# Patient Record
Sex: FEMALE | Race: WHITE | NOT HISPANIC OR LATINO | Employment: OTHER | URBAN - METROPOLITAN AREA
[De-identification: names, ages, dates, MRNs, and addresses within clinical notes are randomized per-mention and may not be internally consistent; named-entity substitution may affect disease eponyms.]

---

## 2017-01-04 ENCOUNTER — OFFICE VISIT (OUTPATIENT)
Dept: CARDIOLOGY | Facility: MEDICAL CENTER | Age: 62
End: 2017-01-04
Payer: OTHER GOVERNMENT

## 2017-01-04 VITALS
RESPIRATION RATE: 12 BRPM | BODY MASS INDEX: 25.73 KG/M2 | DIASTOLIC BLOOD PRESSURE: 70 MMHG | HEART RATE: 72 BPM | SYSTOLIC BLOOD PRESSURE: 140 MMHG | WEIGHT: 150 LBS

## 2017-01-04 DIAGNOSIS — I35.1 NONRHEUMATIC AORTIC VALVE INSUFFICIENCY: Chronic | ICD-10-CM

## 2017-01-04 DIAGNOSIS — E78.00 PURE HYPERCHOLESTEROLEMIA: ICD-10-CM

## 2017-01-04 PROCEDURE — 99213 OFFICE O/P EST LOW 20 MIN: CPT | Performed by: INTERNAL MEDICINE

## 2017-01-04 ASSESSMENT — ENCOUNTER SYMPTOMS
WHEEZING: 0
BRUISES/BLEEDS EASILY: 0
MYALGIAS: 0
PND: 0
ORTHOPNEA: 0
COUGH: 0

## 2017-01-04 NOTE — Clinical Note
[facility]   [address]  [place] [address]  Phone: [phone]  Fax: [phone]              Shahrzad Scott  1955    Encounter Date: 1/4/2017    Sharath Macias M.D.          PROGRESS NOTE:  Subjective:   Shahrzad Scott is a 60 yo lady who is normally followed by Dr. Jeff Choi but is here for her annual follow-up of her aortic regurgitation and lipids. Her lab data are scanned in. She's had no interval cardiac symptoms at all.  Activity tolerance is normal.  The past year has been extremely hectic due to severe illness of a friend from which she has now recovered and she has been doing very well.  Past Medical History   Diagnosis Date   • Aortic regurgitation 7/26/2011   • HLD (hyperlipidemia) 7/16/2008   • Mitral regurgitation 7/26/2011   • MVP (mitral valve prolapse) 7/26/2011   • Palpitations 7/26/2011   • Pulmonary hypertension (HCC) 10/3/2012     Past Surgical History   Procedure Laterality Date   • Hysterectomy, total abdominal       at age 37 for uterine fibroid, still has both ovaries   • Cataract extraction with iol Bilateral      Dr. Wilder   • Breast reconstruction Bilateral      Family History   Problem Relation Age of Onset   • Stroke Mother    • Hypertension Mother    • Cancer Father      prostate cancer   • Lung Disease Sister      asthma   • No Known Problems Brother    • No Known Problems Maternal Grandmother    • No Known Problems Maternal Grandfather    • Other Brother      cerabral palsy     History   Smoking status   • Never Smoker    Smokeless tobacco   • Never Used     Allergies   Allergen Reactions   • Septra [Bactrim] Hives   • Sulfa Drugs      Outpatient Encounter Prescriptions as of 1/4/2017   Medication Sig Dispense Refill   • MINIVELLE 0.025 MG/24HR PATCH BIWEEKLY Apply 1 Patch to skin as directed. Indications: Moderate to Severe Vasomotor Symptoms, every Wed and Friday     • Multiple  Vitamins-Minerals (MULTIVITAMIN ADULTS 50+ PO) Take  by mouth.     • RESVERATROL PO Take  by mouth.     • ascorbic acid (ASCORBIC ACID) 500 MG Tab Take 500 mg by mouth every day.     • Probiotic Product (PROBIOTIC DAILY PO) Take  by mouth.     • Alpha Lipoic Acid 200 MG Cap Take  by mouth.     • Magnesium 400 MG Cap Take  by mouth.     • Diclofenac Sodium 1 % Gel Apply 2 gram on right arm twice a day as needed 1 Tube 3   • [DISCONTINUED] cyclobenzaprine (FLEXERIL) 5 MG tablet Take 1-2 Tabs by mouth 2 times a day as needed for Severe Pain or Muscle Spasms. 60 Tab 2   • minocycline (MINOCIN) 100 MG Cap   5   • FINACEA 15 % Gel   6   • Ranitidine HCl (ZANTAC PO) Take 150 mg by mouth 2 Times a Day.     • Calcium Carbonate-Vitamin D (CALCIUM + D) 600-200 MG-UNIT TABS Take  by mouth every day.     • Glucosamine-Chondroit-Vit C-Mn (GLUCOSAMINE CHONDR 1500 COMPLX) CAPS Take 1 Cap by mouth every day.     • docosahexanoic acid (OMEGA 3) 1000 MG CAPS Take 1,000 mg by mouth every day.     • vitamin D (CHOLECALCIFEROL) 1000 UNIT TABS Take 2,000 Units by mouth every day. TAKE AS DIRECTED.       No facility-administered encounter medications on file as of 1/4/2017.     Review of Systems   HENT: Negative for nosebleeds.    Respiratory: Negative for cough and wheezing.    Cardiovascular: Negative for orthopnea and PND.   Musculoskeletal: Negative for myalgias.   Endo/Heme/Allergies: Does not bruise/bleed easily.        Objective:   /70 mmHg  Pulse 72  Resp 12  Wt 68.04 kg (150 lb)    Physical Exam   Constitutional: She is oriented to person, place, and time. She appears well-developed and well-nourished.   She is young for her age   Eyes: Conjunctivae are normal. No scleral icterus.   Neck: No JVD present.   Cardiovascular: Normal rate, regular rhythm, S1 normal, S2 normal and intact distal pulses.  Exam reveals no gallop.    Murmur (3/6 early to midsystolic ejection murmur with well preserved 2nd sound) heard.  I cannot  hear her diastolic murmur today   Pulmonary/Chest: Effort normal and breath sounds normal.   Musculoskeletal: She exhibits no edema.   Neurological: She is alert and oriented to person, place, and time.   Skin: Skin is warm and dry.   Psychiatric: She has a normal mood and affect. Thought content normal.       Assessment:     1. Pure hypercholesterolemia     2. Nonrheumatic aortic valve insufficiency       The echo is enclosed and was stable. Her LDL is higher than it has been. The risk calculator does not mandate drug therapy as yet however. Her blood pressure is borderline and definitely out of normal for her.  Her estimated right ventricular systolic pressure was only mildly elevated and she is very well familiar with this.  Medical Decision Making:  Today's Assessment / Status / Plan:     She'll check her blood pressures and outpatient about every month and reported his elevated beyond its usual 130/70 or less. She and Dr. Donnelly are going to recheck lipids this summer. She will be seeing Dr. Choi in late 2017 and I will see her immediately during his absence for any problems.  She will get another echo in one year.      No Recipients

## 2017-01-04 NOTE — PROGRESS NOTES
Subjective:   Shahrzad Scott is a 62 yo lady who is normally followed by Dr. Jeff Choi but is here for her annual follow-up of her aortic regurgitation and lipids. Her lab data are scanned in. She's had no interval cardiac symptoms at all.  Activity tolerance is normal.  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  Past Medical History   Diagnosis Date   • Aortic regurgitation 1996   • HLD (hyperlipidemia)    • Mitral regurgitation    • MVP (mitral valve prolapse)    • Palpitations    • Pulmonary hypertension (HCC) 10/3/2012     Past Surgical History   Procedure Laterality Date   • Hysterectomy, total abdominal       at age 37 for uterine fibroid, still has both ovaries   • Cataract extraction with iol Bilateral      Dr. Wilder   • Breast reconstruction Bilateral      Family History   Problem Relation Age of Onset   • Stroke Mother    • Hypertension Mother    • Cancer Father      prostate cancer   • Lung Disease Sister      asthma   • No Known Problems Brother    • No Known Problems Maternal Grandmother    • No Known Problems Maternal Grandfather    • Other Brother      cerabral palsy     History   Smoking status   • Never Smoker    Smokeless tobacco   • Never Used     Allergies   Allergen Reactions   • Septra [Bactrim] Hives   • Sulfa Drugs      Outpatient Encounter Prescriptions as of 1/4/2017   Medication Sig Dispense Refill   • MINIVELLE 0.025 MG/24HR PATCH BIWEEKLY Apply 1 Patch to skin as directed. Indications: Moderate to Severe Vasomotor Symptoms, every Wed and Friday     • Multiple Vitamins-Minerals (MULTIVITAMIN ADULTS 50+ PO) Take  by mouth.     • RESVERATROL PO Take  by mouth.     • ascorbic acid (ASCORBIC ACID) 500 MG Tab Take 500 mg by mouth every day.     • Probiotic Product (PROBIOTIC DAILY PO) Take  by mouth.     • Alpha Lipoic Acid 200 MG Cap Take  by mouth.      • Magnesium 400 MG Cap Take  by mouth.     • Diclofenac Sodium 1 % Gel Apply 2 gram on right arm twice a day as needed 1 Tube 3   • [DISCONTINUED] cyclobenzaprine (FLEXERIL) 5 MG tablet Take 1-2 Tabs by mouth 2 times a day as needed for Severe Pain or Muscle Spasms. 60 Tab 2   • minocycline (MINOCIN) 100 MG Cap   5   • FINACEA 15 % Gel   6   • Ranitidine HCl (ZANTAC PO) Take 150 mg by mouth 2 Times a Day.     • Calcium Carbonate-Vitamin D (CALCIUM + D) 600-200 MG-UNIT TABS Take  by mouth every day.     • Glucosamine-Chondroit-Vit C-Mn (GLUCOSAMINE CHONDR 1500 COMPLX) CAPS Take 1 Cap by mouth every day.     • docosahexanoic acid (OMEGA 3) 1000 MG CAPS Take 1,000 mg by mouth every day.     • vitamin D (CHOLECALCIFEROL) 1000 UNIT TABS Take 2,000 Units by mouth every day. TAKE AS DIRECTED.       No facility-administered encounter medications on file as of 1/4/2017.     Review of Systems   HENT: Negative for nosebleeds.    Respiratory: Negative for cough and wheezing.    Cardiovascular: Negative for orthopnea and PND.   Musculoskeletal: Negative for myalgias.   Endo/Heme/Allergies: Does not bruise/bleed easily.        Objective:   /70 mmHg  Pulse 72  Resp 12  Wt 68.04 kg (150 lb)    Physical Exam   Constitutional: She is oriented to person, place, and time. She appears well-developed and well-nourished.   She is young for her age   Eyes: Conjunctivae are normal. No scleral icterus.   Neck: No JVD present.   Cardiovascular: Normal rate, regular rhythm, S1 normal, S2 normal and intact distal pulses.  Exam reveals no gallop.    Murmur (3/6 early to midsystolic ejection murmur with well preserved 2nd sound) heard.  I cannot hear her diastolic murmur today   Pulmonary/Chest: Effort normal and breath sounds normal.   Musculoskeletal: She exhibits no edema.   Neurological: She is alert and oriented to person, place, and time.   Skin: Skin is warm and dry.   Psychiatric: She has a normal mood and affect. Thought  content normal.       Assessment:     1. Pure hypercholesterolemia     2. Nonrheumatic aortic valve insufficiency       The echo is enclosed and was stable. Her LDL is higher than it has been. The risk calculator does not mandate drug therapy as yet however. Her blood pressure is borderline and definitely out of normal for her.  Her estimated right ventricular systolic pressure was only mildly elevated and she is very well familiar with this.  Medical Decision Making:  Today's Assessment / Status / Plan:     She'll check her blood pressures and outpatient about every month and reported his elevated beyond its usual 130/70 or less. She and Dr. Donnelly are going to recheck lipids this summer. She will be seeing Dr. Choi in late 2017 and I will see her immediately during his absence for any problems.  She will get another echo in one year.

## 2017-01-04 NOTE — MR AVS SNAPSHOT
Shahrzad Scott   2017 1:00 PM   Office Visit   MRN: [id number]    Department:  [facility]   Dept Phone:  [phone]    Description:  Female : 1955   Provider:  Sharath Macias M.D.           Allergies as of 2017     Allergen Noted Reactions    Septra [Bactrim] 2008   Hives    Sulfa Drugs 2015         You were diagnosed with     Pure hypercholesterolemia   [272.0.ICD-9-CM]       Nonrheumatic aortic valve insufficiency   [136400]         Vital Signs     Blood Pressure Pulse Respirations Weight Smoking Status       140/70 mmHg 72 12 68.04 kg (150 lb) Never Smoker        Basic Information     Date Of Birth Sex Race Ethnicity Preferred Language    1955 Female White Non- English      Your appointments     2017  1:00 PM   Established Patient with Chloe Donnelly M.D.   [address] [facility] [address]    [employer] [place] [id number] [phone]           You will be receiving a confirmation call a few days before your appointment from our automated call confirmation system.              Problem List              ICD-10-CM Priority Class Noted - Resolved    Aortic regurgitation I35.1   2011 - Present    Pure hypercholesterolemia E78.00   2008 - Present    Mitral regurgitation I34.0   2011 - Present    MVP (mitral valve prolapse) I34.1   2011 - Present    Palpitations R00.2   2011 - Present    Pulmonary hypertension (HCC) I27.2   10/3/2012 - Present    Hot flashes due to menopause N95.1   2016 - Present    Gastroesophageal reflux disease without esophagitis K21.9   2016 - Present    Osteopenia M85.80   2016 - Present    Right arm pain M79.601   2016 - Present      Health Maintenance        Date Due Completion Dates    PAP SMEAR 1975 ---    MAMMOGRAM 1994 ---    COLONOSCOPY 2004 ---    IMM DTaP/Tdap/Td Vaccine (2 - Td) 2025            Current Immunizations      INFLUENZA VACCINE H1N1 11/13/2009    Influenza TIV (IM) 9/26/2016    Influenza Vaccine Quad Inj (Pf) 11/6/2015, 10/1/2010    Influenza Vaccine Quad Inj (Preserved) 10/6/2013, 10/16/2012, 10/3/2011    SHINGLES VACCINE 8/14/2014    Tdap Vaccine 8/22/2015      Below and/or attached are the medications your provider expects you to take. Review all of your home medications and newly ordered medications with your provider and/or pharmacist. Follow medication instructions as directed by your provider and/or pharmacist. Please keep your medication list with you and share with your provider. Update the information when medications are discontinued, doses are changed, or new medications (including over-the-counter products) are added; and carry medication information at all times in the event of emergency situations     Allergies:  SEPTRA - Hives     SULFA DRUGS - (reactions not documented)               Medications  Valid as of: January 04, 2017 -  2:21 PM    Generic Name Brand Name Tablet Size Instructions for use    Alpha-Lipoic Acid (Cap) Alpha Lipoic Acid 200 MG Take  by mouth.        Ascorbic Acid (Tab) ascorbic acid 500 MG Take 500 mg by mouth every day.        Azelaic Acid (Gel) FINACEA 15 %         Calcium Carbonate-Vitamin D (Tab) Calcium Carbonate-Vitamin D 600-200 MG-UNIT Take  by mouth every day.        Cholecalciferol (Tab) cholecalciferol 1000 UNIT Take 2,000 Units by mouth every day. TAKE AS DIRECTED.        Diclofenac Sodium (Gel) Diclofenac Sodium 1 % Apply 2 gram on right arm twice a day as needed        Estradiol (PATCH BIWEEKLY) MINIVELLE 0.025 MG/24HR Apply 1 Patch to skin as directed. Indications: Moderate to Severe Vasomotor Symptoms, every Wed and Friday        Glucosamine-Chondroit-Vit C-Mn (Cap) GLUCOSAMINE CHONDR 1500 COMPLX  Take 1 Cap by mouth every day.        Magnesium (Cap) Magnesium 400 MG Take  by mouth.        Minocycline HCl (Cap) MINOCIN 100 MG         Multiple Vitamins-Minerals    Take  by mouth.        Omega-3 Fatty Acids (Cap) OMEGA 3 FA 1000 MG Take 1,000 mg by mouth every day.        Probiotic Product   Take  by mouth.        RaNITidine HCl   Take 150 mg by mouth 2 Times a Day.        Resveratrol   Take  by mouth.        .                 Medicines prescribed today were sent to:     [id number] - [place], [place] - [address] [facility]    [address] [place] [address]    Phone: [phone] Fax: [phone]    Open 24 Hours?: No    [facility] - [place], [place] - [address]    [address] [address]    Phone: [phone] Fax: [phone]    Open 24 Hours?: No      Medication refill instructions:       If your prescription bottle indicates you have medication refills left, it is not necessary to call your provider’s office. Please contact your pharmacy and they will refill your medication.    If your prescription bottle indicates you do not have any refills left, you may request refills at any time through one of the following ways: The online [employer] system (except Urgent Care), by calling your provider’s office, or by asking your pharmacy to contact your provider’s office with a refill request. Medication refills are processed only during regular business hours and may not be available until the next business day. Your provider may request additional information or to have a follow-up visit with you prior to refilling your medication.   *Please Note: Medication refills are assigned a new Rx number when refilled electronically. Your pharmacy may indicate that no refills were authorized even though a new prescription for the same medication is available at the pharmacy. Please request the medicine by name with the pharmacy before contacting your provider for a refill.           [employer] Access Code: Activation code not generated  Current [employer] Status: Active

## 2017-01-04 NOTE — Clinical Note
[facility]   [address]  [place] [address]  Phone: [phone]  Fax: [phone]              Shahrzad Scott  1955    Encounter Date: 1/4/2017    Sharath Macias M.D.          PROGRESS NOTE:  Subjective:   Shahrzad Scott is a 62 yo lady who is normally followed by Dr. Jeff Choi but is here for her annual follow-up of her aortic regurgitation and lipids. Her lab data are scanned in. She's had no interval cardiac symptoms at all.  Activity tolerance is normal.  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  Past Medical History   Diagnosis Date   • Aortic regurgitation 1996   • HLD (hyperlipidemia)    • Mitral regurgitation    • MVP (mitral valve prolapse)    • Palpitations    • Pulmonary hypertension (HCC) 10/3/2012     Past Surgical History   Procedure Laterality Date   • Hysterectomy, total abdominal       at age 37 for uterine fibroid, still has both ovaries   • Cataract extraction with iol Bilateral      Dr. Wilder   • Breast reconstruction Bilateral      Family History   Problem Relation Age of Onset   • Stroke Mother    • Hypertension Mother    • Cancer Father      prostate cancer   • Lung Disease Sister      asthma   • No Known Problems Brother    • No Known Problems Maternal Grandmother    • No Known Problems Maternal Grandfather    • Other Brother      cerabral palsy     History   Smoking status   • Never Smoker    Smokeless tobacco   • Never Used     Allergies   Allergen Reactions   • Septra [Bactrim] Hives   • Sulfa Drugs      Outpatient Encounter Prescriptions as of 1/4/2017   Medication Sig Dispense Refill   • MINIVELLE 0.025 MG/24HR PATCH BIWEEKLY Apply 1 Patch to skin as directed. Indications: Moderate to Severe Vasomotor Symptoms, every Wed and Friday     • Multiple  Vitamins-Minerals (MULTIVITAMIN ADULTS 50+ PO) Take  by mouth.     • RESVERATROL PO Take  by mouth.     • ascorbic acid (ASCORBIC ACID) 500 MG Tab Take 500 mg by mouth every day.     • Probiotic Product (PROBIOTIC DAILY PO) Take  by mouth.     • Alpha Lipoic Acid 200 MG Cap Take  by mouth.     • Magnesium 400 MG Cap Take  by mouth.     • Diclofenac Sodium 1 % Gel Apply 2 gram on right arm twice a day as needed 1 Tube 3   • [DISCONTINUED] cyclobenzaprine (FLEXERIL) 5 MG tablet Take 1-2 Tabs by mouth 2 times a day as needed for Severe Pain or Muscle Spasms. 60 Tab 2   • minocycline (MINOCIN) 100 MG Cap   5   • FINACEA 15 % Gel   6   • Ranitidine HCl (ZANTAC PO) Take 150 mg by mouth 2 Times a Day.     • Calcium Carbonate-Vitamin D (CALCIUM + D) 600-200 MG-UNIT TABS Take  by mouth every day.     • Glucosamine-Chondroit-Vit C-Mn (GLUCOSAMINE CHONDR 1500 COMPLX) CAPS Take 1 Cap by mouth every day.     • docosahexanoic acid (OMEGA 3) 1000 MG CAPS Take 1,000 mg by mouth every day.     • vitamin D (CHOLECALCIFEROL) 1000 UNIT TABS Take 2,000 Units by mouth every day. TAKE AS DIRECTED.       No facility-administered encounter medications on file as of 1/4/2017.     Review of Systems   HENT: Negative for nosebleeds.    Respiratory: Negative for cough and wheezing.    Cardiovascular: Negative for orthopnea and PND.   Musculoskeletal: Negative for myalgias.   Endo/Heme/Allergies: Does not bruise/bleed easily.        Objective:   /70 mmHg  Pulse 72  Resp 12  Wt 68.04 kg (150 lb)    Physical Exam   Constitutional: She is oriented to person, place, and time. She appears well-developed and well-nourished.   She is young for her age   Eyes: Conjunctivae are normal. No scleral icterus.   Neck: No JVD present.   Cardiovascular: Normal rate, regular rhythm, S1 normal, S2 normal and intact distal pulses.  Exam reveals no gallop.    Murmur (3/6 early to midsystolic ejection murmur with well preserved 2nd sound) heard.  I cannot  hear her diastolic murmur today   Pulmonary/Chest: Effort normal and breath sounds normal.   Musculoskeletal: She exhibits no edema.   Neurological: She is alert and oriented to person, place, and time.   Skin: Skin is warm and dry.   Psychiatric: She has a normal mood and affect. Thought content normal.       Assessment:     1. Pure hypercholesterolemia     2. Nonrheumatic aortic valve insufficiency       The echo is enclosed and was stable. Her LDL is higher than it has been. The risk calculator does not mandate drug therapy as yet however. Her blood pressure is borderline and definitely out of normal for her.  Her estimated right ventricular systolic pressure was only mildly elevated and she is very well familiar with this.  Medical Decision Making:  Today's Assessment / Status / Plan:     She'll check her blood pressures and outpatient about every month and reported his elevated beyond its usual 130/70 or less. She and Dr. Donnelly are going to recheck lipids this summer. She will be seeing Dr. Choi in late 2017 and I will see her immediately during his absence for any problems.  She will get another echo in one year.      No Recipients

## 2017-06-22 ENCOUNTER — OFFICE VISIT (OUTPATIENT)
Dept: MEDICAL GROUP | Age: 62
End: 2017-06-22
Payer: OTHER GOVERNMENT

## 2017-06-22 VITALS
TEMPERATURE: 98 F | BODY MASS INDEX: 23.12 KG/M2 | SYSTOLIC BLOOD PRESSURE: 134 MMHG | DIASTOLIC BLOOD PRESSURE: 72 MMHG | HEIGHT: 64 IN | WEIGHT: 135.4 LBS | HEART RATE: 89 BPM | OXYGEN SATURATION: 97 %

## 2017-06-22 DIAGNOSIS — K21.9 GASTROESOPHAGEAL REFLUX DISEASE WITHOUT ESOPHAGITIS: ICD-10-CM

## 2017-06-22 DIAGNOSIS — E78.00 PURE HYPERCHOLESTEROLEMIA: ICD-10-CM

## 2017-06-22 DIAGNOSIS — R03.0 PREHYPERTENSION: ICD-10-CM

## 2017-06-22 PROCEDURE — 99214 OFFICE O/P EST MOD 30 MIN: CPT | Performed by: INTERNAL MEDICINE

## 2017-06-22 RX ORDER — SIMVASTATIN 10 MG
10 TABLET ORAL EVERY EVENING
Qty: 90 TAB | Refills: 3 | Status: SHIPPED | OUTPATIENT
Start: 2017-06-22 | End: 2018-03-21 | Stop reason: SDUPTHER

## 2017-06-22 RX ORDER — AMPICILLIN TRIHYDRATE 250 MG
1200 CAPSULE ORAL 2 TIMES DAILY
COMMUNITY
End: 2017-06-22

## 2017-06-22 ASSESSMENT — PAIN SCALES - GENERAL: PAINLEVEL: NO PAIN

## 2017-06-22 NOTE — Clinical Note
[facility]  Chloe Donnelly M.D.  [address]  [place] [address]  Fax: [phone]   Authorization for Release/Disclosure of   Protected Health Information   Name: SHAHRZAD GARAY : 1955 SSN: [id number]   Address: [address] [place] [address] [place] [address] Phone:    [phone] (home)    I authorize the entity listed below to release/disclose the PHI below to:   [facility]/Chloe Donnelly M.D. and Chloe Donnelly M.D.   Provider or Entity Name:  [facility]   Address   City, State, Zip   Phone:      Fax:     Reason for request: continuity of care   Information to be released:    [  ] LAST COLONOSCOPY,  including any PATH REPORT and follow-up  [  ] LAST FIT/COLOGUARD RESULT [ xxx ] LAST DEXA  [xxx  ] LAST MAMMOGRAM  [  ] LAST PAP  [  ] LAST LABS [  ] RETINA EXAM REPORT  [  ] IMMUNIZATION RECORDS  [  ] Release all info      [  ] Check here and initial the line next to each item to release ALL health information INCLUDING  _____ Care and treatment for drug and / or alcohol abuse  _____ HIV testing, infection status, or AIDS  _____ Genetic Testing    DATES OF SERVICE OR TIME PERIOD TO BE DISCLOSED: _____________  I understand and acknowledge that:  * This Authorization may be revoked at any time by you in writing, except if your health information has already been used or disclosed.  * Your health information that will be used or disclosed as a result of you signing this authorization could be re-disclosed by the recipient. If this occurs, your re-disclosed health information may no longer be protected by State or Federal laws.  * You may refuse to sign this Authorization. Your refusal will not affect your ability to obtain treatment.  * This Authorization becomes effective upon signing and will  on (date) __________.      If no date is indicated, this Authorization will  one (1) year from the signature date.    Name: Shahrzad Garay    Signature:   Date:     2017       PLEASE FAX  REQUESTED RECORDS BACK TO: [phone]

## 2017-06-22 NOTE — MR AVS SNAPSHOT
"        Shahrzad Jamess   2017 1:00 PM   Office Visit   MRN: [id number]    Department:  [address] [place] [address]   Dept Phone:  [phone]    Description:  Female : 1955   Provider:  Chloe Donnelly M.D.           Reason for Visit     Hyperlipidemia lab review      Allergies as of 2017     Allergen Noted Reactions    Septra [Bactrim] 2008   Hives    Sulfa Drugs 2015         You were diagnosed with     Pure hypercholesterolemia   [272.0.ICD-9-CM]       Gastroesophageal reflux disease without esophagitis   [660912]       Prehypertension   [397625]         Vital Signs     Blood Pressure Pulse Temperature Height Weight Body Mass Index    134/72 mmHg 89 36.7 °C (98 °F) 1.626 m (5' 4\") 61.417 kg (135 lb 6.4 oz) 23.23 kg/m2    Oxygen Saturation Breastfeeding? Smoking Status             97% No Never Smoker          Basic Information     Date Of Birth Sex Race Ethnicity Preferred Language    1955 Female White Non- English      Your appointments     Sep 20, 2017  1:00 PM   Established Patient with Chloe Donnelly M.D.   [address] [id number] [phone]           You will be receiving a confirmation call a few days before your appointment from our automated call confirmation system.              Problem List              ICD-10-CM Priority Class Noted - Resolved    Aortic regurgitation I35.1   2011 - Present    Pure hypercholesterolemia E78.00   2008 - Present    Mitral regurgitation I34.0   2011 - Present    MVP (mitral valve prolapse) I34.1   2011 - Present    Palpitations R00.2   2011 - Present    Pulmonary hypertension (CMS-HCC) I27.2   10/3/2012 - Present    Hot flashes due to menopause N95.1   2016 - Present    Gastroesophageal reflux disease without esophagitis K21.9   2016 - Present    Osteopenia M85.80   2016 - Present    Prehypertension R03.0   2017 - Present      "   Health Maintenance        Date Due Completion Dates    MAMMOGRAM 7/16/1994 ---    IMM DTaP/Tdap/Td Vaccine (2 - Td) 8/22/2025 8/22/2015    COLONOSCOPY 3/21/2027 3/21/2017            Current Immunizations     INFLUENZA VACCINE H1N1 11/13/2009    Influenza TIV (IM) 9/26/2016    Influenza Vaccine Quad Inj (Pf) 11/6/2015, 10/1/2010    Influenza Vaccine Quad Inj (Preserved) 10/6/2013, 10/16/2012, 10/3/2011    SHINGLES VACCINE 8/14/2014    Tdap Vaccine 8/22/2015      Below and/or attached are the medications your provider expects you to take. Review all of your home medications and newly ordered medications with your provider and/or pharmacist. Follow medication instructions as directed by your provider and/or pharmacist. Please keep your medication list with you and share with your provider. Update the information when medications are discontinued, doses are changed, or new medications (including over-the-counter products) are added; and carry medication information at all times in the event of emergency situations     Allergies:  SEPTRA - Hives     SULFA DRUGS - (reactions not documented)               Medications  Valid as of: June 22, 2017 -  1:44 PM    Generic Name Brand Name Tablet Size Instructions for use    Alpha-Lipoic Acid (Cap) Alpha Lipoic Acid 200 MG Take  by mouth.        Ascorbic Acid (Tab) ascorbic acid 500 MG Take 500 mg by mouth every day.        Azelaic Acid (Gel) FINACEA 15 %         Calcium Carbonate-Vitamin D (Tab) Calcium Carbonate-Vitamin D 600-200 MG-UNIT Take  by mouth every day.        Cholecalciferol (Tab) cholecalciferol 1000 UNIT Take 2,000 Units by mouth every day. TAKE AS DIRECTED.        Coenzyme Q10 (Cap) Coenzyme Q10 200 MG Take  by mouth.        Estradiol (PATCH BIWEEKLY) MINIVELLE 0.025 MG/24HR Apply 1 Patch to skin as directed. Indications: Moderate to Severe Vasomotor Symptoms, every Wed and Friday        Glucosamine-Chondroit-Vit C-Mn (Cap) GLUCOSAMINE CHONDR 1500 COMPLX  Take 1  Cap by mouth every day.        Magnesium (Cap) Magnesium 400 MG Take  by mouth.        Minocycline HCl (Cap) MINOCIN 100 MG         Multiple Vitamins-Minerals   Take  by mouth.        Omega-3 Fatty Acids (Cap) OMEGA 3 FA 1000 MG Take 1,000 mg by mouth every day.        Probiotic Product   Take  by mouth.        RaNITidine HCl   Take 150 mg by mouth 2 Times a Day.        Resveratrol   Take  by mouth.        Simvastatin (Tab) ZOCOR 10 MG Take 1 Tab by mouth every evening.        .                 Medicines prescribed today were sent to:     [facility] [id number] [place] - [address] [facility]    [address] [facility] [address]    Phone: [phone] Fax: [phone]    Open 24 Hours?: No    [facility] - [place], [place] - [address] [place] [address]    Phone: [phone] Fax: [phone]    Open 24 Hours?: No      Medication refill instructions:       If your prescription bottle indicates you have medication refills left, it is not necessary to call your provider’s office. Please contact your pharmacy and they will refill your medication.    If your prescription bottle indicates you do not have any refills left, you may request refills at any time through one of the following ways: The online [email or web address] system (except Urgent Care), by calling your provider’s office, or by asking your pharmacy to contact your provider’s office with a refill request. Medication refills are processed only during regular business hours and may not be available until the next business day. Your provider may request additional information or to have a follow-up visit with you prior to refilling your medication.   *Please Note: Medication refills are assigned a new Rx number when refilled electronically. Your pharmacy may indicate that no refills were authorized even though a new prescription for the same medication is available at the  pharmacy. Please request the medicine by name with the pharmacy before contacting your provider for a refill.        Your To Do List     Future Labs/Procedures Complete By Expires    COMP METABOLIC PANEL  As directed 6/23/2018    LIPID PROFILE  As directed 6/23/2018         MyChart Access Code: Activation code not generated  Current MyChart Status: Active

## 2017-06-23 NOTE — PROGRESS NOTES
Subjective:   Joon Garay is a 61 y.o. female here today for evaluation and management of:      Pure hypercholesterolemia  Patient is taking over-the-counter supplements, omega-3 coenzyme Q10 and red yeast rice to control her cholesterol. She states that she has not been very compliance with diet and exercise. She is eating more high cholesterol diet lately and noncompliance with red yeast rice. Her cholesterol level increased compared to previous blood test on 11/29/16.   Lipid panel on 11/29/16 showed , , HDL 56, .  Lipid panel on 6/14/17 was as follow:    Results for JOON GARAY (MRN [id number]) as of 6/22/2017 19:26   Ref. Range 6/14/2017 09:26   Cholesterol,Tot Latest Ref Range: 100-199 mg/dL 258 (H)   Triglycerides Latest Ref Range: 0-149 mg/dL 160 (H)   HDL Latest Ref Range: >39 mg/dL 62   LDL Latest Ref Range: 0-99 mg/dL 164 (H)   VLDL Cholesterol Calc Latest Ref Range: 5-40 mg/dL 32     Patient is interested to start statin treatment. Her ASCVD score was 4.8 if we are not counted her as hypertensive patient. Patient stated that she has significant family history of hypertension, stroke and vascular disease. She wants to have early prevention. She agreed to start with low-dose simvastatin.    Prehypertension  Patient has borderline high in her blood pressure. She stated that her blood pressure at home has been good. She will start a monitor her blood pressure regularly. She is advised to bring her blood pressure monitoring machine as well as high blood pressure reading from home to next follow-up visit. We also discussed healthy diet and exercise to improve her blood pressure.    Gastroesophageal reflux disease without esophagitis  Patient is taking ranitidine 150 mg twice a day. Her symptoms has intermittent flareup depending on that diet. She has never tried to stop taking ranitidine. She will try to control her diet and will try to stop taking ranitidine if she can.         Current  "medicines (including changes today)  Current Outpatient Prescriptions   Medication Sig Dispense Refill   • Coenzyme Q10 200 MG Cap Take  by mouth.     • simvastatin (ZOCOR) 10 MG Tab Take 1 Tab by mouth every evening. 90 Tab 3   • MINIVELLE 0.025 MG/24HR PATCH BIWEEKLY Apply 1 Patch to skin as directed. Indications: Moderate to Severe Vasomotor Symptoms, every Wed and Friday     • Multiple Vitamins-Minerals (MULTIVITAMIN ADULTS 50+ PO) Take  by mouth.     • RESVERATROL PO Take  by mouth.     • ascorbic acid (ASCORBIC ACID) 500 MG Tab Take 500 mg by mouth every day.     • Probiotic Product (PROBIOTIC DAILY PO) Take  by mouth.     • Alpha Lipoic Acid 200 MG Cap Take  by mouth.     • Magnesium 400 MG Cap Take  by mouth.     • minocycline (MINOCIN) 100 MG Cap   5   • FINACEA 15 % Gel   6   • Ranitidine HCl (ZANTAC PO) Take 150 mg by mouth 2 Times a Day.     • Calcium Carbonate-Vitamin D (CALCIUM + D) 600-200 MG-UNIT TABS Take  by mouth every day.     • Glucosamine-Chondroit-Vit C-Mn (GLUCOSAMINE CHONDR 1500 COMPLX) CAPS Take 1 Cap by mouth every day.     • docosahexanoic acid (OMEGA 3) 1000 MG CAPS Take 1,000 mg by mouth every day.     • vitamin D (CHOLECALCIFEROL) 1000 UNIT TABS Take 2,000 Units by mouth every day. TAKE AS DIRECTED.       No current facility-administered medications for this visit.     She  has a past medical history of Aortic regurgitation (1996); HLD (hyperlipidemia); Mitral regurgitation; MVP (mitral valve prolapse); Palpitations; and Pulmonary hypertension (CMS-[facility]) (10/3/2012).    ROS   No chest pain, no shortness of breath, no abdominal pain       Objective:     Blood pressure 134/72, pulse 89, temperature 36.7 °C (98 °F), height 1.626 m (5' 4\"), weight 61.417 kg (135 lb 6.4 oz), SpO2 97 %, not currently breastfeeding. Body mass index is 23.23 kg/(m^2).   Physical Exam:  General: Alert, oriented and no acute distress.  Eye contact is good, speech goal directed, affect calm  HEENT: conjunctiva " non-injected, sclera non-icteric.  Oral mucous membranes pink and moist with no lesions.  Pinna normal.  Lungs: Normal respiratory effort, clear to auscultation bilaterally with good excursion.  CV: regular rate and rhythm. No murmurs.   Abdomen: soft, non distended, nontender, Bowel sound normal.  Ext: no edema, color normal, vascularity normal, temperature normal        Assessment and Plan:   The following treatment plan was discussed     1. Pure hypercholesterolemia  - Start on simvastatin 10 mg today to take every evening. Reviewed potential side effects of simvastatin with patient.  - Advised to eat low fat, low carbohydrate and high fiber diet as well as do cardio physical exercise regularly.   - simvastatin (ZOCOR) 10 MG Tab; Take 1 Tab by mouth every evening.  Dispense: 90 Tab; Refill: 3  - COMP METABOLIC PANEL; Future  - LIPID PROFILE; Future    2. Gastroesophageal reflux disease without esophagitis  - Stable, but has intermittent flareup due to that some food. She will try to avoid acidic food and avoid eating late. Continue ranitidine 150 mg twice a day.    3. Prehypertension  - She may have whitecoat hypertension or pre-hypertension. She understands that it needs to be taken seriously to prevent hypertension and cardiovascular disease.  - Discussed low-sodium diet, low carbohydrate and low-fat. Advised to do regular cardio exercise.  - Recommend to monitor blood pressure and heart rate at home.  - COMP METABOLIC PANEL; Future  - LIPID PROFILE; Future    4. Health Maintenance   - Patient states that she just has DEXA scan and mammogram recently at [facility]. We will request the report. She has annual pelvic exam with gynecologist. She just has pelvic exam with her gynecologist this year. Her gynecologist stated that she does not require Pap smear, but she will have one more Pap smear next year. She is taking estrogen supplement from gynecologist. Patient requested to remove Pap smear from screening  list as she has total hysterectomy.      Followup: Return in about 3 months (around 9/22/2017), or if symptoms worsen or fail to improve, for pre-hypertension, hyperlipidemia, GERD, and lab review.      Please note that this dictation was created using voice recognition software. I have made every reasonable attempt to correct obvious errors, but I expect that there may have unintended errors in text, spelling, punctuation, or grammar that I did not discover.

## 2017-06-23 NOTE — ASSESSMENT & PLAN NOTE
Patient is taking over-the-counter supplements, omega-3 coenzyme Q10 and red yeast rice to control her cholesterol. She states that she has not been very compliance with diet and exercise. She is eating more high cholesterol diet lately and noncompliance with red yeast rice. Her cholesterol level increased compared to previous blood test on 11/29/16.   Lipid panel on 11/29/16 showed , , HDL 56, .  Lipid panel on 6/14/17 was as follow:    Results for JOON GARAY (MRN [id number]) as of 6/22/2017 19:26   Ref. Range 6/14/2017 09:26   Cholesterol,Tot Latest Ref Range: 100-199 mg/dL 258 (H)   Triglycerides Latest Ref Range: 0-149 mg/dL 160 (H)   HDL Latest Ref Range: >39 mg/dL 62   LDL Latest Ref Range: 0-99 mg/dL 164 (H)   VLDL Cholesterol Calc Latest Ref Range: 5-40 mg/dL 32     Patient is interested to start statin treatment. Her ASCVD score was 4.8 if we are not counted her as hypertensive patient. Patient stated that she has significant family history of hypertension, stroke and vascular disease. She wants to have early prevention. She agreed to start with low-dose simvastatin.

## 2017-06-23 NOTE — ASSESSMENT & PLAN NOTE
Patient has borderline high in her blood pressure. She stated that her blood pressure at home has been good. She will start a monitor her blood pressure regularly. She is advised to bring her blood pressure monitoring machine as well as high blood pressure reading from home to next follow-up visit. We also discussed healthy diet and exercise to improve her blood pressure.

## 2017-07-27 ENCOUNTER — HOSPITAL ENCOUNTER (INPATIENT)
Dept: HOSPITAL 8 - ED | Age: 62
LOS: 1 days | Discharge: HOME | DRG: 313 | End: 2017-07-28
Attending: INTERNAL MEDICINE | Admitting: INTERNAL MEDICINE
Payer: OTHER GOVERNMENT

## 2017-07-27 VITALS — HEIGHT: 64 IN | WEIGHT: 137.79 LBS | BODY MASS INDEX: 23.52 KG/M2

## 2017-07-27 VITALS — SYSTOLIC BLOOD PRESSURE: 160 MMHG | DIASTOLIC BLOOD PRESSURE: 83 MMHG

## 2017-07-27 VITALS — DIASTOLIC BLOOD PRESSURE: 83 MMHG | SYSTOLIC BLOOD PRESSURE: 143 MMHG

## 2017-07-27 VITALS — SYSTOLIC BLOOD PRESSURE: 121 MMHG | DIASTOLIC BLOOD PRESSURE: 76 MMHG

## 2017-07-27 DIAGNOSIS — Z82.3: ICD-10-CM

## 2017-07-27 DIAGNOSIS — I08.0: ICD-10-CM

## 2017-07-27 DIAGNOSIS — I10: ICD-10-CM

## 2017-07-27 DIAGNOSIS — Z82.49: ICD-10-CM

## 2017-07-27 DIAGNOSIS — Z88.2: ICD-10-CM

## 2017-07-27 DIAGNOSIS — Z98.42: ICD-10-CM

## 2017-07-27 DIAGNOSIS — Z82.0: ICD-10-CM

## 2017-07-27 DIAGNOSIS — E78.5: ICD-10-CM

## 2017-07-27 DIAGNOSIS — R07.89: Primary | ICD-10-CM

## 2017-07-27 DIAGNOSIS — Z90.49: ICD-10-CM

## 2017-07-27 DIAGNOSIS — Z90.710: ICD-10-CM

## 2017-07-27 DIAGNOSIS — Z98.41: ICD-10-CM

## 2017-07-27 DIAGNOSIS — K21.9: ICD-10-CM

## 2017-07-27 LAB
BUN SERPL-MCNC: 14 MG/DL (ref 7–18)
IS PT STATUS REG ER OR PRE ER?: NO
IS PT STATUS REG ER OR PRE ER?: NO
IS PT STATUS REG ER OR PRE ER?: YES

## 2017-07-27 PROCEDURE — C9898 INPNT STAY RADIOLABELED ITEM: HCPCS

## 2017-07-27 PROCEDURE — 93005 ELECTROCARDIOGRAM TRACING: CPT

## 2017-07-27 PROCEDURE — 80048 BASIC METABOLIC PNL TOTAL CA: CPT

## 2017-07-27 PROCEDURE — 85379 FIBRIN DEGRADATION QUANT: CPT

## 2017-07-27 PROCEDURE — 84484 ASSAY OF TROPONIN QUANT: CPT

## 2017-07-27 PROCEDURE — 82040 ASSAY OF SERUM ALBUMIN: CPT

## 2017-07-27 PROCEDURE — A9502 TC99M TETROFOSMIN: HCPCS

## 2017-07-27 PROCEDURE — 99285 EMERGENCY DEPT VISIT HI MDM: CPT

## 2017-07-27 PROCEDURE — 71010: CPT

## 2017-07-27 PROCEDURE — 36415 COLL VENOUS BLD VENIPUNCTURE: CPT

## 2017-07-27 PROCEDURE — 78452 HT MUSCLE IMAGE SPECT MULT: CPT

## 2017-07-27 PROCEDURE — 93017 CV STRESS TEST TRACING ONLY: CPT

## 2017-07-27 PROCEDURE — 85025 COMPLETE CBC W/AUTO DIFF WBC: CPT

## 2017-07-27 RX ADMIN — FAMOTIDINE SCH MG: 20 TABLET, FILM COATED ORAL at 20:25

## 2017-07-27 RX ADMIN — Medication SCH CAP: at 10:30

## 2017-07-27 RX ADMIN — ACETAMINOPHEN PRN MG: 325 TABLET, FILM COATED ORAL at 10:23

## 2017-07-27 RX ADMIN — ACETAMINOPHEN PRN MG: 325 TABLET, FILM COATED ORAL at 15:23

## 2017-07-27 RX ADMIN — SODIUM CHLORIDE, PRESERVATIVE FREE SCH ML: 5 INJECTION INTRAVENOUS at 20:24

## 2017-07-28 VITALS — DIASTOLIC BLOOD PRESSURE: 75 MMHG | SYSTOLIC BLOOD PRESSURE: 125 MMHG

## 2017-07-28 VITALS — SYSTOLIC BLOOD PRESSURE: 127 MMHG | DIASTOLIC BLOOD PRESSURE: 80 MMHG

## 2017-07-28 RX ADMIN — Medication SCH CAP: at 10:13

## 2017-07-28 RX ADMIN — SODIUM CHLORIDE, PRESERVATIVE FREE SCH ML: 5 INJECTION INTRAVENOUS at 10:16

## 2017-07-28 RX ADMIN — FAMOTIDINE SCH MG: 20 TABLET, FILM COATED ORAL at 10:14

## 2017-08-15 ENCOUNTER — OFFICE VISIT (OUTPATIENT)
Dept: MEDICAL GROUP | Age: 62
End: 2017-08-15
Payer: OTHER GOVERNMENT

## 2017-08-15 VITALS
TEMPERATURE: 98.2 F | HEART RATE: 88 BPM | DIASTOLIC BLOOD PRESSURE: 74 MMHG | SYSTOLIC BLOOD PRESSURE: 130 MMHG | WEIGHT: 138 LBS | OXYGEN SATURATION: 95 % | HEIGHT: 64 IN | BODY MASS INDEX: 23.56 KG/M2

## 2017-08-15 DIAGNOSIS — H61.23 BILATERAL IMPACTED CERUMEN: ICD-10-CM

## 2017-08-15 DIAGNOSIS — Z09 HOSPITAL DISCHARGE FOLLOW-UP: ICD-10-CM

## 2017-08-15 PROCEDURE — 99214 OFFICE O/P EST MOD 30 MIN: CPT | Performed by: INTERNAL MEDICINE

## 2017-08-15 ASSESSMENT — PAIN SCALES - GENERAL: PAINLEVEL: NO PAIN

## 2017-08-15 NOTE — MR AVS SNAPSHOT
"        Shahrzad Jamess   8/15/2017 2:00 PM   Office Visit   MRN: [id number]    Department:  [address] [place] [address]   Dept Phone:  [phone]    Description:  Female : 1955   Provider:  Chloe Donnelly M.D.           Reason for Visit     Hospital Follow-up chest pain    Ear Fullness           Allergies as of 8/15/2017     Allergen Noted Reactions    Septra [Bactrim] 2008   Hives    Sulfa Drugs 2015         You were diagnosed with     Hospital discharge follow-up   [667011]       Bilateral impacted cerumen   [832270]         Vital Signs     Blood Pressure Pulse Temperature Height Weight Body Mass Index    130/74 mmHg 88 36.8 °C (98.2 °F) 1.626 m (5' 4\") 62.596 kg (138 lb) 23.68 kg/m2    Oxygen Saturation Breastfeeding? Smoking Status             95% No Never Smoker          Basic Information     Date Of Birth Sex Race Ethnicity Preferred Language    1955 Female White Non- English      Your appointments     Sep 20, 2017  1:00 PM   Established Patient with Chloe Donnelly M.D.   [address])    [address] [place] [id number] [phone]           You will be receiving a confirmation call a few days before your appointment from our automated call confirmation system.              Problem List              ICD-10-CM Priority Class Noted - Resolved    Aortic regurgitation I35.1   2011 - Present    Pure hypercholesterolemia E78.00   2008 - Present    Mitral regurgitation I34.0   2011 - Present    MVP (mitral valve prolapse) I34.1   2011 - Present    Palpitations R00.2   2011 - Present    Pulmonary hypertension (CMS-[facility]) I27.2   10/3/2012 - Present    Hot flashes due to menopause N95.1   2016 - Present    Gastroesophageal reflux disease without esophagitis K21.9   2016 - Present    Osteopenia M85.80   2016 - Present    Prehypertension R03.0   2017 - Present    Hospital discharge follow-up Z09   8/15/2017 " - Present    Bilateral impacted cerumen H61.23   8/15/2017 - Present      Health Maintenance        Date Due Completion Dates    MAMMOGRAM 7/16/1994 ---    IMM INFLUENZA (1) 9/1/2017 9/26/2016, 11/6/2015, 10/6/2013, 10/16/2012, 10/3/2011, 10/1/2010    IMM DTaP/Tdap/Td Vaccine (2 - Td) 8/22/2025 8/22/2015    COLONOSCOPY 3/21/2027 3/21/2017            Current Immunizations     INFLUENZA VACCINE H1N1 11/13/2009    Influenza TIV (IM) 9/26/2016    Influenza Vaccine Quad Inj (Pf) 11/6/2015, 10/1/2010    Influenza Vaccine Quad Inj (Preserved) 10/6/2013, 10/16/2012, 10/3/2011    SHINGLES VACCINE 8/14/2014    Tdap Vaccine 8/22/2015      Below and/or attached are the medications your provider expects you to take. Review all of your home medications and newly ordered medications with your provider and/or pharmacist. Follow medication instructions as directed by your provider and/or pharmacist. Please keep your medication list with you and share with your provider. Update the information when medications are discontinued, doses are changed, or new medications (including over-the-counter products) are added; and carry medication information at all times in the event of emergency situations     Allergies:  SEPTRA - Hives     SULFA DRUGS - (reactions not documented)               Medications  Valid as of: August 15, 2017 -  4:33 PM    Generic Name Brand Name Tablet Size Instructions for use    Alpha-Lipoic Acid (Cap) Alpha Lipoic Acid 200 MG Take  by mouth.        Ascorbic Acid (Tab) ascorbic acid 500 MG Take 500 mg by mouth every day.        Azelaic Acid (Gel) FINACEA 15 %         Calcium Carbonate-Vitamin D (Tab) Calcium Carbonate-Vitamin D 600-200 MG-UNIT Take  by mouth every day.        Cholecalciferol (Tab) cholecalciferol 1000 UNIT Take 2,000 Units by mouth every day. TAKE AS DIRECTED.        Coenzyme Q10 (Cap) Coenzyme Q10 200 MG Take  by mouth.        Estradiol (PATCH BIWEEKLY) MINIVELLE 0.025 MG/24HR Apply 1 Patch to skin  as directed. Indications: Moderate to Severe Vasomotor Symptoms, every Wed and Friday        Minocycline HCl (Cap) MINOCIN 100 MG         Multiple Vitamins-Minerals   Take  by mouth.        Omega-3 Fatty Acids (Cap) OMEGA 3 FA 1000 MG Take 1,000 mg by mouth every day.        Probiotic Product   Take  by mouth.        RaNITidine HCl   Take 150 mg by mouth 2 Times a Day.        Resveratrol   Take  by mouth.        Simvastatin (Tab) ZOCOR 10 MG Take 1 Tab by mouth every evening.        .                 Medicines prescribed today were sent to:     [facility] - [place] [address] [facility]    [address] [facility] [address]    Phone: [phone] Fax: [phone]    Open 24 Hours?: No    [facility] [address] [facility] [address] [facility]    [address] [facility] [address]    Phone: [phone] Fax: [phone]    Open 24 Hours?: No      Medication refill instructions:       If your prescription bottle indicates you have medication refills left, it is not necessary to call your provider’s office. Please contact your pharmacy and they will refill your medication.    If your prescription bottle indicates you do not have any refills left, you may request refills at any time through one of the following ways: The online [employer] system (except Urgent Care), by calling your provider’s office, or by asking your pharmacy to contact your provider’s office with a refill request. Medication refills are processed only during regular business hours and may not be available until the next business day. Your provider may request additional information or to have a follow-up visit with you prior to refilling your medication.   *Please Note: Medication refills are assigned a new Rx number when refilled electronically. Your pharmacy may indicate that no refills were authorized even though a new prescription for the same medication is available at the  pharmacy. Please request the medicine by name with the pharmacy before contacting your provider for a refill.           MyChart Access Code: Activation code not generated  Current MyChart Status: Active

## 2017-08-15 NOTE — ASSESSMENT & PLAN NOTE
Patient reports that she feels fullness on the both sides of the ears and worsening on the left side. She reported that her hairdresser accidentally pulling water inside her left ear and she started feeling discomfort on the left ear. She tries to remove water with different method and she also used Benicar to rinse her left ear. She feels like a coarse sounds on the left ear when she chews or speak. She denies pain or discharge from ears. She denied fever or chills. No upper airway infection symptoms.

## 2017-08-15 NOTE — ASSESSMENT & PLAN NOTE
Patient presented to clinic for hospital discharge follow-up. Patient reported that she woke up with chest pressure radiated to her back, and throat at 2 AM in the morning on 7/27/17. She went to [facility] for evaluation. She was admitted in telemetry unit for one day. She stated that she had 3 negative troponins series, negative d-dimer, negative chemical cardiac stress test. Patient brought partial blood tests from hospitalization as she printed out from her email. We have not received completely discharge summary, tests done in [facility] when she was in clinic today.

## 2017-08-15 NOTE — PROGRESS NOTES
Subjective:   Shahrzad Scott is a 62 y.o. female here today for evaluation and management of:      Hospital discharge follow-up  Patient presented to clinic for hospital discharge follow-up. Patient reported that she woke up with chest pressure radiated to her back, and throat at 2 AM in the morning on 7/27/17. She went to [facility] for evaluation. She was admitted in telemetry unit for one day. She stated that she had 3 negative troponins series, negative d-dimer, negative chemical cardiac stress test. Patient brought partial blood tests from hospitalization as she printed out from her email. We have not received completely discharge summary, tests done in [facility] when she was in clinic today.    Bilateral impacted cerumen  Patient reports that she feels fullness on the both sides of the ears and worsening on the left side. She reported that her hairdresser accidentally pulling water inside her left ear and she started feeling discomfort on the left ear. She tries to remove water with different method and she also used Benicar to rinse her left ear. She feels like a coarse sounds on the left ear when she chews or speak. She denies pain or discharge from ears. She denied fever or chills. No upper airway infection symptoms.         Current medicines (including changes today)  Current Outpatient Prescriptions   Medication Sig Dispense Refill   • Coenzyme Q10 200 MG Cap Take  by mouth.     • simvastatin (ZOCOR) 10 MG Tab Take 1 Tab by mouth every evening. 90 Tab 3   • MINIVELLE 0.025 MG/24HR PATCH BIWEEKLY Apply 1 Patch to skin as directed. Indications: Moderate to Severe Vasomotor Symptoms, every Wed and Friday     • Multiple Vitamins-Minerals (MULTIVITAMIN ADULTS 50+ PO) Take  by mouth.     • RESVERATROL PO Take  by mouth.     • ascorbic acid (ASCORBIC ACID) 500 MG Tab Take 500 mg by mouth every day.     • Probiotic Product (PROBIOTIC DAILY PO) Take  by mouth.     • Alpha Lipoic Acid 200 MG Cap Take  " by mouth.     • minocycline (MINOCIN) 100 MG Cap   5   • FINACEA 15 % Gel   6   • Ranitidine HCl (ZANTAC PO) Take 150 mg by mouth 2 Times a Day.     • Calcium Carbonate-Vitamin D (CALCIUM + D) 600-200 MG-UNIT TABS Take  by mouth every day.     • docosahexanoic acid (OMEGA 3) 1000 MG CAPS Take 1,000 mg by mouth every day.     • vitamin D (CHOLECALCIFEROL) 1000 UNIT TABS Take 2,000 Units by mouth every day. TAKE AS DIRECTED.       No current facility-administered medications for this visit.     She  has a past medical history of Aortic regurgitation (1996); HLD (hyperlipidemia); Mitral regurgitation; MVP (mitral valve prolapse); Palpitations; and Pulmonary hypertension (CMS-[facility]) (10/3/2012).    ROS   No chest pain, no shortness of breath, no abdominal pain       Objective:     Blood pressure 130/74, pulse 88, temperature 36.8 °C (98.2 °F), height 1.626 m (5' 4\"), weight 62.596 kg (138 lb), SpO2 95 %, not currently breastfeeding. Body mass index is 23.68 kg/(m^2).   Physical Exam:  General: Alert, oriented and no acute distress.  Eye contact is good, speech goal directed, affect calm  HEENT: conjunctiva non-injected, sclera non-icteric.  Oral mucous membranes pink and moist with no lesions.  Pinna normal. Cerumen impaction on both ears. No signs of infection or inflammation inside the ear cavity after ear irrigation with saline in clinic today. TM pearly gray.   Neck No supraclavicular, submandibular, submental lymphadenopathy or masses in the neck or supraclavicular regions.  Lungs: Normal respiratory effort, clear to auscultation bilaterally with good excursion.  CV: regular rate and rhythm. No murmurs.  Abdomen: soft, non distended, nontender, Bowel sound normal.  Ext: no edema, color normal, vascularity normal, temperature normal      Assessment and Plan:   The following treatment plan was discussed     1. Hospital discharge follow-up  - Patient does not have recurrent symptoms since after discharge from " [facility].  - She reported that she has negative cardiac stress test in the hospital. She also has 3 negative troponin tests from hospital.  - We will request official discharge summary from [facility]   - Advised to continue simvastatin 10 mg every evening and to start taking aspirin 81 mg daily.    2. Bilateral impacted cerumen  - Lavage both ears with saline. Cerumen removed from irrigation. Patient feels much better after irrigation, and she is satisfied with the outcome.  - Advised to cover her ears with cotton ball when she showers.    Face-to-face time spent 30 minutes with patient and more than half of that time spent for counseling and cooperating of care for medical problems listed above.       Followup: Return in about 5 weeks (around 9/20/2017), or if symptoms worsen or fail to improve, for hypercholesterolemia, GERD, pre-hypertension, lab review.      Please note that this dictation was created using voice recognition software. I have made every reasonable attempt to correct obvious errors, but I expect that there may have unintended errors in text, spelling, punctuation, or grammar that I did not discover.

## 2017-09-19 ENCOUNTER — TELEPHONE (OUTPATIENT)
Dept: MEDICAL GROUP | Age: 62
End: 2017-09-19

## 2017-09-19 NOTE — TELEPHONE ENCOUNTER
ESTABLISHED PATIENT PRE-VISIT PLANNING     Note: Patient will not be contacted if there is no indication to call.     1.  Reviewed notes from the last few office visits within the medical group: Yes    2.  If any orders were placed at last visit or intended to be done for this visit (i.e. 6 mos follow-up), do we have Results/Consult Notes?        •  Labs - Labs ordered, completed and results are in chart.       •  Imaging - Imaging was not ordered at last office visit.       •  Referrals - No referrals were ordered at last office visit.    3. Is this appointment scheduled as a Hospital Follow-Up? No    4.  Immunizations were updated in Epic using WebIZ?: Epic matches WebIZ       •  Web Iz Recommendations: FLU and TD    5.  Patient is due for the following Health Maintenance Topics:   Health Maintenance Due   Topic Date Due   • MAMMOGRAM  07/16/1994   • IMM INFLUENZA (1) 09/01/2017           6.  Patient was NOT informed to arrive 15 min prior to their scheduled appointment and bring in their medication bottles.

## 2017-09-20 ENCOUNTER — OFFICE VISIT (OUTPATIENT)
Dept: MEDICAL GROUP | Age: 62
End: 2017-09-20
Payer: OTHER GOVERNMENT

## 2017-09-20 VITALS
HEIGHT: 64 IN | WEIGHT: 137.4 LBS | TEMPERATURE: 97.5 F | DIASTOLIC BLOOD PRESSURE: 72 MMHG | BODY MASS INDEX: 23.46 KG/M2 | OXYGEN SATURATION: 99 % | SYSTOLIC BLOOD PRESSURE: 130 MMHG | HEART RATE: 76 BPM

## 2017-09-20 DIAGNOSIS — I35.1 NONRHEUMATIC AORTIC VALVE INSUFFICIENCY: ICD-10-CM

## 2017-09-20 DIAGNOSIS — Z23 NEEDS FLU SHOT: ICD-10-CM

## 2017-09-20 DIAGNOSIS — M54.32 LEFT SIDED SCIATICA: ICD-10-CM

## 2017-09-20 DIAGNOSIS — K21.9 GASTROESOPHAGEAL REFLUX DISEASE WITHOUT ESOPHAGITIS: ICD-10-CM

## 2017-09-20 DIAGNOSIS — E78.00 PURE HYPERCHOLESTEROLEMIA: ICD-10-CM

## 2017-09-20 DIAGNOSIS — R03.0 PREHYPERTENSION: ICD-10-CM

## 2017-09-20 PROCEDURE — 90686 IIV4 VACC NO PRSV 0.5 ML IM: CPT | Performed by: INTERNAL MEDICINE

## 2017-09-20 PROCEDURE — 99215 OFFICE O/P EST HI 40 MIN: CPT | Mod: 25 | Performed by: INTERNAL MEDICINE

## 2017-09-20 PROCEDURE — 90471 IMMUNIZATION ADMIN: CPT | Performed by: INTERNAL MEDICINE

## 2017-09-20 RX ORDER — GABAPENTIN 100 MG/1
100 CAPSULE ORAL 3 TIMES DAILY
Qty: 90 CAP | Refills: 3 | Status: SHIPPED | OUTPATIENT
Start: 2017-09-20 | End: 2018-03-20

## 2017-09-20 ASSESSMENT — PAIN SCALES - GENERAL: PAINLEVEL: 2=MINIMAL-SLIGHT

## 2017-09-20 NOTE — LETTER
Replaced by [facility]  Chloe Donnelly M.D.  [address]  [place] [address]  Fax: [phone]   Authorization for Release/Disclosure of   Protected Health Information   Name: SHAHRZAD GARAY : 1955 SSN: [id number]   Address: [address] [place] [address] [place] [address] Phone:    [phone] (home)    I authorize the entity listed below to release/disclose the PHI below to:   Replaced by [facility]/Chloe Donnelly M.D. and Chloe Donnelly M.D.   Provider or Entity Name:  Dr. Florian   Address   [facility], [facility], [facility]   Phone:      Fax:     Reason for request: continuity of care   Information to be released:    [  ] LAST COLONOSCOPY,  including any PATH REPORT and follow-up  [  ] LAST FIT/COLOGUARD RESULT [  ] LAST DEXA  [  ] LAST MAMMOGRAM  [xxxx ] LAST PAP  [  ] LAST LABS [  ] RETINA EXAM REPORT  [  ] IMMUNIZATION RECORDS  [  ] Release all info      [  ] Check here and initial the line next to each item to release ALL health information INCLUDING  _____ Care and treatment for drug and / or alcohol abuse  _____ HIV testing, infection status, or AIDS  _____ Genetic Testing    DATES OF SERVICE OR TIME PERIOD TO BE DISCLOSED: _____________  I understand and acknowledge that:  * This Authorization may be revoked at any time by you in writing, except if your health information has already been used or disclosed.  * Your health information that will be used or disclosed as a result of you signing this authorization could be re-disclosed by the recipient. If this occurs, your re-disclosed health information may no longer be protected by State or Federal laws.  * You may refuse to sign this Authorization. Your refusal will not affect your ability to obtain treatment.  * This Authorization becomes effective upon signing and will  on (date) __________.      If no date is indicated, this Authorization will  one (1) year from the signature date.    Name: Shahrzad Garay    Signature:   Date:     2017       PLEASE FAX REQUESTED  RECORDS BACK TO: [phone]

## 2017-09-20 NOTE — PROGRESS NOTES
Subjective:   Joon Garay is a 62 y.o. female here today for evaluation and management of:      Pure hypercholesterolemia  Patient is taking simvastatin 10 mg every evening that started on 6/22/17. His cholesterol level improved after taking simvastatin. She denies side effects from taking simvastatin. Her liver enzymes are within normal. I reviewed her blood tests with her in clinic.    Results for JOON GARAY (MRN [id number]) as of 9/19/2017 20:15   Ref. Range 6/14/2017 09:26 9/15/2017 00:00   Cholesterol,Tot Latest Ref Range: 100 - 199 mg/dL 258 (H) 168   Triglycerides Latest Ref Range: 0 - 149 mg/dL 160 (H) 110   HDL Latest Ref Range: >39 mg/dL 62 58   LDL Latest Ref Range: 0 - 99 mg/dL 164 (H) 88   VLDL Cholesterol Calc Latest Ref Range: 5 - 40 mg/dL 32 22       Prehypertension  Patient is not taking any medication for her blood pressure currently. She has pre-hypertension. She stays in active. She brought her blood pressure and pulse readings from home. She has blood pressure reading between 120 to 130 in systole and she has one episode of systolic blood pressure at 150. Patient wants to control her blood pressure with diet and exercise and she will discuss with her cardiologist in follow-up visit in December for medication treatment.    Gastroesophageal reflux disease without esophagitis  Patient is taking ranitidine 150 mg twice a day. Has symptoms is well controlled with current regimens. She denies side effects from taking ranitidine. She also takes vitamin D 1000 units daily.    Left sided sciatica  This is a new problem. Patient reported that she has sciatica pain on the left side started at the end of August. Pain is sharp, tingling and burning sensation radiated to the left toes. She stated that her pain level is 4 out of 10 in severity. She is able to tolerate and function with current pain. She takes over-the-counter Aleve or Advil as needed. I discussed with patient not to take too much NSAIDs  due to her underlying acid reflux and indigestion. I want her to try gabapentin for as needed to decrease tingling, numbness sensation. Patient agreed with the plan. We also discussed to have physical therapy. She is interested to see physical therapist as well.    Aortic regurgitation  Patient has aortic regurgitation, mitral prolapse and mitral regurgitation. She also has pulmonary follow-up and tricuspid regurgitation from last echocardiogram in December 2016.         Current medicines (including changes today)  Current Outpatient Prescriptions   Medication Sig Dispense Refill   • gabapentin (NEURONTIN) 100 MG Cap Take 1 Cap by mouth 3 times a day. 90 Cap 3   • Coenzyme Q10 200 MG Cap Take  by mouth.     • simvastatin (ZOCOR) 10 MG Tab Take 1 Tab by mouth every evening. 90 Tab 3   • MINIVELLE 0.025 MG/24HR PATCH BIWEEKLY Apply 1 Patch to skin as directed. Indications: Moderate to Severe Vasomotor Symptoms, every Wed and Friday     • Multiple Vitamins-Minerals (MULTIVITAMIN ADULTS 50+ PO) Take  by mouth.     • RESVERATROL PO Take  by mouth.     • ascorbic acid (ASCORBIC ACID) 500 MG Tab Take 500 mg by mouth every day.     • Probiotic Product (PROBIOTIC DAILY PO) Take  by mouth.     • Alpha Lipoic Acid 200 MG Cap Take  by mouth.     • minocycline (MINOCIN) 100 MG Cap   5   • FINACEA 15 % Gel   6   • Ranitidine HCl (ZANTAC PO) Take 150 mg by mouth 2 Times a Day.     • Calcium Carbonate-Vitamin D (CALCIUM + D) 600-200 MG-UNIT TABS Take  by mouth every day.     • docosahexanoic acid (OMEGA 3) 1000 MG CAPS Take 1,000 mg by mouth every day.     • vitamin D (CHOLECALCIFEROL) 1000 UNIT TABS Take 2,000 Units by mouth every day. TAKE AS DIRECTED.       No current facility-administered medications for this visit.      She  has a past medical history of Aortic regurgitation (1996); HLD (hyperlipidemia); Mitral regurgitation; MVP (mitral valve prolapse); Palpitations; and Pulmonary hypertension (CMS-[facility]) (10/3/2012).    HUBER  "  No chest pain, no shortness of breath, no abdominal pain       Objective:     Blood pressure 130/72, pulse 76, temperature 36.4 °C (97.5 °F), height 1.626 m (5' 4\"), weight 62.3 kg (137 lb 6.4 oz), SpO2 99 %, not currently breastfeeding. Body mass index is 23.58 kg/m².   Physical Exam:  General: Alert, oriented and no acute distress.  Eye contact is good, speech goal directed, affect calm  HEENT: conjunctiva non-injected, sclera non-icteric.  Oral mucous membranes pink and moist with no lesions.  Pinna normal.   Lungs: Normal respiratory effort, clear to auscultation bilaterally with good excursion.  CV: regular rate and rhythm. No murmurs.   Abdomen: soft, non distended, nontender, Bowel sound normal.  Ext: no edema, color normal, vascularity normal, temperature normal      Assessment and Plan:   The following treatment plan was discussed     1. Pure hypercholesterolemia  - Improved. Continue simvastatin 10 mg every evening. Review the potential side effect of simvastatin with patient.  - Advised to eat low fat, low carbohydrate and high fiber diet as well as do cardio physical exercise regularly.   - COMP METABOLIC PANEL; Future  - LIPID PROFILE; Future    2. Prehypertension  - Patient wants to continue to control with diet and exercise. She will continue monitor his blood pressure and pulse regularly. - She will discuss with cardiologist in December for treatment of high blood pressure.  - We briefly discussed to try beta blocker.  - CBC WITH DIFFERENTIAL; Future  - COMP METABOLIC PANEL; Future    3. Gastroesophageal reflux disease without esophagitis  - Well-controlled. Continue current regimens. Recheck lab 1-2 weeks before next follow up visit.    4. Left sided sciatica  - We will try gabapentin 100 mg 3 times a day when necessary. Reviewed the potential side effect of gabapentin with patient.  - Referred to physical therapy.   - gabapentin (NEURONTIN) 100 MG Cap; Take 1 Cap by mouth 3 times a day.  " Dispense: 90 Cap; Refill: 3    5. Nonrheumatic aortic valve insufficiency  - Will repeat echocardiogram in December before follow-up with cardiologist. Patient requests to order echocardiogram for her.  - ECHOCARDIOGRAM COMP W/O CONT; Future    6. Needs flu shot  - Influenza vaccine was given today after reviewing risks and benefits as well as side effects of vaccine.   - INFLUENZA VACCINE QUAD INJ >3Y(PF)    7. Health maintenance  - Patient has Pap smear and mammogram yearly with gynecologist. We will request the pap smear report from gynecologist and [facility] for mammogram report.    Face-to-face time spent 40 minutes with patient and more than half of that time spent for counseling and cooperating of care for medical problems listed above.       Followup: Return in about 6 months (around 3/20/2018), or if symptoms worsen or fail to improve, for hypercholesterolemia, prehypertension, GERD, lab review.      Please note that this dictation was created using voice recognition software. I have made every reasonable attempt to correct obvious errors, but I expect that there may have unintended errors in text, spelling, punctuation, or grammar that I did not discover.

## 2017-09-20 NOTE — ASSESSMENT & PLAN NOTE
This is a new problem. Patient reported that she has sciatica pain on the left side started at the end of August. Pain is sharp, tingling and burning sensation radiated to the left toes. She stated that her pain level is 4 out of 10 in severity. She is able to tolerate and function with current pain. She takes over-the-counter Aleve or Advil as needed. I discussed with patient not to take too much NSAIDs due to her underlying acid reflux and indigestion. I want her to try gabapentin for as needed to decrease tingling, numbness sensation. Patient agreed with the plan. We also discussed to have physical therapy. She is interested to see physical therapist as well.

## 2017-09-20 NOTE — ASSESSMENT & PLAN NOTE
Patient is taking ranitidine 150 mg twice a day. Has symptoms is well controlled with current regimens. She denies side effects from taking ranitidine. She also takes vitamin D 1000 units daily.

## 2017-09-20 NOTE — ASSESSMENT & PLAN NOTE
Patient has aortic regurgitation, mitral prolapse and mitral regurgitation. She also has pulmonary follow-up and tricuspid regurgitation from last echocardiogram in December 2016.

## 2017-09-20 NOTE — LETTER
[facility]  Chloe Donnelly M.D.  [address]  [place] [address]  Fax: [phone]   Authorization for Release/Disclosure of   Protected Health Information   Name: SHAHRZAD GARAY : 1955 SSN: [id number]   Address: [address] [place] [address] [place] [address] Phone:    [phone] (home)    I authorize the entity listed below to release/disclose the PHI below to:   [facility]/Chloe Donnelly M.D. and Chloe Donnelly M.D.   Provider or Entity Name:  [place] [facility]   Address   City, State, Zip   Phone:      Fax:     Reason for request: continuity of care   Information to be released:    [  ] LAST COLONOSCOPY,  including any PATH REPORT and follow-up  [  ] LAST FIT/COLOGUARD RESULT [  ] LAST DEXA  [ xxxx ] LAST MAMMOGRAM  [  ] LAST PAP  [  ] LAST LABS [  ] RETINA EXAM REPORT  [  ] IMMUNIZATION RECORDS  [  ] Release all info      [  ] Check here and initial the line next to each item to release ALL health information INCLUDING  _____ Care and treatment for drug and / or alcohol abuse  _____ HIV testing, infection status, or AIDS  _____ Genetic Testing    DATES OF SERVICE OR TIME PERIOD TO BE DISCLOSED: _____________  I understand and acknowledge that:  * This Authorization may be revoked at any time by you in writing, except if your health information has already been used or disclosed.  * Your health information that will be used or disclosed as a result of you signing this authorization could be re-disclosed by the recipient. If this occurs, your re-disclosed health information may no longer be protected by State or Federal laws.  * You may refuse to sign this Authorization. Your refusal will not affect your ability to obtain treatment.  * This Authorization becomes effective upon signing and will  on (date) __________.      If no date is indicated, this Authorization will  one (1) year from the signature date.    Name: Shahrzad Garay    Signature:   Date:     2017       PLEASE FAX REQUESTED  RECORDS BACK TO: [phone]

## 2017-09-20 NOTE — ASSESSMENT & PLAN NOTE
Patient is taking simvastatin 10 mg every evening that started on 6/22/17. His cholesterol level improved after taking simvastatin. She denies side effects from taking simvastatin. Her liver enzymes are within normal. I reviewed her blood tests with her in clinic.    Results for JOON GARAY (MRN [id number]) as of 9/19/2017 20:15   Ref. Range 6/14/2017 09:26 9/15/2017 00:00   Cholesterol,Tot Latest Ref Range: 100 - 199 mg/dL 258 (H) 168   Triglycerides Latest Ref Range: 0 - 149 mg/dL 160 (H) 110   HDL Latest Ref Range: >39 mg/dL 62 58   LDL Latest Ref Range: 0 - 99 mg/dL 164 (H) 88   VLDL Cholesterol Calc Latest Ref Range: 5 - 40 mg/dL 32 22

## 2017-10-24 ENCOUNTER — HOSPITAL ENCOUNTER (OUTPATIENT)
Dept: CARDIOLOGY | Facility: MEDICAL CENTER | Age: 62
End: 2017-10-24
Attending: INTERNAL MEDICINE
Payer: OTHER GOVERNMENT

## 2017-10-24 DIAGNOSIS — I35.1 NONRHEUMATIC AORTIC VALVE INSUFFICIENCY: ICD-10-CM

## 2017-10-24 PROCEDURE — 93306 TTE W/DOPPLER COMPLETE: CPT

## 2017-10-24 PROCEDURE — 93306 TTE W/DOPPLER COMPLETE: CPT | Mod: 26 | Performed by: INTERNAL MEDICINE

## 2017-10-25 LAB
LV EJECT FRACT  99904: 60
LV EJECT FRACT MOD 2C 99903: 61.63
LV EJECT FRACT MOD 4C 99902: 61.05
LV EJECT FRACT MOD BP 99901: 59.89

## 2017-10-30 ENCOUNTER — APPOINTMENT (RX ONLY)
Dept: URBAN - METROPOLITAN AREA CLINIC 4 | Facility: CLINIC | Age: 62
Setting detail: DERMATOLOGY
End: 2017-10-30

## 2017-10-30 DIAGNOSIS — Z41.9 ENCOUNTER FOR PROCEDURE FOR PURPOSES OTHER THAN REMEDYING HEALTH STATE, UNSPECIFIED: ICD-10-CM

## 2017-10-30 DIAGNOSIS — L92.0 GRANULOMA ANNULARE: ICD-10-CM

## 2017-10-30 PROCEDURE — 11900 INJECT SKIN LESIONS </W 7: CPT

## 2017-10-30 PROCEDURE — 99212 OFFICE O/P EST SF 10 MIN: CPT | Mod: 25

## 2017-10-30 PROCEDURE — ? INTRALESIONAL KENALOG

## 2017-10-30 PROCEDURE — ? PRESCRIPTION

## 2017-10-30 PROCEDURE — ? COUNSELING

## 2017-10-30 RX ORDER — EFLORNITHINE HYDROCHLORIDE 139 MG/G
CREAM TOPICAL BID
Qty: 1 | Refills: 3

## 2017-10-30 ASSESSMENT — LOCATION SIMPLE DESCRIPTION DERM: LOCATION SIMPLE: RIGHT HAND

## 2017-10-30 ASSESSMENT — LOCATION DETAILED DESCRIPTION DERM
LOCATION DETAILED: RIGHT DORSAL INDEX FINGER METACARPOPHALANGEAL JOINT
LOCATION DETAILED: 2ND WEB SPACE RIGHT HAND

## 2017-10-30 ASSESSMENT — LOCATION ZONE DERM: LOCATION ZONE: HAND

## 2017-10-30 NOTE — PROCEDURE: INTRALESIONAL KENALOG
Medical Necessity Clause: This procedure was medically necessary because the lesions that were treated were:
Expiration Date For Kenalog (Optional): February 2019
Kenalog Preparation: Kenalog
Consent: The risks of atrophy were reviewed with the patient.
Include Z78.9 (Other Specified Conditions Influencing Health Status) As An Associated Diagnosis?: No
Administered By (Optional): Dr. Yeni Cha
Total Volume (Ccs): 0.5
X Size Of Lesion In Cm (Optional): 0
Detail Level: Detailed
Lot # For Kenalog (Optional): [id number]
Concentration Of Kenalog Solution Injected (Mg/Ml): 10.0

## 2017-10-30 NOTE — HPI: UNWANTED HAIR
How Did Your Unwanted Hair Appear?: gradual in onset
How Severe Is Your Unwanted Hair?: moderate
Additional History: Would like refills of Vaniqa cream.

## 2017-11-07 ENCOUNTER — OFFICE VISIT (OUTPATIENT)
Dept: CARDIOLOGY | Facility: MEDICAL CENTER | Age: 62
End: 2017-11-07
Payer: OTHER GOVERNMENT

## 2017-11-07 VITALS
BODY MASS INDEX: 23.9 KG/M2 | WEIGHT: 140 LBS | HEART RATE: 66 BPM | OXYGEN SATURATION: 98 % | HEIGHT: 64 IN | SYSTOLIC BLOOD PRESSURE: 132 MMHG | DIASTOLIC BLOOD PRESSURE: 80 MMHG

## 2017-11-07 DIAGNOSIS — I35.1 NONRHEUMATIC AORTIC VALVE INSUFFICIENCY: ICD-10-CM

## 2017-11-07 DIAGNOSIS — I34.0 NON-RHEUMATIC MITRAL REGURGITATION: ICD-10-CM

## 2017-11-07 DIAGNOSIS — E78.00 PURE HYPERCHOLESTEROLEMIA: ICD-10-CM

## 2017-11-07 PROCEDURE — 99213 OFFICE O/P EST LOW 20 MIN: CPT | Performed by: INTERNAL MEDICINE

## 2017-11-07 RX ORDER — LOSARTAN POTASSIUM 25 MG/1
12.5 TABLET ORAL DAILY
Qty: 30 TAB | Refills: 6 | Status: SHIPPED | OUTPATIENT
Start: 2017-11-07 | End: 2018-10-15 | Stop reason: SDUPTHER

## 2017-11-07 ASSESSMENT — LIFESTYLE VARIABLES: SUBSTANCE_ABUSE: 0

## 2017-11-07 ASSESSMENT — ENCOUNTER SYMPTOMS
PND: 0
FALLS: 0
COUGH: 0
WHEEZING: 0
MYALGIAS: 0
ORTHOPNEA: 0

## 2017-11-07 NOTE — LETTER
[facility]   [address]   [address]  [place] [address]  Phone: [phone]  Fax: [phone]              Shahrzad Scott  1955    Encounter Date: 11/7/2017    Sharath Macias M.D.          PROGRESS NOTE:  Subjective:   Shahrzad Scott is a 63 yo lady who presents today for follow-up of her aortic regurgitation.  She has been clinically extremely stable with no interval cardiovascular symptoms. Activity tolerance is excellent.    Past Medical History:   Diagnosis Date   • Aortic regurgitation 1996   • HLD (hyperlipidemia)    • Mitral regurgitation    • MVP (mitral valve prolapse)    • Palpitations    • Pulmonary hypertension 10/3/2012     Past Surgical History:   Procedure Laterality Date   • BREAST RECONSTRUCTION Bilateral    • CATARACT EXTRACTION WITH IOL Bilateral     Dr. Wilder   • HYSTERECTOMY, TOTAL ABDOMINAL      at age 37 for uterine fibroid, still has both ovaries     Family History   Problem Relation Age of Onset   • Stroke Mother    • Hypertension Mother    • Cancer Father      prostate cancer   • Lung Disease Sister      asthma   • No Known Problems Brother    • No Known Problems Maternal Grandmother    • No Known Problems Maternal Grandfather    • Other Brother      cerabral palsy     History   Smoking Status   • Never Smoker   Smokeless Tobacco   • Never Used     Allergies   Allergen Reactions   • Septra [Bactrim] Hives   • Sulfa Drugs      Outpatient Encounter Prescriptions as of 11/7/2017   Medication Sig Dispense Refill   • aspirin 81 MG tablet Take 81 mg by mouth every day.     • losartan (COZAAR) 25 MG Tab Take 0.5 Tabs by mouth every day. 30 Tab 6   • gabapentin (NEURONTIN) 100 MG Cap Take 1 Cap by mouth 3 times a day. 90 Cap 3   • Coenzyme Q10 200 MG Cap Take  by mouth.     • simvastatin (ZOCOR) 10 MG Tab Take 1 Tab by mouth every evening. 90 Tab 3   • MINIVELLE 0.025 MG/24HR PATCH BIWEEKLY Apply 1 Patch to skin  "as directed. Indications: Moderate to Severe Vasomotor Symptoms, every Wed and Friday     • Multiple Vitamins-Minerals (MULTIVITAMIN ADULTS 50+ PO) Take  by mouth.     • RESVERATROL PO Take  by mouth.     • ascorbic acid (ASCORBIC ACID) 500 MG Tab Take 500 mg by mouth every day.     • Probiotic Product (PROBIOTIC DAILY PO) Take  by mouth.     • Alpha Lipoic Acid 200 MG Cap Take  by mouth.     • minocycline (MINOCIN) 100 MG Cap   5   • FINACEA 15 % Gel   6   • Ranitidine HCl (ZANTAC PO) Take 150 mg by mouth 2 Times a Day.     • Calcium Carbonate-Vitamin D (CALCIUM + D) 600-200 MG-UNIT TABS Take  by mouth every day.     • docosahexanoic acid (OMEGA 3) 1000 MG CAPS Take 1,000 mg by mouth every day.     • vitamin D (CHOLECALCIFEROL) 1000 UNIT TABS Take 2,000 Units by mouth every day. TAKE AS DIRECTED.       No facility-administered encounter medications on file as of 11/7/2017.      Review of Systems   HENT: Negative for nosebleeds.    Respiratory: Negative for cough and wheezing.    Cardiovascular: Negative for orthopnea and PND.   Musculoskeletal: Negative for falls and myalgias.   Psychiatric/Behavioral: Negative for substance abuse.        Objective:   /80   Pulse 66   Ht 1.626 m (5' 4\")   Wt 63.5 kg (140 lb)   SpO2 98%   BMI 24.03 kg/m²      Physical Exam   Constitutional: She is oriented to person, place, and time. She appears well-developed and well-nourished.   She is younger than her age   Eyes: Conjunctivae are normal. No scleral icterus.   Neck: No JVD present.   Cardiovascular: Normal rate, regular rhythm, S1 normal, S2 normal and intact distal pulses.  Exam reveals no gallop.    Murmur (3/6 early to midsystolic ejection murmur with well preserved 2nd sound) heard.   No diastolic murmur is present   Pulmonary/Chest: Effort normal and breath sounds normal.   Musculoskeletal: She exhibits no edema.   Neurological: She is alert and oriented to person, place, and time.   Skin: Skin is warm and dry.   "   Psychiatric: She has a normal mood and affect. Thought content normal.     Echo 10/24/2017: Normal left ventricular chamber size. End systolic diameter was 2.9, actually slightly smaller than 2016  Left ventricular ejection fraction is visually estimated to be 60%.  Aortic sclerosis without stenosis. Moderate aortic insufficiency.  Mild mitral regurgitation.  Mild tricuspid regurgitation.  Mild to moderate pulmonic insufficiency.  No pericardial effusion seen.  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  Assessment:     1. Nonrheumatic aortic valve insufficiency  losartan (COZAAR) 25 MG Tab   2. Non-rheumatic mitral regurgitation  losartan (COZAAR) 25 MG Tab   3. Pure hypercholesterolemia       The above assessed cardiovascular problems are clinically stable.  I agree with Dr. Donnelly regarding treatment for pre-hypertension and afterload reduction in this situation.  Medical Decision Making:  Today's Assessment / Status / Plan:   She will start with 12.5 mg of losartan daily and will be seen in 6 weeks for assessment of blood pressure but she will communicate with me immediately if she has any symptoms or problems. I'll schedule lab work after that. Her follow-up with Dr. Donnelly will be in March and we will try to have this dose stabilized before then.      No Recipients

## 2017-11-08 NOTE — PROGRESS NOTES
Subjective:   Shahrzad Scott is a 61 yo lady who presents today for follow-up of her aortic regurgitation.  She has been clinically extremely stable with no interval cardiovascular symptoms. Activity tolerance is excellent.    Past Medical History:   Diagnosis Date   • Aortic regurgitation 1996   • HLD (hyperlipidemia)    • Mitral regurgitation    • MVP (mitral valve prolapse)    • Palpitations    • Pulmonary hypertension 10/3/2012     Past Surgical History:   Procedure Laterality Date   • BREAST RECONSTRUCTION Bilateral    • CATARACT EXTRACTION WITH IOL Bilateral     Dr. Wilder   • HYSTERECTOMY, TOTAL ABDOMINAL      at age 37 for uterine fibroid, still has both ovaries     Family History   Problem Relation Age of Onset   • Stroke Mother    • Hypertension Mother    • Cancer Father      prostate cancer   • Lung Disease Sister      asthma   • No Known Problems Brother    • No Known Problems Maternal Grandmother    • No Known Problems Maternal Grandfather    • Other Brother      cerabral palsy     History   Smoking Status   • Never Smoker   Smokeless Tobacco   • Never Used     Allergies   Allergen Reactions   • Septra [Bactrim] Hives   • Sulfa Drugs      Outpatient Encounter Prescriptions as of 11/7/2017   Medication Sig Dispense Refill   • aspirin 81 MG tablet Take 81 mg by mouth every day.     • losartan (COZAAR) 25 MG Tab Take 0.5 Tabs by mouth every day. 30 Tab 6   • gabapentin (NEURONTIN) 100 MG Cap Take 1 Cap by mouth 3 times a day. 90 Cap 3   • Coenzyme Q10 200 MG Cap Take  by mouth.     • simvastatin (ZOCOR) 10 MG Tab Take 1 Tab by mouth every evening. 90 Tab 3   • MINIVELLE 0.025 MG/24HR PATCH BIWEEKLY Apply 1 Patch to skin as directed. Indications: Moderate to Severe Vasomotor Symptoms, every Wed and Friday     • Multiple Vitamins-Minerals (MULTIVITAMIN ADULTS 50+ PO) Take  by mouth.     • RESVERATROL PO Take  by mouth.     • ascorbic acid (ASCORBIC ACID) 500 MG Tab Take 500 mg by mouth every day.     •  "Probiotic Product (PROBIOTIC DAILY PO) Take  by mouth.     • Alpha Lipoic Acid 200 MG Cap Take  by mouth.     • minocycline (MINOCIN) 100 MG Cap   5   • FINACEA 15 % Gel   6   • Ranitidine HCl (ZANTAC PO) Take 150 mg by mouth 2 Times a Day.     • Calcium Carbonate-Vitamin D (CALCIUM + D) 600-200 MG-UNIT TABS Take  by mouth every day.     • docosahexanoic acid (OMEGA 3) 1000 MG CAPS Take 1,000 mg by mouth every day.     • vitamin D (CHOLECALCIFEROL) 1000 UNIT TABS Take 2,000 Units by mouth every day. TAKE AS DIRECTED.       No facility-administered encounter medications on file as of 11/7/2017.      Review of Systems   HENT: Negative for nosebleeds.    Respiratory: Negative for cough and wheezing.    Cardiovascular: Negative for orthopnea and PND.   Musculoskeletal: Negative for falls and myalgias.   Psychiatric/Behavioral: Negative for substance abuse.        Objective:   /80   Pulse 66   Ht 1.626 m (5' 4\")   Wt 63.5 kg (140 lb)   SpO2 98%   BMI 24.03 kg/m²     Physical Exam   Constitutional: She is oriented to person, place, and time. She appears well-developed and well-nourished.   She is younger than her age   Eyes: Conjunctivae are normal. No scleral icterus.   Neck: No JVD present.   Cardiovascular: Normal rate, regular rhythm, S1 normal, S2 normal and intact distal pulses.  Exam reveals no gallop.    Murmur (3/6 early to midsystolic ejection murmur with well preserved 2nd sound) heard.   No diastolic murmur is present   Pulmonary/Chest: Effort normal and breath sounds normal.   Musculoskeletal: She exhibits no edema.   Neurological: She is alert and oriented to person, place, and time.   Skin: Skin is warm and dry.   Psychiatric: She has a normal mood and affect. Thought content normal.     Echo 10/24/2017: Normal left ventricular chamber size. End systolic diameter was 2.9, actually slightly smaller than 2016  Left ventricular ejection fraction is visually estimated to be 60%.  Aortic sclerosis " without stenosis. Moderate aortic insufficiency.  Mild mitral regurgitation.  Mild tricuspid regurgitation.  Mild to moderate pulmonic insufficiency.  No pericardial effusion seen.  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  Assessment:     1. Nonrheumatic aortic valve insufficiency  losartan (COZAAR) 25 MG Tab   2. Non-rheumatic mitral regurgitation  losartan (COZAAR) 25 MG Tab   3. Pure hypercholesterolemia       The above assessed cardiovascular problems are clinically stable.  I agree with Dr. Donnelly regarding treatment for pre-hypertension and afterload reduction in this situation.  Medical Decision Making:  Today's Assessment / Status / Plan:   She will start with 12.5 mg of losartan daily and will be seen in 6 weeks for assessment of blood pressure but she will communicate with me immediately if she has any symptoms or problems. I'll schedule lab work after that. Her follow-up with Dr. Donnelly will be in March and we will try to have this dose stabilized before then.

## 2017-12-26 ENCOUNTER — OFFICE VISIT (OUTPATIENT)
Dept: CARDIOLOGY | Facility: MEDICAL CENTER | Age: 62
End: 2017-12-26
Payer: OTHER GOVERNMENT

## 2017-12-26 VITALS
WEIGHT: 140 LBS | BODY MASS INDEX: 23.9 KG/M2 | OXYGEN SATURATION: 96 % | HEIGHT: 64 IN | HEART RATE: 82 BPM | SYSTOLIC BLOOD PRESSURE: 106 MMHG | DIASTOLIC BLOOD PRESSURE: 70 MMHG

## 2017-12-26 DIAGNOSIS — I34.0 NON-RHEUMATIC MITRAL REGURGITATION: ICD-10-CM

## 2017-12-26 DIAGNOSIS — E78.00 PURE HYPERCHOLESTEROLEMIA: ICD-10-CM

## 2017-12-26 DIAGNOSIS — I27.20 PULMONARY HYPERTENSION (HCC): ICD-10-CM

## 2017-12-26 DIAGNOSIS — I35.1 NONRHEUMATIC AORTIC VALVE INSUFFICIENCY: ICD-10-CM

## 2017-12-26 PROCEDURE — 99213 OFFICE O/P EST LOW 20 MIN: CPT | Performed by: INTERNAL MEDICINE

## 2017-12-26 ASSESSMENT — ENCOUNTER SYMPTOMS
BRUISES/BLEEDS EASILY: 0
WHEEZING: 0
PND: 0
FALLS: 0
COUGH: 0
ORTHOPNEA: 0
MYALGIAS: 0

## 2017-12-26 ASSESSMENT — LIFESTYLE VARIABLES: SUBSTANCE_ABUSE: 0

## 2017-12-26 NOTE — LETTER
[facility]   [address]   [address]  [place] [address]  Phone: [phone]  Fax: [phone]              Shahrzad Scott  1955    Encounter Date: 12/26/2017    Sharath Macias M.D.          PROGRESS NOTE:  Subjective:   Shahrzad Scott is a 62 y.o. female who presents today For follow-up of her valvular heart disease.  She tolerates this regimen well and the echo was reasonably stable and is described below. She's had no cardiac symptoms at all.  Past Medical History:   Diagnosis Date   • Aortic regurgitation 1996   • HLD (hyperlipidemia)    • Mitral regurgitation    • MVP (mitral valve prolapse)    • Palpitations    • Pulmonary hypertension 10/3/2012     Past Surgical History:   Procedure Laterality Date   • BREAST RECONSTRUCTION Bilateral    • CATARACT EXTRACTION WITH IOL Bilateral     Dr. Wilder   • HYSTERECTOMY, TOTAL ABDOMINAL      at age 37 for uterine fibroid, still has both ovaries     Family History   Problem Relation Age of Onset   • Stroke Mother    • Hypertension Mother    • Cancer Father      prostate cancer   • Lung Disease Sister      asthma   • No Known Problems Brother    • No Known Problems Maternal Grandmother    • No Known Problems Maternal Grandfather    • Other Brother      cerabral palsy     History   Smoking Status   • Never Smoker   Smokeless Tobacco   • Never Used     Allergies   Allergen Reactions   • Septra [Bactrim] Hives   • Sulfa Drugs      Outpatient Encounter Prescriptions as of 12/26/2017   Medication Sig Dispense Refill   • aspirin 81 MG tablet Take 81 mg by mouth every day.     • losartan (COZAAR) 25 MG Tab Take 0.5 Tabs by mouth every day. 30 Tab 6   • gabapentin (NEURONTIN) 100 MG Cap Take 1 Cap by mouth 3 times a day. 90 Cap 3   • Coenzyme Q10 200 MG Cap Take  by mouth.     • simvastatin (ZOCOR) 10 MG Tab Take 1 Tab by mouth every evening. 90 Tab 3   • MINIVELLE 0.025 MG/24HR PATCH BIWEEKLY  "Apply 1 Patch to skin as directed. Indications: Moderate to Severe Vasomotor Symptoms, every Wed and Friday     • Multiple Vitamins-Minerals (MULTIVITAMIN ADULTS 50+ PO) Take  by mouth.     • RESVERATROL PO Take  by mouth.     • ascorbic acid (ASCORBIC ACID) 500 MG Tab Take 500 mg by mouth every day.     • Probiotic Product (PROBIOTIC DAILY PO) Take  by mouth.     • Alpha Lipoic Acid 200 MG Cap Take  by mouth.     • minocycline (MINOCIN) 100 MG Cap   5   • FINACEA 15 % Gel   6   • Ranitidine HCl (ZANTAC PO) Take 150 mg by mouth 2 Times a Day.     • Calcium Carbonate-Vitamin D (CALCIUM + D) 600-200 MG-UNIT TABS Take  by mouth every day.     • docosahexanoic acid (OMEGA 3) 1000 MG CAPS Take 1,000 mg by mouth every day.     • vitamin D (CHOLECALCIFEROL) 1000 UNIT TABS Take 2,000 Units by mouth every day. TAKE AS DIRECTED.       No facility-administered encounter medications on file as of 12/26/2017.      Review of Systems   HENT: Negative for nosebleeds.    Respiratory: Negative for cough and wheezing.    Cardiovascular: Negative for orthopnea and PND.   Musculoskeletal: Negative for falls and myalgias.   Endo/Heme/Allergies: Does not bruise/bleed easily.   Psychiatric/Behavioral: Negative for substance abuse.        Objective:   /70   Pulse 82   Ht 1.626 m (5' 4\")   Wt 63.5 kg (140 lb)   SpO2 96%   BMI 24.03 kg/m²      Physical Exam   Constitutional: She is oriented to person, place, and time. She appears well-developed and well-nourished.   Young for age   Eyes: Conjunctivae are normal. No scleral icterus.   Neck: No JVD present.   Cardiovascular: Normal rate, regular rhythm, S1 normal, S2 normal and intact distal pulses.  Exam reveals no gallop.    Murmur (3/6 early to midsystolic ejection murmur with well preserved 2nd sound) heard.   No diastolic murmur is present   Pulmonary/Chest: Effort normal and breath sounds normal.   Musculoskeletal: She exhibits no edema.   Neurological: She is alert and " oriented to person, place, and time.   Skin: Skin is warm and dry.   Psychiatric: She has a normal mood and affect. Thought content normal.     Echo, 10/24/2017: Normal left ventricular chamber size.  Left ventricular ejection fraction is visually estimated to be 60%.  Aortic sclerosis without stenosis.  Moderate aortic insufficiency.  Mild mitral regurgitation.  Mild tricuspid regurgitation.  Mild to moderate pulmonic insufficiency.  No pericardial effusion seen.  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    Assessment:     1. Nonrheumatic aortic valve insufficiency  BASIC METABOLIC PANEL   2. Non-rheumatic mitral regurgitation     3. Pulmonary hypertension     4. Pure hypercholesterolemia       The above assessed cardiovascular problems are clinically stable.  She tolerates this regimen well and this blood pressure is also asymptomatic  Medical Decision Making:  Today's Assessment / Status / Plan:     Continue the current cardiovascular regimen.  Continue primary follow up with  Dr. Donnelly.   Cardiology follow up in 6 months and  sooner if needed for any change.  BMP now and call.  Use of the emergency medical system reviewed.       Chloe Donnelly M.D.  [facility] [id number]  [place] [id number]  VIA In Basket

## 2017-12-27 NOTE — PROGRESS NOTES
Subjective:   Shahrzad Scott is a 62 y.o. female who presents today For follow-up of her valvular heart disease.  She tolerates this regimen well and the echo was reasonably stable and is described below. She's had no cardiac symptoms at all.  Past Medical History:   Diagnosis Date   • Aortic regurgitation 1996   • HLD (hyperlipidemia)    • Mitral regurgitation    • MVP (mitral valve prolapse)    • Palpitations    • Pulmonary hypertension 10/3/2012     Past Surgical History:   Procedure Laterality Date   • BREAST RECONSTRUCTION Bilateral    • CATARACT EXTRACTION WITH IOL Bilateral     Dr. Wilder   • HYSTERECTOMY, TOTAL ABDOMINAL      at age 37 for uterine fibroid, still has both ovaries     Family History   Problem Relation Age of Onset   • Stroke Mother    • Hypertension Mother    • Cancer Father      prostate cancer   • Lung Disease Sister      asthma   • No Known Problems Brother    • No Known Problems Maternal Grandmother    • No Known Problems Maternal Grandfather    • Other Brother      cerabral palsy     History   Smoking Status   • Never Smoker   Smokeless Tobacco   • Never Used     Allergies   Allergen Reactions   • Septra [Bactrim] Hives   • Sulfa Drugs      Outpatient Encounter Prescriptions as of 12/26/2017   Medication Sig Dispense Refill   • aspirin 81 MG tablet Take 81 mg by mouth every day.     • losartan (COZAAR) 25 MG Tab Take 0.5 Tabs by mouth every day. 30 Tab 6   • gabapentin (NEURONTIN) 100 MG Cap Take 1 Cap by mouth 3 times a day. 90 Cap 3   • Coenzyme Q10 200 MG Cap Take  by mouth.     • simvastatin (ZOCOR) 10 MG Tab Take 1 Tab by mouth every evening. 90 Tab 3   • MINIVELLE 0.025 MG/24HR PATCH BIWEEKLY Apply 1 Patch to skin as directed. Indications: Moderate to Severe Vasomotor Symptoms, every Wed and Friday     • Multiple Vitamins-Minerals (MULTIVITAMIN ADULTS 50+ PO) Take  by mouth.     • RESVERATROL PO Take  by mouth.     • ascorbic acid (ASCORBIC ACID) 500 MG Tab Take 500 mg by mouth  "every day.     • Probiotic Product (PROBIOTIC DAILY PO) Take  by mouth.     • Alpha Lipoic Acid 200 MG Cap Take  by mouth.     • minocycline (MINOCIN) 100 MG Cap   5   • FINACEA 15 % Gel   6   • Ranitidine HCl (ZANTAC PO) Take 150 mg by mouth 2 Times a Day.     • Calcium Carbonate-Vitamin D (CALCIUM + D) 600-200 MG-UNIT TABS Take  by mouth every day.     • docosahexanoic acid (OMEGA 3) 1000 MG CAPS Take 1,000 mg by mouth every day.     • vitamin D (CHOLECALCIFEROL) 1000 UNIT TABS Take 2,000 Units by mouth every day. TAKE AS DIRECTED.       No facility-administered encounter medications on file as of 12/26/2017.      Review of Systems   HENT: Negative for nosebleeds.    Respiratory: Negative for cough and wheezing.    Cardiovascular: Negative for orthopnea and PND.   Musculoskeletal: Negative for falls and myalgias.   Endo/Heme/Allergies: Does not bruise/bleed easily.   Psychiatric/Behavioral: Negative for substance abuse.        Objective:   /70   Pulse 82   Ht 1.626 m (5' 4\")   Wt 63.5 kg (140 lb)   SpO2 96%   BMI 24.03 kg/m²     Physical Exam   Constitutional: She is oriented to person, place, and time. She appears well-developed and well-nourished.   Young for age   Eyes: Conjunctivae are normal. No scleral icterus.   Neck: No JVD present.   Cardiovascular: Normal rate, regular rhythm, S1 normal, S2 normal and intact distal pulses.  Exam reveals no gallop.    Murmur (3/6 early to midsystolic ejection murmur with well preserved 2nd sound) heard.   No diastolic murmur is present   Pulmonary/Chest: Effort normal and breath sounds normal.   Musculoskeletal: She exhibits no edema.   Neurological: She is alert and oriented to person, place, and time.   Skin: Skin is warm and dry.   Psychiatric: She has a normal mood and affect. Thought content normal.     Echo, 10/24/2017: Normal left ventricular chamber size.  Left ventricular ejection fraction is visually estimated to be 60%.  Aortic sclerosis without " stenosis.  Moderate aortic insufficiency.  Mild mitral regurgitation.  Mild tricuspid regurgitation.  Mild to moderate pulmonic insufficiency.  No pericardial effusion seen.  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    Assessment:     1. Nonrheumatic aortic valve insufficiency  BASIC METABOLIC PANEL   2. Non-rheumatic mitral regurgitation     3. Pulmonary hypertension     4. Pure hypercholesterolemia       The above assessed cardiovascular problems are clinically stable.  She tolerates this regimen well and this blood pressure is also asymptomatic  Medical Decision Making:  Today's Assessment / Status / Plan:     Continue the current cardiovascular regimen.  Continue primary follow up with  Dr. Donnelly.   Cardiology follow up in 6 months and  sooner if needed for any change.  BMP now and call.  Use of the emergency medical system reviewed.

## 2018-01-10 ENCOUNTER — RX ONLY (OUTPATIENT)
Age: 63
Setting detail: RX ONLY
End: 2018-01-10

## 2018-01-10 RX ORDER — MINOCYCLINE HYDROCHLORIDE 100 MG/1
1 CAPSULE ORAL DAILY
Qty: 90 | Refills: 3 | Status: ERX

## 2018-01-10 RX ORDER — HYDROQUINONE 4 %
ONCE CREAM (GRAM) TOPICAL TWICE A DAY
Qty: 1 | Refills: 0

## 2018-01-18 LAB
BUN SERPL-MCNC: 16 MG/DL (ref 8–27)
BUN/CREAT SERPL: 17 (ref 12–28)
CALCIUM SERPL-MCNC: 9.4 MG/DL (ref 8.7–10.3)
CHLORIDE SERPL-SCNC: 102 MMOL/L (ref 96–106)
CO2 SERPL-SCNC: 25 MMOL/L (ref 18–29)
CREAT SERPL-MCNC: 0.92 MG/DL (ref 0.57–1)
GLUCOSE SERPL-MCNC: 99 MG/DL (ref 65–99)
IF AFRICAN AMERICAN  100797: 77 ML/MIN/1.73
IF NON AFRICAN AMER 100791: 66 ML/MIN/1.73
POTASSIUM SERPL-SCNC: 4.2 MMOL/L (ref 3.5–5.2)
SODIUM SERPL-SCNC: 143 MMOL/L (ref 134–144)

## 2018-02-05 ENCOUNTER — APPOINTMENT (RX ONLY)
Dept: URBAN - METROPOLITAN AREA CLINIC 4 | Facility: CLINIC | Age: 63
Setting detail: DERMATOLOGY
End: 2018-02-05

## 2018-02-05 DIAGNOSIS — Z79.899 OTHER LONG TERM (CURRENT) DRUG THERAPY: ICD-10-CM

## 2018-02-05 PROCEDURE — ? ORDER TESTS

## 2018-02-05 NOTE — PROCEDURE: ORDER TESTS
Performing Laboratory: [id number]
Billing Type: Third-Party Bill
Bill For Surgical Tray: no
Clinical Notes (To The Lab): Patient will be coming in for labs within Feb
Expected Date Of Service: 02/15/2018

## 2018-03-05 ENCOUNTER — APPOINTMENT (RX ONLY)
Dept: URBAN - METROPOLITAN AREA CLINIC 4 | Facility: CLINIC | Age: 63
Setting detail: DERMATOLOGY
End: 2018-03-05

## 2018-03-05 DIAGNOSIS — Z87.2 PERSONAL HISTORY OF DISEASES OF THE SKIN AND SUBCUTANEOUS TISSUE: ICD-10-CM

## 2018-03-05 DIAGNOSIS — L92.0 GRANULOMA ANNULARE: ICD-10-CM

## 2018-03-05 DIAGNOSIS — L81.4 OTHER MELANIN HYPERPIGMENTATION: ICD-10-CM

## 2018-03-05 DIAGNOSIS — D18.0 HEMANGIOMA: ICD-10-CM

## 2018-03-05 DIAGNOSIS — L85.3 XEROSIS CUTIS: ICD-10-CM

## 2018-03-05 DIAGNOSIS — D22 MELANOCYTIC NEVI: ICD-10-CM | Status: STABLE

## 2018-03-05 PROBLEM — D22.5 MELANOCYTIC NEVI OF TRUNK: Status: ACTIVE | Noted: 2018-03-05

## 2018-03-05 PROBLEM — D18.01 HEMANGIOMA OF SKIN AND SUBCUTANEOUS TISSUE: Status: ACTIVE | Noted: 2018-03-05

## 2018-03-05 PROCEDURE — 99214 OFFICE O/P EST MOD 30 MIN: CPT

## 2018-03-05 PROCEDURE — ? DIAGNOSIS COMMENT

## 2018-03-05 PROCEDURE — ? COUNSELING

## 2018-03-05 PROCEDURE — ? MEDICATION COUNSELING

## 2018-03-05 PROCEDURE — ? PRESCRIPTION

## 2018-03-05 PROCEDURE — ? PATIENT SPECIFIC COUNSELING

## 2018-03-05 RX ORDER — TRIAMCINOLONE ACETONIDE 1 MG/G
CREAM TOPICAL BID
Qty: 1 | Refills: 3 | Status: ERX

## 2018-03-05 ASSESSMENT — LOCATION ZONE DERM
LOCATION ZONE: LEG
LOCATION ZONE: TRUNK
LOCATION ZONE: HAND

## 2018-03-05 ASSESSMENT — LOCATION DETAILED DESCRIPTION DERM
LOCATION DETAILED: RIGHT ANTERIOR PROXIMAL THIGH
LOCATION DETAILED: LEFT ANTERIOR PROXIMAL THIGH
LOCATION DETAILED: RIGHT MEDIAL UPPER BACK
LOCATION DETAILED: LEFT BUTTOCK
LOCATION DETAILED: RIGHT RADIAL DORSAL HAND
LOCATION DETAILED: RIGHT PROXIMAL PRETIBIAL REGION
LOCATION DETAILED: LEFT PROXIMAL PRETIBIAL REGION

## 2018-03-05 ASSESSMENT — LOCATION SIMPLE DESCRIPTION DERM
LOCATION SIMPLE: LEFT BUTTOCK
LOCATION SIMPLE: RIGHT HAND
LOCATION SIMPLE: RIGHT THIGH
LOCATION SIMPLE: RIGHT PRETIBIAL REGION
LOCATION SIMPLE: LEFT PRETIBIAL REGION
LOCATION SIMPLE: LEFT THIGH
LOCATION SIMPLE: RIGHT UPPER BACK

## 2018-03-05 NOTE — PROCEDURE: MEDICATION COUNSELING
Imiquimod Pregnancy And Lactation Text: This medication is Pregnancy Category C. It is unknown if this medication is excreted in breast milk.
Minoxidil Pregnancy And Lactation Text: This medication has not been assigned a Pregnancy Risk Category but animal studies failed to show danger with the topical medication. It is unknown if the medication is excreted in breast milk.
Cyclosporine Counseling:  I discussed with the patient the risks of cyclosporine including but not limited to hypertension, gingival hyperplasia,myelosuppression, immunosuppression, liver damage, kidney damage, neurotoxicity, lymphoma, and serious infections. The patient understands that monitoring is required including baseline blood pressure, CBC, CMP, lipid panel and uric acid, and then 1-2 times monthly CMP and blood pressure.
Topical Clindamycin Counseling: Patient counseled that this medication may cause skin irritation or allergic reactions.  In the event of skin irritation, the patient was advised to reduce the amount of the drug applied or use it less frequently.   The patient verbalized understanding of the proper use and possible adverse effects of clindamycin.  All of the patient's questions and concerns were addressed.
Rifampin Counseling: I discussed with the patient the risks of rifampin including but not limited to liver damage, kidney damage, red-orange body fluids, nausea/vomiting and severe allergy.
Dapsone Counseling: I discussed with the patient the risks of dapsone including but not limited to hemolytic anemia, agranulocytosis, rashes, methemoglobinemia, kidney failure, peripheral neuropathy, headaches, GI upset, and liver toxicity.  Patients who start dapsone require monitoring including baseline LFTs and weekly CBCs for the first month, then every month thereafter.  The patient verbalized understanding of the proper use and possible adverse effects of dapsone.  All of the patient's questions and concerns were addressed.
Azathioprine Counseling:  I discussed with the patient the risks of azathioprine including but not limited to myelosuppression, immunosuppression, hepatotoxicity, lymphoma, and infections.  The patient understands that monitoring is required including baseline LFTs, Creatinine, possible TPMP genotyping and weekly CBCs for the first month and then every 2 weeks thereafter.  The patient verbalized understanding of the proper use and possible adverse effects of azathioprine.  All of the patient's questions and concerns were addressed.
Drysol Counseling:  I discussed with the patient the risks of drysol/aluminum chloride including but not limited to skin rash, itching, irritation, burning.
Include Pregnancy/Lactation Warning?: No
Bactrim Pregnancy And Lactation Text: This medication is Pregnancy Category D and is known to cause fetal risk.  It is also excreted in breast milk.
Topical Retinoid counseling:  Patient advised to apply a pea-sized amount only at bedtime and wait 30 minutes after washing their face before applying.  If too drying, patient may add a non-comedogenic moisturizer. The patient verbalized understanding of the proper use and possible adverse effects of retinoids.  All of the patient's questions and concerns were addressed.
Humira Pregnancy And Lactation Text: This medication is Pregnancy Category B and is considered safe during pregnancy. It is unknown if this medication is excreted in breast milk.
Metronidazole Counseling:  I discussed with the patient the risks of metronidazole including but not limited to seizures, nausea/vomiting, a metallic taste in the mouth, nausea/vomiting and severe allergy.
Xolair Pregnancy And Lactation Text: This medication is Pregnancy Category B and is considered safe during pregnancy. This medication is excreted in breast milk.
Elidel Counseling: Patient may experience a mild burning sensation during topical application. Elidel is not approved in children less than 2 years of age. There have been case reports of hematologic and skin malignancies in patients using topical calcineurin inhibitors although causality is questionable.
Quinolones Counseling:  I discussed with the patient the risks of fluoroquinolones including but not limited to GI upset, allergic reaction, drug rash, diarrhea, dizziness, photosensitivity, yeast infections, liver function test abnormalities, tendonitis/tendon rupture.
Fluconazole Pregnancy And Lactation Text: This medication is Pregnancy Category C and it isn't know if it is safe during pregnancy. It is also excreted in breast milk.
Detail Level: Simple
Thalidomide Pregnancy And Lactation Text: This medication is Pregnancy Category X and is absolutely contraindicated during pregnancy. It is unknown if it is excreted in breast milk.
Taltz Pregnancy And Lactation Text: The risk during pregnancy and breastfeeding is uncertain with this medication.
Enbrel Counseling:  I discussed with the patient the risks of etanercept including but not limited to myelosuppression, immunosuppression, autoimmune hepatitis, demyelinating diseases, lymphoma, and infections.  The patient understands that monitoring is required including a PPD at baseline and must alert us or the primary physician if symptoms of infection or other concerning signs are noted.
Xelflynnz Pregnancy And Lactation Text: This medication is Pregnancy Category D and is not considered safe during pregnancy.  The risk during breast feeding is also uncertain.
Bexarotene Counseling:  I discussed with the patient the risks of bexarotene including but not limited to hair loss, dry lips/skin/eyes, liver abnormalities, hyperlipidemia, pancreatitis, depression/suicidal ideation, photosensitivity, drug rash/allergic reactions, hypothyroidism, anemia, leukopenia, infection, cataracts, and teratogenicity.  Patient understands that they will need regular blood tests to check lipid profile, liver function tests, white blood cell count, thyroid function tests and pregnancy test if applicable.
Carac Counseling:  I discussed with the patient the risks of Carac including but not limited to erythema, scaling, itching, weeping, crusting, and pain.
Tetracycline Pregnancy And Lactation Text: This medication is Pregnancy Category D and not consider safe during pregnancy. It is also excreted in breast milk.
Protopic Counseling: Patient may experience a mild burning sensation during topical application. Protopic is not approved in children less than 2 years of age. There have been case reports of hematologic and skin malignancies in patients using topical calcineurin inhibitors although causality is questionable.
Tazorac Counseling:  Patient advised that medication is irritating and drying.  Patient may need to apply sparingly and wash off after an hour before eventually leaving it on overnight.  The patient verbalized understanding of the proper use and possible adverse effects of tazorac.  All of the patient's questions and concerns were addressed.
Ketoconazole Pregnancy And Lactation Text: This medication is Pregnancy Category C and it isn't know if it is safe during pregnancy. It is also excreted in breast milk and breast feeding isn't recommended.
Zyclara Counseling:  I discussed with the patient the risks of imiquimod including but not limited to erythema, scaling, itching, weeping, crusting, and pain.  Patient understands that the inflammatory response to imiquimod is variable from person to person and was educated regarded proper titration schedule.  If flu-like symptoms develop, patient knows to discontinue the medication and contact us.
Cephalexin Pregnancy And Lactation Text: This medication is Pregnancy Category B and considered safe during pregnancy.  It is also excreted in breast milk but can be used safely for shorter doses.
Taltz Counseling: I discussed with the patient the risks of ixekizumab including but not limited to immunosuppression, serious infections, worsening of inflammatory bowel disease and drug reactions.  The patient understands that monitoring is required including a PPD at baseline and must alert us or the primary physician if symptoms of infection or other concerning signs are noted.
Carac Pregnancy And Lactation Text: This medication is Pregnancy Category X and contraindicated in pregnancy and in women who may become pregnant. It is unknown if this medication is excreted in breast milk.
Benzoyl Peroxide Pregnancy And Lactation Text: This medication is Pregnancy Category C. It is unknown if benzoyl peroxide is excreted in breast milk.
Arava Counseling:  Patient counseled regarding adverse effects of Arava including but not limited to nausea, vomiting, abnormalities in liver function tests. Patients may develop mouth sores, rash, diarrhea, and abnormalities in blood counts. The patient understands that monitoring is required including LFTs and blood counts.  There is a rare possibility of scarring of the liver and lung problems that can occur when taking methotrexate. Persistent nausea, loss of appetite, pale stools, dark urine, cough, and shortness of breath should be reported immediately. Patient advised to discontinue Arava treatment and consult with a physician prior to attempting conception. The patient will have to undergo a treatment to eliminate Arava from the body prior to conception.
Isotretinoin Pregnancy And Lactation Text: This medication is Pregnancy Category X and is considered extremely dangerous during pregnancy. It is unknown if it is excreted in breast milk.
Azathioprine Pregnancy And Lactation Text: This medication is Pregnancy Category D and isn't considered safe during pregnancy. It is unknown if this medication is excreted in breast milk.
Isotretinoin Counseling: Patient should get monthly blood tests, not donate blood, not drive at night if vision affected, not share medication, and not undergo elective surgery for 6 months after tx completed. Side effects reviewed, pt to contact office should one occur.
Topical Clindamycin Pregnancy And Lactation Text: This medication is Pregnancy Category B and is considered safe during pregnancy. It is unknown if it is excreted in breast milk.
Dupixent Counseling: I discussed with the patient the risks of dupilumab including but not limited to eye infection and irritation, cold sores, injection site reactions, worsening of asthma, allergic reactions and increased risk of parasitic infection.  Live vaccines should be avoided while taking dupilumab. Dupilumab will also interact with certain medications such as warfarin and cyclosporine. The patient understands that monitoring is required and they must alert us or the primary physician if symptoms of infection or other concerning signs are noted.
Drysol Pregnancy And Lactation Text: This medication is considered safe during pregnancy and breast feeding.
5-Fu Counseling: 5-Fluorouracil Counseling:  I discussed with the patient the risks of 5-fluorouracil including but not limited to erythema, scaling, itching, weeping, crusting, and pain.
Albendazole Pregnancy And Lactation Text: This medication is Pregnancy Category C and it isn't known if it is safe during pregnancy. It is also excreted in breast milk.
Methotrexate Counseling:  Patient counseled regarding adverse effects of methotrexate including but not limited to nausea, vomiting, abnormalities in liver function tests. Patients may develop mouth sores, rash, diarrhea, and abnormalities in blood counts. The patient understands that monitoring is required including LFT's and blood counts.  There is a rare possibility of scarring of the liver and lung problems that can occur when taking methotrexate. Persistent nausea, loss of appetite, pale stools, dark urine, cough, and shortness of breath should be reported immediately. Patient advised to discontinue methotrexate treatment at least three months before attempting to become pregnant.  I discussed the need for folate supplements while taking methotrexate.  These supplements can decrease side effects during methotrexate treatment. The patient verbalized understanding of the proper use and possible adverse effects of methotrexate.  All of the patient's questions and concerns were addressed.
Griseofulvin Counseling:  I discussed with the patient the risks of griseofulvin including but not limited to photosensitivity, cytopenia, liver damage, nausea/vomiting and severe allergy.  The patient understands that this medication is best absorbed when taken with a fatty meal (e.g., ice cream or french fries).
Odomzo Counseling- I discussed with the patient the risks of Odomzo including but not limited to nausea, vomiting, diarrhea, constipation, weight loss, changes in the sense of taste, decreased appetite, muscle spasms, and hair loss.  The patient verbalized understanding of the proper use and possible adverse effects of Odomzo.  All of the patient's questions and concerns were addressed.
Protopic Pregnancy And Lactation Text: This medication is Pregnancy Category C. It is unknown if this medication is excreted in breast milk when applied topically.
Clofazimine Pregnancy And Lactation Text: This medication is Pregnancy Category C and isn't considered safe during pregnancy. It is excreted in breast milk.
Rifampin Pregnancy And Lactation Text: This medication is Pregnancy Category C and it isn't know if it is safe during pregnancy. It is also excreted in breast milk and should not be used if you are breast feeding.
Doxepin Pregnancy And Lactation Text: This medication is Pregnancy Category C and it isn't known if it is safe during pregnancy. It is also excreted in breast milk and breast feeding isn't recommended.
Terbinafine Counseling: Patient counseling regarding adverse effects of terbinafine including but not limited to headache, diarrhea, rash, upset stomach, liver function test abnormalities, itching, taste/smell disturbance, nausea, abdominal pain, and flatulence.  There is a rare possibility of liver failure that can occur when taking terbinafine.  The patient understands that a baseline LFT and kidney function test may be required. The patient verbalized understanding of the proper use and possible adverse effects of terbinafine.  All of the patient's questions and concerns were addressed.
Rituxan Pregnancy And Lactation Text: This medication is Pregnancy Category C and it isn't know if it is safe during pregnancy. It is unknown if this medication is excreted in breast milk but similar antibodies are known to be excreted.
Dupixent Pregnancy And Lactation Text: This medication likely crosses the placenta but the risk for the fetus is uncertain. This medication is excreted in breast milk.
Azithromycin Pregnancy And Lactation Text: This medication is considered safe during pregnancy and is also secreted in breast milk.
Birth Control Pills Counseling: Birth Control Pill Counseling: I discussed with the patient the potential side effects of OCPs including but not limited to increased risk of stroke, heart attack, thrombophlebitis, deep venous thrombosis, hepatic adenomas, breast changes, GI upset, headaches, and depression.  The patient verbalized understanding of the proper use and possible adverse effects of OCPs. All of the patient's questions and concerns were addressed.
Xeljanz Counseling: I discussed with the patient the risks of Xeljanz therapy including increased risk of infection, liver issues, headache, diarrhea, or cold symptoms. Live vaccines should be avoided. They were instructed to call if they have any problems.
Hydroxychloroquine Counseling:  I discussed with the patient that a baseline ophthalmologic exam is needed at the start of therapy and every year thereafter while on therapy. A CBC may also be warranted for monitoring.  The side effects of this medication were discussed with the patient, including but not limited to agranulocytosis, aplastic anemia, seizures, rashes, retinopathy, and liver toxicity. Patient instructed to call the office should any adverse effect occur.  The patient verbalized understanding of the proper use and possible adverse effects of Plaquenil.  All the patient's questions and concerns were addressed.
Hydroquinone Counseling:  Patient advised that medication may result in skin irritation, lightening (hypopigmentation), dryness, and burning.  In the event of skin irritation, the patient was advised to reduce the amount of the drug applied or use it less frequently.  Rarely, spots that are treated with hydroquinone can become darker (pseudoochronosis).  Should this occur, patient instructed to stop medication and call the office. The patient verbalized understanding of the proper use and possible adverse effects of hydroquinone.  All of the patient's questions and concerns were addressed.
Spironolactone Pregnancy And Lactation Text: This medication can cause feminization of the male fetus and should be avoided during pregnancy. The active metabolite is also found in breast milk.
Spironolactone Counseling: Patient advised regarding risks of diarrhea, abdominal pain, hyperkalemia, birth defects (for female patients), liver toxicity and renal toxicity. The patient may need blood work to monitor liver and kidney function and potassium levels while on therapy. The patient verbalized understanding of the proper use and possible adverse effects of spironolactone.  All of the patient's questions and concerns were addressed.
Solaraze Pregnancy And Lactation Text: This medication is Pregnancy Category B and is considered safe. There is some data to suggest avoiding during the third trimester. It is unknown if this medication is excreted in breast milk.
Dapsone Pregnancy And Lactation Text: This medication is Pregnancy Category C and is not considered safe during pregnancy or breast feeding.
Valtrex Pregnancy And Lactation Text: this medication is Pregnancy Category B and is considered safe during pregnancy. This medication is not directly found in breast milk but it's metabolite acyclovir is present.
Eucrisa Counseling: Patient may experience a mild burning sensation during topical application. Eucrisa is not approved in children less than 2 years of age.
Methotrexate Pregnancy And Lactation Text: This medication is Pregnancy Category X and is known to cause fetal harm. This medication is excreted in breast milk.
Cephalexin Counseling: I counseled the patient regarding use of cephalexin as an antibiotic for prophylactic and/or therapeutic purposes. Cephalexin (commonly prescribed under brand name Keflex) is a cephalosporin antibiotic which is active against numerous classes of bacteria, including most skin bacteria. Side effects may include nausea, diarrhea, gastrointestinal upset, rash, hives, yeast infections, and in rare cases, hepatitis, kidney disease, seizures, fever, confusion, neurologic symptoms, and others. Patients with severe allergies to penicillin medications are cautioned that there is about a 10% incidence of cross-reactivity with cephalosporins. When possible, patients with penicillin allergies should use alternatives to cephalosporins for antibiotic therapy.
Hydroxyzine Counseling: Patient advised that the medication is sedating and not to drive a car after taking this medication.  Patient informed of potential adverse effects including but not limited to dry mouth, urinary retention, and blurry vision.  The patient verbalized understanding of the proper use and possible adverse effects of hydroxyzine.  All of the patient's questions and concerns were addressed.
Topical Sulfur Applications Pregnancy And Lactation Text: This medication is Pregnancy Category C and has an unknown safety profile during pregnancy. It is unknown if this topical medication is excreted in breast milk.
Minoxidil Counseling: Minoxidil is a topical medication which can increase blood flow where it is applied. It is uncertain how this medication increases hair growth. Side effects are uncommon and include stinging and allergic reactions.
Topical Sulfur Applications Counseling: Topical Sulfur Counseling: Patient counseled that this medication may cause skin irritation or allergic reactions.  In the event of skin irritation, the patient was advised to reduce the amount of the drug applied or use it less frequently.   The patient verbalized understanding of the proper use and possible adverse effects of topical sulfur application.  All of the patient's questions and concerns were addressed.
Erivedge Counseling- I discussed with the patient the risks of Erivedge including but not limited to nausea, vomiting, diarrhea, constipation, weight loss, changes in the sense of taste, decreased appetite, muscle spasms, and hair loss.  The patient verbalized understanding of the proper use and possible adverse effects of Erivedge.  All of the patient's questions and concerns were addressed.
Cyclosporine Pregnancy And Lactation Text: This medication is Pregnancy Category C and it isn't know if it is safe during pregnancy. This medication is excreted in breast milk.
Stelara Counseling:  I discussed with the patient the risks of ustekinumab including but not limited to immunosuppression, malignancy, posterior leukoencephalopathy syndrome, and serious infections.  The patient understands that monitoring is required including a PPD at baseline and must alert us or the primary physician if symptoms of infection or other concerning signs are noted.
Ketoconazole Counseling:   Patient counseled regarding improving absorption with orange juice.  Adverse effects include but are not limited to breast enlargement, headache, diarrhea, nausea, upset stomach, liver function test abnormalities, taste disturbance, and stomach pain.  There is a rare possibility of liver failure that can occur when taking ketoconazole. The patient understands that monitoring of LFTs may be required, especially at baseline. The patient verbalized understanding of the proper use and possible adverse effects of ketoconazole.  All of the patient's questions and concerns were addressed.
Hydroxychloroquine Pregnancy And Lactation Text: This medication has been shown to cause fetal harm but it isn't assigned a Pregnancy Risk Category. There are small amounts excreted in breast milk.
Imiquimod Counseling:  I discussed with the patient the risks of imiquimod including but not limited to erythema, scaling, itching, weeping, crusting, and pain.  Patient understands that the inflammatory response to imiquimod is variable from person to person and was educated regarded proper titration schedule.  If flu-like symptoms develop, patient knows to discontinue the medication and contact us.
Bactrim Counseling:  I discussed with the patient the risks of sulfa antibiotics including but not limited to GI upset, allergic reaction, drug rash, diarrhea, dizziness, photosensitivity, and yeast infections.  Rarely, more serious reactions can occur including but not limited to aplastic anemia, agranulocytosis, methemoglobinemia, blood dyscrasias, liver or kidney failure, lung infiltrates or desquamative/blistering drug rashes.
Colchicine Counseling:  Patient counseled regarding adverse effects including but not limited to stomach upset (nausea, vomiting, stomach pain, or diarrhea).  Patient instructed to limit alcohol consumption while taking this medication.  Colchicine may reduce blood counts especially with prolonged use.  The patient understands that monitoring of kidney function and blood counts may be required, especially at baseline. The patient verbalized understanding of the proper use and possible adverse effects of colchicine.  All of the patient's questions and concerns were addressed.
Nsaids Pregnancy And Lactation Text: These medications are considered safe up to 30 weeks gestation. It is excreted in breast milk.
Clofazimine Counseling:  I discussed with the patient the risks of clofazimine including but not limited to skin and eye pigmentation, liver damage, nausea/vomiting, gastrointestinal bleeding and allergy.
Erythromycin Counseling:  I discussed with the patient the risks of erythromycin including but not limited to GI upset, allergic reaction, drug rash, diarrhea, increase in liver enzymes, and yeast infections.
SSKI Counseling:  I discussed with the patient the risks of SSKI including but not limited to thyroid abnormalities, metallic taste, GI upset, fever, headache, acne, arthralgias, paraesthesias, lymphadenopathy, easy bleeding, arrhythmias, and allergic reaction.
Doxepin Counseling:  Patient advised that the medication is sedating and not to drive a car after taking this medication. Patient informed of potential adverse effects including but not limited to dry mouth, urinary retention, and blurry vision.  The patient verbalized understanding of the proper use and possible adverse effects of doxepin.  All of the patient's questions and concerns were addressed.
Infliximab Counseling:  I discussed with the patient the risks of infliximab including but not limited to myelosuppression, immunosuppression, autoimmune hepatitis, demyelinating diseases, lymphoma, and serious infections.  The patient understands that monitoring is required including a PPD at baseline and must alert us or the primary physician if symptoms of infection or other concerning signs are noted.
Rituxan Counseling:  I discussed with the patient the risks of Rituxan infusions. Side effects can include infusion reactions, severe drug rashes including mucocutaneous reactions, reactivation of latent hepatitis and other infections and rarely progressive multifocal leukoencephalopathy.  All of the patient's questions and concerns were addressed.
Thalidomide Counseling: I discussed with the patient the risks of thalidomide including but not limited to birth defects, anxiety, weakness, chest pain, dizziness, cough and severe allergy.
Terbinafine Pregnancy And Lactation Text: This medication is Pregnancy Category B and is considered safe during pregnancy. It is also excreted in breast milk and breast feeding isn't recommended.
Albendazole Counseling:  I discussed with the patient the risks of albendazole including but not limited to cytopenia, kidney damage, nausea/vomiting and severe allergy.  The patient understands that this medication is being used in an off-label manner.
Acitretin Pregnancy And Lactation Text: This medication is Pregnancy Category X and should not be given to women who are pregnant or may become pregnant in the future. This medication is excreted in breast milk.
Fluconazole Counseling:  Patient counseled regarding adverse effects of fluconazole including but not limited to headache, diarrhea, nausea, upset stomach, liver function test abnormalities, taste disturbance, and stomach pain.  There is a rare possibility of liver failure that can occur when taking fluconazole.  The patient understands that monitoring of LFTs and kidney function test may be required, especially at baseline. The patient verbalized understanding of the proper use and possible adverse effects of fluconazole.  All of the patient's questions and concerns were addressed.
Minocycline Counseling: Patient advised regarding possible photosensitivity and discoloration of the teeth, skin, lips, tongue and gums.  Patient instructed to avoid sunlight, if possible.  When exposed to sunlight, patients should wear protective clothing, sunglasses, and sunscreen.  The patient was instructed to call the office immediately if the following severe adverse effects occur:  hearing changes, easy bruising/bleeding, severe headache, or vision changes.  The patient verbalized understanding of the proper use and possible adverse effects of minocycline.  All of the patient's questions and concerns were addressed.
Tremfya Counseling: I discussed with the patient the risks of guselkumab including but not limited to immunosuppression, serious infections, worsening of inflammatory bowel disease and drug reactions.  The patient understands that monitoring is required including a PPD at baseline and must alert us or the primary physician if symptoms of infection or other concerning signs are noted.
Benzoyl Peroxide Counseling: Patient counseled that medicine may cause skin irritation and bleach clothing.  In the event of skin irritation, the patient was advised to reduce the amount of the drug applied or use it less frequently.   The patient verbalized understanding of the proper use and possible adverse effects of benzoyl peroxide.  All of the patient's questions and concerns were addressed.
Cellcept Counseling:  I discussed with the patient the risks of mycophenolate mofetil including but not limited to infection/immunosuppression, GI upset, hypokalemia, hypercholesterolemia, bone marrow suppression, lymphoproliferative disorders, malignancy, GI ulceration/bleed/perforation, colitis, interstitial lung disease, kidney failure, progressive multifocal leukoencephalopathy, and birth defects.  The patient understands that monitoring is required including a baseline creatinine and regular CBC testing. In addition, patient must alert us immediately if symptoms of infection or other concerning signs are noted.
Acitretin Counseling:  I discussed with the patient the risks of acitretin including but not limited to hair loss, dry lips/skin/eyes, liver damage, hyperlipidemia, depression/suicidal ideation, photosensitivity.  Serious rare side effects can include but are not limited to pancreatitis, pseudotumor cerebri, bony changes, clot formation/stroke/heart attack.  Patient understands that alcohol is contraindicated since it can result in liver toxicity and significantly prolong the elimination of the drug by many years.
Azithromycin Counseling:  I discussed with the patient the risks of azithromycin including but not limited to GI upset, allergic reaction, drug rash, diarrhea, and yeast infections.
Tazorac Pregnancy And Lactation Text: This medication is not safe during pregnancy. It is unknown if this medication is excreted in breast milk.
Prednisone Counseling:  I discussed with the patient the risks of prolonged use of prednisone including but not limited to weight gain, insomnia, osteoporosis, mood changes, diabetes, susceptibility to infection, glaucoma and high blood pressure.  In cases where prednisone use is prolonged, patients should be monitored with blood pressure checks, serum glucose levels and an eye exam.  Additionally, the patient may need to be placed on GI prophylaxis, PCP prophylaxis, and calcium and vitamin D supplementation and/or a bisphosphonate.  The patient verbalized understanding of the proper use and the possible adverse effects of prednisone.  All of the patient's questions and concerns were addressed.
Metronidazole Pregnancy And Lactation Text: This medication is Pregnancy Category B and considered safe during pregnancy.  It is also excreted in breast milk.
Griseofulvin Pregnancy And Lactation Text: This medication is Pregnancy Category X and is known to cause serious birth defects. It is unknown if this medication is excreted in breast milk but breast feeding should be avoided.
Xolair Counseling:  Patient informed of potential adverse effects including but not limited to fever, muscle aches, rash and allergic reactions.  The patient verbalized understanding of the proper use and possible adverse effects of Xolair.  All of the patient's questions and concerns were addressed.
Hydroxyzine Pregnancy And Lactation Text: This medication is not safe during pregnancy and should not be taken. It is also excreted in breast milk and breast feeding isn't recommended.
Itraconazole Counseling:  I discussed with the patient the risks of itraconazole including but not limited to liver damage, nausea/vomiting, neuropathy, and severe allergy.  The patient understands that this medication is best absorbed when taken with acidic beverages such as non-diet cola or ginger ale.  The patient understands that monitoring is required including baseline LFTs and repeat LFTs at intervals.  The patient understands that they are to contact us or the primary physician if concerning signs are noted.
Humira Counseling:  I discussed with the patient the risks of adalimumab including but not limited to myelosuppression, immunosuppression, autoimmune hepatitis, demyelinating diseases, lymphoma, and serious infections.  The patient understands that monitoring is required including a PPD at baseline and must alert us or the primary physician if symptoms of infection or other concerning signs are noted.
Bexarotene Pregnancy And Lactation Text: This medication is Pregnancy Category X and should not be given to women who are pregnant or may become pregnant. This medication should not be used if you are breast feeding.
Valtrex Counseling: I discussed with the patient the risks of valacyclovir including but not limited to kidney damage, nausea, vomiting and severe allergy.  The patient understands that if the infection seems to be worsening or is not improving, they are to call.
Nsaids Counseling: NSAID Counseling: I discussed with the patient that NSAIDs should be taken with food. Prolonged use of NSAIDs can result in the development of stomach ulcers.  Patient advised to stop taking NSAIDs if abdominal pain occurs.  The patient verbalized understanding of the proper use and possible adverse effects of NSAIDs.  All of the patient's questions and concerns were addressed.
Picato Counseling:  I discussed with the patient the risks of Picato including but not limited to erythema, scaling, itching, weeping, crusting, and pain.
Doxycycline Counseling:  Patient counseled regarding possible photosensitivity and increased risk for sunburn.  Patient instructed to avoid sunlight, if possible.  When exposed to sunlight, patients should wear protective clothing, sunglasses, and sunscreen.  The patient was instructed to call the office immediately if the following severe adverse effects occur:  hearing changes, easy bruising/bleeding, severe headache, or vision changes.  The patient verbalized understanding of the proper use and possible adverse effects of doxycycline.  All of the patient's questions and concerns were addressed.
Cimetidine Counseling:  I discussed with the patient the risks of Cimetidine including but not limited to gynecomastia, headache, diarrhea, nausea, drowsiness, arrhythmias, pancreatitis, skin rashes, psychosis, bone marrow suppression and kidney toxicity.
Sski Pregnancy And Lactation Text: This medication is Pregnancy Category D and isn't considered safe during pregnancy. It is excreted in breast milk.
Cosentyx Counseling:  I discussed with the patient the risks of Cosentyx including but not limited to worsening of Crohn's disease, immunosuppression, allergic reactions and infections.  The patient understands that monitoring is required including a PPD at baseline and must alert us or the primary physician if symptoms of infection or other concerning signs are noted.
Solaraze Counseling:  I discussed with the patient the risks of Solaraze including but not limited to erythema, scaling, itching, weeping, crusting, and pain.
Tetracycline Counseling: Patient counseled regarding possible photosensitivity and increased risk for sunburn.  Patient instructed to avoid sunlight, if possible.  When exposed to sunlight, patients should wear protective clothing, sunglasses, and sunscreen.  The patient was instructed to call the office immediately if the following severe adverse effects occur:  hearing changes, easy bruising/bleeding, severe headache, or vision changes.  The patient verbalized understanding of the proper use and possible adverse effects of tetracycline.  All of the patient's questions and concerns were addressed. Patient understands to avoid pregnancy while on therapy due to potential birth defects.
Erythromycin Pregnancy And Lactation Text: This medication is Pregnancy Category B and is considered safe during pregnancy. It is also excreted in breast milk.
Doxycycline Pregnancy And Lactation Text: This medication is Pregnancy Category D and not consider safe during pregnancy. It is also excreted in breast milk but is considered safe for shorter treatment courses.
Birth Control Pills Pregnancy And Lactation Text: This medication should be avoided if pregnant and for the first 30 days post-partum.
Ivermectin Counseling:  Patient instructed to take medication on an empty stomach with a full glass of water.  Patient informed of potential adverse effects including but not limited to nausea, diarrhea, dizziness, itching, and swelling of the extremities or lymph nodes.  The patient verbalized understanding of the proper use and possible adverse effects of ivermectin.  All of the patient's questions and concerns were addressed.
High Dose Vitamin A Pregnancy And Lactation Text: High dose vitamin A therapy is contraindicated during pregnancy and breast feeding.
Gabapentin Counseling: I discussed with the patient the risks of gabapentin including but not limited to dizziness, somnolence, fatigue and ataxia.
High Dose Vitamin A Counseling: Side effects reviewed, pt to contact office should one occur.
Simponi Counseling:  I discussed with the patient the risks of golimumab including but not limited to myelosuppression, immunosuppression, autoimmune hepatitis, demyelinating diseases, lymphoma, and serious infections.  The patient understands that monitoring is required including a PPD at baseline and must alert us or the primary physician if symptoms of infection or other concerning signs are noted.

## 2018-03-05 NOTE — PROCEDURE: PATIENT SPECIFIC COUNSELING
Patient states occasionally itches on hands where treated.  Recommended she apply TAC twice daily, if symptomatic.
Detail Level: Simple

## 2018-03-16 ENCOUNTER — HOSPITAL ENCOUNTER (OUTPATIENT)
Dept: HOSPITAL 8 - CFH | Age: 63
Discharge: HOME | End: 2018-03-16
Attending: SPECIALIST
Payer: OTHER GOVERNMENT

## 2018-03-16 DIAGNOSIS — E04.2: Primary | ICD-10-CM

## 2018-03-16 PROCEDURE — 76536 US EXAM OF HEAD AND NECK: CPT

## 2018-03-20 ENCOUNTER — OFFICE VISIT (OUTPATIENT)
Dept: MEDICAL GROUP | Age: 63
End: 2018-03-20
Payer: OTHER GOVERNMENT

## 2018-03-20 VITALS
SYSTOLIC BLOOD PRESSURE: 125 MMHG | OXYGEN SATURATION: 94 % | DIASTOLIC BLOOD PRESSURE: 70 MMHG | WEIGHT: 143 LBS | TEMPERATURE: 98.6 F | HEART RATE: 78 BPM | HEIGHT: 65 IN | BODY MASS INDEX: 23.82 KG/M2

## 2018-03-20 DIAGNOSIS — E78.00 PURE HYPERCHOLESTEROLEMIA: ICD-10-CM

## 2018-03-20 DIAGNOSIS — M54.32 LEFT SIDED SCIATICA: ICD-10-CM

## 2018-03-20 DIAGNOSIS — M79.672 LEFT FOOT PAIN: ICD-10-CM

## 2018-03-20 DIAGNOSIS — I10 ESSENTIAL HYPERTENSION: ICD-10-CM

## 2018-03-20 DIAGNOSIS — K21.9 GASTROESOPHAGEAL REFLUX DISEASE WITHOUT ESOPHAGITIS: ICD-10-CM

## 2018-03-20 PROBLEM — Z09 HOSPITAL DISCHARGE FOLLOW-UP: Status: RESOLVED | Noted: 2017-08-15 | Resolved: 2018-03-20

## 2018-03-20 PROBLEM — H61.23 BILATERAL IMPACTED CERUMEN: Status: RESOLVED | Noted: 2017-08-15 | Resolved: 2018-03-20

## 2018-03-20 LAB
ALBUMIN SERPL-MCNC: 4.6 G/DL (ref 3.6–4.8)
ALBUMIN/GLOB SERPL: 2 {RATIO} (ref 1.2–2.2)
ALP SERPL-CCNC: 55 IU/L (ref 39–117)
ALT SERPL-CCNC: 18 IU/L (ref 0–32)
AST SERPL-CCNC: 21 IU/L (ref 0–40)
BASOPHILS # BLD AUTO: 0 X10E3/UL (ref 0–0.2)
BASOPHILS NFR BLD AUTO: 0 %
BILIRUB SERPL-MCNC: 0.4 MG/DL (ref 0–1.2)
BUN SERPL-MCNC: 18 MG/DL (ref 8–27)
BUN/CREAT SERPL: 19 (ref 12–28)
CALCIUM SERPL-MCNC: 9.5 MG/DL (ref 8.7–10.3)
CHLORIDE SERPL-SCNC: 103 MMOL/L (ref 96–106)
CHOLEST SERPL-MCNC: 177 MG/DL (ref 100–199)
CO2 SERPL-SCNC: 23 MMOL/L (ref 18–29)
CREAT SERPL-MCNC: 0.94 MG/DL (ref 0.57–1)
EOSINOPHIL # BLD AUTO: 0.1 X10E3/UL (ref 0–0.4)
EOSINOPHIL NFR BLD AUTO: 3 %
ERYTHROCYTE [DISTWIDTH] IN BLOOD BY AUTOMATED COUNT: 12.7 % (ref 12.3–15.4)
GFR SERPLBLD CREATININE-BSD FMLA CKD-EPI: 65 ML/MIN/1.73
GFR SERPLBLD CREATININE-BSD FMLA CKD-EPI: 75 ML/MIN/1.73
GLOBULIN SER CALC-MCNC: 2.3 G/DL (ref 1.5–4.5)
GLUCOSE SERPL-MCNC: 98 MG/DL (ref 65–99)
HCT VFR BLD AUTO: 40 % (ref 34–46.6)
HDLC SERPL-MCNC: 54 MG/DL
HGB BLD-MCNC: 13.2 G/DL (ref 11.1–15.9)
IMM GRANULOCYTES # BLD: 0 X10E3/UL (ref 0–0.1)
IMM GRANULOCYTES NFR BLD: 0 %
IMMATURE CELLS  115398: NORMAL
LABORATORY COMMENT REPORT: NORMAL
LDLC SERPL CALC-MCNC: 98 MG/DL (ref 0–99)
LYMPHOCYTES # BLD AUTO: 1.4 X10E3/UL (ref 0.7–3.1)
LYMPHOCYTES NFR BLD AUTO: 29 %
MCH RBC QN AUTO: 32 PG (ref 26.6–33)
MCHC RBC AUTO-ENTMCNC: 33 G/DL (ref 31.5–35.7)
MCV RBC AUTO: 97 FL (ref 79–97)
MONOCYTES # BLD AUTO: 0.4 X10E3/UL (ref 0.1–0.9)
MONOCYTES NFR BLD AUTO: 8 %
MORPHOLOGY BLD-IMP: NORMAL
NEUTROPHILS # BLD AUTO: 2.9 X10E3/UL (ref 1.4–7)
NEUTROPHILS NFR BLD AUTO: 60 %
NRBC BLD AUTO-RTO: NORMAL %
PLATELET # BLD AUTO: 259 X10E3/UL (ref 150–379)
POTASSIUM SERPL-SCNC: 4.3 MMOL/L (ref 3.5–5.2)
PROT SERPL-MCNC: 6.9 G/DL (ref 6–8.5)
RBC # BLD AUTO: 4.12 X10E6/UL (ref 3.77–5.28)
SODIUM SERPL-SCNC: 143 MMOL/L (ref 134–144)
TRIGL SERPL-MCNC: 125 MG/DL (ref 0–149)
VLDLC SERPL CALC-MCNC: 25 MG/DL (ref 5–40)
WBC # BLD AUTO: 4.8 X10E3/UL (ref 3.4–10.8)

## 2018-03-20 PROCEDURE — 99214 OFFICE O/P EST MOD 30 MIN: CPT | Performed by: INTERNAL MEDICINE

## 2018-03-20 NOTE — ASSESSMENT & PLAN NOTE
Patient states that her left sciatica pain gradually improved. She stopped taking gabapentin already. She wants to continue to monitor her symptoms. She is taking over-the-counter turmeric 1000 mg once to twice a day. She feels that turmeric is helpful for to control her arthritis and inflammation. She denies side effects from taking turmeric. I also reminded her that if she has worsening acid reflux from taking turmeric, she should cut down the dose of turmeric or stopped taking it. Patient understands and agrees with the plan.

## 2018-03-20 NOTE — ASSESSMENT & PLAN NOTE
Patient stated that she needs to cut down the dose of losartan from 25 mg daily to 12.5 mg daily as her blood pressure is low with the full dose of losartan. She feels much better after cutdown losartan. Her blood pressure is stable, well controlled with losartan 12.5 mg daily currently. She has normal CBC, CMP on 3/19/18.

## 2018-03-20 NOTE — PROGRESS NOTES
Subjective:   Joon Garay is a 62 y.o. female here today for evaluation and management of:      Pure hypercholesterolemia  Patient is taking simvastatin 10 mg every evening. She also takes omega-3 daily and baby aspirin daily. She denies side effects from taking simvastatin. Her recent blood tests show normal liver enzymes and cholesterol level are well controlled. I reviewed her blood tests with her in clinic.    Results for JOON GARAY (MRN [id number]) as of 3/20/2018 13:43   Ref. Range 3/19/2018 08:48   Cholesterol,Tot Latest Ref Range: 100 - 199 mg/dL 177   Triglycerides Latest Ref Range: 0 - 149 mg/dL 125   HDL Latest Ref Range: >39 mg/dL 54   LDL Latest Ref Range: 0 - 99 mg/dL 98   VLDL Cholesterol Calc Latest Ref Range: 5 - 40 mg/dL 25       Gastroesophageal reflux disease without esophagitis  Patient is taking ranitidine 150 mg twice a day. She stated that her acid reflux is fairly controlled with ranitidine. She sometimes has recurrent acid reflux if she eats acidic food. We discussed to try short course of omeprazole if she wants to take Advil for her left foot pain. Patient understands and agrees with the plan.    Essential hypertension  Patient stated that she needs to cut down the dose of losartan from 25 mg daily to 12.5 mg daily as her blood pressure is low with the full dose of losartan. She feels much better after cutdown losartan. Her blood pressure is stable, well controlled with losartan 12.5 mg daily currently. She has normal CBC, CMP on 3/19/18.    Left sided sciatica  Patient states that her left sciatica pain gradually improved. She stopped taking gabapentin already. She wants to continue to monitor her symptoms. She is taking over-the-counter turmeric 1000 mg once to twice a day. She feels that turmeric is helpful for to control her arthritis and inflammation. She denies side effects from taking turmeric. I also reminded her that if she has worsening acid reflux from taking turmeric, she  "should cut down the dose of turmeric or stopped taking it. Patient understands and agrees with the plan.         Current medicines (including changes today)  Current Outpatient Prescriptions   Medication Sig Dispense Refill   • TURMERIC PO Take 1,000 mg by mouth 2 Times a Day.     • aspirin 81 MG tablet Take 81 mg by mouth every day.     • losartan (COZAAR) 25 MG Tab Take 0.5 Tabs by mouth every day. (Patient taking differently: Take 12.5 mg by mouth every day.) 30 Tab 6   • Coenzyme Q10 200 MG Cap Take  by mouth.     • simvastatin (ZOCOR) 10 MG Tab Take 1 Tab by mouth every evening. 90 Tab 3   • MINIVELLE 0.025 MG/24HR PATCH BIWEEKLY Apply 1 Patch to skin as directed. Indications: Moderate to Severe Vasomotor Symptoms, every Wed and Friday     • Multiple Vitamins-Minerals (MULTIVITAMIN ADULTS 50+ PO) Take  by mouth.     • RESVERATROL PO Take  by mouth.     • ascorbic acid (ASCORBIC ACID) 500 MG Tab Take 500 mg by mouth every day.     • Probiotic Product (PROBIOTIC DAILY PO) Take  by mouth.     • Alpha Lipoic Acid 200 MG Cap Take  by mouth.     • minocycline (MINOCIN) 100 MG Cap   5   • FINACEA 15 % Gel   6   • Ranitidine HCl (ZANTAC PO) Take 150 mg by mouth 2 Times a Day.     • Calcium Carbonate-Vitamin D (CALCIUM + D) 600-200 MG-UNIT TABS Take  by mouth every day.     • docosahexanoic acid (OMEGA 3) 1000 MG CAPS Take 1,000 mg by mouth every day.     • vitamin D (CHOLECALCIFEROL) 1000 UNIT TABS Take 2,000 Units by mouth every day. TAKE AS DIRECTED.       No current facility-administered medications for this visit.      She  has a past medical history of Aortic regurgitation (1996); HLD (hyperlipidemia); Mitral regurgitation; MVP (mitral valve prolapse); Palpitations; and Pulmonary hypertension (10/3/2012).    ROS   No chest pain, no shortness of breath, no abdominal pain       Objective:     Blood pressure 125/70, pulse 78, temperature 37 °C (98.6 °F), height 1.651 m (5' 5\"), weight 64.9 kg (143 lb), SpO2 94 %. " Body mass index is 23.8 kg/m².   Physical Exam:  General: Alert, oriented and no acute distress.  Eye contact is good, speech goal directed, affect calm  HEENT: conjunctiva non-injected, sclera non-icteric.  Oral mucous membranes pink and moist with no lesions.  Pinna normal.   Lungs: Normal respiratory effort, clear to auscultation bilaterally with good excursion.  CV: regular rate and rhythm. No murmurs.  Abdomen: soft, non distended, nontender, Bowel sound normal.  Ext: no edema, color normal, vascularity normal, temperature normal        Assessment and Plan:   The following treatment plan was discussed     1. Pure hypercholesterolemia  - Well-controlled. Continue simvastatin 10 mg every evening.  - Advised to eat low fat, low carbohydrate and high fiber diet as well as do cardio physical exercise regularly.   - COMP METABOLIC PANEL; Future  - LIPID PROFILE; Future    2. Essential hypertension  - Well-controlled. Continue losartan 12.5 mg daily.  - Recommend to monitor blood pressure and heart rate at home.  - COMP METABOLIC PANEL; Future    3. Gastroesophageal reflux disease without esophagitis  - Well-controlled and stable with ranitidine 150 mg twice a day.     4. Left sided sciatica  - Improved. Stopped taking gabapentin already. Patient wants to continue turmeric and continue to monitor her symptom. She is advised to do gentle stretching exercises.    5. Left foot pain  - Discussed with patient to take omeprazole for temporary if she plans to take over-the-counter Advil or ibuprofen for short course to treat left foot pain. I also advised patient to stop taking Advil or ibuprofen and says if she has worsening acid reflux or epigastric pain or black tarry stools or bloody bowel movement. Patient will follow-up with [facility] for left foot pain. She stated that she has left foot x-ray with [facility]ist and there was no fracture. She was told that she has sesamoiditis on left foot. She has cushion  pad prescribed by [facility] to support the bottom of left foot. Patient will follow with [facility]ist for the treatment on left foot pain.       Followup: Return in about 9 months (around 12/20/2018), or if symptoms worsen or fail to improve, for hypertension, hyperlipidemia, GERD, lab review.      Please note that this dictation was created using voice recognition software. I have made every reasonable attempt to correct obvious errors, but I expect that there may have unintended errors in text, spelling, punctuation, or grammar that I did not discover.

## 2018-03-20 NOTE — ASSESSMENT & PLAN NOTE
Patient is taking simvastatin 10 mg every evening. She also takes omega-3 daily and baby aspirin daily. She denies side effects from taking simvastatin. Her recent blood tests show normal liver enzymes and cholesterol level are well controlled. I reviewed her blood tests with her in clinic.    Results for JOON GARAY (MRN [id number]) as of 3/20/2018 13:43   Ref. Range 3/19/2018 08:48   Cholesterol,Tot Latest Ref Range: 100 - 199 mg/dL 177   Triglycerides Latest Ref Range: 0 - 149 mg/dL 125   HDL Latest Ref Range: >39 mg/dL 54   LDL Latest Ref Range: 0 - 99 mg/dL 98   VLDL Cholesterol Calc Latest Ref Range: 5 - 40 mg/dL 25

## 2018-03-20 NOTE — ASSESSMENT & PLAN NOTE
Patient is taking ranitidine 150 mg twice a day. She stated that her acid reflux is fairly controlled with ranitidine. She sometimes has recurrent acid reflux if she eats acidic food. We discussed to try short course of omeprazole if she wants to take Advil for her left foot pain. Patient understands and agrees with the plan.

## 2018-03-21 DIAGNOSIS — E78.00 PURE HYPERCHOLESTEROLEMIA: ICD-10-CM

## 2018-03-21 RX ORDER — SIMVASTATIN 10 MG
TABLET ORAL
Qty: 90 TAB | Refills: 3 | Status: SHIPPED | OUTPATIENT
Start: 2018-03-21 | End: 2019-03-17 | Stop reason: SDUPTHER

## 2018-05-30 ENCOUNTER — OFFICE VISIT (OUTPATIENT)
Dept: CARDIOLOGY | Facility: MEDICAL CENTER | Age: 63
End: 2018-05-30
Payer: OTHER GOVERNMENT

## 2018-05-30 VITALS
HEART RATE: 68 BPM | BODY MASS INDEX: 23.66 KG/M2 | OXYGEN SATURATION: 97 % | HEIGHT: 65 IN | SYSTOLIC BLOOD PRESSURE: 150 MMHG | WEIGHT: 142 LBS | DIASTOLIC BLOOD PRESSURE: 90 MMHG

## 2018-05-30 DIAGNOSIS — I34.1 MVP (MITRAL VALVE PROLAPSE): ICD-10-CM

## 2018-05-30 DIAGNOSIS — I10 ESSENTIAL HYPERTENSION: ICD-10-CM

## 2018-05-30 DIAGNOSIS — I35.1 MODERATE AORTIC REGURGITATION: ICD-10-CM

## 2018-05-30 PROCEDURE — 99213 OFFICE O/P EST LOW 20 MIN: CPT | Performed by: INTERNAL MEDICINE

## 2018-05-30 PROCEDURE — 93000 ELECTROCARDIOGRAM COMPLETE: CPT | Performed by: INTERNAL MEDICINE

## 2018-05-30 NOTE — PROGRESS NOTES
Cardiology Follow-up Consultation Note    Date of note:    5/30/2018    Primary Care Provider: Chloe Donnelly M.D.  Referring Provider: Sharath Macias M.D.     Patient Name: Shahrzad Scott   YOB: 1955  MRN:              [id number]    Chief Complaint: palpitations    History of Present Illness: Shahrzad Scott is a 62 y.o. female whose current medical problems include dyslipidemia, hypertension, and aortic regurgitation secondary to phen phen who is here for follow-up.    Last seen by Dr. Macias on 12/26/2017.      Interim Events:  She does have occasional mild severity palpitations, sometimes not for a couple months, sometimes every day, and at most 30 minutes. Sometimes these do stop with coughing.  She does not know if her heart rate was irregular.     In terms of hypertension, usually 120s/80s. Did have hypotension with losartan 12.5mg PO Daily, now taking it every other day.     Does not exercise.      ROS  Constitution: Negative for chills, fever and night sweats.   HENT: Negative for nosebleeds.    Eyes: Negative for vision loss in left eye and vision loss in right eye.   Respiratory: Negative for hemoptysis.    Gastrointestinal: Negative for hematemesis, hematochezia and melena.   Genitourinary: Negative for hematuria.   Neurological: Negative for focal weakness, numbness and paresthesias.       Past Medical History:   Diagnosis Date   • Aortic regurgitation 1996   • HLD (hyperlipidemia)    • Mitral regurgitation    • MVP (mitral valve prolapse)    • Palpitations    • Pulmonary hypertension (HCC) 10/3/2012         Past Surgical History:   Procedure Laterality Date   • ABDOMINAL HYSTERECTOMY TOTAL     • APPENDECTOMY     • BREAST RECONSTRUCTION Bilateral    • CATARACT EXTRACTION WITH IOL Bilateral     Dr. Wilder   • CRANIOTOMY     • EYE SURGERY     • HYSTERECTOMY, TOTAL ABDOMINAL      at age 37 for uterine fibroid, still has both ovaries   • KNEE ARTHROSCOPY, ACL RECON W/ TIBIALIS  TENDON AUTOGRAFT           Current Outpatient Prescriptions   Medication Sig Dispense Refill   • simvastatin (ZOCOR) 10 MG Tab TAKE 1 TABLET EVERY EVENING 90 Tab 3   • TURMERIC PO Take 1,000 mg by mouth 2 Times a Day.     • aspirin 81 MG tablet Take 81 mg by mouth every day.     • losartan (COZAAR) 25 MG Tab Take 0.5 Tabs by mouth every day. (Patient taking differently: Take 12.5 mg by mouth every day.) 30 Tab 6   • Coenzyme Q10 200 MG Cap Take  by mouth.     • MINIVELLE 0.025 MG/24HR PATCH BIWEEKLY Apply 1 Patch to skin as directed. Indications: Moderate to Severe Vasomotor Symptoms, every Wed and Friday     • Multiple Vitamins-Minerals (MULTIVITAMIN ADULTS 50+ PO) Take  by mouth.     • RESVERATROL PO Take  by mouth.     • ascorbic acid (ASCORBIC ACID) 500 MG Tab Take 500 mg by mouth every day.     • Alpha Lipoic Acid 200 MG Cap Take  by mouth.     • minocycline (MINOCIN) 100 MG Cap   5   • Ranitidine HCl (ZANTAC PO) Take 150 mg by mouth 2 Times a Day.     • Calcium Carbonate-Vitamin D (CALCIUM + D) 600-200 MG-UNIT TABS Take  by mouth every day.     • docosahexanoic acid (OMEGA 3) 1000 MG CAPS Take 1,000 mg by mouth every day.     • vitamin D (CHOLECALCIFEROL) 1000 UNIT TABS Take 2,000 Units by mouth every day. TAKE AS DIRECTED.     • Probiotic Product (PROBIOTIC DAILY PO) Take  by mouth.     • FINACEA 15 % Gel   6     No current facility-administered medications for this visit.          Allergies   Allergen Reactions   • Septra [Bactrim] Hives   • Sulfa Drugs          Family History   Problem Relation Age of Onset   • Stroke Mother    • Hypertension Mother    • Cancer Father      prostate cancer   • Lung Disease Sister      asthma   • No Known Problems Brother    • No Known Problems Maternal Grandmother    • No Known Problems Maternal Grandfather    • Other Brother      cerabral palsy         Social History     Social History   • Marital status:      Spouse name: N/A   • Number of children: N/A   • Years of  "education: N/A     Occupational History   • Not on file.     Social History Main Topics   • Smoking status: Never Smoker   • Smokeless tobacco: Never Used   • Alcohol use No   • Drug use: No   • Sexual activity: No     Other Topics Concern   • Not on file     Social History Narrative   • No narrative on file         Physical Exam:  Ambulatory Vitals  Blood pressure 150/90, pulse 68, height 1.651 m (5' 5\"), weight 64.4 kg (142 lb), SpO2 97 %.   Oxygen Therapy:  Pulse Oximetry: 97 %  BP Readings from Last 4 Encounters:   05/30/18 150/90   03/20/18 125/70   12/26/17 106/70   11/07/17 132/80       Weight/BMI: Body mass index is 23.63 kg/m².  Wt Readings from Last 4 Encounters:   05/30/18 64.4 kg (142 lb)   03/20/18 64.9 kg (143 lb)   12/26/17 63.5 kg (140 lb)   11/07/17 63.5 kg (140 lb)       General: Well appearing and in no apparent distress  Eyes: nl conjunctiva  ENT: OP clear, normal external appearance of nose and ears  Neck: JVP 4-5 cm H2O, no carotid bruits  Lungs: normal respiratory effort, CTAB  Heart: RRR, 2/6 systolic murmur, no rubs or gallops, no edema bilateral lower extremities. No LV/RV heave on cardiac palpatation. 2+ bilateral radial pulses.  2+ bilateral dp pulses.   Abdomen: soft, non tender, non distended, no masses, normal bowel sounds.  No HSM.  Extremities/MSK: no clubbing, no cyanosis  Neurological: No focal sensory deficits  Psychiatric: Appropriate affect, A/O x 3, intact judgement and insight  Skin: Warm extremities    Lab Data Review:  Lab Results   Component Value Date/Time    CHOLSTRLTOT 177 03/19/2018 08:48 AM    LDL 98 03/19/2018 08:48 AM    HDL 54 03/19/2018 08:48 AM    TRIGLYCERIDE 125 03/19/2018 08:48 AM       Lab Results   Component Value Date/Time    SODIUM 143 03/19/2018 08:48 AM    POTASSIUM 4.3 03/19/2018 08:48 AM    CHLORIDE 103 03/19/2018 08:48 AM    CO2 23 03/19/2018 08:48 AM    GLUCOSE 98 03/19/2018 08:48 AM    BUN 18 03/19/2018 08:48 AM    CREATININE 0.94 03/19/2018 08:48 AM "    BUNCREATRAT 19 2018 08:48 AM     Lab Results   Component Value Date/Time    ALKPHOSPHAT 55 2018 08:48 AM    ASTSGOT 21 2018 08:48 AM    ALTSGPT 18 2018 08:48 AM    TBILIRUBIN 0.4 2018 08:48 AM      Lab Results   Component Value Date/Time    WBC 4.8 2018 08:48 AM     No components found for: HBGA1C  No components found for: TROPONIN  No components found for: BNP      Cardiac Imaging and Procedures Review:    EKG dated 2018 : My personal interpretation is NSR, LAE    Echo dated 10/24/2017:   CONCLUSIONS  Prior echo done 16.Normal left ventricular chamber size.  Left ventricular ejection fraction is visually estimated to be 60%.  Aortic sclerosis without stenosis.  Moderate aortic insufficiency.  Mild mitral regurgitation.  Mild tricuspid regurgitation.  Mild to moderate pulmonic insufficiency.  No pericardial effusion seen.  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        Medical Decision Makin. MVP (mitral valve prolapse)  Repeat echo in 6 months    2. Moderate aortic regurgitation  Repeat echo in 6 months, then 1-2 years based on severity at that time    3. Essential hypertension  Continue losartan 12.5mg PO every other day, well controlled    4. Primary prevention  -continue statin    5. Palpitations  2 week ziopatch vs SEEQ monitor to check for a fib if they do worsen in severity.       Return in about 6 months (around 2018).      Hans Higuera MD, [facility]  [facility], [address]  [address] [address]  Phone: [phone]  Fax: [phone]

## 2018-06-01 LAB — EKG IMPRESSION: NORMAL

## 2018-07-22 DIAGNOSIS — M54.32 LEFT SIDED SCIATICA: ICD-10-CM

## 2018-07-24 RX ORDER — GABAPENTIN 100 MG/1
CAPSULE ORAL
Qty: 90 CAP | Refills: 3 | Status: SHIPPED | OUTPATIENT
Start: 2018-07-24 | End: 2018-11-13

## 2018-10-12 ENCOUNTER — HOSPITAL ENCOUNTER (OUTPATIENT)
Dept: CARDIOLOGY | Facility: MEDICAL CENTER | Age: 63
End: 2018-10-12
Attending: INTERNAL MEDICINE
Payer: OTHER GOVERNMENT

## 2018-10-12 DIAGNOSIS — I34.1 MVP (MITRAL VALVE PROLAPSE): ICD-10-CM

## 2018-10-12 DIAGNOSIS — I35.1 MODERATE AORTIC REGURGITATION: ICD-10-CM

## 2018-10-12 PROCEDURE — 93306 TTE W/DOPPLER COMPLETE: CPT

## 2018-10-12 PROCEDURE — 93306 TTE W/DOPPLER COMPLETE: CPT | Mod: 26 | Performed by: INTERNAL MEDICINE

## 2018-10-15 DIAGNOSIS — I34.0 NON-RHEUMATIC MITRAL REGURGITATION: ICD-10-CM

## 2018-10-15 DIAGNOSIS — I10 ESSENTIAL HYPERTENSION: ICD-10-CM

## 2018-10-15 DIAGNOSIS — I35.1 NONRHEUMATIC AORTIC VALVE INSUFFICIENCY: ICD-10-CM

## 2018-10-15 LAB
LV EJECT FRACT  99904: 60
LV EJECT FRACT MOD 2C 99903: 73.86
LV EJECT FRACT MOD 4C 99902: 53.08
LV EJECT FRACT MOD BP 99901: 64.98

## 2018-10-15 RX ORDER — LOSARTAN POTASSIUM 25 MG/1
12.5 TABLET ORAL DAILY
Qty: 45 TAB | Refills: 2 | Status: SHIPPED | OUTPATIENT
Start: 2018-10-15 | End: 2018-11-13

## 2018-11-13 ENCOUNTER — OFFICE VISIT (OUTPATIENT)
Dept: CARDIOLOGY | Facility: MEDICAL CENTER | Age: 63
End: 2018-11-13
Payer: OTHER GOVERNMENT

## 2018-11-13 VITALS
BODY MASS INDEX: 23.99 KG/M2 | HEART RATE: 86 BPM | OXYGEN SATURATION: 96 % | WEIGHT: 144 LBS | DIASTOLIC BLOOD PRESSURE: 80 MMHG | SYSTOLIC BLOOD PRESSURE: 120 MMHG | HEIGHT: 65 IN

## 2018-11-13 DIAGNOSIS — I10 ESSENTIAL HYPERTENSION: ICD-10-CM

## 2018-11-13 DIAGNOSIS — I35.1 MODERATE AORTIC REGURGITATION: ICD-10-CM

## 2018-11-13 DIAGNOSIS — Z82.3 FAMILY HISTORY OF STROKE: ICD-10-CM

## 2018-11-13 DIAGNOSIS — I34.0 NON-RHEUMATIC MITRAL REGURGITATION: ICD-10-CM

## 2018-11-13 DIAGNOSIS — I27.20 PULMONARY HYPERTENSION (HCC): ICD-10-CM

## 2018-11-13 DIAGNOSIS — R42 DIZZINESS: ICD-10-CM

## 2018-11-13 DIAGNOSIS — I34.1 MVP (MITRAL VALVE PROLAPSE): ICD-10-CM

## 2018-11-13 DIAGNOSIS — E78.00 PURE HYPERCHOLESTEROLEMIA: ICD-10-CM

## 2018-11-13 PROCEDURE — 99214 OFFICE O/P EST MOD 30 MIN: CPT | Performed by: INTERNAL MEDICINE

## 2018-11-13 ASSESSMENT — ENCOUNTER SYMPTOMS
SHORTNESS OF BREATH: 1
PALPITATIONS: 1
DIZZINESS: 1

## 2018-11-14 NOTE — PROGRESS NOTES
Cardiology Follow-up Consultation Note    Date of note:    11/13/2018  Primary Care Provider: Chloe Donnelly M.D.  Referring Provider: Sharath Macias M.D.     Patient Name: Shahrzad Scott   YOB: 1955  MRN:              [id number]    Chief Complaint: dizziness.    History of Present Illness: Shahrzad Scott is a 62 y.o. female whose current medical problems include dyslipidemia, hypertension, and aortic regurgitation secondary to phen phen who is here for follow-up.    At our visit, 5/30/2018:  She does have occasional mild severity palpitations, sometimes not for a couple months, sometimes every day, and at most 30 minutes. Sometimes these do stop with coughing.  She does not know if her heart rate was irregular.     In terms of hypertension, usually 120s/80s. Did have hypotension with losartan 12.5mg PO Daily, now taking it every other day.     Does not exercise.      Interim Events:   In terms of hypertension, Had severe dizziness even on taking losartan 12.5mg PO every other day. Currently without medication, BP 110s-120s at home.     HR in the 60s-70s.    She gets dizzy 2-3 times a week, can be severe and feels like she's off balance.  Happens in the shower, when driving in a specific spot on [address] [facility].  No syncope. This started about a month ago.  It is much worse when she moves her head back.     Continues to have occasional palpitations.       Review of Systems   Cardiovascular: Positive for palpitations.   Respiratory: Positive for shortness of breath.    Neurological: Positive for dizziness.   Allergic/Immunologic: Positive for environmental allergies.     All other systems reviewed and discussed using a comprehensive questionnaire and are negative.       Past Medical History:   Diagnosis Date   • Aortic regurgitation 1996   • HLD (hyperlipidemia)    • Mitral regurgitation    • MVP (mitral valve prolapse)    • Palpitations    • Pulmonary hypertension (HCC) 10/3/2012         Past  Surgical History:   Procedure Laterality Date   • ABDOMINAL HYSTERECTOMY TOTAL     • APPENDECTOMY     • BREAST RECONSTRUCTION Bilateral    • CATARACT EXTRACTION WITH IOL Bilateral     Dr. Wilder   • CRANIOTOMY     • EYE SURGERY     • HYSTERECTOMY, TOTAL ABDOMINAL      at age 37 for uterine fibroid, still has both ovaries   • KNEE ARTHROSCOPY, ACL RECON W/ TIBIALIS TENDON AUTOGRAFT           Current Outpatient Prescriptions   Medication Sig Dispense Refill   • simvastatin (ZOCOR) 10 MG Tab TAKE 1 TABLET EVERY EVENING 90 Tab 3   • TURMERIC PO Take 1,000 mg by mouth 2 Times a Day.     • aspirin 81 MG tablet Take 81 mg by mouth every day.     • Coenzyme Q10 200 MG Cap Take  by mouth.     • MINIVELLE 0.025 MG/24HR PATCH BIWEEKLY Apply 1 Patch to skin as directed. Indications: Moderate to Severe Vasomotor Symptoms, every Wed and Friday     • Multiple Vitamins-Minerals (MULTIVITAMIN ADULTS 50+ PO) Take  by mouth.     • RESVERATROL PO Take  by mouth.     • ascorbic acid (ASCORBIC ACID) 500 MG Tab Take 500 mg by mouth every day.     • Probiotic Product (PROBIOTIC DAILY PO) Take  by mouth.     • Alpha Lipoic Acid 200 MG Cap Take  by mouth.     • minocycline (MINOCIN) 100 MG Cap   5   • FINACEA 15 % Gel   6   • Ranitidine HCl (ZANTAC PO) Take 150 mg by mouth 2 Times a Day.     • Calcium Carbonate-Vitamin D (CALCIUM + D) 600-200 MG-UNIT TABS Take  by mouth every day.     • docosahexanoic acid (OMEGA 3) 1000 MG CAPS Take 1,000 mg by mouth every day.     • vitamin D (CHOLECALCIFEROL) 1000 UNIT TABS Take 2,000 Units by mouth every day. TAKE AS DIRECTED.     • losartan (COZAAR) 25 MG Tab Take 0.5 Tabs by mouth every day. (Patient not taking: Reported on 11/13/2018) 45 Tab 2   • gabapentin (NEURONTIN) 100 MG Cap TAKE 1 CAPSULE THREE TIMES A DAY (Patient not taking: Reported on 11/13/2018) 90 Cap 3     No current facility-administered medications for this visit.          Allergies   Allergen Reactions   • Septra [Bactrim] Hives   •  "Sulfa Drugs          Family History   Problem Relation Age of Onset   • Stroke Mother    • Hypertension Mother    • Cancer Father         prostate cancer   • Lung Disease Sister         asthma   • No Known Problems Brother    • No Known Problems Maternal Grandmother    • No Known Problems Maternal Grandfather    • Other Brother         cerabral palsy         Social History     Social History   • Marital status:      Spouse name: N/A   • Number of children: N/A   • Years of education: N/A     Occupational History   • Not on file.     Social History Main Topics   • Smoking status: Never Smoker   • Smokeless tobacco: Never Used   • Alcohol use No   • Drug use: No   • Sexual activity: No     Other Topics Concern   • Not on file     Social History Narrative   • No narrative on file         Physical Exam:  Ambulatory Vitals  Blood pressure 120/80, pulse 86, height 1.651 m (5' 5\"), weight 65.3 kg (144 lb), SpO2 96 %, not currently breastfeeding.   Oxygen Therapy:  Pulse Oximetry: 96 %  BP Readings from Last 4 Encounters:   11/13/18 120/80   05/30/18 150/90   03/20/18 125/70   12/26/17 106/70       Weight/BMI: Body mass index is 23.96 kg/m².  Wt Readings from Last 4 Encounters:   11/13/18 65.3 kg (144 lb)   05/30/18 64.4 kg (142 lb)   03/20/18 64.9 kg (143 lb)   12/26/17 63.5 kg (140 lb)       General: Well appearing and in no apparent distress  Eyes: nl conjunctiva  ENT: OP clear, normal external appearance of nose and ears  Neck: JVP 4-5 cm H2O, no carotid bruits  Lungs: normal respiratory effort, CTAB  Heart: RRR, 2/6 systolic murmur, no rubs or gallops, no edema bilateral lower extremities. No LV/RV heave on cardiac palpatation. 2+ bilateral radial pulses.  2+ bilateral dp pulses.   Abdomen: soft, non tender, non distended, no masses, normal bowel sounds.  No HSM.  Extremities/MSK: no clubbing, no cyanosis  Neurological: No focal sensory deficits  Psychiatric: Appropriate affect, A/O x 3, intact judgement and " insight  Skin: Warm extremities    Exam repeated in full and unchanged except for as noted above.      Lab Data Review:  Lab Results   Component Value Date/Time    CHOLSTRLTOT 177 03/19/2018 08:48 AM    LDL 98 03/19/2018 08:48 AM    HDL 54 03/19/2018 08:48 AM    TRIGLYCERIDE 125 03/19/2018 08:48 AM       Lab Results   Component Value Date/Time    SODIUM 143 03/19/2018 08:48 AM    POTASSIUM 4.3 03/19/2018 08:48 AM    CHLORIDE 103 03/19/2018 08:48 AM    CO2 23 03/19/2018 08:48 AM    GLUCOSE 98 03/19/2018 08:48 AM    BUN 18 03/19/2018 08:48 AM    CREATININE 0.94 03/19/2018 08:48 AM    BUNCREATRAT 19 03/19/2018 08:48 AM     Lab Results   Component Value Date/Time    ALKPHOSPHAT 55 03/19/2018 08:48 AM    ASTSGOT 21 03/19/2018 08:48 AM    ALTSGPT 18 03/19/2018 08:48 AM    TBILIRUBIN 0.4 03/19/2018 08:48 AM      Lab Results   Component Value Date/Time    WBC 4.8 03/19/2018 08:48 AM     No components found for: HBGA1C  No components found for: TROPONIN  No components found for: BNP      Cardiac Imaging and Procedures Review:    EKG dated 5/30/2018 : My personal interpretation is [id number]    Echo dated 10/24/2017:   CONCLUSIONS  Prior echo done 12/06/16.Normal left ventricular chamber size.  Left ventricular ejection fraction is visually estimated to be 60%.  Aortic sclerosis without stenosis.  Moderate aortic insufficiency.  Mild mitral regurgitation.  Mild tricuspid regurgitation.  Mild to moderate pulmonic insufficiency.  No pericardial effusion seen.  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    Echo 10/12/2018:  CONCLUSIONS  Compared to the images of the prior study done - 10/24/17, no   significant changes are noted.  Normal left ventricular systolic function.  Left ventricular ejection fraction is visually estimated to be 60%.  Normal diastolic function.  Normal inferior vena cava size and inspiratory collapse.  Estimated right ventricular systolic pressure  is 29 mmHg.  Aortic sclerosis without  stenosis.      Medical Decision Makin. MVP (mitral valve prolapse)  Not seen on recent echos. No need for monitoring.     2. Moderate aortic regurgitation  Repeat echo in 1 year, last was stable with no LV enlargement.      3. Essential hypertension  Well controlled now off medications.  Significant symptoms even on low dose losartan.     4. Primary prevention  -continue statin    5. Palpitations  2 week ziopatch vs SEEQ monitor to check for a fib if they do worsen in severity.     6. Dizziness - likely BPPV, will discuss with PCP.  For now will r/o carotid disease  -carotid ultrasound.       Return in about 6 months (around 2019).      Hans Higuera MD, [facility], [address]  [address] [address]  Phone: [phone]  Fax: [phone]

## 2018-11-26 ENCOUNTER — HOSPITAL ENCOUNTER (OUTPATIENT)
Dept: RADIOLOGY | Facility: MEDICAL CENTER | Age: 63
End: 2018-11-26
Attending: INTERNAL MEDICINE
Payer: OTHER GOVERNMENT

## 2018-11-26 DIAGNOSIS — R42 DIZZINESS: ICD-10-CM

## 2018-11-26 DIAGNOSIS — Z82.3 FAMILY HISTORY OF STROKE: ICD-10-CM

## 2018-11-26 PROCEDURE — 93880 EXTRACRANIAL BILAT STUDY: CPT

## 2018-11-26 PROCEDURE — 93880 EXTRACRANIAL BILAT STUDY: CPT | Mod: 26 | Performed by: SURGERY

## 2018-11-28 ENCOUNTER — HOSPITAL ENCOUNTER (OUTPATIENT)
Dept: RADIOLOGY | Facility: MEDICAL CENTER | Age: 63
End: 2018-11-28
Attending: INTERNAL MEDICINE
Payer: COMMERCIAL

## 2018-11-28 DIAGNOSIS — E78.00 PURE HYPERCHOLESTEROLEMIA: ICD-10-CM

## 2018-11-28 DIAGNOSIS — R42 DIZZINESS: ICD-10-CM

## 2018-11-28 DIAGNOSIS — Z82.3 FAMILY HISTORY OF STROKE: ICD-10-CM

## 2018-11-28 PROCEDURE — 4410556 CT-CARDIAC SCORING

## 2018-12-06 LAB
ALBUMIN SERPL-MCNC: 4.5 G/DL (ref 3.6–4.8)
ALBUMIN/GLOB SERPL: 2 {RATIO} (ref 1.2–2.2)
ALP SERPL-CCNC: 67 IU/L (ref 39–117)
ALT SERPL-CCNC: 17 IU/L (ref 0–32)
AST SERPL-CCNC: 19 IU/L (ref 0–40)
BILIRUB SERPL-MCNC: 0.4 MG/DL (ref 0–1.2)
BUN SERPL-MCNC: 18 MG/DL (ref 8–27)
BUN/CREAT SERPL: 20 (ref 12–28)
CALCIUM SERPL-MCNC: 9.1 MG/DL (ref 8.7–10.3)
CHLORIDE SERPL-SCNC: 106 MMOL/L (ref 96–106)
CHOLEST SERPL-MCNC: 188 MG/DL (ref 100–199)
CO2 SERPL-SCNC: 21 MMOL/L (ref 20–29)
CREAT SERPL-MCNC: 0.89 MG/DL (ref 0.57–1)
GLOBULIN SER CALC-MCNC: 2.2 G/DL (ref 1.5–4.5)
GLUCOSE SERPL-MCNC: 104 MG/DL (ref 65–99)
HDLC SERPL-MCNC: 49 MG/DL
IF AFRICAN AMERICAN  100797: 79 ML/MIN/1.73
IF NON AFRICAN AMER 100791: 69 ML/MIN/1.73
LABORATORY COMMENT REPORT: ABNORMAL
LDLC SERPL CALC-MCNC: 109 MG/DL (ref 0–99)
POTASSIUM SERPL-SCNC: 4 MMOL/L (ref 3.5–5.2)
PROT SERPL-MCNC: 6.7 G/DL (ref 6–8.5)
SODIUM SERPL-SCNC: 141 MMOL/L (ref 134–144)
TRIGL SERPL-MCNC: 148 MG/DL (ref 0–149)
VLDLC SERPL CALC-MCNC: 30 MG/DL (ref 5–40)

## 2018-12-12 ENCOUNTER — OFFICE VISIT (OUTPATIENT)
Dept: MEDICAL GROUP | Age: 63
End: 2018-12-12
Payer: OTHER GOVERNMENT

## 2018-12-12 VITALS
BODY MASS INDEX: 23.86 KG/M2 | WEIGHT: 143.2 LBS | HEART RATE: 69 BPM | TEMPERATURE: 97.9 F | DIASTOLIC BLOOD PRESSURE: 70 MMHG | SYSTOLIC BLOOD PRESSURE: 120 MMHG | OXYGEN SATURATION: 97 % | HEIGHT: 65 IN

## 2018-12-12 DIAGNOSIS — E78.00 PURE HYPERCHOLESTEROLEMIA: ICD-10-CM

## 2018-12-12 DIAGNOSIS — M85.80 OSTEOPENIA, UNSPECIFIED LOCATION: ICD-10-CM

## 2018-12-12 DIAGNOSIS — I10 ESSENTIAL HYPERTENSION: ICD-10-CM

## 2018-12-12 PROCEDURE — 99214 OFFICE O/P EST MOD 30 MIN: CPT | Performed by: INTERNAL MEDICINE

## 2018-12-12 RX ORDER — AMOXICILLIN 500 MG/1
CAPSULE ORAL
COMMUNITY
Start: 2018-11-30 | End: 2018-12-12

## 2018-12-12 ASSESSMENT — PATIENT HEALTH QUESTIONNAIRE - PHQ9: CLINICAL INTERPRETATION OF PHQ2 SCORE: 0

## 2018-12-13 NOTE — ASSESSMENT & PLAN NOTE
Patient stated that her blood pressure was low and made her dizzy when she took losartan 12.5 mg.  Her cardiologist discontinue losartan.  She is currently control her blood pressure with diet and exercise only.  Her blood pressure is stable and well controlled.

## 2018-12-13 NOTE — ASSESSMENT & PLAN NOTE
Patient reported that she has DEXA scan 2 years ago ordered by gynecologist and she was done in renal diagnostic.  She has established care with new gynecologist in the same office and her current gynecologist will order DEXA scan for her.  She is taking vitamin D and calcium supplements daily and doing walking exercise regularly.  She denies history of any pathological fracture.

## 2018-12-13 NOTE — ASSESSMENT & PLAN NOTE
Patient is taking simvastatin 10 mg every evening.  She denies side effects from taking simvastatin.  Her LDL cholesterol is slightly elevated in recent blood test.  She has negative CT cardiac scoring.  Her cardiologist took her off from aspirin.  I discussed with patient to continue simvastatin with the same dose and she agreed with the plan.  She states that her cardiologist also want her to continue her simvastatin.  I discussed blood test results with her in clinic today.    Results for JOON GARAY (MRN [id number]) as of 12/12/2018 18:18   Ref. Range 12/5/2018 08:40   Cholesterol,Tot Latest Ref Range: 100 - 199 mg/dL 188   Triglycerides Latest Ref Range: 0 - 149 mg/dL 148   HDL Latest Ref Range: >39 mg/dL 49   LDL Latest Ref Range: 0 - 99 mg/dL 109 (H)   VLDL Cholesterol Calc Latest Ref Range: 5 - 40 mg/dL 30

## 2018-12-13 NOTE — PROGRESS NOTES
Subjective:   Joon Garay is a 63 y.o. female here today for evaluation and management of:      Pure hypercholesterolemia  Patient is taking simvastatin 10 mg every evening.  She denies side effects from taking simvastatin.  Her LDL cholesterol is slightly elevated in recent blood test.  She has negative CT cardiac scoring.  Her cardiologist took her off from aspirin.  I discussed with patient to continue simvastatin with the same dose and she agreed with the plan.  She states that her cardiologist also want her to continue her simvastatin.  I discussed blood test results with her in clinic today.    Results for JOON GARAY (MRN [id number]) as of 12/12/2018 18:18   Ref. Range 12/5/2018 08:40   Cholesterol,Tot Latest Ref Range: 100 - 199 mg/dL 188   Triglycerides Latest Ref Range: 0 - 149 mg/dL 148   HDL Latest Ref Range: >39 mg/dL 49   LDL Latest Ref Range: 0 - 99 mg/dL 109 (H)   VLDL Cholesterol Calc Latest Ref Range: 5 - 40 mg/dL 30       Osteopenia  Patient reported that she has DEXA scan 2 years ago ordered by gynecologist and she was done in renal diagnostic.  She has established care with new gynecologist in the same office and her current gynecologist will order DEXA scan for her.  She is taking vitamin D and calcium supplements daily and doing walking exercise regularly.  She denies history of any pathological fracture.    Essential hypertension  Patient stated that her blood pressure was low and made her dizzy when she took losartan 12.5 mg.  Her cardiologist discontinue losartan.  She is currently control her blood pressure with diet and exercise only.  Her blood pressure is stable and well controlled.         Current medicines (including changes today)  Current Outpatient Prescriptions   Medication Sig Dispense Refill   • simvastatin (ZOCOR) 10 MG Tab TAKE 1 TABLET EVERY EVENING 90 Tab 3   • TURMERIC PO Take 1,000 mg by mouth 2 Times a Day.     • Coenzyme Q10 200 MG Cap Take  by mouth.     • MINIVELLE  "0.025 MG/24HR PATCH BIWEEKLY Apply 1 Patch to skin as directed. Indications: Moderate to Severe Vasomotor Symptoms, every Wed and Friday     • Multiple Vitamins-Minerals (MULTIVITAMIN ADULTS 50+ PO) Take  by mouth.     • RESVERATROL PO Take  by mouth.     • ascorbic acid (ASCORBIC ACID) 500 MG Tab Take 500 mg by mouth every day.     • Probiotic Product (PROBIOTIC DAILY PO) Take  by mouth.     • Alpha Lipoic Acid 200 MG Cap Take  by mouth.     • minocycline (MINOCIN) 100 MG Cap   5   • Ranitidine HCl (ZANTAC PO) Take 150 mg by mouth 2 Times a Day.     • Calcium Carbonate-Vitamin D (CALCIUM + D) 600-200 MG-UNIT TABS Take  by mouth every day.     • docosahexanoic acid (OMEGA 3) 1000 MG CAPS Take 1,000 mg by mouth every day.     • vitamin D (CHOLECALCIFEROL) 1000 UNIT TABS Take 2,000 Units by mouth every day. TAKE AS DIRECTED.       No current facility-administered medications for this visit.      She  has a past medical history of Aortic regurgitation (1996); HLD (hyperlipidemia); Mitral regurgitation; MVP (mitral valve prolapse); and Palpitations.    ROS   No chest pain, no shortness of breath, no abdominal pain       Objective:     Blood pressure 120/70, pulse 69, temperature 36.6 °C (97.9 °F), temperature source Temporal, height 1.651 m (5' 5\"), weight 65 kg (143 lb 3.2 oz), SpO2 97 %, not currently breastfeeding. Body mass index is 23.83 kg/m².   Physical Exam:  General: Alert, oriented and no acute distress.  Eye contact is good, speech goal directed, affect calm  HEENT: conjunctiva non-injected, sclera non-icteric.  Oral mucous membranes pink and moist with no lesions.  Pinna normal.   Lungs: Normal respiratory effort, clear to auscultation bilaterally with good excursion.  CV: regular rate and rhythm. No murmurs.   Abdomen: soft, non distended, nontender, Bowel sound normal.  Ext: no edema, color normal, vascularity normal, temperature normal        Assessment and Plan:   The following treatment plan was " discussed     1. Pure hypercholesterolemia  - Well-controlled. Continue current regimens, simvastatin 10 mg every evening. Recheck lab 1-2 weeks before next follow up visit.  - Reviewed the risks and benefits of treatment and potential side effects of medication.  - Advised to eat low fat, low carbohydrate and high fiber diet as well as do cardio physical exercise regularly.  - COMP METABOLIC PANEL; Future  - Lipid Profile; Future    2. Essential hypertension  - Well-controlled. Continue to control with diet and exercise. Recheck lab 1-2 weeks before next follow up visit.  - Discussed to eat low-sodium diet and encouraged to do regular physical exercise.  - Recommend to monitor blood pressure and heart rate at home.  - CBC WITH DIFFERENTIAL; Future  - COMP METABOLIC PANEL; Future    3. Osteopenia, unspecified location  - Patient stated that she will follow with gynecologist for DEXA scan.  She will also send a copy of her DEXA scan report to us.  She will continue vitamin D and calcium supplements daily.  Patient is advised to do weightbearing exercise regularly.    4. Health Maintenance   - Patient has mammogram scheduled in [facility].  She follows with gynecologist regularly for pelvic exam, Pap smear and mammogram.    Followup: Return in about 3 months (around 3/12/2019), or if symptoms worsen or fail to improve, for Hypertension, Hyperlipidemia, Osteopenia, Lab review.      Please note that this dictation was created using voice recognition software. I have made every reasonable attempt to correct obvious errors, but I expect that there may have unintended errors in text, spelling, punctuation, or grammar that I did not discover.

## 2019-02-05 ENCOUNTER — APPOINTMENT (RX ONLY)
Dept: URBAN - METROPOLITAN AREA CLINIC 4 | Facility: CLINIC | Age: 64
Setting detail: DERMATOLOGY
End: 2019-02-05

## 2019-02-05 DIAGNOSIS — Z41.9 ENCOUNTER FOR PROCEDURE FOR PURPOSES OTHER THAN REMEDYING HEALTH STATE, UNSPECIFIED: ICD-10-CM

## 2019-02-05 DIAGNOSIS — L81.4 OTHER MELANIN HYPERPIGMENTATION: ICD-10-CM

## 2019-02-05 DIAGNOSIS — Z79.899 OTHER LONG TERM (CURRENT) DRUG THERAPY: ICD-10-CM

## 2019-02-05 DIAGNOSIS — L30.9 DERMATITIS, UNSPECIFIED: ICD-10-CM

## 2019-02-05 DIAGNOSIS — D485 NEOPLASM OF UNCERTAIN BEHAVIOR OF SKIN: ICD-10-CM

## 2019-02-05 PROBLEM — D48.5 NEOPLASM OF UNCERTAIN BEHAVIOR OF SKIN: Status: ACTIVE | Noted: 2019-02-05

## 2019-02-05 PROCEDURE — 11102 TANGNTL BX SKIN SINGLE LES: CPT

## 2019-02-05 PROCEDURE — 99213 OFFICE O/P EST LOW 20 MIN: CPT | Mod: 25

## 2019-02-05 PROCEDURE — ? ADDITIONAL NOTES

## 2019-02-05 PROCEDURE — ? ORDER TESTS

## 2019-02-05 PROCEDURE — ? COUNSELING

## 2019-02-05 PROCEDURE — ? BIOPSY BY SHAVE METHOD

## 2019-02-05 PROCEDURE — ? PRESCRIPTION

## 2019-02-05 RX ORDER — EFLORNITHINE HYDROCHLORIDE 139 MG/G
CREAM TOPICAL BID
Qty: 1 | Refills: 3

## 2019-02-05 RX ORDER — HYDROQUINONE 4 %
CREAM (GRAM) TOPICAL
Qty: 1 | Refills: 1

## 2019-02-05 ASSESSMENT — LOCATION DETAILED DESCRIPTION DERM
LOCATION DETAILED: LEFT SUPERIOR LATERAL UPPER BACK
LOCATION DETAILED: SUPERIOR THORACIC SPINE
LOCATION DETAILED: RIGHT SUPERIOR LATERAL UPPER BACK
LOCATION DETAILED: RIGHT SUPERIOR MEDIAL UPPER BACK

## 2019-02-05 ASSESSMENT — LOCATION SIMPLE DESCRIPTION DERM
LOCATION SIMPLE: UPPER BACK
LOCATION SIMPLE: LEFT UPPER BACK
LOCATION SIMPLE: RIGHT BACK
LOCATION SIMPLE: RIGHT UPPER BACK

## 2019-02-05 ASSESSMENT — LOCATION ZONE DERM: LOCATION ZONE: TRUNK

## 2019-02-05 NOTE — PROCEDURE: BIOPSY BY SHAVE METHOD
Dressing: Band-Aid
Silver Nitrate Text: The wound bed was treated with silver nitrate after the biopsy was performed.
Hemostasis: Aluminum Chloride and Electrocautery
Electrodesiccation And Curettage Text: The wound bed was treated with electrodesiccation and curettage after the biopsy was performed.
Post-Care Instructions: I reviewed with the patient in detail post-care instructions. Patient is to keep the biopsy site dry overnight, and then apply bacitracin twice daily until healed. Patient may apply hydrogen peroxide soaks to remove any crusting.
Biopsy Method: 15 blade
Detail Level: Detailed
Curettage Text: The wound bed was treated with curettage after the biopsy was performed.
Lab: [id number]
Consent: Written consent was obtained and risks were reviewed including but not limited to scarring, infection, bleeding, scabbing, incomplete removal, nerve damage and allergy to anesthesia.
Destruction After The Procedure: No
Notification Instructions: Patient will be notified of biopsy results. However, patient instructed to call the office if not contacted within 2 weeks.
Size Of Lesion In Cm: 0.3
Additional Anesthesia Volume In Cc (Will Not Render If 0): 0
Lab Facility: 
Anesthesia Type: 1% lidocaine with epinephrine
Depth Of Biopsy: dermis
Biopsy Type: H and E
Wound Care: Aquaphor
Cryotherapy Text: The wound bed was treated with cryotherapy after the biopsy was performed.
Billing Type: Third-Party Bill
Type Of Destruction Used: Curettage
Was A Bandage Applied: Yes
Electrodesiccation Text: The wound bed was treated with electrodesiccation after the biopsy was performed.

## 2019-02-05 NOTE — PROCEDURE: ADDITIONAL NOTES
Additional Notes: Will do bx to determine dx.
Detail Level: Simple
Additional Notes: Script given to patient.

## 2019-02-26 ENCOUNTER — APPOINTMENT (RX ONLY)
Dept: URBAN - METROPOLITAN AREA CLINIC 4 | Facility: CLINIC | Age: 64
Setting detail: DERMATOLOGY
End: 2019-02-26

## 2019-02-26 DIAGNOSIS — L92.0 GRANULOMA ANNULARE: ICD-10-CM

## 2019-02-26 PROCEDURE — 99212 OFFICE O/P EST SF 10 MIN: CPT

## 2019-02-26 PROCEDURE — ? ADDITIONAL NOTES

## 2019-02-26 PROCEDURE — ? COUNSELING

## 2019-02-26 ASSESSMENT — LOCATION SIMPLE DESCRIPTION DERM
LOCATION SIMPLE: RIGHT UPPER BACK
LOCATION SIMPLE: LEFT UPPER BACK

## 2019-02-26 ASSESSMENT — LOCATION DETAILED DESCRIPTION DERM
LOCATION DETAILED: RIGHT SUPERIOR UPPER BACK
LOCATION DETAILED: LEFT SUPERIOR UPPER BACK

## 2019-02-26 ASSESSMENT — LOCATION ZONE DERM: LOCATION ZONE: TRUNK

## 2019-02-26 NOTE — PROCEDURE: ADDITIONAL NOTES
Additional Notes: Discussed in detail treatments and risks. \\nDiscussed options such as injections, topicals steriods. \\nPatient to try Niacinamide serum and patient can try TAC patient has at home. \\nDiscussed how to apply TAC; BID x7-14 days.
Detail Level: Simple

## 2019-03-17 DIAGNOSIS — E78.00 PURE HYPERCHOLESTEROLEMIA: ICD-10-CM

## 2019-03-18 RX ORDER — SIMVASTATIN 10 MG
TABLET ORAL
Qty: 90 TAB | Refills: 3 | Status: SHIPPED | OUTPATIENT
Start: 2019-03-18 | End: 2019-11-20 | Stop reason: SDUPTHER

## 2019-04-08 ENCOUNTER — APPOINTMENT (RX ONLY)
Dept: URBAN - METROPOLITAN AREA CLINIC 4 | Facility: CLINIC | Age: 64
Setting detail: DERMATOLOGY
End: 2019-04-08

## 2019-04-08 DIAGNOSIS — Z87.2 PERSONAL HISTORY OF DISEASES OF THE SKIN AND SUBCUTANEOUS TISSUE: ICD-10-CM

## 2019-04-08 DIAGNOSIS — L81.4 OTHER MELANIN HYPERPIGMENTATION: ICD-10-CM

## 2019-04-08 DIAGNOSIS — L57.0 ACTINIC KERATOSIS: ICD-10-CM

## 2019-04-08 DIAGNOSIS — L92.0 GRANULOMA ANNULARE: ICD-10-CM

## 2019-04-08 DIAGNOSIS — D22 MELANOCYTIC NEVI: ICD-10-CM | Status: STABLE

## 2019-04-08 PROBLEM — D22.5 MELANOCYTIC NEVI OF TRUNK: Status: ACTIVE | Noted: 2019-04-08

## 2019-04-08 PROBLEM — D22.62 MELANOCYTIC NEVI OF LEFT UPPER LIMB, INCLUDING SHOULDER: Status: ACTIVE | Noted: 2019-04-08

## 2019-04-08 PROBLEM — D48.5 NEOPLASM OF UNCERTAIN BEHAVIOR OF SKIN: Status: ACTIVE | Noted: 2019-04-08

## 2019-04-08 PROCEDURE — ? MEDICATION COUNSELING

## 2019-04-08 PROCEDURE — ? DIAGNOSIS COMMENT

## 2019-04-08 PROCEDURE — 17000 DESTRUCT PREMALG LESION: CPT | Mod: 59

## 2019-04-08 PROCEDURE — ? ADDITIONAL NOTES

## 2019-04-08 PROCEDURE — ? PRESCRIPTION

## 2019-04-08 PROCEDURE — ? BIOPSY BY SHAVE METHOD

## 2019-04-08 PROCEDURE — 99214 OFFICE O/P EST MOD 30 MIN: CPT | Mod: 25

## 2019-04-08 PROCEDURE — 11102 TANGNTL BX SKIN SINGLE LES: CPT

## 2019-04-08 PROCEDURE — ? COUNSELING

## 2019-04-08 PROCEDURE — ? LIQUID NITROGEN

## 2019-04-08 RX ORDER — CLOBETASOL PROPIONATE 0.5 MG/G
CREAM TOPICAL BID
Qty: 1 | Refills: 1 | Status: ERX

## 2019-04-08 ASSESSMENT — LOCATION DETAILED DESCRIPTION DERM
LOCATION DETAILED: LEFT POSTERIOR ANKLE
LOCATION DETAILED: LEFT BUTTOCK
LOCATION DETAILED: RIGHT RADIAL DORSAL HAND
LOCATION DETAILED: RIGHT MEDIAL UPPER BACK
LOCATION DETAILED: LEFT INFERIOR MEDIAL MALAR CHEEK
LOCATION DETAILED: RIGHT RIB CAGE
LOCATION DETAILED: RIGHT DISTAL CALF
LOCATION DETAILED: LEFT ANTERIOR PROXIMAL UPPER ARM
LOCATION DETAILED: LEFT SUPERIOR UPPER BACK
LOCATION DETAILED: LEFT ANTERIOR PROXIMAL THIGH
LOCATION DETAILED: RIGHT SUPERIOR UPPER BACK

## 2019-04-08 ASSESSMENT — LOCATION ZONE DERM
LOCATION ZONE: HAND
LOCATION ZONE: TRUNK
LOCATION ZONE: FACE
LOCATION ZONE: LEG
LOCATION ZONE: ARM

## 2019-04-08 ASSESSMENT — LOCATION SIMPLE DESCRIPTION DERM
LOCATION SIMPLE: RIGHT UPPER BACK
LOCATION SIMPLE: RIGHT CALF
LOCATION SIMPLE: LEFT CHEEK
LOCATION SIMPLE: LEFT THIGH
LOCATION SIMPLE: LEFT UPPER ARM
LOCATION SIMPLE: ABDOMEN
LOCATION SIMPLE: LEFT UPPER BACK
LOCATION SIMPLE: LEFT ANKLE
LOCATION SIMPLE: RIGHT HAND
LOCATION SIMPLE: LEFT BUTTOCK

## 2019-04-08 NOTE — PROCEDURE: MEDICATION COUNSELING
Clofazimine Pregnancy And Lactation Text: This medication is Pregnancy Category C and isn't considered safe during pregnancy. It is excreted in breast milk.
Doxepin Pregnancy And Lactation Text: This medication is Pregnancy Category C and it isn't known if it is safe during pregnancy. It is also excreted in breast milk and breast feeding isn't recommended.
Carac Counseling:  I discussed with the patient the risks of Carac including but not limited to erythema, scaling, itching, weeping, crusting, and pain.
Niacinamide Pregnancy And Lactation Text: These medications are considered safe during pregnancy.
Protopic Pregnancy And Lactation Text: This medication is Pregnancy Category C. It is unknown if this medication is excreted in breast milk when applied topically.
Xelflynnz Pregnancy And Lactation Text: This medication is Pregnancy Category D and is not considered safe during pregnancy.  The risk during breast feeding is also uncertain.
Minocycline Counseling: Patient advised regarding possible photosensitivity and discoloration of the teeth, skin, lips, tongue and gums.  Patient instructed to avoid sunlight, if possible.  When exposed to sunlight, patients should wear protective clothing, sunglasses, and sunscreen.  The patient was instructed to call the office immediately if the following severe adverse effects occur:  hearing changes, easy bruising/bleeding, severe headache, or vision changes.  The patient verbalized understanding of the proper use and possible adverse effects of minocycline.  All of the patient's questions and concerns were addressed.
Humira Counseling:  I discussed with the patient the risks of adalimumab including but not limited to myelosuppression, immunosuppression, autoimmune hepatitis, demyelinating diseases, lymphoma, and serious infections.  The patient understands that monitoring is required including a PPD at baseline and must alert us or the primary physician if symptoms of infection or other concerning signs are noted.
Birth Control Pills Pregnancy And Lactation Text: This medication should be avoided if pregnant and for the first 30 days post-partum.
Hydroquinone Counseling:  Patient advised that medication may result in skin irritation, lightening (hypopigmentation), dryness, and burning.  In the event of skin irritation, the patient was advised to reduce the amount of the drug applied or use it less frequently.  Rarely, spots that are treated with hydroquinone can become darker (pseudoochronosis).  Should this occur, patient instructed to stop medication and call the office. The patient verbalized understanding of the proper use and possible adverse effects of hydroquinone.  All of the patient's questions and concerns were addressed.
Cephalexin Pregnancy And Lactation Text: This medication is Pregnancy Category B and considered safe during pregnancy.  It is also excreted in breast milk but can be used safely for shorter doses.
Bexarotene Counseling:  I discussed with the patient the risks of bexarotene including but not limited to hair loss, dry lips/skin/eyes, liver abnormalities, hyperlipidemia, pancreatitis, depression/suicidal ideation, photosensitivity, drug rash/allergic reactions, hypothyroidism, anemia, leukopenia, infection, cataracts, and teratogenicity.  Patient understands that they will need regular blood tests to check lipid profile, liver function tests, white blood cell count, thyroid function tests and pregnancy test if applicable.
Topical Sulfur Applications Pregnancy And Lactation Text: This medication is Pregnancy Category C and has an unknown safety profile during pregnancy. It is unknown if this topical medication is excreted in breast milk.
Itraconazole Counseling:  I discussed with the patient the risks of itraconazole including but not limited to liver damage, nausea/vomiting, neuropathy, and severe allergy.  The patient understands that this medication is best absorbed when taken with acidic beverages such as non-diet cola or ginger ale.  The patient understands that monitoring is required including baseline LFTs and repeat LFTs at intervals.  The patient understands that they are to contact us or the primary physician if concerning signs are noted.
Cyclophosphamide Pregnancy And Lactation Text: This medication is Pregnancy Category D and it isn't considered safe during pregnancy. This medication is excreted in breast milk.
Colchicine Counseling:  Patient counseled regarding adverse effects including but not limited to stomach upset (nausea, vomiting, stomach pain, or diarrhea).  Patient instructed to limit alcohol consumption while taking this medication.  Colchicine may reduce blood counts especially with prolonged use.  The patient understands that monitoring of kidney function and blood counts may be required, especially at baseline. The patient verbalized understanding of the proper use and possible adverse effects of colchicine.  All of the patient's questions and concerns were addressed.
Siliq Pregnancy And Lactation Text: The risk during pregnancy and breastfeeding is uncertain with this medication.
Carac Pregnancy And Lactation Text: This medication is Pregnancy Category X and contraindicated in pregnancy and in women who may become pregnant. It is unknown if this medication is excreted in breast milk.
Nsaids Counseling: NSAID Counseling: I discussed with the patient that NSAIDs should be taken with food. Prolonged use of NSAIDs can result in the development of stomach ulcers.  Patient advised to stop taking NSAIDs if abdominal pain occurs.  The patient verbalized understanding of the proper use and possible adverse effects of NSAIDs.  All of the patient's questions and concerns were addressed.
Xeljanz Counseling: I discussed with the patient the risks of Xeljanz therapy including increased risk of infection, liver issues, headache, diarrhea, or cold symptoms. Live vaccines should be avoided. They were instructed to call if they have any problems.
Solaraze Counseling:  I discussed with the patient the risks of Solaraze including but not limited to erythema, scaling, itching, weeping, crusting, and pain.
Enbrel Pregnancy And Lactation Text: This medication is Pregnancy Category B and is considered safe during pregnancy. It is unknown if this medication is excreted in breast milk.
Spironolactone Counseling: Patient advised regarding risks of diarrhea, abdominal pain, hyperkalemia, birth defects (for female patients), liver toxicity and renal toxicity. The patient may need blood work to monitor liver and kidney function and potassium levels while on therapy. The patient verbalized understanding of the proper use and possible adverse effects of spironolactone.  All of the patient's questions and concerns were addressed.
Include Pregnancy/Lactation Warning?: No
Hydroquinone Pregnancy And Lactation Text: This medication has not been assigned a Pregnancy Risk Category but animal studies failed to show danger with the topical medication. It is unknown if the medication is excreted in breast milk.
Acitretin Pregnancy And Lactation Text: This medication is Pregnancy Category X and should not be given to women who are pregnant or may become pregnant in the future. This medication is excreted in breast milk.
Doxepin Counseling:  Patient advised that the medication is sedating and not to drive a car after taking this medication. Patient informed of potential adverse effects including but not limited to dry mouth, urinary retention, and blurry vision.  The patient verbalized understanding of the proper use and possible adverse effects of doxepin.  All of the patient's questions and concerns were addressed.
Minocycline Pregnancy And Lactation Text: This medication is Pregnancy Category D and not consider safe during pregnancy. It is also excreted in breast milk.
Wartpeel Counseling:  I discussed with the patient the risks of Wartpeel including but not limited to erythema, scaling, itching, weeping, crusting, and pain.
Clindamycin Counseling: I counseled the patient regarding use of clindamycin as an antibiotic for prophylactic and/or therapeutic purposes. Clindamycin is active against numerous classes of bacteria, including skin bacteria. Side effects may include nausea, diarrhea, gastrointestinal upset, rash, hives, yeast infections, and in rare cases, colitis.
Griseofulvin Pregnancy And Lactation Text: This medication is Pregnancy Category X and is known to cause serious birth defects. It is unknown if this medication is excreted in breast milk but breast feeding should be avoided.
Cyclophosphamide Counseling:  I discussed with the patient the risks of cyclophosphamide including but not limited to hair loss, hormonal abnormalities, decreased fertility, abdominal pain, diarrhea, nausea and vomiting, bone marrow suppression and infection. The patient understands that monitoring is required while taking this medication.
Siliq Counseling:  I discussed with the patient the risks of Siliq including but not limited to new or worsening depression, suicidal thoughts and behavior, immunosuppression, malignancy, posterior leukoencephalopathy syndrome, and serious infections.  The patient understands that monitoring is required including a PPD at baseline and must alert us or the primary physician if symptoms of infection or other concerning signs are noted. There is also a special program designed to monitor depression which is required with Siliq.
Nsaids Pregnancy And Lactation Text: These medications are considered safe up to 30 weeks gestation. It is excreted in breast milk.
5-Fu Counseling: 5-Fluorouracil Counseling:  I discussed with the patient the risks of 5-fluorouracil including but not limited to erythema, scaling, itching, weeping, crusting, and pain.
Solaraze Pregnancy And Lactation Text: This medication is Pregnancy Category B and is considered safe. There is some data to suggest avoiding during the third trimester. It is unknown if this medication is excreted in breast milk.
Enbrel Counseling:  I discussed with the patient the risks of etanercept including but not limited to myelosuppression, immunosuppression, autoimmune hepatitis, demyelinating diseases, lymphoma, and infections.  The patient understands that monitoring is required including a PPD at baseline and must alert us or the primary physician if symptoms of infection or other concerning signs are noted.
Imiquimod Counseling:  I discussed with the patient the risks of imiquimod including but not limited to erythema, scaling, itching, weeping, crusting, and pain.  Patient understands that the inflammatory response to imiquimod is variable from person to person and was educated regarded proper titration schedule.  If flu-like symptoms develop, patient knows to discontinue the medication and contact us.
Spironolactone Pregnancy And Lactation Text: This medication can cause feminization of the male fetus and should be avoided during pregnancy. The active metabolite is also found in breast milk.
Cimetidine Pregnancy And Lactation Text: This medication is Pregnancy Category B and is considered safe during pregnancy. It is also excreted in breast milk and breast feeding isn't recommended.
Acitretin Counseling:  I discussed with the patient the risks of acitretin including but not limited to hair loss, dry lips/skin/eyes, liver damage, hyperlipidemia, depression/suicidal ideation, photosensitivity.  Serious rare side effects can include but are not limited to pancreatitis, pseudotumor cerebri, bony changes, clot formation/stroke/heart attack.  Patient understands that alcohol is contraindicated since it can result in liver toxicity and significantly prolong the elimination of the drug by many years.
Quinolones Counseling:  I discussed with the patient the risks of fluoroquinolones including but not limited to GI upset, allergic reaction, drug rash, diarrhea, dizziness, photosensitivity, yeast infections, liver function test abnormalities, tendonitis/tendon rupture.
Ivermectin Pregnancy And Lactation Text: This medication is Pregnancy Category C and it isn't known if it is safe during pregnancy. It is also excreted in breast milk.
Isotretinoin Pregnancy And Lactation Text: This medication is Pregnancy Category X and is considered extremely dangerous during pregnancy. It is unknown if it is excreted in breast milk.
Clindamycin Pregnancy And Lactation Text: This medication can be used in pregnancy if certain situations. Clindamycin is also present in breast milk.
Cellcept Pregnancy And Lactation Text: This medication is Pregnancy Category D and isn't considered safe during pregnancy. It is unknown if this medication is excreted in breast milk.
Dapsone Counseling: I discussed with the patient the risks of dapsone including but not limited to hemolytic anemia, agranulocytosis, rashes, methemoglobinemia, kidney failure, peripheral neuropathy, headaches, GI upset, and liver toxicity.  Patients who start dapsone require monitoring including baseline LFTs and weekly CBCs for the first month, then every month thereafter.  The patient verbalized understanding of the proper use and possible adverse effects of dapsone.  All of the patient's questions and concerns were addressed.
Griseofulvin Counseling:  I discussed with the patient the risks of griseofulvin including but not limited to photosensitivity, cytopenia, liver damage, nausea/vomiting and severe allergy.  The patient understands that this medication is best absorbed when taken with a fatty meal (e.g., ice cream or french fries).
Rituxan Pregnancy And Lactation Text: This medication is Pregnancy Category C and it isn't know if it is safe during pregnancy. It is unknown if this medication is excreted in breast milk but similar antibodies are known to be excreted.
Odomzo Counseling- I discussed with the patient the risks of Odomzo including but not limited to nausea, vomiting, diarrhea, constipation, weight loss, changes in the sense of taste, decreased appetite, muscle spasms, and hair loss.  The patient verbalized understanding of the proper use and possible adverse effects of Odomzo.  All of the patient's questions and concerns were addressed.
Tremfya Counseling: I discussed with the patient the risks of guselkumab including but not limited to immunosuppression, serious infections, worsening of inflammatory bowel disease and drug reactions.  The patient understands that monitoring is required including a PPD at baseline and must alert us or the primary physician if symptoms of infection or other concerning signs are noted.
Topical Retinoid counseling:  Patient advised to apply a pea-sized amount only at bedtime and wait 30 minutes after washing their face before applying.  If too drying, patient may add a non-comedogenic moisturizer. The patient verbalized understanding of the proper use and possible adverse effects of retinoids.  All of the patient's questions and concerns were addressed.
Quinolones Pregnancy And Lactation Text: This medication is Pregnancy Category C and it isn't know if it is safe during pregnancy. It is also excreted in breast milk.
Dupixent Pregnancy And Lactation Text: This medication likely crosses the placenta but the risk for the fetus is uncertain. This medication is excreted in breast milk.
Imiquimod Pregnancy And Lactation Text: This medication is Pregnancy Category C. It is unknown if this medication is excreted in breast milk.
SSKI Counseling:  I discussed with the patient the risks of SSKI including but not limited to thyroid abnormalities, metallic taste, GI upset, fever, headache, acne, arthralgias, paraesthesias, lymphadenopathy, easy bleeding, arrhythmias, and allergic reaction.
Cimetidine Counseling:  I discussed with the patient the risks of Cimetidine including but not limited to gynecomastia, headache, diarrhea, nausea, drowsiness, arrhythmias, pancreatitis, skin rashes, psychosis, bone marrow suppression and kidney toxicity.
Doxycycline Counseling:  Patient counseled regarding possible photosensitivity and increased risk for sunburn.  Patient instructed to avoid sunlight, if possible.  When exposed to sunlight, patients should wear protective clothing, sunglasses, and sunscreen.  The patient was instructed to call the office immediately if the following severe adverse effects occur:  hearing changes, easy bruising/bleeding, severe headache, or vision changes.  The patient verbalized understanding of the proper use and possible adverse effects of doxycycline.  All of the patient's questions and concerns were addressed.
Zyclara Counseling:  I discussed with the patient the risks of imiquimod including but not limited to erythema, scaling, itching, weeping, crusting, and pain.  Patient understands that the inflammatory response to imiquimod is variable from person to person and was educated regarded proper titration schedule.  If flu-like symptoms develop, patient knows to discontinue the medication and contact us.
Ivermectin Counseling:  Patient instructed to take medication on an empty stomach with a full glass of water.  Patient informed of potential adverse effects including but not limited to nausea, diarrhea, dizziness, itching, and swelling of the extremities or lymph nodes.  The patient verbalized understanding of the proper use and possible adverse effects of ivermectin.  All of the patient's questions and concerns were addressed.
High Dose Vitamin A Counseling: Side effects reviewed, pt to contact office should one occur.
Cellcept Counseling:  I discussed with the patient the risks of mycophenolate mofetil including but not limited to infection/immunosuppression, GI upset, hypokalemia, hypercholesterolemia, bone marrow suppression, lymphoproliferative disorders, malignancy, GI ulceration/bleed/perforation, colitis, interstitial lung disease, kidney failure, progressive multifocal leukoencephalopathy, and birth defects.  The patient understands that monitoring is required including a baseline creatinine and regular CBC testing. In addition, patient must alert us immediately if symptoms of infection or other concerning signs are noted.
Dapsone Pregnancy And Lactation Text: This medication is Pregnancy Category C and is not considered safe during pregnancy or breast feeding.
Rituxan Counseling:  I discussed with the patient the risks of Rituxan infusions. Side effects can include infusion reactions, severe drug rashes including mucocutaneous reactions, reactivation of latent hepatitis and other infections and rarely progressive multifocal leukoencephalopathy.  All of the patient's questions and concerns were addressed.
Drysol Counseling:  I discussed with the patient the risks of drysol/aluminum chloride including but not limited to skin rash, itching, irritation, burning.
Odomzo Pregnancy And Lactation Text: This medication is Pregnancy Category X and is absolutely contraindicated during pregnancy. It is unknown if it is excreted in breast milk.
Sski Pregnancy And Lactation Text: This medication is Pregnancy Category D and isn't considered safe during pregnancy. It is excreted in breast milk.
Dupixent Counseling: I discussed with the patient the risks of dupilumab including but not limited to eye infection and irritation, cold sores, injection site reactions, worsening of asthma, allergic reactions and increased risk of parasitic infection.  Live vaccines should be avoided while taking dupilumab. Dupilumab will also interact with certain medications such as warfarin and cyclosporine. The patient understands that monitoring is required and they must alert us or the primary physician if symptoms of infection or other concerning signs are noted.
Minoxidil Counseling: Minoxidil is a topical medication which can increase blood flow where it is applied. It is uncertain how this medication increases hair growth. Side effects are uncommon and include stinging and allergic reactions.
Doxycycline Pregnancy And Lactation Text: This medication is Pregnancy Category D and not consider safe during pregnancy. It is also excreted in breast milk but is considered safe for shorter treatment courses.
Prednisone Pregnancy And Lactation Text: This medication is Pregnancy Category C and it isn't know if it is safe during pregnancy. This medication is excreted in breast milk.
Rifampin Counseling: I discussed with the patient the risks of rifampin including but not limited to liver damage, kidney damage, red-orange body fluids, nausea/vomiting and severe allergy.
High Dose Vitamin A Pregnancy And Lactation Text: High dose vitamin A therapy is contraindicated during pregnancy and breast feeding.
Erivedge Counseling- I discussed with the patient the risks of Erivedge including but not limited to nausea, vomiting, diarrhea, constipation, weight loss, changes in the sense of taste, decreased appetite, muscle spasms, and hair loss.  The patient verbalized understanding of the proper use and possible adverse effects of Erivedge.  All of the patient's questions and concerns were addressed.
Drysol Pregnancy And Lactation Text: This medication is considered safe during pregnancy and breast feeding.
Otezla Counseling: The side effects of Otezla were discussed with the patient, including but not limited to worsening or new depression, weight loss, diarrhea, nausea, upper respiratory tract infection, and headache. Patient instructed to call the office should any adverse effect occur.  The patient verbalized understanding of the proper use and possible adverse effects of Otezla.  All the patient's questions and concerns were addressed.
Taltz Counseling: I discussed with the patient the risks of ixekizumab including but not limited to immunosuppression, serious infections, worsening of inflammatory bowel disease and drug reactions.  The patient understands that monitoring is required including a PPD at baseline and must alert us or the primary physician if symptoms of infection or other concerning signs are noted.
Azithromycin Counseling:  I discussed with the patient the risks of azithromycin including but not limited to GI upset, allergic reaction, drug rash, diarrhea, and yeast infections.
Tazorac Counseling:  Patient advised that medication is irritating and drying.  Patient may need to apply sparingly and wash off after an hour before eventually leaving it on overnight.  The patient verbalized understanding of the proper use and possible adverse effects of tazorac.  All of the patient's questions and concerns were addressed.
Thalidomide Counseling: I discussed with the patient the risks of thalidomide including but not limited to birth defects, anxiety, weakness, chest pain, dizziness, cough and severe allergy.
Erythromycin Counseling:  I discussed with the patient the risks of erythromycin including but not limited to GI upset, allergic reaction, drug rash, diarrhea, increase in liver enzymes, and yeast infections.
Prednisone Counseling:  I discussed with the patient the risks of prolonged use of prednisone including but not limited to weight gain, insomnia, osteoporosis, mood changes, diabetes, susceptibility to infection, glaucoma and high blood pressure.  In cases where prednisone use is prolonged, patients should be monitored with blood pressure checks, serum glucose levels and an eye exam.  Additionally, the patient may need to be placed on GI prophylaxis, PCP prophylaxis, and calcium and vitamin D supplementation and/or a bisphosphonate.  The patient verbalized understanding of the proper use and the possible adverse effects of prednisone.  All of the patient's questions and concerns were addressed.
Rifampin Pregnancy And Lactation Text: This medication is Pregnancy Category C and it isn't know if it is safe during pregnancy. It is also excreted in breast milk and should not be used if you are breast feeding.
Albendazole Counseling:  I discussed with the patient the risks of albendazole including but not limited to cytopenia, kidney damage, nausea/vomiting and severe allergy.  The patient understands that this medication is being used in an off-label manner.
Glycopyrrolate Pregnancy And Lactation Text: This medication is Pregnancy Category B and is considered safe during pregnancy. It is unknown if it is excreted breast milk.
Azathioprine Counseling:  I discussed with the patient the risks of azathioprine including but not limited to myelosuppression, immunosuppression, hepatotoxicity, lymphoma, and infections.  The patient understands that monitoring is required including baseline LFTs, Creatinine, possible TPMP genotyping and weekly CBCs for the first month and then every 2 weeks thereafter.  The patient verbalized understanding of the proper use and possible adverse effects of azathioprine.  All of the patient's questions and concerns were addressed.
Infliximab Counseling:  I discussed with the patient the risks of infliximab including but not limited to myelosuppression, immunosuppression, autoimmune hepatitis, demyelinating diseases, lymphoma, and serious infections.  The patient understands that monitoring is required including a PPD at baseline and must alert us or the primary physician if symptoms of infection or other concerning signs are noted.
Otezla Pregnancy And Lactation Text: This medication is Pregnancy Category C and it isn't known if it is safe during pregnancy. It is unknown if it is excreted in breast milk.
Elidel Counseling: Patient may experience a mild burning sensation during topical application. Elidel is not approved in children less than 2 years of age. There have been case reports of hematologic and skin malignancies in patients using topical calcineurin inhibitors although causality is questionable.
Tazorac Pregnancy And Lactation Text: This medication is not safe during pregnancy. It is unknown if this medication is excreted in breast milk.
Tetracycline Counseling: Patient counseled regarding possible photosensitivity and increased risk for sunburn.  Patient instructed to avoid sunlight, if possible.  When exposed to sunlight, patients should wear protective clothing, sunglasses, and sunscreen.  The patient was instructed to call the office immediately if the following severe adverse effects occur:  hearing changes, easy bruising/bleeding, severe headache, or vision changes.  The patient verbalized understanding of the proper use and possible adverse effects of tetracycline.  All of the patient's questions and concerns were addressed. Patient understands to avoid pregnancy while on therapy due to potential birth defects.
Cosentyx Counseling:  I discussed with the patient the risks of Cosentyx including but not limited to worsening of Crohn's disease, immunosuppression, allergic reactions and infections.  The patient understands that monitoring is required including a PPD at baseline and must alert us or the primary physician if symptoms of infection or other concerning signs are noted.
Azithromycin Pregnancy And Lactation Text: This medication is considered safe during pregnancy and is also secreted in breast milk.
Picato Counseling:  I discussed with the patient the risks of Picato including but not limited to erythema, scaling, itching, weeping, crusting, and pain.
Terbinafine Counseling: Patient counseling regarding adverse effects of terbinafine including but not limited to headache, diarrhea, rash, upset stomach, liver function test abnormalities, itching, taste/smell disturbance, nausea, abdominal pain, and flatulence.  There is a rare possibility of liver failure that can occur when taking terbinafine.  The patient understands that a baseline LFT and kidney function test may be required. The patient verbalized understanding of the proper use and possible adverse effects of terbinafine.  All of the patient's questions and concerns were addressed.
Methotrexate Pregnancy And Lactation Text: This medication is Pregnancy Category X and is known to cause fetal harm. This medication is excreted in breast milk.
Arava Counseling:  Patient counseled regarding adverse effects of Arava including but not limited to nausea, vomiting, abnormalities in liver function tests. Patients may develop mouth sores, rash, diarrhea, and abnormalities in blood counts. The patient understands that monitoring is required including LFTs and blood counts.  There is a rare possibility of scarring of the liver and lung problems that can occur when taking methotrexate. Persistent nausea, loss of appetite, pale stools, dark urine, cough, and shortness of breath should be reported immediately. Patient advised to discontinue Arava treatment and consult with a physician prior to attempting conception. The patient will have to undergo a treatment to eliminate Arava from the body prior to conception.
Hydroxychloroquine Counseling:  I discussed with the patient that a baseline ophthalmologic exam is needed at the start of therapy and every year thereafter while on therapy. A CBC may also be warranted for monitoring.  The side effects of this medication were discussed with the patient, including but not limited to agranulocytosis, aplastic anemia, seizures, rashes, retinopathy, and liver toxicity. Patient instructed to call the office should any adverse effect occur.  The patient verbalized understanding of the proper use and possible adverse effects of Plaquenil.  All the patient's questions and concerns were addressed.
Erythromycin Pregnancy And Lactation Text: This medication is Pregnancy Category B and is considered safe during pregnancy. It is also excreted in breast milk.
Oxybutynin Counseling:  I discussed with the patient the risks of oxybutynin including but not limited to skin rash, drowsiness, dry mouth, difficulty urinating, and blurred vision.
Bactrim Counseling:  I discussed with the patient the risks of sulfa antibiotics including but not limited to GI upset, allergic reaction, drug rash, diarrhea, dizziness, photosensitivity, and yeast infections.  Rarely, more serious reactions can occur including but not limited to aplastic anemia, agranulocytosis, methemoglobinemia, blood dyscrasias, liver or kidney failure, lung infiltrates or desquamative/blistering drug rashes.
Stelara Counseling:  I discussed with the patient the risks of ustekinumab including but not limited to immunosuppression, malignancy, posterior leukoencephalopathy syndrome, and serious infections.  The patient understands that monitoring is required including a PPD at baseline and must alert us or the primary physician if symptoms of infection or other concerning signs are noted.
Topical Clindamycin Counseling: Patient counseled that this medication may cause skin irritation or allergic reactions.  In the event of skin irritation, the patient was advised to reduce the amount of the drug applied or use it less frequently.   The patient verbalized understanding of the proper use and possible adverse effects of clindamycin.  All of the patient's questions and concerns were addressed.
Cimzia Pregnancy And Lactation Text: This medication crosses the placenta but can be considered safe in certain situations. Cimzia may be excreted in breast milk.
Gabapentin Counseling: I discussed with the patient the risks of gabapentin including but not limited to dizziness, somnolence, fatigue and ataxia.
Valtrex Counseling: I discussed with the patient the risks of valacyclovir including but not limited to kidney damage, nausea, vomiting and severe allergy.  The patient understands that if the infection seems to be worsening or is not improving, they are to call.
Ketoconazole Pregnancy And Lactation Text: This medication is Pregnancy Category C and it isn't know if it is safe during pregnancy. It is also excreted in breast milk and breast feeding isn't recommended.
Metronidazole Counseling:  I discussed with the patient the risks of metronidazole including but not limited to seizures, nausea/vomiting, a metallic taste in the mouth, nausea/vomiting and severe allergy.
Methotrexate Counseling:  Patient counseled regarding adverse effects of methotrexate including but not limited to nausea, vomiting, abnormalities in liver function tests. Patients may develop mouth sores, rash, diarrhea, and abnormalities in blood counts. The patient understands that monitoring is required including LFT's and blood counts.  There is a rare possibility of scarring of the liver and lung problems that can occur when taking methotrexate. Persistent nausea, loss of appetite, pale stools, dark urine, cough, and shortness of breath should be reported immediately. Patient advised to discontinue methotrexate treatment at least three months before attempting to become pregnant.  I discussed the need for folate supplements while taking methotrexate.  These supplements can decrease side effects during methotrexate treatment. The patient verbalized understanding of the proper use and possible adverse effects of methotrexate.  All of the patient's questions and concerns were addressed.
Hydroxychloroquine Pregnancy And Lactation Text: This medication has been shown to cause fetal harm but it isn't assigned a Pregnancy Risk Category. There are small amounts excreted in breast milk.
Isotretinoin Counseling: Patient should get monthly blood tests, not donate blood, not drive at night if vision affected, not share medication, and not undergo elective surgery for 6 months after tx completed. Side effects reviewed, pt to contact office should one occur.
Hydroxyzine Pregnancy And Lactation Text: This medication is not safe during pregnancy and should not be taken. It is also excreted in breast milk and breast feeding isn't recommended.
Benzoyl Peroxide Counseling: Patient counseled that medicine may cause skin irritation and bleach clothing.  In the event of skin irritation, the patient was advised to reduce the amount of the drug applied or use it less frequently.   The patient verbalized understanding of the proper use and possible adverse effects of benzoyl peroxide.  All of the patient's questions and concerns were addressed.
Xolair Pregnancy And Lactation Text: This medication is Pregnancy Category B and is considered safe during pregnancy. This medication is excreted in breast milk.
Ilumya Counseling: I discussed with the patient the risks of tildrakizumab including but not limited to immunosuppression, malignancy, posterior leukoencephalopathy syndrome, and serious infections.  The patient understands that monitoring is required including a PPD at baseline and must alert us or the primary physician if symptoms of infection or other concerning signs are noted.
Eucrisa Counseling: Patient may experience a mild burning sensation during topical application. Eucrisa is not approved in children less than 2 years of age.
Oxybutynin Pregnancy And Lactation Text: This medication is Pregnancy Category B and is considered safe during pregnancy. It is unknown if it is excreted in breast milk.
Bactrim Pregnancy And Lactation Text: This medication is Pregnancy Category D and is known to cause fetal risk.  It is also excreted in breast milk.
Detail Level: Simple
Valtrex Pregnancy And Lactation Text: this medication is Pregnancy Category B and is considered safe during pregnancy. This medication is not directly found in breast milk but it's metabolite acyclovir is present.
Cimzia Counseling:  I discussed with the patient the risks of Cimzia including but not limited to immunosuppression, allergic reactions and infections.  The patient understands that monitoring is required including a PPD at baseline and must alert us or the primary physician if symptoms of infection or other concerning signs are noted.
Clofazimine Counseling:  I discussed with the patient the risks of clofazimine including but not limited to skin and eye pigmentation, liver damage, nausea/vomiting, gastrointestinal bleeding and allergy.
Ketoconazole Counseling:   Patient counseled regarding improving absorption with orange juice.  Adverse effects include but are not limited to breast enlargement, headache, diarrhea, nausea, upset stomach, liver function test abnormalities, taste disturbance, and stomach pain.  There is a rare possibility of liver failure that can occur when taking ketoconazole. The patient understands that monitoring of LFTs may be required, especially at baseline. The patient verbalized understanding of the proper use and possible adverse effects of ketoconazole.  All of the patient's questions and concerns were addressed.
Hydroxyzine Counseling: Patient advised that the medication is sedating and not to drive a car after taking this medication.  Patient informed of potential adverse effects including but not limited to dry mouth, urinary retention, and blurry vision.  The patient verbalized understanding of the proper use and possible adverse effects of hydroxyzine.  All of the patient's questions and concerns were addressed.
Niacinamide Counseling: I recommended taking niacin or niacinamide, also know as vitamin B3, twice daily. Recent evidence suggests that taking vitamin B3 (500 mg twice daily) can reduce the risk of actinic keratoses and non-melanoma skin cancers. Side effects of vitamin B3 include flushing and headache.
Bexarotene Pregnancy And Lactation Text: This medication is Pregnancy Category X and should not be given to women who are pregnant or may become pregnant. This medication should not be used if you are breast feeding.
Benzoyl Peroxide Pregnancy And Lactation Text: This medication is Pregnancy Category C. It is unknown if benzoyl peroxide is excreted in breast milk.
Xolair Counseling:  Patient informed of potential adverse effects including but not limited to fever, muscle aches, rash and allergic reactions.  The patient verbalized understanding of the proper use and possible adverse effects of Xolair.  All of the patient's questions and concerns were addressed.
Protopic Counseling: Patient may experience a mild burning sensation during topical application. Protopic is not approved in children less than 2 years of age. There have been case reports of hematologic and skin malignancies in patients using topical calcineurin inhibitors although causality is questionable.
Birth Control Pills Counseling: Birth Control Pill Counseling: I discussed with the patient the potential side effects of OCPs including but not limited to increased risk of stroke, heart attack, thrombophlebitis, deep venous thrombosis, hepatic adenomas, breast changes, GI upset, headaches, and depression.  The patient verbalized understanding of the proper use and possible adverse effects of OCPs. All of the patient's questions and concerns were addressed.
Simponi Counseling:  I discussed with the patient the risks of golimumab including but not limited to myelosuppression, immunosuppression, autoimmune hepatitis, demyelinating diseases, lymphoma, and serious infections.  The patient understands that monitoring is required including a PPD at baseline and must alert us or the primary physician if symptoms of infection or other concerning signs are noted.
Metronidazole Pregnancy And Lactation Text: This medication is Pregnancy Category B and considered safe during pregnancy.  It is also excreted in breast milk.
Topical Sulfur Applications Counseling: Topical Sulfur Counseling: Patient counseled that this medication may cause skin irritation or allergic reactions.  In the event of skin irritation, the patient was advised to reduce the amount of the drug applied or use it less frequently.   The patient verbalized understanding of the proper use and possible adverse effects of topical sulfur application.  All of the patient's questions and concerns were addressed.
Fluconazole Counseling:  Patient counseled regarding adverse effects of fluconazole including but not limited to headache, diarrhea, nausea, upset stomach, liver function test abnormalities, taste disturbance, and stomach pain.  There is a rare possibility of liver failure that can occur when taking fluconazole.  The patient understands that monitoring of LFTs and kidney function test may be required, especially at baseline. The patient verbalized understanding of the proper use and possible adverse effects of fluconazole.  All of the patient's questions and concerns were addressed.
Glycopyrrolate Counseling:  I discussed with the patient the risks of glycopyrrolate including but not limited to skin rash, drowsiness, dry mouth, difficulty urinating, and blurred vision.
Cephalexin Counseling: I counseled the patient regarding use of cephalexin as an antibiotic for prophylactic and/or therapeutic purposes. Cephalexin (commonly prescribed under brand name Keflex) is a cephalosporin antibiotic which is active against numerous classes of bacteria, including most skin bacteria. Side effects may include nausea, diarrhea, gastrointestinal upset, rash, hives, yeast infections, and in rare cases, hepatitis, kidney disease, seizures, fever, confusion, neurologic symptoms, and others. Patients with severe allergies to penicillin medications are cautioned that there is about a 10% incidence of cross-reactivity with cephalosporins. When possible, patients with penicillin allergies should use alternatives to cephalosporins for antibiotic therapy.
Cyclosporine Counseling:  I discussed with the patient the risks of cyclosporine including but not limited to hypertension, gingival hyperplasia,myelosuppression, immunosuppression, liver damage, kidney damage, neurotoxicity, lymphoma, and serious infections. The patient understands that monitoring is required including baseline blood pressure, CBC, CMP, lipid panel and uric acid, and then 1-2 times monthly CMP and blood pressure.

## 2019-04-08 NOTE — PROCEDURE: ADDITIONAL NOTES
Additional Notes: Will prescribe stronger steroid today for Clobetasol and send Rx to pharmacy.  \\nPatient to apply as directed.
Detail Level: Simple
Additional Notes: Patient to discontinue minocycline for the acne.

## 2019-04-08 NOTE — PROCEDURE: BIOPSY BY SHAVE METHOD
Silver Nitrate Text: The wound bed was treated with silver nitrate after the biopsy was performed.
Was A Bandage Applied: Yes
Hemostasis: Aluminum Chloride
Notification Instructions: Patient will be notified of biopsy results. However, patient instructed to call the office if not contacted within 2 weeks.
Curettage Text: The wound bed was treated with curettage after the biopsy was performed.
Biopsy Method: Personna blade
Lab: [id number]
Bill For Surgical Tray: no
Detail Level: Detailed
Consent: Written consent was obtained and risks were reviewed including but not limited to scarring, infection, bleeding, scabbing, incomplete removal, nerve damage and allergy to anesthesia.
Cryotherapy Text: The wound bed was treated with cryotherapy after the biopsy was performed.
Lab Facility: 
Anesthesia Type: 1% lidocaine with 1:100,000 epinephrine and a 1:10 solution of 8.4% sodium bicarbonate
Size Of Lesion In Cm: 0.5
Additional Anesthesia Volume In Cc (Will Not Render If 0): 0
Wound Care: Petrolatum
Electrodesiccation Text: The wound bed was treated with electrodesiccation after the biopsy was performed.
Depth Of Biopsy: dermis
Type Of Destruction Used: Cryotherapy
Electrodesiccation And Curettage Text: The wound bed was treated with electrodesiccation and curettage after the biopsy was performed.
Billing Type: Third-Party Bill
Biopsy Type: H and E
Anesthesia Volume In Cc: 1
Dressing: bandage
Post-Care Instructions: I reviewed with the patient in detail post-care instructions. Patient is to keep the biopsy site dry overnight, and then apply bacitracin/petroleum twice daily until healed.

## 2019-04-12 ENCOUNTER — RX ONLY (OUTPATIENT)
Age: 64
Setting detail: RX ONLY
End: 2019-04-12

## 2019-04-12 RX ORDER — MINOCYCLINE HYDROCHLORIDE 100 MG/1
CAPSULE ORAL
Qty: 60 | Refills: 4 | Status: ERX

## 2019-04-23 ENCOUNTER — HOSPITAL ENCOUNTER (OUTPATIENT)
Dept: RADIOLOGY | Facility: MEDICAL CENTER | Age: 64
End: 2019-04-23
Attending: SPECIALIST
Payer: OTHER GOVERNMENT

## 2019-04-23 DIAGNOSIS — E07.9 DISORDER OF THYROID: ICD-10-CM

## 2019-04-23 DIAGNOSIS — E04.2 NONTOXIC MULTINODULAR GOITER: ICD-10-CM

## 2019-04-23 PROCEDURE — 76536 US EXAM OF HEAD AND NECK: CPT

## 2019-05-23 ENCOUNTER — OFFICE VISIT (OUTPATIENT)
Dept: CARDIOLOGY | Facility: MEDICAL CENTER | Age: 64
End: 2019-05-23
Payer: OTHER GOVERNMENT

## 2019-05-23 VITALS
WEIGHT: 146 LBS | HEART RATE: 78 BPM | SYSTOLIC BLOOD PRESSURE: 128 MMHG | DIASTOLIC BLOOD PRESSURE: 82 MMHG | OXYGEN SATURATION: 95 % | BODY MASS INDEX: 24.32 KG/M2 | HEIGHT: 65 IN

## 2019-05-23 DIAGNOSIS — E78.00 PURE HYPERCHOLESTEROLEMIA: ICD-10-CM

## 2019-05-23 DIAGNOSIS — I10 ESSENTIAL HYPERTENSION: ICD-10-CM

## 2019-05-23 DIAGNOSIS — I35.1 MODERATE AORTIC REGURGITATION: ICD-10-CM

## 2019-05-23 PROCEDURE — 99213 OFFICE O/P EST LOW 20 MIN: CPT | Performed by: INTERNAL MEDICINE

## 2019-05-23 ASSESSMENT — ENCOUNTER SYMPTOMS
DIZZINESS: 1
PALPITATIONS: 1
SHORTNESS OF BREATH: 1

## 2019-05-23 NOTE — PROGRESS NOTES
Cardiology Follow-up Consultation Note    Date of note:   5/23/2019  Primary Care Provider: Chloe Donnelly M.D.  Referring Provider: Sharath Macias M.D.     Patient Name: Shahrzad Scott   YOB: 1955  MRN:              [id number]    Chief Complaint: dizziness    History of Present Illness: Shahrzad Scott is a 63 y.o. female whose current medical problems include dyslipidemia, hypertension, and aortic regurgitation secondary to phen phen who is here for follow-up.    At our visit, 5/30/2018:  She does have occasional mild severity palpitations, sometimes not for a couple months, sometimes every day, and at most 30 minutes. Sometimes these do stop with coughing.  She does not know if her heart rate was irregular.     In terms of hypertension, usually 120s/80s. Did have hypotension with losartan 12.5mg PO Daily, now taking it every other day.     Does not exercise.    At our visit,  11/13/2018:  In terms of hypertension, Had severe dizziness even on taking losartan 12.5mg PO every other day. Currently without medication, BP 110s-120s at home.     HR in the 60s-70s.    She gets dizzy 2-3 times a week, can be severe and feels like she's off balance.  Happens in the shower, when driving in a specific spot on [place].  No syncope. This started about a month ago.  It is much worse when she moves her head back.     Continues to have occasional palpitations.     Interim Events:   Has worsening thyroid nodules, awaiting thyroidectomy.         Review of Systems   Cardiovascular: Positive for palpitations.   Respiratory: Positive for shortness of breath.    Neurological: Positive for dizziness.   Allergic/Immunologic: Positive for environmental allergies.     Constitution: Negative for chills, fever and night sweats.   HENT: Negative for nosebleeds.    Eyes: Negative for vision loss in left eye and vision loss in right eye.   Respiratory: Negative for hemoptysis.    Gastrointestinal: Negative for  hematemesis, hematochezia and melena.   Genitourinary: Negative for hematuria.   Neurological: Negative for focal weakness, numbness and paresthesias.       Past Medical History:   Diagnosis Date   • Aortic regurgitation 1996   • HLD (hyperlipidemia)    • Mitral regurgitation    • MVP (mitral valve prolapse)    • Palpitations          Past Surgical History:   Procedure Laterality Date   • ABDOMINAL HYSTERECTOMY TOTAL     • APPENDECTOMY     • BREAST RECONSTRUCTION Bilateral    • CATARACT EXTRACTION WITH IOL Bilateral     Dr. Wilder   • CRANIOTOMY     • EYE SURGERY     • HYSTERECTOMY, TOTAL ABDOMINAL      at age 37 for uterine fibroid, still has both ovaries   • KNEE ARTHROSCOPY, ACL RECON W/ TIBIALIS TENDON AUTOGRAFT           Current Outpatient Prescriptions   Medication Sig Dispense Refill   • simvastatin (ZOCOR) 10 MG Tab TAKE 1 TABLET EVERY EVENING 90 Tab 3   • TURMERIC PO Take 1,000 mg by mouth 2 Times a Day.     • Coenzyme Q10 200 MG Cap Take  by mouth.     • MINIVELLE 0.025 MG/24HR PATCH BIWEEKLY Apply 1 Patch to skin as directed. Indications: Moderate to Severe Vasomotor Symptoms, every Wed and Friday     • Multiple Vitamins-Minerals (MULTIVITAMIN ADULTS 50+ PO) Take  by mouth.     • RESVERATROL PO Take  by mouth.     • ascorbic acid (ASCORBIC ACID) 500 MG Tab Take 500 mg by mouth every day.     • Probiotic Product (PROBIOTIC DAILY PO) Take  by mouth.     • Alpha Lipoic Acid 200 MG Cap Take  by mouth.     • Ranitidine HCl (ZANTAC PO) Take 150 mg by mouth 2 Times a Day.     • Calcium Carbonate-Vitamin D (CALCIUM + D) 600-200 MG-UNIT TABS Take  by mouth every day.     • docosahexanoic acid (OMEGA 3) 1000 MG CAPS Take 1,000 mg by mouth every day.     • vitamin D (CHOLECALCIFEROL) 1000 UNIT TABS Take 2,000 Units by mouth every day. TAKE AS DIRECTED.     • minocycline (MINOCIN) 100 MG Cap   5     No current facility-administered medications for this visit.          Allergies   Allergen Reactions   • Septra  "[Bactrim] Hives   • Sulfa Drugs          Family History   Problem Relation Age of Onset   • Stroke Mother    • Hypertension Mother    • Cancer Father         prostate cancer   • Lung Disease Sister         asthma   • No Known Problems Brother    • No Known Problems Maternal Grandmother    • No Known Problems Maternal Grandfather    • Other Brother         cerabral palsy         Social History     Social History   • Marital status:      Spouse name: N/A   • Number of children: N/A   • Years of education: N/A     Occupational History   • Not on file.     Social History Main Topics   • Smoking status: Never Smoker   • Smokeless tobacco: Never Used   • Alcohol use No   • Drug use: No   • Sexual activity: No     Other Topics Concern   • Not on file     Social History Narrative   • No narrative on file         Physical Exam:  Ambulatory Vitals  /82 (BP Location: Left arm, Patient Position: Sitting, BP Cuff Size: Adult)   Pulse 78   Ht 1.651 m (5' 5\")   Wt 66.2 kg (146 lb)   SpO2 95%    Oxygen Therapy:  Pulse Oximetry: 95 %  BP Readings from Last 4 Encounters:   05/23/19 128/82   12/12/18 120/70   11/13/18 120/80   05/30/18 150/90       Weight/BMI: Body mass index is 24.3 kg/m².  Wt Readings from Last 4 Encounters:   05/23/19 66.2 kg (146 lb)   12/12/18 65 kg (143 lb 3.2 oz)   11/13/18 65.3 kg (144 lb)   05/30/18 64.4 kg (142 lb)       General: Well appearing and in no apparent distress  Eyes: nl conjunctiva  ENT: OP clear, normal external appearance of nose and ears  Neck: JVP 4-5 cm H2O, no carotid bruits  Lungs: normal respiratory effort, CTAB  Heart: RRR, 2/6 systolic murmur, no rubs or gallops, no edema bilateral lower extremities. No LV/RV heave on cardiac palpatation. 2+ bilateral radial pulses.  2+ bilateral dp pulses.   Abdomen: soft, non tender, non distended, no masses, normal bowel sounds.  No HSM.  Extremities/MSK: no clubbing, no cyanosis  Neurological: No focal sensory deficits  Psychiatric: " Appropriate affect, A/O x 3, intact judgement and insight  Skin: Warm extremities    Exam repeated in full and unchanged except for as noted above.      Lab Data Review:  Lab Results   Component Value Date/Time    CHOLSTRLTOT 188 12/05/2018 08:40 AM     (H) 12/05/2018 08:40 AM    HDL 49 12/05/2018 08:40 AM    TRIGLYCERIDE 148 12/05/2018 08:40 AM       Lab Results   Component Value Date/Time    SODIUM 141 12/05/2018 08:40 AM    POTASSIUM 4.0 12/05/2018 08:40 AM    CHLORIDE 106 12/05/2018 08:40 AM    CO2 21 12/05/2018 08:40 AM    GLUCOSE 104 (H) 12/05/2018 08:40 AM    BUN 18 12/05/2018 08:40 AM    CREATININE 0.89 12/05/2018 08:40 AM    BUNCREATRAT 20 12/05/2018 08:40 AM     Lab Results   Component Value Date/Time    ALKPHOSPHAT 67 12/05/2018 08:40 AM    ASTSGOT 19 12/05/2018 08:40 AM    ALTSGPT 17 12/05/2018 08:40 AM    TBILIRUBIN 0.4 12/05/2018 08:40 AM      Lab Results   Component Value Date/Time    WBC 4.8 03/19/2018 08:48 AM     No components found for: HBGA1C  No components found for: TROPONIN  No components found for: BNP      Cardiac Imaging and Procedures Review:    EKG dated 5/30/2018 : My personal interpretation is NSR, LAE    Echo dated 10/24/2017:   CONCLUSIONS  Prior echo done 12/06/16.Normal left ventricular chamber size.  Left ventricular ejection fraction is visually estimated to be 60%.  Aortic sclerosis without stenosis.  Moderate aortic insufficiency.  Mild mitral regurgitation.  Mild tricuspid regurgitation.  Mild to moderate pulmonic insufficiency.  No pericardial effusion seen.  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    Echo 10/12/2018:  CONCLUSIONS  Compared to the images of the prior study done - 10/24/17, no   significant changes are noted.  Normal left ventricular systolic function.  Left ventricular ejection fraction is visually estimated to be 60%.  Normal diastolic function.  Normal inferior vena cava size and inspiratory collapse.  Estimated right ventricular systolic  pressure  is 29 mmHg.  Aortic sclerosis without stenosis.    CCS 2018:  Coronary calcification:  LMA - 0.0  LCX - 0.0  LAD - 0.0  RCA - 0.0  PDA - 0.0    Calcium score:  0    Carotid US 2018:  Vascular Laboratory   CONCLUSIONS   1.  Minimal plaque in the right carotid bulb without stenosis.    2.  Normal left carotid arterial exam.    3.  Normal bilateral subclavian and vertebral arterial exam.      Medical Decision Makin. MVP (mitral valve prolapse)  Not seen on recent echos. No need for monitoring.     2. Moderate aortic regurgitation  Repeat echo in 1 year, last was stable with no LV enlargement.      3. Essential hypertension  Well controlled now off medications.  Significant symptoms even on low dose losartan.     4. Primary prevention  -continue statin if she wants, CCS 0.     5. Palpitations  2 week ziopatch vs SEEQ monitor to check for a fib if they do worsen in severity.     6. Dizziness - likely BPPV, will discuss with PCP. negative carotid US. Will also trial stopping simvastatin.       Return in about 1 year (around 2020).      Hans Higuera MD, [facility]  [facility], [address]  [address] [address]  Phone: [phone]  Fax: [phone]

## 2019-05-31 ENCOUNTER — OFFICE VISIT (OUTPATIENT)
Dept: URGENT CARE | Facility: CLINIC | Age: 64
End: 2019-05-31
Payer: OTHER GOVERNMENT

## 2019-05-31 VITALS
BODY MASS INDEX: 24.16 KG/M2 | RESPIRATION RATE: 16 BRPM | OXYGEN SATURATION: 99 % | SYSTOLIC BLOOD PRESSURE: 110 MMHG | TEMPERATURE: 97.4 F | DIASTOLIC BLOOD PRESSURE: 80 MMHG | HEIGHT: 65 IN | HEART RATE: 70 BPM | WEIGHT: 145 LBS

## 2019-05-31 DIAGNOSIS — H92.02 OTALGIA OF LEFT EAR: ICD-10-CM

## 2019-05-31 DIAGNOSIS — Z87.39 HISTORY OF TMJ SYNDROME: ICD-10-CM

## 2019-05-31 DIAGNOSIS — J30.9 ALLERGIC RHINITIS, UNSPECIFIED SEASONALITY, UNSPECIFIED TRIGGER: ICD-10-CM

## 2019-05-31 PROCEDURE — 99203 OFFICE O/P NEW LOW 30 MIN: CPT | Performed by: EMERGENCY MEDICINE

## 2019-05-31 ASSESSMENT — ENCOUNTER SYMPTOMS
VOMITING: 0
DIZZINESS: 1
FOCAL WEAKNESS: 0
VERTIGO: 1
NAUSEA: 1
SORE THROAT: 0
COUGH: 0
SINUS PAIN: 0
FEVER: 0
ABDOMINAL PAIN: 0
HEADACHES: 0
SWOLLEN GLANDS: 0
SENSORY CHANGE: 0

## 2019-05-31 NOTE — PROGRESS NOTES
Subjective:      Shahrzad Scott is a 63 y.o. female who presents with Ear Fullness (x 1 wks, bilateral earfullness and dizziness)            Ear Fullness   This is a new problem. The current episode started in the past 7 days. The problem occurs daily. The problem has been gradually worsening. Associated symptoms include congestion, nausea and vertigo. Pertinent negatives include no abdominal pain, chest pain, coughing, fever, headaches, rash, sore throat, swollen glands or vomiting.   PMH AR, TMJ    Review of Systems   Constitutional: Negative for fever.   HENT: Positive for congestion, ear pain and hearing loss. Negative for ear discharge, nosebleeds, sinus pain, sore throat and tinnitus.    Respiratory: Negative for cough.    Cardiovascular: Negative for chest pain.   Gastrointestinal: Positive for nausea. Negative for abdominal pain and vomiting.   Skin: Negative for rash.   Neurological: Positive for dizziness and vertigo. Negative for sensory change, focal weakness and headaches.     PMH:  has a past medical history of Aortic regurgitation (1996); HLD (hyperlipidemia); Mitral regurgitation; MVP (mitral valve prolapse); and Palpitations.  MEDS:   Current Outpatient Prescriptions:   •  simvastatin (ZOCOR) 10 MG Tab, TAKE 1 TABLET EVERY EVENING (Patient not taking: Reported on 5/31/2019), Disp: 90 Tab, Rfl: 3  •  TURMERIC PO, Take 1,000 mg by mouth 2 Times a Day., Disp: , Rfl:   •  Coenzyme Q10 200 MG Cap, Take  by mouth., Disp: , Rfl:   •  MINIVELLE 0.025 MG/24HR PATCH BIWEEKLY, Apply 1 Patch to skin as directed. Indications: Moderate to Severe Vasomotor Symptoms, every Wed and Friday, Disp: , Rfl:   •  Multiple Vitamins-Minerals (MULTIVITAMIN ADULTS 50+ PO), Take  by mouth., Disp: , Rfl:   •  RESVERATROL PO, Take  by mouth., Disp: , Rfl:   •  ascorbic acid (ASCORBIC ACID) 500 MG Tab, Take 500 mg by mouth every day., Disp: , Rfl:   •  Probiotic Product (PROBIOTIC DAILY PO), Take  by mouth., Disp: , Rfl:   •   "Alpha Lipoic Acid 200 MG Cap, Take  by mouth., Disp: , Rfl:   •  Ranitidine HCl (ZANTAC PO), Take 150 mg by mouth 2 Times a Day., Disp: , Rfl:   •  Calcium Carbonate-Vitamin D (CALCIUM + D) 600-200 MG-UNIT TABS, Take  by mouth every day., Disp: , Rfl:   •  docosahexanoic acid (OMEGA 3) 1000 MG CAPS, Take 1,000 mg by mouth every day., Disp: , Rfl:   •  vitamin D (CHOLECALCIFEROL) 1000 UNIT TABS, Take 2,000 Units by mouth every day. TAKE AS DIRECTED., Disp: , Rfl:   ALLERGIES:   Allergies   Allergen Reactions   • Septra [Bactrim] Hives   • Sulfa Drugs      SURGHX:   Past Surgical History:   Procedure Laterality Date   • ABDOMINAL HYSTERECTOMY TOTAL     • APPENDECTOMY     • BREAST RECONSTRUCTION Bilateral    • CATARACT EXTRACTION WITH IOL Bilateral     Dr. Wilder   • CRANIOTOMY     • EYE SURGERY     • HYSTERECTOMY, TOTAL ABDOMINAL      at age 37 for uterine fibroid, still has both ovaries   • KNEE ARTHROSCOPY, ACL RECON W/ TIBIALIS TENDON AUTOGRAFT       SOCHX:  reports that she has never smoked. She has never used smokeless tobacco. She reports that she does not drink alcohol or use drugs.  FH: family history includes Cancer in her father; Hypertension in her mother; Lung Disease in her sister; No Known Problems in her brother, maternal grandfather, and maternal grandmother; Other in her brother; Stroke in her mother.       Objective:     /80 (BP Location: Left arm, Patient Position: Sitting, BP Cuff Size: Adult)   Pulse 70   Temp 36.3 °C (97.4 °F) (Temporal)   Resp 16   Ht 1.651 m (5' 5\")   Wt 65.8 kg (145 lb)   LMP  (LMP Unknown)   SpO2 99%   BMI 24.13 kg/m²      Physical Exam   Constitutional: She is oriented to person, place, and time. She appears well-developed and well-nourished. She is cooperative. She does not appear ill. No distress.   HENT:   Head: Normocephalic.   Right Ear: Tympanic membrane and external ear normal.   Left Ear: Tympanic membrane, external ear and ear canal normal.   Nose: " Mucosal edema present. No rhinorrhea.   Mouth/Throat: Uvula is midline and oropharynx is clear and moist.   No temporomandibular joint tenderness or crepitus.  Right ear canal with cerumen debris against posterior inferior tympanic membrane.  After ear lavage, residual dead skin debris noted.   Eyes: Conjunctivae and lids are normal.   Neck: Trachea normal. Neck supple.   Cardiovascular: Normal rate, regular rhythm and normal heart sounds.    Pulmonary/Chest: Effort normal. She has no decreased breath sounds. She has no wheezes. She has no rhonchi. She has no rales.   Lymphadenopathy:     She has no cervical adenopathy.   Neurological: She is alert and oriented to person, place, and time. She displays no tremor. Coordination and gait normal.   Skin: Skin is warm and dry.   Psychiatric: She has a normal mood and affect.               Assessment/Plan:     1. Otalgia of left ear  Right ear lavaged.  OTC analgesia prn    2. Allergic rhinitis, unspecified seasonality, unspecified trigger  Recommended nasal saline irrigation daily, OTC inhaled nasal steroid daily, OTC Afrin 2-3 days    3. History of TMJ syndrome  Continue night mouth splint

## 2019-06-12 LAB
ALBUMIN SERPL-MCNC: 4.4 G/DL (ref 3.6–4.8)
ALBUMIN/GLOB SERPL: 1.9 {RATIO} (ref 1.2–2.2)
ALP SERPL-CCNC: 62 IU/L (ref 39–117)
ALT SERPL-CCNC: 20 IU/L (ref 0–32)
AST SERPL-CCNC: 19 IU/L (ref 0–40)
BASOPHILS # BLD AUTO: 0 X10E3/UL (ref 0–0.2)
BASOPHILS NFR BLD AUTO: 0 %
BILIRUB SERPL-MCNC: 0.3 MG/DL (ref 0–1.2)
BUN SERPL-MCNC: 12 MG/DL (ref 8–27)
BUN/CREAT SERPL: 13 (ref 12–28)
CALCIUM SERPL-MCNC: 9.2 MG/DL (ref 8.7–10.3)
CHLORIDE SERPL-SCNC: 105 MMOL/L (ref 96–106)
CHOLEST SERPL-MCNC: 211 MG/DL (ref 100–199)
CO2 SERPL-SCNC: 22 MMOL/L (ref 20–29)
CREAT SERPL-MCNC: 0.92 MG/DL (ref 0.57–1)
EOSINOPHIL # BLD AUTO: 0.1 X10E3/UL (ref 0–0.4)
EOSINOPHIL NFR BLD AUTO: 2 %
ERYTHROCYTE [DISTWIDTH] IN BLOOD BY AUTOMATED COUNT: 13 % (ref 12.3–15.4)
GLOBULIN SER CALC-MCNC: 2.3 G/DL (ref 1.5–4.5)
GLUCOSE SERPL-MCNC: 105 MG/DL (ref 65–99)
HCT VFR BLD AUTO: 37.8 % (ref 34–46.6)
HDLC SERPL-MCNC: 46 MG/DL
HGB BLD-MCNC: 13.2 G/DL (ref 11.1–15.9)
IMM GRANULOCYTES # BLD AUTO: 0 X10E3/UL (ref 0–0.1)
IMM GRANULOCYTES NFR BLD AUTO: 0 %
IMMATURE CELLS  115398: NORMAL
LABORATORY COMMENT REPORT: ABNORMAL
LDLC SERPL CALC-MCNC: 125 MG/DL (ref 0–99)
LYMPHOCYTES # BLD AUTO: 1.7 X10E3/UL (ref 0.7–3.1)
LYMPHOCYTES NFR BLD AUTO: 35 %
MCH RBC QN AUTO: 32.4 PG (ref 26.6–33)
MCHC RBC AUTO-ENTMCNC: 34.9 G/DL (ref 31.5–35.7)
MCV RBC AUTO: 93 FL (ref 79–97)
MONOCYTES # BLD AUTO: 0.4 X10E3/UL (ref 0.1–0.9)
MONOCYTES NFR BLD AUTO: 9 %
MORPHOLOGY BLD-IMP: NORMAL
NEUTROPHILS # BLD AUTO: 2.6 X10E3/UL (ref 1.4–7)
NEUTROPHILS NFR BLD AUTO: 54 %
NRBC BLD AUTO-RTO: NORMAL %
PLATELET # BLD AUTO: 260 X10E3/UL (ref 150–450)
POTASSIUM SERPL-SCNC: 4.4 MMOL/L (ref 3.5–5.2)
PROT SERPL-MCNC: 6.7 G/DL (ref 6–8.5)
RBC # BLD AUTO: 4.07 X10E6/UL (ref 3.77–5.28)
SODIUM SERPL-SCNC: 141 MMOL/L (ref 134–144)
TRIGL SERPL-MCNC: 201 MG/DL (ref 0–149)
VLDLC SERPL CALC-MCNC: 40 MG/DL (ref 5–40)
WBC # BLD AUTO: 4.9 X10E3/UL (ref 3.4–10.8)

## 2019-06-18 ENCOUNTER — OFFICE VISIT (OUTPATIENT)
Dept: MEDICAL GROUP | Age: 64
End: 2019-06-18
Payer: OTHER GOVERNMENT

## 2019-06-18 VITALS
OXYGEN SATURATION: 97 % | WEIGHT: 144.6 LBS | HEIGHT: 65 IN | SYSTOLIC BLOOD PRESSURE: 112 MMHG | HEART RATE: 63 BPM | BODY MASS INDEX: 24.09 KG/M2 | TEMPERATURE: 97.4 F | DIASTOLIC BLOOD PRESSURE: 66 MMHG

## 2019-06-18 DIAGNOSIS — E04.1 THYROID NODULE: ICD-10-CM

## 2019-06-18 DIAGNOSIS — R73.01 IFG (IMPAIRED FASTING GLUCOSE): ICD-10-CM

## 2019-06-18 DIAGNOSIS — Z11.59 NEED FOR HEPATITIS C SCREENING TEST: ICD-10-CM

## 2019-06-18 DIAGNOSIS — E78.00 PURE HYPERCHOLESTEROLEMIA: ICD-10-CM

## 2019-06-18 PROBLEM — I10 ESSENTIAL HYPERTENSION: Status: RESOLVED | Noted: 2017-06-22 | Resolved: 2019-06-18

## 2019-06-18 PROCEDURE — 99214 OFFICE O/P EST MOD 30 MIN: CPT | Performed by: INTERNAL MEDICINE

## 2019-06-18 ASSESSMENT — PAIN SCALES - GENERAL: PAINLEVEL: NO PAIN

## 2019-06-18 ASSESSMENT — PATIENT HEALTH QUESTIONNAIRE - PHQ9: CLINICAL INTERPRETATION OF PHQ2 SCORE: 0

## 2019-06-18 NOTE — PROGRESS NOTES
"Subjective:   Shahrzad Scott is a 63 y.o. female here today for evaluation and management of:    Pure hypercholesterolemia  Still not at goal. Patient is taking Simvastatin 10 MG every evening however stopped for 1 month-6 weeks as instructed by her cardiologist after presenting there with complaints of dizziness onset 1 year ago. She has recently restarted taking Simvastatin 10 MG 1 month ago. She is also taking Turmeric. Patient states, \"I live on sugar\" but is trying to maintain a healthy diet. She states that she ate ice cream for 3 months. I discussed the blood test with the patient in clinic today indicating that her LDL remains high. Previously LDL was 109, it is now 125, per recent labs on 6/11/19.      [id number]) as of 6/18/2019 15:51   Ref. Range 12/5/2018 08:40 6/11/2019 08:58   Cholesterol,Tot Latest Ref Range: 100 - 199 mg/dL 188 211 (H)   Triglycerides Latest Ref Range: 0 - 149 mg/dL 148 201 (H)   HDL Latest Ref Range: >39 mg/dL 49 46   LDL Latest Ref Range: 0 - 99 mg/dL 109 (H) 125 (H)   VLDL Cholesterol Calc Latest Ref Range: 5 - 40 mg/dL 30 40       Thyroid nodule  Patient had a biopsy of her thyroid many years ago that was negative. She recently had an ultrasound of her thyroid on 4/23/19. She is scheduled for a partial right thyroidectomy on July 18th with Dr. Hilliard (ENT). She is scheduled to have her right thyroid removed as it is category 10- TR5 and highly suspicious for cancer. She denies symptoms of hypo- and hyperthyroidism and is completely asymptomatic.     Ultrasound results from 4/23/19  Nodule #1  Location:  Right mid  ACR TIRADS poi=nts/category:  10 - TR5 - Highly Suspicious    Nodule #2  Location:  Left  ACR TIRADS points/category:  3 - TR3 - Mildly Suspicious    Nodule #3  Location:  Left  mid  ACR TIRADS points/category:  6 - TR4 - Moderately Suspicious    IFG (impaired fasting glucose)  Patient states that she has recently had a poor diet. She eats a lot of sugar. She will try " "follow a healthy and well balanced diet. Will check A1c in future to see if she has prediabetes.      Current medicines (including changes today)  Current Outpatient Prescriptions   Medication Sig Dispense Refill   • simvastatin (ZOCOR) 10 MG Tab TAKE 1 TABLET EVERY EVENING 90 Tab 3   • TURMERIC PO Take 1,000 mg by mouth 2 Times a Day.     • Coenzyme Q10 200 MG Cap Take  by mouth.     • MINIVELLE 0.025 MG/24HR PATCH BIWEEKLY Apply 1 Patch to skin as directed.     • Multiple Vitamins-Minerals (MULTIVITAMIN ADULTS 50+ PO) Take  by mouth.     • RESVERATROL PO Take  by mouth.     • ascorbic acid (ASCORBIC ACID) 500 MG Tab Take 500 mg by mouth every day.     • Probiotic Product (PROBIOTIC DAILY PO) Take  by mouth.     • Alpha Lipoic Acid 200 MG Cap Take  by mouth.     • Ranitidine HCl (ZANTAC PO) Take 150 mg by mouth 2 Times a Day.     • Calcium Carbonate-Vitamin D (CALCIUM + D) 600-200 MG-UNIT TABS Take  by mouth every day.     • docosahexanoic acid (OMEGA 3) 1000 MG CAPS Take 1,000 mg by mouth every day.     • vitamin D (CHOLECALCIFEROL) 1000 UNIT TABS Take 2,000 Units by mouth every day. TAKE AS DIRECTED.       No current facility-administered medications for this visit.      She  has a past medical history of Aortic regurgitation (1996); HLD (hyperlipidemia); Mitral regurgitation; MVP (mitral valve prolapse); and Palpitations.    ROS   No chest pain, no shortness of breath, no abdominal pain       Objective:     /66 (BP Location: Right arm, Patient Position: Sitting, BP Cuff Size: Adult)   Pulse 63   Temp 36.3 °C (97.4 °F) (Temporal)   Ht 1.651 m (5' 5\")   Wt 65.6 kg (144 lb 9.6 oz)   SpO2 97%  Body mass index is 24.06 kg/m².     Physical Exam:  General: Alert, oriented and no acute distress.  Eye contact is good, speech goal directed, affect calm  HEENT: conjunctiva non-injected, sclera non-icteric.  Oral mucous membranes pink and moist with no lesions.  Pinna normal.   Lungs: Normal respiratory effort, " clear to auscultation bilaterally with good excursion.  CV: regular rate and rhythm. No murmurs.  Abdomen: soft, non distended, nontender, Bowel sound normal.  Ext: no edema, color normal, vascularity normal, temperature normal    Assessment and Plan:   The following treatment plan was discussed     1. Pure hypercholesterolemia  - Not well controlled. Patient has been eating a lot of sugar. She will work on changing her diet. Will continue same dose of Simvastatin 10 MG and recheck in the future.   - Advised to eat low fat, low carbohydrate and high fiber diet as well as do cardio physical exercise regularly.   - Continue to follow with Dr. Higuera (Cardiology).  - Patient is following up on her dizziness with Dr. Hilliard (ENT) tomorrow.   - Comp Metabolic Panel; Future  - Lipid Profile; Future    2. Thyroid nodule  - Patient is scheduled for partial thyroidectomy with Dr. Hilliard. Advised to continue to monitor.  Recheck thyroid function test in 6 months.  She will have thyroid hormone test, calcium test and possible parathyroid hormone test by ENT after surgery.  - CBC WITH DIFFERENTIAL; Future  - TSH; Future  - FREE THYROXINE; Future    3. IFG (impaired fasting glucose)  - Patient will continue to manage through self-efforts by diet and exercise.  - Patient advised to avoid processed sugar. Will check A1c in the future to check for prediabetes.   - Comp Metabolic Panel; Future  - HEMOGLOBIN A1C; Future    4. Need for hepatitis C screening test  - Discussed the importance of hepatitis C screening. Patient agrees.  - HEP C VIRUS ANTIBODY; Future    5. Health Maintenance   - Patient already complete her shingles vaccine.  She received Tdap vaccine on 8/22/2015.      Followup: Return in about 6 months (around 12/18/2019), or if symptoms worsen or fail to improve, for Thyroid noted to, Hyperlipidemia, Impaired fasting glucose, Lab review.      Please note that this dictation was created using voice recognition  software. I have made every reasonable attempt to correct obvious errors, but I expect that there may have unintended errors in text, spelling, punctuation, or grammar that I did not discover.    I, Skyla Vargas (Juany), am scribing for, and in the presence of, Chloe Donnelly M.D..    Electronically signed by: Skyla Vargas (Juany), 6/18/2019    I, Chloe Donnelly M.D., personally performed the services described in this documentation, as scribed by Skyla Vargas in my presence, and it is both accurate and complete.

## 2019-06-20 DIAGNOSIS — L92.0 GRANULOMA ANNULARE: ICD-10-CM

## 2019-06-20 RX ORDER — NIACINAMIDE 500 MG
500 TABLET ORAL 2 TIMES DAILY
Qty: 60 TAB | Refills: 3 | COMMUNITY
Start: 2019-06-20

## 2019-06-21 NOTE — PROGRESS NOTES
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    The medication list was updated.    Chloe Donnelly M.D.

## 2019-07-12 DIAGNOSIS — Z01.812 PRE-OPERATIVE LABORATORY EXAMINATION: ICD-10-CM

## 2019-07-12 DIAGNOSIS — Z01.810 PRE-OPERATIVE CARDIOVASCULAR EXAMINATION: ICD-10-CM

## 2019-07-12 LAB
ANION GAP SERPL CALC-SCNC: 6 MMOL/L (ref 0–11.9)
BUN SERPL-MCNC: 13 MG/DL (ref 8–22)
CALCIUM SERPL-MCNC: 10.2 MG/DL (ref 8.5–10.5)
CHLORIDE SERPL-SCNC: 105 MMOL/L (ref 96–112)
CO2 SERPL-SCNC: 27 MMOL/L (ref 20–33)
CREAT SERPL-MCNC: 0.94 MG/DL (ref 0.5–1.4)
ERYTHROCYTE [DISTWIDTH] IN BLOOD BY AUTOMATED COUNT: 43.8 FL (ref 35.9–50)
GLUCOSE SERPL-MCNC: 91 MG/DL (ref 65–99)
HCT VFR BLD AUTO: 38.2 % (ref 37–47)
HGB BLD-MCNC: 13 G/DL (ref 12–16)
MCH RBC QN AUTO: 33.5 PG (ref 27–33)
MCHC RBC AUTO-ENTMCNC: 34 G/DL (ref 33.6–35)
MCV RBC AUTO: 98.5 FL (ref 81.4–97.8)
PLATELET # BLD AUTO: 237 K/UL (ref 164–446)
PMV BLD AUTO: 10.1 FL (ref 9–12.9)
POTASSIUM SERPL-SCNC: 4.2 MMOL/L (ref 3.6–5.5)
RBC # BLD AUTO: 3.88 M/UL (ref 4.2–5.4)
SODIUM SERPL-SCNC: 138 MMOL/L (ref 135–145)
WBC # BLD AUTO: 5.6 K/UL (ref 4.8–10.8)

## 2019-07-12 PROCEDURE — 93005 ELECTROCARDIOGRAM TRACING: CPT

## 2019-07-12 PROCEDURE — 36415 COLL VENOUS BLD VENIPUNCTURE: CPT

## 2019-07-12 PROCEDURE — 80048 BASIC METABOLIC PNL TOTAL CA: CPT

## 2019-07-12 PROCEDURE — 93010 ELECTROCARDIOGRAM REPORT: CPT | Performed by: INTERNAL MEDICINE

## 2019-07-12 PROCEDURE — 85027 COMPLETE CBC AUTOMATED: CPT

## 2019-07-13 LAB — EKG IMPRESSION: NORMAL

## 2019-07-18 ENCOUNTER — ANESTHESIA (OUTPATIENT)
Dept: SURGERY | Facility: MEDICAL CENTER | Age: 64
End: 2019-07-18
Payer: MEDICARE

## 2019-07-18 ENCOUNTER — ANESTHESIA EVENT (OUTPATIENT)
Dept: SURGERY | Facility: MEDICAL CENTER | Age: 64
End: 2019-07-18
Payer: MEDICARE

## 2019-07-18 ENCOUNTER — HOSPITAL ENCOUNTER (OUTPATIENT)
Facility: MEDICAL CENTER | Age: 64
End: 2019-07-18
Attending: SPECIALIST | Admitting: SPECIALIST
Payer: MEDICARE

## 2019-07-18 VITALS
HEIGHT: 64 IN | WEIGHT: 145.5 LBS | TEMPERATURE: 99.1 F | RESPIRATION RATE: 16 BRPM | BODY MASS INDEX: 24.84 KG/M2 | OXYGEN SATURATION: 97 % | HEART RATE: 92 BPM

## 2019-07-18 PROBLEM — H69.91 EUSTACHIAN TUBE DYSFUNCTION, RIGHT: Status: ACTIVE | Noted: 2019-07-18

## 2019-07-18 LAB — PATHOLOGY CONSULT NOTE: NORMAL

## 2019-07-18 PROCEDURE — 700105 HCHG RX REV CODE 258: Performed by: SPECIALIST

## 2019-07-18 PROCEDURE — 502594 HCHG SCISSOR HANDLE, HARMONIC ACE: Performed by: SPECIALIST

## 2019-07-18 PROCEDURE — 160009 HCHG ANES TIME/MIN: Performed by: SPECIALIST

## 2019-07-18 PROCEDURE — 160025 RECOVERY II MINUTES (STATS): Performed by: SPECIALIST

## 2019-07-18 PROCEDURE — 501838 HCHG SUTURE GENERAL: Performed by: SPECIALIST

## 2019-07-18 PROCEDURE — 502573 HCHG PACK, ENT: Performed by: SPECIALIST

## 2019-07-18 PROCEDURE — 160035 HCHG PACU - 1ST 60 MINS PHASE I: Performed by: SPECIALIST

## 2019-07-18 PROCEDURE — 160046 HCHG PACU - 1ST 60 MINS PHASE II: Performed by: SPECIALIST

## 2019-07-18 PROCEDURE — 160048 HCHG OR STATISTICAL LEVEL 1-5: Performed by: SPECIALIST

## 2019-07-18 PROCEDURE — 700111 HCHG RX REV CODE 636 W/ 250 OVERRIDE (IP): Performed by: ANESTHESIOLOGY

## 2019-07-18 PROCEDURE — 160028 HCHG SURGERY MINUTES - 1ST 30 MINS LEVEL 3: Performed by: SPECIALIST

## 2019-07-18 PROCEDURE — A9270 NON-COVERED ITEM OR SERVICE: HCPCS | Performed by: ANESTHESIOLOGY

## 2019-07-18 PROCEDURE — 160039 HCHG SURGERY MINUTES - EA ADDL 1 MIN LEVEL 3: Performed by: SPECIALIST

## 2019-07-18 PROCEDURE — 160036 HCHG PACU - EA ADDL 30 MINS PHASE I: Performed by: SPECIALIST

## 2019-07-18 PROCEDURE — 700101 HCHG RX REV CODE 250: Performed by: SPECIALIST

## 2019-07-18 PROCEDURE — 160047 HCHG PACU  - EA ADDL 30 MINS PHASE II: Performed by: SPECIALIST

## 2019-07-18 PROCEDURE — 501599 HCHG TUBE, EAR SHEEHY ACTVNT ANTIMIC.: Performed by: SPECIALIST

## 2019-07-18 PROCEDURE — 700102 HCHG RX REV CODE 250 W/ 637 OVERRIDE(OP): Performed by: ANESTHESIOLOGY

## 2019-07-18 PROCEDURE — 88307 TISSUE EXAM BY PATHOLOGIST: CPT

## 2019-07-18 PROCEDURE — 501445 HCHG STAPLER, SKIN DISP: Performed by: SPECIALIST

## 2019-07-18 PROCEDURE — 700101 HCHG RX REV CODE 250: Performed by: ANESTHESIOLOGY

## 2019-07-18 PROCEDURE — 160002 HCHG RECOVERY MINUTES (STAT): Performed by: SPECIALIST

## 2019-07-18 RX ORDER — ACETAMINOPHEN 500 MG
1000 TABLET ORAL EVERY 4 HOURS PRN
Status: DISCONTINUED | OUTPATIENT
Start: 2019-07-18 | End: 2019-07-18 | Stop reason: HOSPADM

## 2019-07-18 RX ORDER — MOXIFLOXACIN 5 MG/ML
SOLUTION/ DROPS OPHTHALMIC
Status: DISCONTINUED | OUTPATIENT
Start: 2019-07-18 | End: 2019-07-18 | Stop reason: HOSPADM

## 2019-07-18 RX ORDER — LIDOCAINE HYDROCHLORIDE AND EPINEPHRINE 10; 10 MG/ML; UG/ML
INJECTION, SOLUTION INFILTRATION; PERINEURAL
Status: DISCONTINUED
Start: 2019-07-18 | End: 2019-07-18 | Stop reason: HOSPADM

## 2019-07-18 RX ORDER — SODIUM CHLORIDE, SODIUM LACTATE, POTASSIUM CHLORIDE, CALCIUM CHLORIDE 600; 310; 30; 20 MG/100ML; MG/100ML; MG/100ML; MG/100ML
INJECTION, SOLUTION INTRAVENOUS CONTINUOUS
Status: DISCONTINUED | OUTPATIENT
Start: 2019-07-18 | End: 2019-07-18 | Stop reason: HOSPADM

## 2019-07-18 RX ORDER — DEXAMETHASONE SODIUM PHOSPHATE 4 MG/ML
INJECTION, SOLUTION INTRA-ARTICULAR; INTRALESIONAL; INTRAMUSCULAR; INTRAVENOUS; SOFT TISSUE PRN
Status: DISCONTINUED | OUTPATIENT
Start: 2019-07-18 | End: 2019-07-18 | Stop reason: SURG

## 2019-07-18 RX ORDER — DEXMEDETOMIDINE HYDROCHLORIDE 100 UG/ML
INJECTION, SOLUTION INTRAVENOUS PRN
Status: DISCONTINUED | OUTPATIENT
Start: 2019-07-18 | End: 2019-07-18 | Stop reason: SURG

## 2019-07-18 RX ORDER — LIDOCAINE HYDROCHLORIDE AND EPINEPHRINE 10; 10 MG/ML; UG/ML
INJECTION, SOLUTION INFILTRATION; PERINEURAL
Status: DISCONTINUED | OUTPATIENT
Start: 2019-07-18 | End: 2019-07-18 | Stop reason: HOSPADM

## 2019-07-18 RX ORDER — ONDANSETRON 2 MG/ML
INJECTION INTRAMUSCULAR; INTRAVENOUS PRN
Status: DISCONTINUED | OUTPATIENT
Start: 2019-07-18 | End: 2019-07-18 | Stop reason: SURG

## 2019-07-18 RX ORDER — HALOPERIDOL 5 MG/ML
1 INJECTION INTRAMUSCULAR
Status: DISCONTINUED | OUTPATIENT
Start: 2019-07-18 | End: 2019-07-18 | Stop reason: HOSPADM

## 2019-07-18 RX ORDER — LIDOCAINE HYDROCHLORIDE 40 MG/ML
SOLUTION TOPICAL PRN
Status: DISCONTINUED | OUTPATIENT
Start: 2019-07-18 | End: 2019-07-18 | Stop reason: SURG

## 2019-07-18 RX ORDER — OXYCODONE HCL 5 MG/5 ML
5 SOLUTION, ORAL ORAL
Status: DISCONTINUED | OUTPATIENT
Start: 2019-07-18 | End: 2019-07-18 | Stop reason: HOSPADM

## 2019-07-18 RX ORDER — ONDANSETRON 2 MG/ML
4 INJECTION INTRAMUSCULAR; INTRAVENOUS
Status: DISCONTINUED | OUTPATIENT
Start: 2019-07-18 | End: 2019-07-18 | Stop reason: HOSPADM

## 2019-07-18 RX ORDER — DIPHENHYDRAMINE HYDROCHLORIDE 50 MG/ML
12.5 INJECTION INTRAMUSCULAR; INTRAVENOUS
Status: DISCONTINUED | OUTPATIENT
Start: 2019-07-18 | End: 2019-07-18 | Stop reason: HOSPADM

## 2019-07-18 RX ORDER — OXYCODONE HCL 5 MG/5 ML
10 SOLUTION, ORAL ORAL
Status: DISCONTINUED | OUTPATIENT
Start: 2019-07-18 | End: 2019-07-18 | Stop reason: HOSPADM

## 2019-07-18 RX ORDER — CIPROFLOXACIN AND DEXAMETHASONE 3; 1 MG/ML; MG/ML
SUSPENSION/ DROPS AURICULAR (OTIC)
Status: DISCONTINUED
Start: 2019-07-18 | End: 2019-07-18 | Stop reason: HOSPADM

## 2019-07-18 RX ADMIN — MIDAZOLAM HYDROCHLORIDE 1 MG: 1 INJECTION, SOLUTION INTRAMUSCULAR; INTRAVENOUS at 10:20

## 2019-07-18 RX ADMIN — LIDOCAINE HYDROCHLORIDE 4 ML: 40 SOLUTION TOPICAL at 10:06

## 2019-07-18 RX ADMIN — ROCURONIUM BROMIDE 10 MG: 10 INJECTION, SOLUTION INTRAVENOUS at 10:02

## 2019-07-18 RX ADMIN — SODIUM CHLORIDE, POTASSIUM CHLORIDE, SODIUM LACTATE AND CALCIUM CHLORIDE: 600; 310; 30; 20 INJECTION, SOLUTION INTRAVENOUS at 11:10

## 2019-07-18 RX ADMIN — ONDANSETRON 4 MG: 2 INJECTION INTRAMUSCULAR; INTRAVENOUS at 11:45

## 2019-07-18 RX ADMIN — FENTANYL CITRATE 50 MCG: 50 INJECTION, SOLUTION INTRAMUSCULAR; INTRAVENOUS at 10:02

## 2019-07-18 RX ADMIN — DEXAMETHASONE SODIUM PHOSPHATE 8 MG: 4 INJECTION, SOLUTION INTRA-ARTICULAR; INTRALESIONAL; INTRAMUSCULAR; INTRAVENOUS; SOFT TISSUE at 10:10

## 2019-07-18 RX ADMIN — MIDAZOLAM HYDROCHLORIDE 1 MG: 1 INJECTION, SOLUTION INTRAMUSCULAR; INTRAVENOUS at 10:02

## 2019-07-18 RX ADMIN — DEXMEDETOMIDINE HYDROCHLORIDE 20 MCG: 100 INJECTION, SOLUTION INTRAVENOUS at 10:15

## 2019-07-18 RX ADMIN — SODIUM CHLORIDE, POTASSIUM CHLORIDE, SODIUM LACTATE AND CALCIUM CHLORIDE: 600; 310; 30; 20 INJECTION, SOLUTION INTRAVENOUS at 09:59

## 2019-07-18 RX ADMIN — DEXMEDETOMIDINE HYDROCHLORIDE 20 MCG: 100 INJECTION, SOLUTION INTRAVENOUS at 10:02

## 2019-07-18 RX ADMIN — SODIUM CHLORIDE, POTASSIUM CHLORIDE, SODIUM LACTATE AND CALCIUM CHLORIDE: 600; 310; 30; 20 INJECTION, SOLUTION INTRAVENOUS at 09:00

## 2019-07-18 RX ADMIN — ACETAMINOPHEN 1000 MG: 500 TABLET ORAL at 13:24

## 2019-07-18 RX ADMIN — FENTANYL CITRATE 50 MCG: 50 INJECTION, SOLUTION INTRAMUSCULAR; INTRAVENOUS at 10:20

## 2019-07-18 RX ADMIN — SUCCINYLCHOLINE CHLORIDE 120 MG: 20 INJECTION, SOLUTION INTRAMUSCULAR; INTRAVENOUS at 10:02

## 2019-07-18 RX ADMIN — PROPOFOL 100 MCG/KG/MIN: 10 INJECTION, EMULSION INTRAVENOUS at 10:10

## 2019-07-18 RX ADMIN — PROPOFOL 150 MG: 10 INJECTION, EMULSION INTRAVENOUS at 10:02

## 2019-07-18 ASSESSMENT — PAIN SCALES - GENERAL: PAIN_LEVEL: 2

## 2019-07-18 NOTE — ANESTHESIA PROCEDURE NOTES
Airway  Date/Time: 7/18/2019 10:06 AM  Performed by: SHAMIR ANTUNEZ  Authorized by: SHAMIR ANTUNEZ     Location:  OR  Urgency:  Elective  Indications for Airway Management:  Anesthesia  Spontaneous Ventilation: absent    Sedation Level:  Deep  Preoxygenated: Yes    Patient Position:  Sniffing  Final Airway Type:  Endotracheal airway  Final Endotracheal Airway:  ETT and NIM tube  Cuffed: Yes    Technique Used for Successful ETT Placement:  Direct laryngoscopy  Insertion Site:  Oral  Blade Type:  Yin  Laryngoscope Blade/Videolaryngoscope Blade Size:  3  ETT Size (mm):  7.0  Measured from:  Teeth  ETT to Teeth (cm):  23  Placement Verified by: auscultation and capnometry    Cormack-Lehane Classification:  Grade I - full view of glottis  Number of Attempts at Approach:  1

## 2019-07-18 NOTE — OR NURSING
1206 Pt transferred to PACU. Report received from OR RN and anesthesia. Oral airway in place. Appears to have no distress at this time. VS stable, respirations even and unlabored. Midline neck incision with steri-strips, CDI.     1210 Oral airway dc/d by Dr. Cheney.     1220 Handoff to GAMALIEL Argueta for break coverage.     1235 Report back from GAMALIEL Argueta. Pt continues to rest. VSS.     1310 Pt more awake. Pt states she needs to void immediately - placed on bed pan.    1324 Pt tolerating sips of water. Medicated per MAR.    1400 Pt sitting more upright in gurney eating crackers and drinking coffee. No needs at this time.      1430 Pt disconnected from monitor. Up to bathroom. Back to recliner.      1507 Pt educated on discharge instructions. Verbalized understanding. All questions answered. All belongings are with patient. Pt discharged home.

## 2019-07-18 NOTE — ANESTHESIA POSTPROCEDURE EVALUATION
Patient: Shahrzad Scott    Procedure Summary     Date:  07/18/19 Room / Location:  [facility] ROOM 21 / SURGERY SAME DAY [facility]    Anesthesia Start:  0959 Anesthesia Stop:  1208    Procedures:       LOBECTOMY, THYROID WITH FROZEN SECTION (Right Neck)      MYRINGOTOMY WITH TUBES (Bilateral Ear) Diagnosis:  (NONTOXIC MULTINODULAR GOITER )    Surgeon:  Dirk Hilliard M.D. Responsible Provider:  Sharath Cheney M.D.    Anesthesia Type:  general ASA Status:  1          Final Anesthesia Type: general  Last vitals  BP   NIBP: 148/86    Temp   36.6 °C (97.8 °F)    Pulse   Pulse: 85   Resp   16    SpO2   97 %      Anesthesia Post Evaluation    Patient location during evaluation: PACU  Patient participation: complete - patient participated  Level of consciousness: awake and alert  Pain score: 2    Airway patency: patent  Anesthetic complications: no  Cardiovascular status: hemodynamically stable  Respiratory status: acceptable  Hydration status: euvolemic    PONV: none

## 2019-07-18 NOTE — DISCHARGE INSTRUCTIONS
ACTIVITY: Rest and take it easy for the first 24 hours.  A responsible adult is recommended to remain with you during that time.  It is normal to feel sleepy.  We encourage you to not do anything that requires balance, judgment or coordination.    MILD FLU-LIKE SYMPTOMS ARE NORMAL. YOU MAY EXPERIENCE GENERALIZED MUSCLE ACHES, THROAT IRRITATION, HEADACHE AND/OR SOME NAUSEA.    FOR 24 HOURS DO NOT:  Drive, operate machinery or run household appliances.  Drink beer or alcoholic beverages.   Make important decisions or sign legal documents.    SPECIAL INSTRUCTIONS: *Ear drops per MD's instructions**    DIET: To avoid nausea, slowly advance diet as tolerated, avoiding spicy or greasy foods for the first day.  Add more substantial food to your diet according to your physician's instructions.  Babies can be fed formula or breast milk as soon as they are hungry.  INCREASE FLUIDS AND FIBER TO AVOID CONSTIPATION.    SURGICAL DRESSING/BATHING: *Keep clean and dry. Do not submerge in water**    FOLLOW-UP APPOINTMENT:  A follow-up appointment should be arranged with your doctor; call to schedule.    You should CALL YOUR PHYSICIAN if you develop:  Fever greater than 101 degrees F.  Pain not relieved by medication, or persistent nausea or vomiting.  Excessive bleeding (blood soaking through dressing) or unexpected drainage from the wound.  Extreme redness or swelling around the incision site, drainage of pus or foul smelling drainage.  Inability to urinate or empty your bladder within 8 hours.  Problems with breathing or chest pain.    You should call 911 if you develop problems with breathing or chest pain.  If you are unable to contact your doctor or surgical center, you should go to the nearest emergency room or urgent care center.    Physician's telephone #: *Dr. Hilliard [phone]**    If any questions arise, call your doctor.  If your doctor is not available, please feel free to call the Surgical Center at [phone].   The Center is open Monday through Friday from 7AM to 7PM.  You can also call the HEALTH HOTLINE open 24 hours/day, 7 days/week and speak to a nurse at [phone], or toll free at [phone].    A registered nurse may call you a few days after your surgery to see how you are doing after your procedure.    MEDICATIONS: Resume taking daily medication.  Take prescribed pain medication with food.  If no medication is prescribed, you may take non-aspirin pain medication if needed.  PAIN MEDICATION CAN BE VERY CONSTIPATING.  Take a stool softener or laxative such as senokot, pericolace, or milk of magnesia if needed.    Prescription given for *none**.  Last pain medication given at *Tylenol at 1:25pm**.    If your physician has prescribed pain medication that includes Acetaminophen (Tylenol), do not take additional Acetaminophen (Tylenol) while taking the prescribed medication.    Depression / Suicide Risk    As you are discharged from this [facility] facility, it is important to learn how to keep safe from harming yourself.    Recognize the warning signs:  · Abrupt changes in personality, positive or negative- including increase in energy   · Giving away possessions  · Change in eating patterns- significant weight changes-  positive or negative  · Change in sleeping patterns- unable to sleep or sleeping all the time   · Unwillingness or inability to communicate  · Depression  · Unusual sadness, discouragement and loneliness  · Talk of wanting to die  · Neglect of personal appearance   · Rebelliousness- reckless behavior  · Withdrawal from people/activities they love  · Confusion- inability to concentrate     If you or a loved one observes any of these behaviors or has concerns about self-harm, here's what you can do:  · Talk about it- your feelings and reasons for harming yourself  · Remove any means that you might use to hurt yourself (examples: pills, rope, extension cords, firearm)  · Get professional help  from the community (Mental Health, Substance Abuse, psychological counseling)  · Do not be alone:Call your Safe Contact- someone whom you trust who will be there for you.  · Call your local CRISIS HOTLINE 681-5321 or 016-775-7677  · Call your local Children's Mobile Crisis Response Team [place] (311) 419-0409 or www.250ok  · Call the toll free National Suicide Prevention Hotlines   · National Suicide Prevention Lifeline 210-962-ZISN (1627)  · National Hope Line Network 800-SUICIDE (364-4441)

## 2019-07-18 NOTE — ANESTHESIA TIME REPORT
Anesthesia Start and Stop Event Times     Date Time Event    7/18/2019 0959 Anesthesia Start     1208 Anesthesia Stop        Responsible Staff  07/18/19    Name Role Begin End    Sharath Cheney M.D. Anesth 0959 1208        Preop Diagnosis (Free Text):  Pre-op Diagnosis     NONTOXIC MULTINODULAR GOITER         Preop Diagnosis (Codes):  Diagnosis Information     Diagnosis Code(s):         Post op Diagnosis  Thyroid nodule      Premium Reason  Non-Premium    Comments:

## 2019-07-18 NOTE — OP REPORT
DATE OF SERVICE:  07/18/2019    SURGEON:  Dirk Hilliard MD    ASSISTANT:  Memo Griffiths MD    ANESTHESIA:  General given via endotracheal tube by Dr. Cheney.    PREOPERATIVE DIAGNOSES:  Multinodular goiter, and eustachian tube dysfunction,   right ear.    POSTOPERATIVE DIAGNOSES:  Multinodular goiter, and eustachian tube   dysfunction, right ear.    NAME OF THE PROCEDURE:  Right total thyroid lobectomy with frozen section;   right tympanostomy under general anesthesia; EMG of 10th cranial nerve with   intraoperative neurophysiology monitoring and testing.    INDICATIONS:  Patient is a 64-year-old woman who has had multiple thyroid   nodules noted by ultrasonography.  A previous needle aspiration biopsy has   been benign, but TI-RADS scoring of the thyroid nodules on ultrasonography   yielded a highly suspicious nodule on the right side.  The patient does not   want to take an observation and a repeat biopsy approach and wants to proceed   with excisional biopsy with thyroid lobectomy.  Total thyroidectomy is planned   if the frozen section shows evidence of carcinoma.  She understands that   completion thyroidectomy may be necessary in the future if carcinoma is   eventually determined.  Additionally, the patient has symptoms of eustachian   tube dysfunction involving the right ear.  This causes dizziness with altitude   change on a repetitive basis.    DESCRIPTION OF PROCEDURE:  Patient was brought in the operating room and   placed in supine position on the operating room table.  General anesthesia was   induced and the patient was endotracheally intubated using a nerve integrity   monitor endotracheal tube.  Following satisfactory intubation, eyes were   protected with taping.  Appropriate grounding electrodes were placed for the   nerve integrity monitor and interelectrode impedance was noted to be 0.2 kilo   ohms or less.  Appropriate EMG responses were noted with external stimulation.    Continuous  monitoring is then undertaken throughout the procedure along with   intermittent stimulation.    At this point, attention was directed to the right ear.  The operating   microscope was used to visualize the ear and cerumen was cleared from the   canal.  Anterior inferior quadrant myringotomy incision was made followed by   aspiration of the middle ear, but no fluid is noted.  A Susannah-type collar   button ventilation tube was then inserted through the myringotomy incision   without difficulty.  Ciprodex drops were applied to the external canal   followed by placement of a cotton ball.    At this point, a shoulder roll was placed beneath the shoulders to extend the   neck.  Approximately 7 mL of 1% Xylocaine with 1:100,000 units of epinephrine   solution were used to infiltrate along the skin and subcutaneous tissue for   the low anterior neck.  The patient's neck and upper chest were then prepped   and the patient is draped to expose the neck for thyroid surgery.    An approximate 3.5 cm incision is outlined within the natural skin crease   approximately fingerbreadth above the sternal notch in a slightly curved   fashion to follow the natural skin creases.  The skin was incised and the   incision was carried down through the skin and subcutaneous tissue as well as   the platysmal layer.  Bleeding was controlled with electrocautery as   necessary.  Subplatysmal flap was then elevated superiorly up to the level of   the cricothyroid membrane.  Bleeding was controlled with electrocautery as   necessary.  The midline raphae between the strap muscles were then divided and   the thyroid isthmus was identified over the trachea.  Again, bleeding was   controlled with bipolar cautery or Harmonic focus division of tissue.  Once   the thyroid isthmus was identified, the strap muscles were elevated off of the   right lobe of the thyroid.  Dissection was undertaken laterally.  The middle   thyroid vein was identified and  divided using the Harmonic focus device.    Dissection was then undertaken superiorly where the superior thyroid vessels   were identified and divided between clamps with silk tie ligature being used   to control bleeding.  The superior lobe of the thyroid was then dissected away   from the cricothyroid area down towards the area of the cricoid.    At this point, the right lobe of the thyroid was rotated medially and   attention was directed to the inferior pole where careful dissection allowed   for division of the inferior pole vessels along the capsule with bipolar   cautery or Harmonic focus device use.  Once the inferior pole vessels have   been freed out and divided, the inferior pole of the right lobe was then   carefully elevated off of the trachea medially.  Dissection was undertaken   along the capsule along its undersurface on the lateral aspect from an   inferior to superior direction with careful division of the fascial tissue   along the capsule.  Superior pole parathyroid was eventually identified and   preserved with dissection away from the capsule of the thyroid.  Once the   parathyroid has been dissected away from the capsule, gentle dissection along   the medial aspect of the parathyroid allowed for identification of the   recurrent laryngeal nerve.  Once identified, the identity is confirmed using a   nerve stimulus probe for the nerve integrity monitor to stimulus intensity of   0.5 milliamps.  Appropriate EMG responses noted.    Further soft tissue was then dissected away from the upper aspect of the   undersurface of the right lobe thyroid up to the area of the superior pole.    Additional residual fascial attachments along the cricothyroid are carefully   dissected to further free up the superior pole area.  The isthmus was then   identified over the trachea and dissection along the superior and inferior   aspects allows for division of the isthmus between clamps with a 2-0 silk   suture  ligature being used to tie off the left lobe isthmus portion.  Further   dissection of the right lobe thyroid off of the trachea superiorly eventually   allows for removal of the thyroid with preservation of the recurrent laryngeal   nerve as well as what appears to be at least a superior parathyroid and   suspected inferior parathyroid.  Bleeding was controlled with bipolar cautery,   taking care to avoid injury to the recurrent laryngeal nerve.  Once bleeding   was controlled, the specimen was passed off for frozen section evaluation.    Frozen section returned as no obvious carcinoma noted.    At this point, Fibrillar Surgicel was placed along the tracheoesophageal   gutter.  Significant bleeding is not apparent and no drain was felt to be   necessary at this time.  The strap muscles were left open and the platysmal   layer was closed using interrupted buried stitches of 3-0 Vicryl.  A 4-0   Vicryl was then used in interrupted buried subcutaneous fashion to approximate   the skin margins.  A 5-0 Monocryl was then utilized in a running   intradermal/subcuticular fashion to approximate the skin margins.    Steri-Strips were then applied to bolster the skin closure.    The procedure was then terminated.  The patient was subsequently awakened from   anesthesia and extubated without difficulty.  She was taken from the   operating room to the recovery area, awake, breathing on her own in stable   condition.  There were no apparent complications.  Blood loss was felt to be   less than 20 mL.  No drains were utilized.  Sponge and needle counts were   correct.       ____________________________________     MD INDIRA DUMONT / PIETER    DD:  07/18/2019 12:15:27  DT:  07/18/2019 14:11:46    D#:  [id number]  Job#:  [id number]

## 2019-07-18 NOTE — OR SURGEON
Immediate Post OP Note    PreOp Diagnosis: multinodular goiter, ETD right ear  PostOp Diagnosis: same    Procedure(s):  TOTAL RIGHT LOBECTOMY, THYROID WITH FROZEN SECTION - Wound Class: Clean  RIGHT MYRINGOTOMY WITH TUBE - Wound Class: Clean Contaminated  EMG X cranial nerve with intraoperative monitoring    Surgeon(s):  LICHA Urbina M.D.    Anesthesiologist/Type of Anesthesia:  Anesthesiologist: Sharath Cheney M.D./General    Surgical Staff:  Circulator: Aylin Kumar R.N.; Lukas D. Gansert, R.N.  Relief Circulator: Sheyla Raines R.N.  Scrub Person: Linda Fuentes    Specimens removed if any:  ID Type Source Tests Collected by Time Destination   A : right thyroid lobectomy- suture marks isthmus Tissue Thyroid PATHOLOGY SPECIMEN Dirk Hilliard M.D. 7/18/2019 11:30 AM        Estimated Blood Loss: <20ML    Findings: CBT right ear, FS no obvious carcinoa    Complications: none        7/18/2019 12:00 PM Dirk Hilliard M.D.

## 2019-07-18 NOTE — ANESTHESIA QCDR
2019 [facility] Clinical Data Registry (for Quality Improvement)     Postoperative nausea/vomiting risk protocol (Adult = 18 yrs and Pediatric 3-17 yrs)- (430 and 463)  General inhalation anesthetic (NOT TIVA) with PONV risk factors: Yes  Provision of anti-emetic therapy with at least 2 different classes of agents: Yes   Patient DID NOT receive anti-emetic therapy and reason is documented in Medical Record:  N/A    Multimodal Pain Management- (AQI59)  Patient undergoing Elective Surgery (i.e. Outpatient, or ASC, or Prescheduled Surgery prior to Hospital Admission): Yes  Use of Multimodal Pain Management, two or more drugs and/or interventions, NOT including systemic opioids: Yes   Exception: Documented allergy to multiple classes of analgesics:  N/A    PACU assessment of acute postoperative pain prior to Anesthesia Care End- Applies to Patients Age = 18- (ABG7)  Initial PACU pain score is which of the following: < 7/10  Patient unable to report pain score: N/A    Post-anesthetic transfer of care checklist/protocol to PACU/ICU- (426 and 427)  Upon conclusion of case, patient transferred to which of the following locations: PACU/Non-ICU  Use of transfer checklist/protocol: Yes  Exclusion: Service Performed in Patient Hospital Room (and thus did not require transfer): N/A    PACU Reintubation- (AQI31)  General anesthesia requiring endotracheal intubation (ETT) along with subsequent extubation in OR or PACU: No  Required reintubation in the PACU: N/A  Extubation was a planned trial documented in the medical record prior to removal of the original airway device: N/A    Unplanned admission to ICU related to anesthesia service up through end of PACU care- (MD51)  Unplanned admission to ICU (not initially anticipated at anesthesia start time): No

## 2019-07-18 NOTE — ANESTHESIA PREPROCEDURE EVALUATION
Relevant Problems   (+) Gastroesophageal reflux disease without esophagitis   (+) Moderate aortic regurgitation       Physical Exam    Airway   Mallampati: II  TM distance: >3 FB  Neck ROM: full       Cardiovascular - normal exam  Rhythm: regular  Rate: normal  (-) murmur     Dental - normal exam         Pulmonary - normal exam  Breath sounds clear to auscultation     Abdominal    Neurological - normal exam                 Anesthesia Plan    ASA 1       Plan - general       Airway plan will be ETT      Plan Factors:   Patient did not smoke on day of procedure.    Induction: intravenous      Pertinent diagnostic labs and testing reviewed    Informed Consent:    Anesthetic plan and risks discussed with patient.    Use of blood products discussed with: patient whom consented to blood products.

## 2019-09-29 ENCOUNTER — PATIENT MESSAGE (OUTPATIENT)
Dept: MEDICAL GROUP | Age: 64
End: 2019-09-29

## 2019-10-14 ENCOUNTER — APPOINTMENT (RX ONLY)
Dept: URBAN - METROPOLITAN AREA CLINIC 4 | Facility: CLINIC | Age: 64
Setting detail: DERMATOLOGY
End: 2019-10-14

## 2019-10-14 DIAGNOSIS — L81.4 OTHER MELANIN HYPERPIGMENTATION: ICD-10-CM

## 2019-10-14 DIAGNOSIS — L92.0 GRANULOMA ANNULARE: ICD-10-CM | Status: IMPROVED

## 2019-10-14 DIAGNOSIS — Z87.2 PERSONAL HISTORY OF DISEASES OF THE SKIN AND SUBCUTANEOUS TISSUE: ICD-10-CM

## 2019-10-14 DIAGNOSIS — L81.4 OTHER MELANIN HYPERPIGMENTATION: ICD-10-CM | Status: IMPROVED

## 2019-10-14 DIAGNOSIS — D22 MELANOCYTIC NEVI: ICD-10-CM | Status: STABLE

## 2019-10-14 DIAGNOSIS — L82.1 OTHER SEBORRHEIC KERATOSIS: ICD-10-CM

## 2019-10-14 PROBLEM — D22.5 MELANOCYTIC NEVI OF TRUNK: Status: ACTIVE | Noted: 2019-10-14

## 2019-10-14 PROBLEM — D22.62 MELANOCYTIC NEVI OF LEFT UPPER LIMB, INCLUDING SHOULDER: Status: ACTIVE | Noted: 2019-10-14

## 2019-10-14 PROCEDURE — ? DIAGNOSIS COMMENT

## 2019-10-14 PROCEDURE — ? COUNSELING

## 2019-10-14 PROCEDURE — ? ADDITIONAL NOTES

## 2019-10-14 PROCEDURE — 99214 OFFICE O/P EST MOD 30 MIN: CPT

## 2019-10-14 ASSESSMENT — LOCATION DETAILED DESCRIPTION DERM
LOCATION DETAILED: LEFT ACHILLES SKIN
LOCATION DETAILED: RIGHT SUPERIOR UPPER BACK
LOCATION DETAILED: LEFT ANTERIOR PROXIMAL THIGH
LOCATION DETAILED: RIGHT MEDIAL UPPER BACK
LOCATION DETAILED: RIGHT RADIAL DORSAL HAND
LOCATION DETAILED: SUPERIOR LUMBAR SPINE
LOCATION DETAILED: LEFT SUPERIOR UPPER BACK
LOCATION DETAILED: RIGHT ACHILLES SKIN
LOCATION DETAILED: LEFT ANTERIOR PROXIMAL UPPER ARM
LOCATION DETAILED: RIGHT RIB CAGE
LOCATION DETAILED: LEFT BUTTOCK

## 2019-10-14 ASSESSMENT — LOCATION SIMPLE DESCRIPTION DERM
LOCATION SIMPLE: ABDOMEN
LOCATION SIMPLE: LOWER BACK
LOCATION SIMPLE: RIGHT ACHILLES SKIN
LOCATION SIMPLE: LEFT BUTTOCK
LOCATION SIMPLE: LEFT UPPER BACK
LOCATION SIMPLE: LEFT ACHILLES SKIN
LOCATION SIMPLE: RIGHT HAND
LOCATION SIMPLE: LEFT UPPER ARM
LOCATION SIMPLE: RIGHT UPPER BACK
LOCATION SIMPLE: LEFT THIGH

## 2019-10-14 ASSESSMENT — LOCATION ZONE DERM
LOCATION ZONE: LEG
LOCATION ZONE: TRUNK
LOCATION ZONE: ARM
LOCATION ZONE: HAND

## 2019-10-14 NOTE — PROCEDURE: ADDITIONAL NOTES
Additional Notes: The patient is doing much better now that she D/C the [facility] and has had minimal break outs
Detail Level: Simple

## 2019-10-18 ENCOUNTER — HOSPITAL ENCOUNTER (OUTPATIENT)
Dept: CARDIOLOGY | Facility: MEDICAL CENTER | Age: 64
End: 2019-10-18
Attending: INTERNAL MEDICINE
Payer: MEDICARE

## 2019-10-18 DIAGNOSIS — I35.1 MODERATE AORTIC REGURGITATION: ICD-10-CM

## 2019-10-18 PROCEDURE — 93306 TTE W/DOPPLER COMPLETE: CPT | Mod: 26 | Performed by: INTERNAL MEDICINE

## 2019-10-18 PROCEDURE — 93306 TTE W/DOPPLER COMPLETE: CPT

## 2019-10-19 LAB
LV EJECT FRACT  99904: 70
LV EJECT FRACT MOD 2C 99903: 73.82
LV EJECT FRACT MOD 4C 99902: 66.21
LV EJECT FRACT MOD BP 99901: 70.12

## 2019-11-16 ENCOUNTER — HOSPITAL ENCOUNTER (OUTPATIENT)
Dept: RADIOLOGY | Facility: MEDICAL CENTER | Age: 64
End: 2019-11-16
Attending: PSYCHIATRY & NEUROLOGY
Payer: MEDICARE

## 2019-11-16 DIAGNOSIS — R42 DIZZINESS AND GIDDINESS: ICD-10-CM

## 2019-11-16 DIAGNOSIS — H81.10 BENIGN PAROXYSMAL POSITIONAL VERTIGO, UNSPECIFIED LATERALITY: ICD-10-CM

## 2019-11-16 PROCEDURE — 70551 MRI BRAIN STEM W/O DYE: CPT

## 2019-11-20 ENCOUNTER — OFFICE VISIT (OUTPATIENT)
Dept: MEDICAL GROUP | Age: 64
End: 2019-11-20
Payer: MEDICARE

## 2019-11-20 VITALS
SYSTOLIC BLOOD PRESSURE: 132 MMHG | BODY MASS INDEX: 25.06 KG/M2 | HEART RATE: 72 BPM | HEIGHT: 64 IN | OXYGEN SATURATION: 96 % | WEIGHT: 146.8 LBS | TEMPERATURE: 98.4 F | DIASTOLIC BLOOD PRESSURE: 70 MMHG

## 2019-11-20 DIAGNOSIS — R73.01 IFG (IMPAIRED FASTING GLUCOSE): ICD-10-CM

## 2019-11-20 DIAGNOSIS — E78.00 PURE HYPERCHOLESTEROLEMIA: ICD-10-CM

## 2019-11-20 DIAGNOSIS — E55.9 VITAMIN D DEFICIENCY: ICD-10-CM

## 2019-11-20 DIAGNOSIS — H81.13 BENIGN PAROXYSMAL POSITIONAL VERTIGO DUE TO BILATERAL VESTIBULAR DISORDER: ICD-10-CM

## 2019-11-20 DIAGNOSIS — M85.80 OSTEOPENIA, UNSPECIFIED LOCATION: ICD-10-CM

## 2019-11-20 DIAGNOSIS — Z00.00 ENCOUNTER FOR MEDICAL EXAMINATION TO ESTABLISH CARE: ICD-10-CM

## 2019-11-20 DIAGNOSIS — I35.1 MODERATE AORTIC REGURGITATION: ICD-10-CM

## 2019-11-20 PROCEDURE — 99204 OFFICE O/P NEW MOD 45 MIN: CPT | Performed by: FAMILY MEDICINE

## 2019-11-20 RX ORDER — SIMVASTATIN 10 MG
TABLET ORAL
Qty: 90 TAB | Refills: 2 | Status: SHIPPED | OUTPATIENT
Start: 2019-11-20 | End: 2020-08-19

## 2019-11-20 NOTE — PROGRESS NOTES
This medical record contains text that has been entered with the assistance of computer voice recognition and dictation software.  Therefore, it may contain unintended errors in text, spelling, punctuation, or grammar    Chief Complaint   Patient presents with   • Establish Kika Donnelly PT   • Lab Results         Joon Garay is a 64 y.o. female here to establish care. This is a former patient of Dr. Donnelly.  Patient was establish care      HPI:     1. Encounter for medical examination to establish care  New patient to me    Patient is a 64-year-old female who presents to clinic to establish care.  She has significant past medical history of chronic dizziness, hyperlipidemia, prediabetes, GERD, moderate aortic regurgitation.   Today the patient denies any chest pain, no back pain, no numbness or tingling, no change in vision no syncopal episodes, no nausea or vomiting, no new rashes, no joint pain.    Past medical history - None    Family History of Cancer---Dad had prostate cancer.    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     Colonoscopy---Up to date      2. Pure hypercholesterolemia    She has a history of hyperlipidemia. The patient has been on a statin for years and tolerating this fine. The patient denies any muscle aches, no abdominal pain and no history of elevated liver enzymes.    Results for JOON GARAY (MRN [id number]) as of 11/20/2019 14:17   Ref. Range 6/11/2019 08:58   Cholesterol,Tot Latest Ref Range: 100 - 199 mg/dL 211 (H)   Triglycerides Latest Ref Range: 0 - 149 mg/dL 201 (H)   HDL Latest Ref Range: >39 mg/dL 46   LDL Latest Ref Range: 0 - 99 mg/dL 125 (H)   VLDL Cholesterol Calc Latest Ref Range: 5 - 40 mg/dL 40       3. IFG (impaired fasting glucose)    Patient has a history of high blood glucose before labs.  Chronic and stable in history. She is currently managing this through her own efforts.     Results for JOON GARAY (MRN [id number]) as of 11/20/2019 14:17   Ref. Range 7/12/2019 16:17   Glucose Latest Ref Range: 65 - 99 mg/dL 91       4. Moderate/mild aortic regurgitation    The patient has a history of aortic regurgitation. This has improved since onset. She states that she gets an echo every year for the last 20 years.        Narrative & Impression     Transthoracic  Echo Report        Echocardiography Laboratory     CONCLUSIONS  Normal right and left ventricular size and function.   Left ventricular ejection fraction is visually estimated to be 70%.  Aortic sclerosis without stenosis.  Mild aortic insufficiency.  Right atrial pressure is estimated to be 3 mmHg.  Right ventricular systolic pressure is estimated to be 35 mmHg.     Compared to the images of the prior study done 10/12/2018: The aortic   insufficiency now appears clearly mild.      JOON GARAY  Exam Date:         10/18/2019                      12:57  Exam Location:     Out Patient  Priority:          Routine     Ordering Physician:        YOSEF PEDERSEN          5. Osteopenia, unspecified location     Chronic in nature and stable. This is well managed at this time.       6. Benign paroxysmal positional vertigo due to bilateral vestibular disorder    The patient has been experiencing intermittent dizziness. She states that she recently had an MRI on 11/16/19 which showed normal results. She states that there is not one thing that gets her dizzy, and that the dizziness comes on randomly.       7. Vitamin D deficiency    Patient has a history of vitamin D deficency and is taking Cholecalciferol 1000 units. This is under good control at this time.     Current medicines (including changes today)  Current Outpatient Medications   Medication Sig Dispense Refill   • simvastatin (ZOCOR) 10 MG Tab TAKE 1 TABLET EVERY EVENING 90 Tab 2   • niacinamide  500 MG tablet Take 1 Tab by mouth 2 times a day. 60 Tab 3   • TURMERIC PO Take 1,000 mg by mouth 2 Times a Day.     • Coenzyme Q10 200 MG Cap Take  by mouth.     • MINIVELLE 0.025 MG/24HR PATCH BIWEEKLY Apply 1 Patch to skin as directed.     • Multiple Vitamins-Minerals (MULTIVITAMIN ADULTS 50+ PO) Take  by mouth.     • Probiotic Product (PROBIOTIC DAILY PO) Take  by mouth.     • Alpha Lipoic Acid 200 MG Cap Take  by mouth.     • Calcium Carbonate-Vitamin D (CALCIUM + D) 600-200 MG-UNIT TABS Take  by mouth every day.     • docosahexanoic acid (OMEGA 3) 1000 MG CAPS Take 1,000 mg by mouth every day.     • vitamin D (CHOLECALCIFEROL) 1000 UNIT TABS Take 2,000 Units by mouth every day. TAKE AS DIRECTED.     • ascorbic acid (ASCORBIC ACID) 500 MG Tab Take 500 mg by mouth every day.       No current facility-administered medications for this visit.      She  has a past medical history of Anesthesia, Aortic regurgitation (1996), Arrhythmia, Arthritis, Breath shortness, Cataract, Disorder of thyroid, Hemorrhagic disorder (HCC), High cholesterol, HLD (hyperlipidemia), Indigestion, Mitral regurgitation, MVP (mitral valve prolapse), Palpitations, and PONV (postoperative nausea and vomiting).  She  has a past surgical history that includes hysterectomy, total abdominal; cataract extraction with iol (Bilateral); breast reconstruction (Bilateral); appendectomy; knee arthroscopy, acl recon w/ tibialis tendon autograft; craniotomy; abdominal hysterectomy total; eye surgery; thyroid lobectomy (Right, 7/18/2019); and myringotomy (Bilateral, 7/18/2019).  Social History     Tobacco Use   • Smoking status: Never Smoker   • Smokeless tobacco: Never Used   Substance Use Topics   • Alcohol use: No   • Drug use: No     Social History     Patient does not qualify to have social determinant information on file (likely too young).   Social History Narrative   • Not on file     Family History   Problem Relation Age of Onset   • Stroke Mother   "  • Hypertension Mother    • Cancer Father         prostate cancer   • Lung Disease Sister         asthma   • No Known Problems Brother    • No Known Problems Maternal Grandmother    • No Known Problems Maternal Grandfather    • Other Brother         cerabral palsy     Family Status   Relation Name Status   • Mo   at age 76        HYPERTENSION(cause of death, not taking meds)   • Fa          PARKINSON'S DISEASE(cause of death), PNEUMONIA.   • Sis  Alive   • Bro  Alive   • MGMo     • MGFa   at age 102   • Bro     • PGMo unknown    • PGFa unknown          ROS    Please see hpi     All other systems reviewed and are negative     Objective:     /70 (BP Location: Left arm, Patient Position: Sitting, BP Cuff Size: Adult)   Pulse 72   Temp 36.9 °C (98.4 °F) (Temporal)   Ht 1.626 m (5' 4\")   Wt 66.6 kg (146 lb 12.8 oz)   SpO2 96%  Body mass index is 25.2 kg/m².  Physical Exam:    Constitutional: Alert, no distress.  Skin: Warm, dry, good turgor, no rashes in visible areas.  Eye: Equal, round and reactive, conjunctiva clear, lids normal.  ENMT: Lips without lesions, good dentition, oropharynx clear.  Neck: Trachea midline, no masses, no thyromegaly. No cervical or supraclavicular lymphadenopathy.  Respiratory: Unlabored respiratory effort, lungs clear to auscultation, no wheezes, no ronchi.  Cardiovascular: Normal S1, S2, no murmur, no edema.  Psych: Alert and oriented x3, normal affect and mood.  Neuro:  CN II-XII checked and intact, DTRs 2+ throughout, intact sensation to light touch, vibration, normal gait, No focal deficits, Romberg Normal, Normal tandem gait, normal heel to shin, normal finger to nose. [place]lis produced dizziness severely on the right      Assessment and Plan:   The following treatment plan was discussed      1. Encounter for medical examination to establish care  Care has been established  We need baseline labs to establish a " clinical profile  We will then consider daily prophylactic aspirin   In order to weigh  the benefits vs risk of GI bleed    We reviewed USPSTF guidelines    The decision to initiate low-dose aspirin use for the primary prevention of CVD and CRC in adults aged 60 to 69 years who have a 10% or greater 10-year CVD risk should be an individual one    US AORTA for AAA Screening in smokers for patients age 65 and older.    Requested Medical records to be sent to us  Mooseies intimate partner edilma  Discussed seat belt safety      2. Pure hypercholesterolemia    Stable on current management. We will make no changes at this time.     - simvastatin (ZOCOR) 10 MG Tab; TAKE 1 TABLET EVERY EVENING  Dispense: 90 Tab; Refill: 2  - Comp Metabolic Panel; Future  - CBC WITHOUT DIFFERENTIAL; Future  - Lipid Profile; Future      3. IFG (impaired fasting glucose)    Patient has been stable with current management. We will make no changes for now. I advised the patient to keep a low sugar and low carb diet, and to incorporate exercise in her daily routine.     4. Moderate aortic regurgitation     Patient has been stable with current management. Patient states that this has improved. We will make no changes for now.       5. Osteopenia, unspecified location    Stable on current management. We will make no changes at this time.       6. Benign paroxysmal positional vertigo due to bilateral vestibular disorder    We reviewed several particle repositioning maneuvers   Such as modified lilliana, Marie andAndres      Instructed  to take meclizine as prescribed  Establish care in PT for vestibular rehabilitation      7. Vitamin D deficiency    Patient has been stable with current management of Cholecalciferol 1000 units. We will make no changes for now    - VITAMIN D,25 HYDROXY; Future      HEALTH MAINTENANCE: Patient is due for a Hepatitis C screening, an annual wellness visit, and a mammogram.     Instructed to Follow up in clinic or  ER for worsening symptoms, difficulty breathing, lack of expected recovery, or should new symptoms or problems arise.    Followup: Return in about 6 months (around 5/20/2020) for Reevaluation, labs.      Once again this medical record contains text that has been entered with the assistance of computer voice recognition, dictation software, and medical scribes.  Therefore, it may contain unintended errors in text, spelling, punctuation, or grammar.    ITony (Scribe), am scribing for, and in the presence of, Yony Villarreal M.D.    Electronically signed by: Tony Kumar (Scribe), 11/20/2019     IYony M.D. personally performed the services described in this documentation, as scribed by Tony Kumar in my presence, and it is both accurate and complete.

## 2019-12-04 ENCOUNTER — OFFICE VISIT (OUTPATIENT)
Dept: MEDICAL GROUP | Age: 64
End: 2019-12-04
Payer: MEDICARE

## 2019-12-04 ENCOUNTER — HOSPITAL ENCOUNTER (OUTPATIENT)
Dept: LAB | Facility: MEDICAL CENTER | Age: 64
End: 2019-12-04
Attending: FAMILY MEDICINE
Payer: MEDICARE

## 2019-12-04 VITALS
WEIGHT: 146 LBS | OXYGEN SATURATION: 99 % | HEIGHT: 64 IN | HEART RATE: 66 BPM | BODY MASS INDEX: 24.92 KG/M2 | SYSTOLIC BLOOD PRESSURE: 128 MMHG | DIASTOLIC BLOOD PRESSURE: 84 MMHG | TEMPERATURE: 97.7 F

## 2019-12-04 DIAGNOSIS — D48.9 NEOPLASM OF UNCERTAIN BEHAVIOR: ICD-10-CM

## 2019-12-04 DIAGNOSIS — M72.2 PLANTAR FASCIITIS, BILATERAL: ICD-10-CM

## 2019-12-04 DIAGNOSIS — M72.2 PLANTAR FASCIITIS: ICD-10-CM

## 2019-12-04 LAB — PATHOLOGY CONSULT NOTE: NORMAL

## 2019-12-04 PROCEDURE — 88305 TISSUE EXAM BY PATHOLOGIST: CPT

## 2019-12-04 PROCEDURE — 99213 OFFICE O/P EST LOW 20 MIN: CPT | Mod: 25 | Performed by: FAMILY MEDICINE

## 2019-12-04 PROCEDURE — 11401 EXC TR-EXT B9+MARG 0.6-1 CM: CPT | Mod: GZ | Performed by: FAMILY MEDICINE

## 2019-12-04 NOTE — PROGRESS NOTES
This medical record contains text that has been entered with the assistance of computer voice recognition and dictation software. Therefore, it may contain unintended errors in text, spelling, punctuation or grammar.    Chief Complaint   Patient presents with   • Blister     upper left leg       HPI:   Shahrzad Scott is a 64 y.o. female here evaluation and management of:      Neoplasm of uncertain behavior  Acute complaint.    The patient developed a fluid filled, round skin lesion to her left anterior thigh six weeks ago. It spontaneously resolved and was flat but erythematous. She presents today for a recurrent lesion to the same area that is fluid filled, erythematous and painful to touch. No alleviating factors were reported. Negative for a rash, additional concerning skin lesions or fevers. She denies blood thinner use.    Plantar fasciitis  Acute history.    The patient has experienced left heel pain for the past two months and developed right heel pain in the past month. Her pain is constant and is gradually worsening. No change with completing regular strengthening exercises. Advil provides mild relief of her symptoms. She is requesting a referral to physical therapy for further treatment of her symptoms. Negative for trauma or injury.    Current medicines (including changes today)  Current Outpatient Medications   Medication Sig Dispense Refill   • simvastatin (ZOCOR) 10 MG Tab TAKE 1 TABLET EVERY EVENING 90 Tab 2   • niacinamide 500 MG tablet Take 1 Tab by mouth 2 times a day. 60 Tab 3   • TURMERIC PO Take 1,000 mg by mouth 2 Times a Day.     • Coenzyme Q10 200 MG Cap Take  by mouth.     • MINIVELLE 0.025 MG/24HR PATCH BIWEEKLY Apply 1 Patch to skin as directed.     • Multiple Vitamins-Minerals (MULTIVITAMIN ADULTS 50+ PO) Take  by mouth.     • Probiotic Product (PROBIOTIC DAILY PO) Take  by mouth.     • Alpha Lipoic Acid 200 MG Cap Take  by mouth.     • Calcium Carbonate-Vitamin D (CALCIUM + D) 600-200  MG-UNIT TABS Take  by mouth every day.     • docosahexanoic acid (OMEGA 3) 1000 MG CAPS Take 1,000 mg by mouth every day.     • vitamin D (CHOLECALCIFEROL) 1000 UNIT TABS Take 2,000 Units by mouth every day. TAKE AS DIRECTED.     • benzonatate (TESSALON) 200 MG capsule Take 1 Cap by mouth 3 times a day as needed for Cough. 60 Cap 0   • ascorbic acid (ASCORBIC ACID) 500 MG Tab Take 500 mg by mouth every day.       No current facility-administered medications for this visit.      She  has a past medical history of Anesthesia, Aortic regurgitation (), Arrhythmia, Arthritis, Breath shortness, Cataract, Disorder of thyroid, Hemorrhagic disorder (), High cholesterol, HLD (hyperlipidemia), Indigestion, Mitral regurgitation, MVP (mitral valve prolapse), Palpitations, and PONV (postoperative nausea and vomiting).  She  has a past surgical history that includes hysterectomy, total abdominal; cataract extraction with iol (Bilateral); breast reconstruction (Bilateral); appendectomy; knee arthroscopy, acl recon w/ tibialis tendon autograft; craniotomy; abdominal hysterectomy total; eye surgery; thyroid lobectomy (Right, 2019); and myringotomy (Bilateral, 2019).  Social History     Tobacco Use   • Smoking status: Never Smoker   • Smokeless tobacco: Never Used   Substance Use Topics   • Alcohol use: No   • Drug use: No     Social History     Patient does not qualify to have social determinant information on file (likely too young).   Social History Narrative   • Not on file     Family History   Problem Relation Age of Onset   • Stroke Mother    • Hypertension Mother    • Cancer Father         prostate cancer   • Lung Disease Sister         asthma   • No Known Problems Brother    • No Known Problems Maternal Grandmother    • No Known Problems Maternal Grandfather    • Other Brother         cerabral palsy     Family Status   Relation Name Status   • Mo   at age 76        HYPERTENSION(cause of death, not  "taking meds)   • Fa          PARKINSON'S DISEASE(cause of death), PNEUMONIA.   • Sis  Alive   • Bro  Alive   • MGMo     • MGFa   at age 102   • Bro     • PGMo unknown    • PGFa unknown        ROS    Please see HPI for further details.     All other systems reviewed and are negative.     Objective:     /84 (BP Location: Left arm, Patient Position: Sitting, BP Cuff Size: Adult)   Pulse 66   Temp 36.5 °C (97.7 °F) (Temporal)   Ht 1.626 m (5' 4\")   Wt 66.2 kg (146 lb)   SpO2 99%  Body mass index is 25.06 kg/m².    Physical Exam:    Constitutional: Alert, no distress.  Skin: Warm, dry, good turgor, no rashes in visible areas. 6 mm erythematous, raised vesicle to left anterior thigh.  Eye: Equal, round and reactive, conjunctiva clear, lids normal.  ENMT: Lips without lesions, good dentition, oropharynx clear.  Neck: Trachea midline, no masses, no thyromegaly. No cervical or supraclavicular lymphadenopathy.  Respiratory: Unlabored respiratory effort, lungs clear to auscultation, no wheezes, no ronchi.  Cardiovascular: Normal S1, S2, no murmur, no edema.  Psych: Alert and oriented x3, normal affect and mood.      Assessment and Plan:   The following treatment plan was discussed:    1. Neoplasm of uncertain behavior    Acute complaint, onset six weeks. Resolved and presents for recurrent skin lesion. Excisional biopsy via punch technique was performed as noted below. Sample sent for pathology. After care instructions were discussed with the patient. She will return in ten days for suture removal.    - Pathology Specimen; Future     Procedure Note       Excision    After informed written consent was obtained, using Betadine for cleansing and 1% Lidocaine w- epinephrine for anesthetic, with sterile technique a 8 mm punch tool was used to obtain and excise  the lesion through dermis (full thickness) . Intent was to remove entire lesion with margins . Hemostasis was " obtained by pressure and wound was  Sutured  With 3.0 Ethilon x3 Plus Antibiotic dressing is applied, and wound care instructions provided. Be alert for any signs of cutaneous infection. The specimen is labeled and sent to pathology for evaluation. The procedure was well tolerated without complications.      Final repair length (measurement of repaired defect): 1.6cm      2. Plantar fasciitis      Acute complaint. Plan to refer to physical therapy for worsened pain. Recommended the patient continue Advil, Tylenol and regular exercises. She declined a referral to podiatry at this time and would like to hold off on steroid injections.      - REFERRAL TO PHYSICAL THERAPY Reason for Therapy: Eval/Treat/Report    HEALTH MAINTENANCE: Up to date.    Instructed to follow up in clinic or ER for worsening symptoms, difficulty breathing, lack of expected recovery or should new symptoms or problems arise.    Follow up: Return in about 10 days (around 12/14/2019) for Suture Removal with MA.      Once again this medical record contains text that has been entered with the assistance of computer voice recognition, dictation software and medical scribes. Therefore, it may contain unintended errors in text, spelling, punctuation or grammar.    Georgiana SIMS (Scribe), am scribing for, and in the presence of, Yony Villarreal M.D.    Electronically signed by: Georgiana Sebastian ([place]), 12/4/2019     Yony SIMS M.D. personally performed the services described in this documentation, as scribed by Georgiana Sebastian in my presence, and it is both accurate and complete.

## 2019-12-11 ENCOUNTER — PHYSICAL THERAPY (OUTPATIENT)
Dept: PHYSICAL THERAPY | Facility: REHABILITATION | Age: 64
End: 2019-12-11
Attending: FAMILY MEDICINE
Payer: MEDICARE

## 2019-12-11 DIAGNOSIS — M72.2 PLANTAR FASCIITIS: ICD-10-CM

## 2019-12-11 PROCEDURE — 97140 MANUAL THERAPY 1/> REGIONS: CPT

## 2019-12-11 PROCEDURE — 97110 THERAPEUTIC EXERCISES: CPT

## 2019-12-11 PROCEDURE — 97162 PT EVAL MOD COMPLEX 30 MIN: CPT

## 2019-12-11 ASSESSMENT — ENCOUNTER SYMPTOMS
PAIN SCALE AT LOWEST: 2
ALLEVIATING FACTORS: MASSAGE
EXACERBATED BY: STAIR CLIMBING
PAIN SCALE: 4
PAIN SCALE AT HIGHEST: 10
PAIN TIMING: WHEN ACTIVE
EXACERBATED BY: SQUATTING
QUALITY: BURNING
ALLEVIATING FACTORS: PAIN MEDICATION
EXACERBATED BY: WALKING
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
## 2019-12-17 ENCOUNTER — PHYSICAL THERAPY (OUTPATIENT)
Dept: PHYSICAL THERAPY | Facility: REHABILITATION | Age: 64
End: 2019-12-17
Attending: FAMILY MEDICINE
Payer: MEDICARE

## 2019-12-17 DIAGNOSIS — M72.2 PLANTAR FASCIITIS: ICD-10-CM

## 2019-12-17 PROCEDURE — 97110 THERAPEUTIC EXERCISES: CPT

## 2019-12-17 PROCEDURE — 97140 MANUAL THERAPY 1/> REGIONS: CPT

## 2019-12-17 PROCEDURE — 97014 ELECTRIC STIMULATION THERAPY: CPT

## 2019-12-17 ASSESSMENT — ACTIVITIES OF DAILY LIVING (ADL): POOR_BALANCE: 1

## 2019-12-18 ENCOUNTER — NON-PROVIDER VISIT (OUTPATIENT)
Dept: MEDICAL GROUP | Age: 64
End: 2019-12-18
Payer: MEDICARE

## 2019-12-18 DIAGNOSIS — D48.9 NEOPLASM OF UNCERTAIN BEHAVIOR: ICD-10-CM

## 2019-12-18 PROCEDURE — 99999 PR NO CHARGE: CPT | Performed by: FAMILY MEDICINE

## 2019-12-18 NOTE — NON-PROVIDER
Shahrzad Scott is a 64 y.o. female here for a Non-Provider Visit for Suture Removal.    Sutures were placed by Dr Villarreal on date: 12/4/19  Skin is healed: Yes  Provider notified if skin is not healed, or if there is redness, heat, pain, or drainage from incision: N\A  x3 Sutures removed.   Mastisol and steristips are placed: No    Advised to use emollient (vaseline, aquaphor, etc.) as needed, avoid peroxide and antibiotic ointment to reduce irritation.     Path report has not been reviewed by provider.  Path report has not reviewed with patient.

## 2019-12-18 NOTE — OP THERAPY DAILY TREATMENT
Outpatient Physical Therapy  DAILY TREATMENT     [facility] [address], [address]  [place] [address]  Phone:  [phone]    Date: 12/17/2019    Patient: Shahrzad Scott  YOB: 1955  MRN: [id number]     Time Calculation  Start time: 0240  Stop time: 0320 Time Calculation (min): 40 minutes       Chief Complaint: No chief complaint on file.    Visit #: 2    SUBJECTIVE:  Still having a lot of heel pain but it's less. I don't think I have an issue with bending my toes. I still cannot be on feet for any length of time without heel pain when not wearing shoes.     OBJECTIVE:  Current objective measures:           Therapeutic Exercises (CPT 69047):     1. Seated heel slide stretch     Therapeutic Treatments and Modalities:     1. Manual Therapy (CPT 93189), foot and ankle R and L, mobilization toe flexion; sub-talar distraction; TCJ posterior gl    2. E Stim Unattended (CPT 22604), bilateral feet and posterior leg, IFC and MHP 15 min duration     Time-based treatments/modalities:  Manual therapy minutes (CPT 86497): 22 minutes  Therapeutic exercise minutes (CPT 35076): 8 minutes         ASSESSMENT:   Response to treatment: patient reported continued pain of diminished intensity into heels at end of session.     PLAN/RECOMMENDATIONS:   Plan for treatment: therapy treatment to continue next visit.  Planned interventions for next visit: continue with current treatment.

## 2019-12-24 ENCOUNTER — OFFICE VISIT (OUTPATIENT)
Dept: URGENT CARE | Facility: CLINIC | Age: 64
End: 2019-12-24
Payer: MEDICARE

## 2019-12-24 VITALS
OXYGEN SATURATION: 96 % | HEART RATE: 72 BPM | WEIGHT: 144 LBS | RESPIRATION RATE: 16 BRPM | HEIGHT: 64 IN | SYSTOLIC BLOOD PRESSURE: 138 MMHG | DIASTOLIC BLOOD PRESSURE: 90 MMHG | BODY MASS INDEX: 24.59 KG/M2 | TEMPERATURE: 97.5 F

## 2019-12-24 DIAGNOSIS — J06.9 ACUTE URI: ICD-10-CM

## 2019-12-24 PROCEDURE — 99214 OFFICE O/P EST MOD 30 MIN: CPT | Performed by: PHYSICIAN ASSISTANT

## 2019-12-24 RX ORDER — BENZONATATE 200 MG/1
200 CAPSULE ORAL 3 TIMES DAILY PRN
Qty: 60 CAP | Refills: 0 | Status: SHIPPED | OUTPATIENT
Start: 2019-12-24 | End: 2020-07-22

## 2019-12-24 ASSESSMENT — ENCOUNTER SYMPTOMS: COUGH: 1

## 2019-12-24 NOTE — PROGRESS NOTES
Subjective:      Shahrzad Scott is a 64 y.o. female who presents with Cough (headache, congestion, stuffy nose, no fever, body aches, coughing up phlegm clear color, x1wk )    PMH:  has a past medical history of Anesthesia, Aortic regurgitation (1996), Arrhythmia, Arthritis, Breath shortness, Cataract, Disorder of thyroid, Hemorrhagic disorder (HCC), High cholesterol, HLD (hyperlipidemia), Indigestion, Mitral regurgitation, MVP (mitral valve prolapse), Palpitations, and PONV (postoperative nausea and vomiting).  MEDS:   Current Outpatient Medications:   •  simvastatin (ZOCOR) 10 MG Tab, TAKE 1 TABLET EVERY EVENING, Disp: 90 Tab, Rfl: 2  •  TURMERIC PO, Take 1,000 mg by mouth 2 Times a Day., Disp: , Rfl:   •  Coenzyme Q10 200 MG Cap, Take  by mouth., Disp: , Rfl:   •  MINIVELLE 0.025 MG/24HR PATCH BIWEEKLY, Apply 1 Patch to skin as directed., Disp: , Rfl:   •  Multiple Vitamins-Minerals (MULTIVITAMIN ADULTS 50+ PO), Take  by mouth., Disp: , Rfl:   •  Probiotic Product (PROBIOTIC DAILY PO), Take  by mouth., Disp: , Rfl:   •  Calcium Carbonate-Vitamin D (CALCIUM + D) 600-200 MG-UNIT TABS, Take  by mouth every day., Disp: , Rfl:   •  docosahexanoic acid (OMEGA 3) 1000 MG CAPS, Take 1,000 mg by mouth every day., Disp: , Rfl:   •  vitamin D (CHOLECALCIFEROL) 1000 UNIT TABS, Take 2,000 Units by mouth every day. TAKE AS DIRECTED., Disp: , Rfl:   •  niacinamide 500 MG tablet, Take 1 Tab by mouth 2 times a day., Disp: 60 Tab, Rfl: 3  •  ascorbic acid (ASCORBIC ACID) 500 MG Tab, Take 500 mg by mouth every day., Disp: , Rfl:   •  Alpha Lipoic Acid 200 MG Cap, Take  by mouth., Disp: , Rfl:   ALLERGIES:   Allergies   Allergen Reactions   • Septra [Bactrim] Hives   • Sulfa Drugs Hives     SURGHX:   Past Surgical History:   Procedure Laterality Date   • THYROID LOBECTOMY Right 7/18/2019    Procedure: LOBECTOMY, THYROID WITH FROZEN SECTION;  Surgeon: Dirk Hilliard M.D.;  Location: SURGERY SAME DAY [facility];  Service: Ent    • MYRINGOTOMY Bilateral 7/18/2019    Procedure: MYRINGOTOMY WITH TUBES;  Surgeon: Dirk Hilliard M.D.;  Location: SURGERY SAME DAY [facility];  Service: Ent   • ABDOMINAL HYSTERECTOMY TOTAL     • APPENDECTOMY     • BREAST RECONSTRUCTION Bilateral    • CATARACT EXTRACTION WITH IOL Bilateral     Dr. Wilder   • CRANIOTOMY     • EYE SURGERY     • HYSTERECTOMY, TOTAL ABDOMINAL      at age 37 for uterine fibroid, still has both ovaries   • KNEE ARTHROSCOPY, ACL RECON W/ TIBIALIS TENDON AUTOGRAFT       SOCHX:  reports that she has never smoked. She has never used smokeless tobacco. She reports that she does not drink alcohol or use drugs.  FH: Reviewed with patient, not pertinent to this visit.           Patient presents with:  Cough: headache, congestion, stuffy nose, no fever, occasional body aches, coughing up clear phlegm x1wk .  Pt would like Rx for tessalon.        Cough   This is a new problem. The current episode started in the past 7 days. The problem has been gradually improving. The problem occurs every few minutes. The cough is productive of sputum. Associated symptoms include headaches, nasal congestion and postnasal drip. Pertinent negatives include no chest pain, chills, fever, sore throat or shortness of breath. The symptoms are aggravated by lying down. She has tried body position changes, rest and prescription cough suppressant for the symptoms. The treatment provided mild relief. There is no history of asthma or COPD.       Review of Systems   Constitutional: Negative for chills and fever.   HENT: Positive for congestion and postnasal drip. Negative for sore throat.    Respiratory: Positive for cough. Negative for shortness of breath.    Cardiovascular: Negative for chest pain.   Neurological: Positive for headaches.   All other systems reviewed and are negative.         Objective:     /90 (BP Location: Left arm, Patient Position: Sitting, BP Cuff Size: Adult)   Pulse 72   Temp 36.4 °C  "(97.5 °F) (Temporal)   Resp 16   Ht 1.626 m (5' 4\")   Wt 65.3 kg (144 lb)   LMP  (LMP Unknown)   SpO2 96%   BMI 24.72 kg/m²      Physical Exam  Vitals signs and nursing note reviewed.   Constitutional:       General: She is not in acute distress.     Appearance: Normal appearance. She is well-developed and normal weight. She is not toxic-appearing.   HENT:      Head: Normocephalic and atraumatic.      Right Ear: Tympanic membrane normal.      Left Ear: Tympanic membrane normal.      Nose: Mucosal edema, congestion and rhinorrhea present.      Mouth/Throat:      Mouth: Mucous membranes are moist.      Pharynx: Uvula midline.   Eyes:      Extraocular Movements: Extraocular movements intact.      Conjunctiva/sclera: Conjunctivae normal.      Pupils: Pupils are equal, round, and reactive to light.   Neck:      Musculoskeletal: Normal range of motion and neck supple.   Cardiovascular:      Rate and Rhythm: Normal rate and regular rhythm.      Pulses: Normal pulses.      Heart sounds: Normal heart sounds.   Pulmonary:      Effort: Pulmonary effort is normal. No respiratory distress.      Breath sounds: Normal breath sounds. No rhonchi.   Abdominal:      Palpations: Abdomen is soft.   Musculoskeletal: Normal range of motion.   Skin:     General: Skin is warm and dry.      Capillary Refill: Capillary refill takes less than 2 seconds.   Neurological:      General: No focal deficit present.      Mental Status: She is alert and oriented to person, place, and time.      Gait: Gait normal.   Psychiatric:         Mood and Affect: Mood normal.         Behavior: Behavior is cooperative.          Assessment/Plan:     1. Acute URI  benzonatate (TESSALON) 200 MG capsule     Discussed that I felt this was viral in nature. Did not see any evidence of a bacterial process. Discussed natural progression and sx care.    PT can continue OTC medications, increase fluids and rest until symptoms improve.     PT should follow up with PCP " in 1-2 days for re-evaluation if symptoms have not improved.  Discussed red flags and reasons to return to UC or ED.  Pt and/or family verbalized understanding of diagnosis and follow up instructions and was offered informational handout on diagnosis.  PT discharged.

## 2019-12-27 ENCOUNTER — PHYSICAL THERAPY (OUTPATIENT)
Dept: PHYSICAL THERAPY | Facility: REHABILITATION | Age: 64
End: 2019-12-27
Attending: FAMILY MEDICINE
Payer: MEDICARE

## 2019-12-27 DIAGNOSIS — M72.2 PLANTAR FASCIITIS: ICD-10-CM

## 2019-12-27 PROCEDURE — 97535 SELF CARE MNGMENT TRAINING: CPT

## 2019-12-27 PROCEDURE — 97140 MANUAL THERAPY 1/> REGIONS: CPT

## 2019-12-27 NOTE — OP THERAPY DAILY TREATMENT
Outpatient Physical Therapy  DAILY TREATMENT     [facility] [address] [facility], [address]  [place] [address]  Phone:  [phone]    Date: 12/27/2019    Patient: Shahrzad Scott  YOB: 1955  MRN: [id number]     Time Calculation  Start time: 1130  Stop time: 1220 Time Calculation (min): 50 minutes       Chief Complaint: No chief complaint on file.    Visit #: 3    SUBJECTIVE:  I stood for two days cooking and my feet are really hurting today.     OBJECTIVE:  Current objective measures:       numerous hypertonic muscles in posterior leg that are TTP: fib longus, EHL, M.elissa and L. Elissa.       Therapeutic Treatments and Modalities:     1. Manual Therapy (CPT 85816), posterior leg, MFR with joint motion into dorsiflexion, inv and ever    2. Manual Therapy (CPT 34958), bilat feet, MTP mob for flexion with STW plantarfasc.     3. E Stim Unattended (CPT 99093), bilateral leg, IFC and MHP 15 min duration    4. Functional Training, Self Care (CPT 52134), plantafascia massage with PF ball     Time-based treatments/modalities:  Manual therapy minutes (CPT 17063): 30 minutes  Functional training, self care minutes (CPT 94164): 8 minutes         ASSESSMENT:   Response to treatment: improved myofasc. Mobility in posterior leg that resulted in less heel pain at end of session and improved tolerance for weight bearing.     PLAN/RECOMMENDATIONS:   Plan for treatment: therapy treatment to continue next visit.  Planned interventions for next visit: continue with current treatment.

## 2019-12-29 ASSESSMENT — ENCOUNTER SYMPTOMS
FEVER: 0
SHORTNESS OF BREATH: 0
SORE THROAT: 0
HEADACHES: 1
CHILLS: 0

## 2019-12-29 ASSESSMENT — COPD QUESTIONNAIRES: COPD: 0

## 2020-01-02 ENCOUNTER — PHYSICAL THERAPY (OUTPATIENT)
Dept: PHYSICAL THERAPY | Facility: REHABILITATION | Age: 65
End: 2020-01-02
Attending: FAMILY MEDICINE
Payer: MEDICARE

## 2020-01-02 DIAGNOSIS — M72.2 PLANTAR FASCIITIS: ICD-10-CM

## 2020-01-02 PROCEDURE — 97014 ELECTRIC STIMULATION THERAPY: CPT

## 2020-01-02 PROCEDURE — 97110 THERAPEUTIC EXERCISES: CPT

## 2020-01-02 PROCEDURE — 97140 MANUAL THERAPY 1/> REGIONS: CPT

## 2020-01-02 NOTE — OP THERAPY DAILY TREATMENT
Outpatient Physical Therapy  DAILY TREATMENT     [facility] [address] [facility], [address]  [place] [address]  Phone:  [phone]    Date: 01/02/2020    Patient: Shahrzad Scott  YOB: 1955  MRN: [id number]     Time Calculation  Start time: 0200  Stop time: 0245 Time Calculation (min): 45 minutes       Chief Complaint: No chief complaint on file.    Visit #: 4    SUBJECTIVE:  Extended period of standing, makes heel hurt.     OBJECTIVE:  Current objective measures:           Therapeutic Exercises (CPT 21211):     1. Supine PF w/ band, blue 1 x 20B    2. Shuttle press squat, 2.5B 1 x 20, for stretch    3. Shuttle press heel raise, 2.5B 1 x 30     Therapeutic Treatments and Modalities:     1. Manual Therapy (CPT 11747), posterior leg, MFR with joint motion into dorsiflexion, inv and ever    2. Manual Therapy (CPT 25022), bilat feet, MTP mob for flexion with STW plantarfasc.     3. E Stim Unattended (CPT 83938), bilateral leg, IFC and MHP 15 min duration    Time-based treatments/modalities:  Manual therapy minutes (CPT 60293): 20 minutes  Therapeutic exercise minutes (CPT 50225): 10 minutes         ASSESSMENT:   Response to treatment:  Focus of session is improving mobility for dorsiflexion through STW and active stretching. Tolerated tx well.       PLAN/RECOMMENDATIONS:   Plan for treatment: therapy treatment to continue next visit.  Planned interventions for next visit: continue with current treatment.

## 2020-01-06 ENCOUNTER — PHYSICAL THERAPY (OUTPATIENT)
Dept: PHYSICAL THERAPY | Facility: REHABILITATION | Age: 65
End: 2020-01-06
Attending: FAMILY MEDICINE
Payer: MEDICARE

## 2020-01-06 DIAGNOSIS — M72.2 PLANTAR FASCIITIS: ICD-10-CM

## 2020-01-06 PROCEDURE — 97110 THERAPEUTIC EXERCISES: CPT

## 2020-01-06 PROCEDURE — 97140 MANUAL THERAPY 1/> REGIONS: CPT

## 2020-01-06 PROCEDURE — 97014 ELECTRIC STIMULATION THERAPY: CPT

## 2020-01-06 NOTE — OP THERAPY DAILY TREATMENT
Outpatient Physical Therapy  DAILY TREATMENT     [facility] [address] [facility], [address]  [place] [address]  Phone:  [phone]    Date: 01/06/2020    Patient: Shahrzad Scott  YOB: 1955  MRN: [id number]     Time Calculation  Start time: 0239  Stop time: 0335 Time Calculation (min): 56 minutes       Chief Complaint: No chief complaint on file.    Visit #: 5    SUBJECTIVE:  Less foot pain then last week, but it still can hurt if I am standing for awhile.     OBJECTIVE:  Current objective measures:     hypertonic medial and lateral gastroc. Bilat. But other leg muscles less tone than previous session.        Therapeutic Exercises (CPT 89274):     1. Supine PF w/ band, blue 1 x 20B    2. Shuttle press squat, 2.5B 1 x 20, for stretch    3. Shuttle press heel raise, 2.5B 1 x 30     4. MWM DF KE and KF, 1 x 10 x 5'' each bilateral    Therapeutic Treatments and Modalities:     1. Manual Therapy (CPT 58401), posterior leg, MFR with joint motion into dorsiflexion, inv and ever    2. Manual Therapy (CPT 74516), bilat feet, MTP mob for flexion with STW plantarfasc.     3. E Stim Unattended (CPT 09347), bilateral leg, IFC and MHP 15 min duration    Time-based treatments/modalities:  Manual therapy minutes (CPT 14241): 23 minutes  Therapeutic exercise minutes (CPT 46608): 25 minutes         ASSESSMENT:   Response to treatment: Focus of session is improving mobility for dorsiflexion. Tolerated tx well.     PLAN/RECOMMENDATIONS:   Plan for treatment: therapy treatment to continue next visit.  Planned interventions for next visit: continue with current treatment.

## 2020-01-14 ENCOUNTER — APPOINTMENT (RX ONLY)
Dept: URBAN - METROPOLITAN AREA CLINIC 4 | Facility: CLINIC | Age: 65
Setting detail: DERMATOLOGY
End: 2020-01-14

## 2020-01-14 DIAGNOSIS — L30.9 DERMATITIS, UNSPECIFIED: ICD-10-CM

## 2020-01-14 PROCEDURE — 99212 OFFICE O/P EST SF 10 MIN: CPT

## 2020-01-14 PROCEDURE — ? DIAGNOSIS COMMENT

## 2020-01-14 PROCEDURE — ? ADDITIONAL NOTES

## 2020-01-14 ASSESSMENT — LOCATION DETAILED DESCRIPTION DERM: LOCATION DETAILED: LEFT ANTERIOR DISTAL THIGH

## 2020-01-14 ASSESSMENT — LOCATION SIMPLE DESCRIPTION DERM: LOCATION SIMPLE: LEFT THIGH

## 2020-01-14 ASSESSMENT — LOCATION ZONE DERM: LOCATION ZONE: LEG

## 2020-01-14 NOTE — PROCEDURE: DIAGNOSIS COMMENT
[id number]. 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 If multiple lesions are present, the differential diagnosis includes pemphigus and its variants and a necrolytic erythema. Immunofluorescence studies should be considered if multiple changes lesions are present. No dysphasia or malignancy is identified. Immunostains for VSV and HSV 1/2 are performed at [facility] and interpreted by [facility] as negative. Controls were appropriate.\\n\\Sea there was only one lesion, which appears to be healing, will monitor for additional lesions. No further work up or biopsy indicated at this time. If patient develops any new lesions, immediate evaluation is warranted. Patient agrees to call/RTC if more lesions present. Direct lines given for patient to call.
Detail Level: Detailed

## 2020-01-14 NOTE — HPI: BIOPSY FOLLOW-UP
Additional History: The patient states that the lesion was biopsied 12/4/19 by Dr. Pinedo at [facility]. The patient states that it wont heal and keeping popping open and is very painful
Pathology Results (Optional): Intraepidermal bullous dermatitis with dyskeratosis

## 2020-01-14 NOTE — PROCEDURE: ADDITIONAL NOTES
Detail Level: Simple
Additional Notes: Discussed results in depth with patient. Recommended to patient contact office if she develops more lesions consistent with this .

## 2020-01-14 NOTE — PROCEDURE: MIPS QUALITY
Quality 402: Tobacco Use And Help With Quitting Among Adolescents: Patient screened for tobacco and never smoked
Quality 110: Preventive Care And Screening: Influenza Immunization: Influenza Immunization Administered during Influenza season
Quality 111:Pneumonia Vaccination Status For Older Adults: Pneumococcal Vaccination Previously Received
Detail Level: Generalized
Quality 130: Documentation Of Current Medications In The Medical Record: Current Medications Documented

## 2020-01-16 ENCOUNTER — APPOINTMENT (OUTPATIENT)
Dept: PHYSICAL THERAPY | Facility: REHABILITATION | Age: 65
End: 2020-01-16
Attending: FAMILY MEDICINE
Payer: MEDICARE

## 2020-01-21 ENCOUNTER — PHYSICAL THERAPY (OUTPATIENT)
Dept: PHYSICAL THERAPY | Facility: REHABILITATION | Age: 65
End: 2020-01-21
Attending: FAMILY MEDICINE
Payer: MEDICARE

## 2020-01-21 DIAGNOSIS — M72.2 PLANTAR FASCIITIS: ICD-10-CM

## 2020-01-21 PROCEDURE — 97014 ELECTRIC STIMULATION THERAPY: CPT

## 2020-01-21 PROCEDURE — 97140 MANUAL THERAPY 1/> REGIONS: CPT

## 2020-01-21 NOTE — OP THERAPY DAILY TREATMENT
Outpatient Physical Therapy  DAILY TREATMENT     [facility] Outpatient Physical Therapy [address], [address]  [place] [address]  Phone:  [phone]    Date: 01/21/2020    Patient: Shahrzad Scott  YOB: 1955  MRN: [id number]     Time Calculation  Start time: 0200  Stop time: 0245 Time Calculation (min): 45 minutes       Chief Complaint: No chief complaint on file.    Visit #: 6    SUBJECTIVE:  Pain in heel is waxing and waning. First steps in AM are the worst. It used to be that the pain was provoked only with walking, but now feeling pain with resting. I usually feel better after PT, lasts hours.     OBJECTIVE:  Current objective measures:           Therapeutic Treatments and Modalities:     1. Manual Therapy (CPT 40742), bilateral feet, taping for plantarfascia support, leukotape    2. E Stim Unattended (CPT 03451), bilateral legs, IFC and MHP 15 min    Time-based treatments/modalities:  Manual therapy minutes (CPT 27889): 25 minutes         ASSESSMENT:   Response to treatment: Patient reported significant relief of plantar foot pain after taping to support plantar fascia.     PLAN/RECOMMENDATIONS:   Plan for treatment: therapy treatment to continue next visit.  Planned interventions for next visit: continue with current treatment.

## 2020-01-27 ENCOUNTER — PHYSICAL THERAPY (OUTPATIENT)
Dept: PHYSICAL THERAPY | Facility: REHABILITATION | Age: 65
End: 2020-01-27
Attending: FAMILY MEDICINE
Payer: MEDICARE

## 2020-01-27 DIAGNOSIS — M72.2 PLANTAR FASCIITIS: ICD-10-CM

## 2020-01-27 PROCEDURE — 97110 THERAPEUTIC EXERCISES: CPT

## 2020-01-27 PROCEDURE — 97014 ELECTRIC STIMULATION THERAPY: CPT

## 2020-01-27 PROCEDURE — 97140 MANUAL THERAPY 1/> REGIONS: CPT

## 2020-01-27 NOTE — OP THERAPY DAILY TREATMENT
Outpatient Physical Therapy  DAILY TREATMENT     [facility] Outpatient Physical Therapy [address] [facility], [address]  [place] [address]  Phone:  [phone]    Date: 01/27/2020    Patient: Shahrzad Scott  YOB: 1955  MRN: [id number]     Time Calculation  Start time: 0200  Stop time: 0245 Time Calculation (min): 45 minutes       Chief Complaint: No chief complaint on file.    Visit #: 7    SUBJECTIVE:  My heels feel horrible today! Taping helped last week.       OBJECTIVE:  Current objective measures:       Pain with passive eversion at calcaneus.     Therapeutic Exercises (CPT 98564):     1. Shuttle press active stretch soleus, 2 x 10 x 5'' hold 2.5B    2. Shuttle press active stretch gastroc. , 2 x 10 x 5'' hold 2.5B    Therapeutic Treatments and Modalities:     1. Manual Therapy (CPT 13162), bilateral feet, taping for plantarfascia support, leukotape    2. E Stim Unattended (CPT 28418), bilateral legs, IFC and MHP 15 min    Time-based treatments/modalities:  Manual therapy minutes (CPT 49388): 15 minutes  Therapeutic exercise minutes (CPT 94343): 13 minutes         ASSESSMENT:   Response to treatment: patient presented with high pain into bilateral calcaneous and was sensitive for active and passive eversion at subtalar joint. Taping to support plantarfascia was well tolerated, as was active stretching to triceps surae.     PLAN/RECOMMENDATIONS:   Plan for treatment: therapy treatment to continue next visit.  Planned interventions for next visit: continue with current treatment.

## 2020-02-05 ENCOUNTER — PHYSICAL THERAPY (OUTPATIENT)
Dept: PHYSICAL THERAPY | Facility: REHABILITATION | Age: 65
End: 2020-02-05
Attending: FAMILY MEDICINE
Payer: MEDICARE

## 2020-02-05 DIAGNOSIS — M72.2 PLANTAR FASCIITIS: ICD-10-CM

## 2020-02-05 PROCEDURE — 97110 THERAPEUTIC EXERCISES: CPT

## 2020-02-05 PROCEDURE — 97014 ELECTRIC STIMULATION THERAPY: CPT

## 2020-02-05 PROCEDURE — 97140 MANUAL THERAPY 1/> REGIONS: CPT

## 2020-02-05 NOTE — OP THERAPY DAILY TREATMENT
Outpatient Physical Therapy  DAILY TREATMENT     [facility] Outpatient Physical Therapy [address], [address]  [place] [address]  Phone:  [phone]    Date: 02/05/2020    Patient: Shahrzad Scott  YOB: 1955  MRN: [id number]     Time Calculation  Start time: 0200  Stop time: 0245 Time Calculation (min): 45 minutes       Chief Complaint: No chief complaint on file.    Visit #: 8    SUBJECTIVE:  No significant changes in heel pain. I have good days and bad days.     OBJECTIVE:  Current objective measures:           Therapeutic Exercises (CPT 79531):     1. Shuttle press active stretch soleus, 2 x 10 x 5'' hold 2.5B    2. Shuttle press active stretch gastroc. , 2 x 10 x 5'' hold 2.5B    Therapeutic Treatments and Modalities:     1. Manual Therapy (CPT 39097), bilateral feet, taping for plantarfascia support, leukotape    2. E Stim Unattended (CPT 27447), bilateral legs, IFC and MHP 15 min    Time-based treatments/modalities:  Manual therapy minutes (CPT 41821): 18 minutes  Therapeutic exercise minutes (CPT 09306): 10 minutes         ASSESSMENT:   Response to treatment: taping and stretching continues to bring relief from pain but patient reports that pain returns.     PLAN/RECOMMENDATIONS:   Plan for treatment: therapy treatment to continue next visit.  Planned interventions for next visit: continue with current treatment.

## 2020-02-10 ENCOUNTER — APPOINTMENT (RX ONLY)
Dept: URBAN - METROPOLITAN AREA CLINIC 4 | Facility: CLINIC | Age: 65
Setting detail: DERMATOLOGY
End: 2020-02-10

## 2020-02-10 DIAGNOSIS — L90.5 SCAR CONDITIONS AND FIBROSIS OF SKIN: ICD-10-CM

## 2020-02-10 DIAGNOSIS — L57.0 ACTINIC KERATOSIS: ICD-10-CM

## 2020-02-10 PROCEDURE — ? LIQUID NITROGEN

## 2020-02-10 PROCEDURE — 17000 DESTRUCT PREMALG LESION: CPT

## 2020-02-10 PROCEDURE — 99212 OFFICE O/P EST SF 10 MIN: CPT | Mod: 25

## 2020-02-10 PROCEDURE — ? ADDITIONAL NOTES

## 2020-02-10 PROCEDURE — ? DIAGNOSIS COMMENT

## 2020-02-10 ASSESSMENT — LOCATION ZONE DERM
LOCATION ZONE: NOSE
LOCATION ZONE: LEG

## 2020-02-10 ASSESSMENT — LOCATION DETAILED DESCRIPTION DERM
LOCATION DETAILED: LEFT ANTERIOR DISTAL THIGH
LOCATION DETAILED: NASAL DORSUM

## 2020-02-10 ASSESSMENT — LOCATION SIMPLE DESCRIPTION DERM
LOCATION SIMPLE: LEFT THIGH
LOCATION SIMPLE: NOSE

## 2020-02-10 NOTE — HPI: SCAR
Is This A New Presentation, Or A Follow-Up?: Scar Follow Up
How Severe Is Your Scar?: moderate
Additional History: The patient had a biopsy at the site in December 2019

## 2020-02-10 NOTE — PROCEDURE: MIPS QUALITY
Quality 130: Documentation Of Current Medications In The Medical Record: Current Medications Documented
Quality 110: Preventive Care And Screening: Influenza Immunization: Influenza Immunization Administered during Influenza season
Detail Level: Generalized
Quality 402: Tobacco Use And Help With Quitting Among Adolescents: Patient screened for tobacco and never smoked
Quality 226: Preventive Care And Screening: Tobacco Use: Screening And Cessation Intervention: Patient screened for tobacco use and is an ex/non-smoker
Quality 111:Pneumonia Vaccination Status For Older Adults: Pneumococcal Vaccination Previously Received

## 2020-02-10 NOTE — PROCEDURE: DIAGNOSIS COMMENT
[id number] 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 If multiple lesions are present, the differential diagnosis includes pemphigus and its variants and a necrolytic erythema. Immunofluorescence studies should be considered if multiple changes lesions are present. No dysphasia or malignancy is identified. Immunostains for VSV and HSV 1/2 are performed at [facility] and interpreted by [facility] as negative. Controls were appropriate.\\n\\Sea there was only one lesion, which appears to be healing, will monitor for additional lesions. No further work up or biopsy indicated at this time. If patient develops any new lesions, immediate evaluation is warranted. Patient agrees to call/RTC if more lesions present.
Detail Level: Detailed

## 2020-02-10 NOTE — PROCEDURE: ADDITIONAL NOTES
Additional Notes: The patient declined ILK today and prefers to continue to apply silicone scar pads
Detail Level: Detailed

## 2020-02-14 ENCOUNTER — PHYSICAL THERAPY (OUTPATIENT)
Dept: PHYSICAL THERAPY | Facility: REHABILITATION | Age: 65
End: 2020-02-14
Attending: FAMILY MEDICINE
Payer: MEDICARE

## 2020-02-14 DIAGNOSIS — M72.2 PLANTAR FASCIITIS: ICD-10-CM

## 2020-02-14 PROCEDURE — 97140 MANUAL THERAPY 1/> REGIONS: CPT

## 2020-02-14 PROCEDURE — 97535 SELF CARE MNGMENT TRAINING: CPT

## 2020-02-14 PROCEDURE — 97014 ELECTRIC STIMULATION THERAPY: CPT

## 2020-02-14 NOTE — OP THERAPY DAILY TREATMENT
Outpatient Physical Therapy  DAILY TREATMENT     [facility] Outpatient Physical Therapy [address], [address]  [place] [address]  Phone:  [phone]    Date: 02/14/2020    Patient: Shahrzad Scott  YOB: 1955  MRN: [id number]     Time Calculation  Start time: 1200  Stop time: 1245 Time Calculation (min): 45 minutes       Chief Complaint: No chief complaint on file.    Visit #: 9    SUBJECTIVE:  I have good days and bad days, taping always helps bring relief of foot pain that lasts for a couple days.     OBJECTIVE:  Current objective measures:           Therapeutic Exercises (CPT 44213):     1. Shuttle press active stretch soleus, 2 x 10 x 5'' hold 2.5B    2. Shuttle press active stretch gastroc. , 2 x 10 x 5'' hold 2.5B    Therapeutic Treatments and Modalities:     1. Manual Therapy (CPT 91291), bilateral feet, taping for plantarfascia support, leukotape    2. E Stim Unattended (CPT 10401), bilateral legs, IFC and MHP 15 min    3. Functional Training, Self Care (CPT 84969), orthotic reccomendations     Time-based treatments/modalities:  Manual therapy minutes (CPT 54952): 12 minutes  Therapeutic exercise minutes (CPT 54416): 9 minutes  Functional training, self care minutes (CPT 62728): 9 minutes         ASSESSMENT:   Response to treatment: taping and stretching continues to bring relief from pain but patient reports that pain returns.     PLAN/RECOMMENDATIONS:   Plan for treatment: therapy treatment to continue next visit.  Planned interventions for next visit: continue with current treatment.

## 2020-02-15 ENCOUNTER — HOSPITAL ENCOUNTER (OUTPATIENT)
Dept: RADIOLOGY | Facility: MEDICAL CENTER | Age: 65
End: 2020-02-15
Attending: PSYCHIATRY & NEUROLOGY
Payer: MEDICARE

## 2020-02-15 DIAGNOSIS — H81.10 VERTICAL VERTIGO: ICD-10-CM

## 2020-02-15 DIAGNOSIS — R42 DIZZINESS: ICD-10-CM

## 2020-02-15 PROCEDURE — 70551 MRI BRAIN STEM W/O DYE: CPT

## 2020-02-26 ENCOUNTER — APPOINTMENT (OUTPATIENT)
Dept: PHYSICAL THERAPY | Facility: REHABILITATION | Age: 65
End: 2020-02-26
Attending: FAMILY MEDICINE
Payer: MEDICARE

## 2020-03-04 ENCOUNTER — PHYSICAL THERAPY (OUTPATIENT)
Dept: PHYSICAL THERAPY | Facility: REHABILITATION | Age: 65
End: 2020-03-04
Attending: FAMILY MEDICINE
Payer: MEDICARE

## 2020-03-04 DIAGNOSIS — M72.2 PLANTAR FASCIITIS: ICD-10-CM

## 2020-03-04 PROCEDURE — 97014 ELECTRIC STIMULATION THERAPY: CPT

## 2020-03-04 NOTE — OP THERAPY DAILY TREATMENT
Outpatient Physical Therapy  DAILY TREATMENT     [facility] Outpatient Physical Therapy [address], [address]  [place] [address]  Phone:  [phone]    Date: 03/04/2020    Patient: Shahrzad Scott  YOB: 1955  MRN: [id number]     Time Calculation  Start time: 0130  Stop time: 0200 Time Calculation (min): 30 minutes       Chief Complaint: No chief complaint on file.    Visit #: 10    SUBJECTIVE:  Feet are feeling better and I feel like I am ready to graduate. The company you recommended wouldn't sell me the orthotics you recommended.     OBJECTIVE:  Current objective measures:           Therapeutic Treatments and Modalities:     1. E Stim Unattended (CPT 81080), bilateral legs and feet , IFC and MHP 15 min duration     Time-based treatments/modalities:            ASSESSMENT:   Response to treatment: Patient feels comfortable with discharge.     PLAN/RECOMMENDATIONS:   Plan for treatment: discharge patient due to accomplished goals.

## 2020-03-30 NOTE — OP THERAPY DISCHARGE SUMMARY
Outpatient Physical Therapy  DISCHARGE SUMMARY NOTE      [facility] [address], [address]  [place] [address]  Phone:  [phone]    Date of Visit: 03/04/2020    Patient: Shahrzad Scott  YOB: 1955  MRN: [id number]     Referring Provider: LICHA Hawkins [facility] [place] [address]   Referring Diagnosis Plantar fascial fibromatosis [M72.2]     Physical Therapy Occurrence Codes    Date of onset of impairment:  12/4/19   Date physical therapy care plan established or reviewed:  12/11/19   Date physical therapy treatment started:  12/11/19          Functional Assessment Used        Your patient is being discharged from Physical Therapy with the following comments:   · Goals partially met    Comments:  Patient attending PT for plantar fasciitis.  Her symptoms have been waxing and waning but overall subsiding. At time of discharge she is reporting less plantar pain since using recommended orthotics.     Limitations Remaining:  Pas cavus foot type  Decreased flexibility for dorsiflexion  Hallux valgus and decreased mobility first MTP flexion and extension     Recommendations:  Patient has been shown ways of managing symptoms, stretches for improving myofascia mobility plantar foot and posterior leg.     Sharath Berry, PT    Date: 3/30/2020

## 2020-04-15 ENCOUNTER — APPOINTMENT (OUTPATIENT)
Dept: CARDIOLOGY | Facility: MEDICAL CENTER | Age: 65
End: 2020-04-15
Payer: MEDICARE

## 2020-05-06 DIAGNOSIS — Z20.822 EXPOSURE TO COVID-19 VIRUS: ICD-10-CM

## 2020-05-13 LAB
25(OH)D3+25(OH)D2 SERPL-MCNC: 28.1 NG/ML (ref 30–100)
ALBUMIN SERPL-MCNC: 4.8 G/DL (ref 3.8–4.8)
ALBUMIN/GLOB SERPL: 2.3 {RATIO} (ref 1.2–2.2)
ALP SERPL-CCNC: 62 IU/L (ref 39–117)
ALT SERPL-CCNC: 32 IU/L (ref 0–32)
AST SERPL-CCNC: 26 IU/L (ref 0–40)
BILIRUB SERPL-MCNC: 0.3 MG/DL (ref 0–1.2)
BUN SERPL-MCNC: 15 MG/DL (ref 8–27)
BUN/CREAT SERPL: 19 (ref 12–28)
CALCIUM SERPL-MCNC: 9.6 MG/DL (ref 8.7–10.3)
CHLORIDE SERPL-SCNC: 103 MMOL/L (ref 96–106)
CHOLEST SERPL-MCNC: 200 MG/DL (ref 100–199)
CO2 SERPL-SCNC: 22 MMOL/L (ref 20–29)
CREAT SERPL-MCNC: 0.81 MG/DL (ref 0.57–1)
ERYTHROCYTE [DISTWIDTH] IN BLOOD BY AUTOMATED COUNT: 11.6 % (ref 11.7–15.4)
GLOBULIN SER CALC-MCNC: 2.1 G/DL (ref 1.5–4.5)
GLUCOSE SERPL-MCNC: 104 MG/DL (ref 65–99)
HCT VFR BLD AUTO: 40.5 % (ref 34–46.6)
HDLC SERPL-MCNC: 59 MG/DL
HGB BLD-MCNC: 13.7 G/DL (ref 11.1–15.9)
LABORATORY COMMENT REPORT: ABNORMAL
LDLC SERPL CALC-MCNC: 105 MG/DL (ref 0–99)
MCH RBC QN AUTO: 32.5 PG (ref 26.6–33)
MCHC RBC AUTO-ENTMCNC: 33.8 G/DL (ref 31.5–35.7)
MCV RBC AUTO: 96 FL (ref 79–97)
NRBC BLD AUTO-RTO: ABNORMAL %
PLATELET # BLD AUTO: 280 X10E3/UL (ref 150–450)
POTASSIUM SERPL-SCNC: 4.3 MMOL/L (ref 3.5–5.2)
PROT SERPL-MCNC: 6.9 G/DL (ref 6–8.5)
RBC # BLD AUTO: 4.22 X10E6/UL (ref 3.77–5.28)
SARS-COV-2 IGG SERPL QL IA: NEGATIVE
SODIUM SERPL-SCNC: 140 MMOL/L (ref 134–144)
TRIGL SERPL-MCNC: 179 MG/DL (ref 0–149)
VLDLC SERPL CALC-MCNC: 36 MG/DL (ref 5–40)
WBC # BLD AUTO: 4.7 X10E3/UL (ref 3.4–10.8)

## 2020-05-14 ENCOUNTER — APPOINTMENT (RX ONLY)
Dept: URBAN - METROPOLITAN AREA CLINIC 20 | Facility: CLINIC | Age: 65
Setting detail: DERMATOLOGY
End: 2020-05-14

## 2020-05-14 DIAGNOSIS — L81.4 OTHER MELANIN HYPERPIGMENTATION: ICD-10-CM

## 2020-05-14 DIAGNOSIS — L60.1 ONYCHOLYSIS: ICD-10-CM

## 2020-05-14 DIAGNOSIS — Z41.9 ENCOUNTER FOR PROCEDURE FOR PURPOSES OTHER THAN REMEDYING HEALTH STATE, UNSPECIFIED: ICD-10-CM

## 2020-05-14 DIAGNOSIS — L57.0 ACTINIC KERATOSIS: ICD-10-CM

## 2020-05-14 PROCEDURE — 17000 DESTRUCT PREMALG LESION: CPT

## 2020-05-14 PROCEDURE — ? PRESCRIPTION

## 2020-05-14 PROCEDURE — ? LIQUID NITROGEN

## 2020-05-14 PROCEDURE — 99213 OFFICE O/P EST LOW 20 MIN: CPT | Mod: 25

## 2020-05-14 PROCEDURE — ? ADDITIONAL NOTES

## 2020-05-14 PROCEDURE — ? COUNSELING

## 2020-05-14 RX ORDER — HYDROQUINONE 4 %
CREAM (GRAM) TOPICAL
Qty: 1 | Refills: 5

## 2020-05-14 RX ORDER — EFLORNITHINE HYDROCHLORIDE 139 MG/G
CREAM TOPICAL BID
Qty: 1 | Refills: 5

## 2020-05-14 ASSESSMENT — LOCATION DETAILED DESCRIPTION DERM
LOCATION DETAILED: LEFT INFERIOR LATERAL MALAR CHEEK
LOCATION DETAILED: RIGHT MIDDLE FINGERNAIL
LOCATION DETAILED: LEFT SUPERIOR CENTRAL BUCCAL CHEEK
LOCATION DETAILED: RIGHT NASAL ALA
LOCATION DETAILED: RIGHT INFERIOR CENTRAL MALAR CHEEK
LOCATION DETAILED: RIGHT SUPERIOR LATERAL BUCCAL CHEEK
LOCATION DETAILED: LEFT RING FINGERNAIL

## 2020-05-14 ASSESSMENT — LOCATION ZONE DERM
LOCATION ZONE: FINGERNAIL
LOCATION ZONE: NOSE
LOCATION ZONE: FACE

## 2020-05-14 ASSESSMENT — LOCATION SIMPLE DESCRIPTION DERM
LOCATION SIMPLE: LEFT CHEEK
LOCATION SIMPLE: RIGHT CHEEK
LOCATION SIMPLE: RIGHT MIDDLE FINGERNAIL
LOCATION SIMPLE: LEFT RING FINGERNAIL
LOCATION SIMPLE: RIGHT NOSE

## 2020-05-14 NOTE — PROCEDURE: MIPS QUALITY
Detail Level: Generalized
Quality 402: Tobacco Use And Help With Quitting Among Adolescents: Patient screened for tobacco and never smoked
Quality 111:Pneumonia Vaccination Status For Older Adults: Pneumococcal Vaccination Previously Received
Quality 130: Documentation Of Current Medications In The Medical Record: Current Medications Documented
Quality 110: Preventive Care And Screening: Influenza Immunization: Influenza Immunization Administered during Influenza season
Quality 226: Preventive Care And Screening: Tobacco Use: Screening And Cessation Intervention: Patient screened for tobacco use and is an ex/non-smoker

## 2020-05-14 NOTE — PROCEDURE: ADDITIONAL NOTES
Detail Level: Simple
Additional Notes: Patient has had artificial nails for about 35 years, recently removed them herself (salons closed due to COVID).\\nAfter removing her nails she noticed lifting of the nail on 2 fingers. She is concerned about nail fungus. This has improved in the last few weeks as her nails have grown out.\\n\\nDiscussed this is likely due to trauma caused by nail removal. \\nPhoto taken today\\nReassurance provided that this is less likely fungal in nature. Advised patient to grow out nails prior to next visit if problem persists for clipping.
Additional Notes: Patient interested in refill of Vaniqa - has used with success

## 2020-05-14 NOTE — HPI: EVALUATION OF SKIN LESION(S)
How Severe Are Your Spot(S)?: mild
Have Your Spot(S) Been Treated In The Past?: has been treated
Hpi Title: Evaluation of a Skin Lesion
When Was It Treated?: 02/2020

## 2020-05-20 ENCOUNTER — OFFICE VISIT (OUTPATIENT)
Dept: MEDICAL GROUP | Age: 65
End: 2020-05-20
Payer: MEDICARE

## 2020-05-20 VITALS
WEIGHT: 152.4 LBS | OXYGEN SATURATION: 94 % | HEIGHT: 65 IN | DIASTOLIC BLOOD PRESSURE: 86 MMHG | HEART RATE: 74 BPM | SYSTOLIC BLOOD PRESSURE: 118 MMHG | BODY MASS INDEX: 25.39 KG/M2 | TEMPERATURE: 98.6 F

## 2020-05-20 DIAGNOSIS — I35.1 MODERATE AORTIC REGURGITATION: ICD-10-CM

## 2020-05-20 DIAGNOSIS — R73.01 IMPAIRED FASTING GLUCOSE: ICD-10-CM

## 2020-05-20 DIAGNOSIS — Z12.31 ENCOUNTER FOR SCREENING MAMMOGRAM FOR BREAST CANCER: ICD-10-CM

## 2020-05-20 DIAGNOSIS — E78.00 PURE HYPERCHOLESTEROLEMIA: ICD-10-CM

## 2020-05-20 DIAGNOSIS — Z00.00 ANNUAL PHYSICAL EXAM: ICD-10-CM

## 2020-05-20 PROCEDURE — 99214 OFFICE O/P EST MOD 30 MIN: CPT | Performed by: FAMILY MEDICINE

## 2020-05-20 ASSESSMENT — PATIENT HEALTH QUESTIONNAIRE - PHQ9: CLINICAL INTERPRETATION OF PHQ2 SCORE: 0

## 2020-05-20 ASSESSMENT — PAIN SCALES - GENERAL: PAINLEVEL: NO PAIN

## 2020-05-20 ASSESSMENT — FIBROSIS 4 INDEX: FIB4 SCORE: 1.07

## 2020-05-20 NOTE — PROGRESS NOTES
Subjective:     CC:   Chief Complaint   Patient presents with   • Follow-Up       HPI:   Shahrzad Scott is a 64 y.o. female who presents for annual exam      2. Pure hypercholesterolemia  Patient states that she has had a very terrible diet during the pandemic.  However she has been compliant with her statin 10 mg p.o. daily    3. Moderate aortic regurgitation  The patient is due for repeat echocardiogram in 2020 she remains asymptomatic.    4. Encounter for screening mammogram for breast cancer  Patient is also due for mammogram      Patient has GYN provider: Yes  Last Pap Smear:    H/O Abnormal Pap: No  Last Mammogram: 2019 normal  Last Bone Density Test: [id number]  Last Colorectal Cancer Screenin2017  Last Tdap: 2015  Received HPV series: No    Exercise: no regular exercise, sedentary  Diet: regular      No LMP recorded (lmp unknown). Patient has had a hysterectomy.  She has not utilized hormone replacement therapy.  Reports menopause symptoms of night sweats.  No significant bloating/fluid retention, pelvic pain, or dyspareunia. No abnormal vaginal discharge.   No breast tenderness, mass, nipple discharge or changes in size or contour.    OB History   No obstetric history on file.      She  reports never being sexually active.    She  has a past medical history of Anesthesia, Aortic regurgitation (), Arrhythmia, Arthritis, Breath shortness, Cataract, Disorder of thyroid, Hemorrhagic disorder (), High cholesterol, HLD (hyperlipidemia), Indigestion, Mitral regurgitation, MVP (mitral valve prolapse), Palpitations, and PONV (postoperative nausea and vomiting).  She  has a past surgical history that includes hysterectomy, total abdominal; cataract extraction with iol (Bilateral); breast reconstruction (Bilateral); appendectomy; knee arthroscopy, acl recon w/ tibialis tendon autograft; craniotomy; abdominal hysterectomy total; eye surgery; thyroid lobectomy (Right, 2019);  and myringotomy (Bilateral, 7/18/2019).    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   • Plantar fasciitis, bilateral 12/04/2019   • Encounter for medical examination to establish care 11/20/2019   • Benign paroxysmal positional vertigo due to bilateral vestibular disorder 11/20/2019   • Eustachian tube dysfunction, right 07/18/2019   • 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 60 Cap 0   • simvastatin (ZOCOR) 10 MG Tab TAKE 1 TABLET EVERY EVENING 90 Tab 2   • niacinamide 500 MG tablet Take 1 Tab by mouth 2 times a day. 60 Tab 3   • TURMERIC PO Take 1,000 mg by mouth 2 Times a Day.     • Coenzyme Q10 200 MG Cap Take  by mouth.     • MINIVELLE 0.025 MG/24HR PATCH BIWEEKLY Apply 1 Patch to skin as directed.     • Multiple Vitamins-Minerals (MULTIVITAMIN ADULTS 50+ PO) Take  by mouth.     • Probiotic Product (PROBIOTIC DAILY PO) Take  by mouth.     • Alpha Lipoic Acid 200 MG Cap Take  by mouth.    "  • Calcium Carbonate-Vitamin D (CALCIUM + D) 600-200 MG-UNIT TABS Take  by mouth every day.     • docosahexanoic acid (OMEGA 3) 1000 MG CAPS Take 1,000 mg by mouth every day.     • vitamin D (CHOLECALCIFEROL) 1000 UNIT TABS Take 2,000 Units by mouth every day. TAKE AS DIRECTED.     • ascorbic acid (ASCORBIC ACID) 500 MG Tab Take 500 mg by mouth every day.       No current facility-administered medications for this visit.      Allergies   Allergen Reactions   • Septra [Bactrim] Hives   • Sulfa Drugs Hives       Review of Systems   Constitutional: Negative for fever, chills and malaise/fatigue.   HENT: Negative for congestion.    Eyes: Negative for pain.   Respiratory: Negative for cough and shortness of breath.    Cardiovascular: Negative for chest pain and leg swelling.   Gastrointestinal: Negative for nausea, vomiting, abdominal pain and diarrhea.   Genitourinary: Negative for dysuria and hematuria.   Skin: Negative for rash.   Neurological: Negative for dizziness, focal weakness and headaches.   Endo/Heme/Allergies: Does not bruise/bleed easily.   Psychiatric/Behavioral: Negative for depression.  The patient is not nervous/anxious.      Objective:   /86 (BP Location: Left arm, Patient Position: Sitting, BP Cuff Size: Adult)   Pulse 74   Temp 37 °C (98.6 °F) (Temporal)   Ht 1.638 m (5' 4.5\")   Wt 69.1 kg (152 lb 6.4 oz)   LMP  (LMP Unknown)   SpO2 94%   BMI 25.76 kg/m²     Wt Readings from Last 4 Encounters:   05/20/20 69.1 kg (152 lb 6.4 oz)   12/24/19 65.3 kg (144 lb)   12/04/19 66.2 kg (146 lb)   11/20/19 66.6 kg (146 lb 12.8 oz)         Physical Exam:  Constitutional: Well-developed and well-nourished. Not diaphoretic. No distress.   Skin: Skin is warm and dry. No rash noted.  Head: Atraumatic without lesions.  Eyes: Conjunctivae and extraocular motions are normal. Pupils are equal, round, and reactive to light. No scleral icterus.   Ears:  External ears unremarkable. Tympanic membranes clear and " intact.  Nose: Nares patent. Septum midline. Turbinates without erythema nor edema. No discharge.   Mouth/Throat: Tongue normal. Oropharynx is clear and moist. Posterior pharynx without erythema or exudates.  Neck: Supple, trachea midline. Normal range of motion. No thyromegaly present. No lymphadenopathy--cervical or supraclavicular.  Cardiovascular: Regular rate and rhythm, S1 and S2 without murmur, rubs, or gallops.      Abdomen: Soft, non tender, and without distention. Active bowel sounds in all four quadrants. No rebound, guarding, masses or HSM.    Extremities: No cyanosis, clubbing, erythema, nor edema. Distal pulses intact and symmetric.   Musculoskeletal: All major joints AROM full in all directions without pain.  Neurological: Alert and oriented x 3. Grossly non-focal. Strength and sensation grossly intact. DTRs 2+/3 and symmetric.   Psychiatric:  Behavior, mood, and affect are appropriate.      Assessment and Plan:     1. Annual physical exam  Due for mammogram      Health maintenance:     Labs per orders  Immunizations per orders  Patient counseled about skin care, diet, supplements, and exercise.  Discussed  diet and exercise     - MA-SCREENING MAMMO BILAT W/ IMPLANTS W/CAD; Future    2. Pure hypercholesterolemia  We will obtain new labs to update clinical profile.  Then we will adjust therapy as needed.      3. Moderate aortic regurgitation  - EC-ECHOCARDIOGRAM REST/STRESS W/ CONT; Future    4. Encounter for screening mammogram for breast cancer  - MA-SCREENING MAMMO BILAT W/ IMPLANTS W/CAD; Future          Follow-up: Return in about 6 months (around 11/20/2020) for Reevaluation, labs.

## 2020-05-23 ENCOUNTER — HOSPITAL ENCOUNTER (OUTPATIENT)
Dept: RADIOLOGY | Facility: MEDICAL CENTER | Age: 65
End: 2020-05-23
Payer: MEDICARE

## 2020-05-27 DIAGNOSIS — I35.1 MODERATE AORTIC REGURGITATION: ICD-10-CM

## 2020-06-26 ENCOUNTER — HOSPITAL ENCOUNTER (OUTPATIENT)
Dept: LAB | Facility: MEDICAL CENTER | Age: 65
End: 2020-06-26
Attending: NURSE PRACTITIONER
Payer: MEDICARE

## 2020-06-26 ENCOUNTER — HOSPITAL ENCOUNTER (OUTPATIENT)
Dept: RADIOLOGY | Facility: MEDICAL CENTER | Age: 65
End: 2020-06-26
Attending: NURSE PRACTITIONER
Payer: MEDICARE

## 2020-06-26 DIAGNOSIS — M79.671 RIGHT FOOT PAIN: ICD-10-CM

## 2020-06-26 DIAGNOSIS — R22.41 MASS OF RIGHT FOOT: ICD-10-CM

## 2020-06-26 PROCEDURE — 73720 MRI LWR EXTREMITY W/O&W/DYE: CPT | Mod: RT

## 2020-06-26 PROCEDURE — 700117 HCHG RX CONTRAST REV CODE 255: Performed by: NURSE PRACTITIONER

## 2020-06-26 PROCEDURE — A9576 INJ PROHANCE MULTIPACK: HCPCS | Performed by: NURSE PRACTITIONER

## 2020-06-26 RX ADMIN — GADOTERIDOL 15 ML: 279.3 INJECTION, SOLUTION INTRAVENOUS at 11:26

## 2020-07-22 ENCOUNTER — OFFICE VISIT (OUTPATIENT)
Dept: CARDIOLOGY | Facility: MEDICAL CENTER | Age: 65
End: 2020-07-22
Payer: MEDICARE

## 2020-07-22 VITALS
HEART RATE: 68 BPM | HEIGHT: 65 IN | WEIGHT: 150 LBS | SYSTOLIC BLOOD PRESSURE: 140 MMHG | DIASTOLIC BLOOD PRESSURE: 74 MMHG | BODY MASS INDEX: 24.99 KG/M2 | OXYGEN SATURATION: 95 %

## 2020-07-22 DIAGNOSIS — R42 DIZZINESS: ICD-10-CM

## 2020-07-22 DIAGNOSIS — R00.2 PALPITATIONS: ICD-10-CM

## 2020-07-22 DIAGNOSIS — I35.1 MODERATE AORTIC REGURGITATION: ICD-10-CM

## 2020-07-22 DIAGNOSIS — E78.00 PURE HYPERCHOLESTEROLEMIA: ICD-10-CM

## 2020-07-22 PROCEDURE — 99214 OFFICE O/P EST MOD 30 MIN: CPT | Performed by: INTERNAL MEDICINE

## 2020-07-22 RX ORDER — TRAMADOL HYDROCHLORIDE 50 MG/1
TABLET ORAL
COMMUNITY
Start: 2020-07-16 | End: 2020-07-22

## 2020-07-22 RX ORDER — HYDROXYZINE PAMOATE 50 MG/1
CAPSULE ORAL
COMMUNITY
Start: 2020-07-16 | End: 2020-07-22

## 2020-07-22 RX ORDER — METHYLPREDNISOLONE 4 MG/1
TABLET ORAL
COMMUNITY
Start: 2020-06-13 | End: 2020-07-22

## 2020-07-22 ASSESSMENT — FIBROSIS 4 INDEX: FIB4 SCORE: 1.08

## 2020-07-22 ASSESSMENT — ENCOUNTER SYMPTOMS: DIZZINESS: 1

## 2020-07-22 NOTE — PROGRESS NOTES
Cardiology Follow-up Consultation Note    Date of note:   7/22/2020  Primary Care Provider: Chloe Donnelly M.D.  Referring Provider: Sharath Macias M.D.     Patient Name: Shahrzad Scott   YOB: 1955  MRN:              [id number]    Chief Complaint: dizziness    History of Present Illness: Shahrzad Scott is a 65 y.o. female whose current medical problems include dyslipidemia, hypertension, and aortic regurgitation secondary to phen phen who is here for follow-up.    At our visit, 5/30/2018:  She does have occasional mild severity palpitations, sometimes not for a couple months, sometimes every day, and at most 30 minutes. Sometimes these do stop with coughing.  She does not know if her heart rate was irregular.     In terms of hypertension, usually 120s/80s. Did have hypotension with losartan 12.5mg PO Daily, now taking it every other day.     Does not exercise.    At our visit,  11/13/2018:  In terms of hypertension, Had severe dizziness even on taking losartan 12.5mg PO every other day. Currently without medication, BP 110s-120s at home.     HR in the 60s-70s.    She gets dizzy 2-3 times a week, can be severe and feels like she's off balance.  Happens in the shower, when driving in a specific spot on [place].  No syncope. This started about a month ago.  It is much worse when she moves her head back.     Continues to have occasional palpitations.     At our visit, 5/23/2019:  Has worsening thyroid nodules, awaiting thyroidectomy.     Interim Events:   Hypertension well controlled at home.     Continues to have mild to moderate severity dizziness at times, typically once a day which lasts from 2-30 minutes. Gets dizzy everytime she steps into the shower. Sometimes associated with abnormal smells. Has seen ENT and Dr. Dailey. Dizziness is not related to standing up or orthostasis or palpitations. Meclizine does improve this.     Still with rare mild palpitations.       Review of Systems    Neurological: Positive for dizziness.     Constitution: Negative for chills, fever and night sweats.   HENT: Negative for nosebleeds.    Eyes: Negative for vision loss in left eye and vision loss in right eye.   Respiratory: Negative for hemoptysis.    Gastrointestinal: Negative for hematemesis, hematochezia and melena.   Genitourinary: Negative for hematuria.   Neurological: Negative for focal weakness, numbness and paresthesias.     All other systems reviewed and discussed using a comprehensive questionnaire and are negative.       Past Medical History:   Diagnosis Date   • Anesthesia     may req scopalamine patch   • Aortic regurgitation 1996   • Arrhythmia    • Arthritis     hands   • Breath shortness    • Cataract     bilat IOL   • Disorder of thyroid     nodules   • Hemorrhagic disorder (HCC)     nose bleeds   • High cholesterol    • HLD (hyperlipidemia)    • Indigestion    • Mitral regurgitation    • MVP (mitral valve prolapse)    • Palpitations    • PONV (postoperative nausea and vomiting)          Past Surgical History:   Procedure Laterality Date   • THYROID LOBECTOMY Right 7/18/2019    Procedure: LOBECTOMY, THYROID WITH FROZEN SECTION;  Surgeon: Dirk Hilliard M.D.;  Location: SURGERY SAME DAY [facility] ORS;  Service: Ent   • MYRINGOTOMY Bilateral 7/18/2019    Procedure: MYRINGOTOMY WITH TUBES;  Surgeon: Dirk Hilliard M.D.;  Location: SURGERY SAME DAY [facility] ORS;  Service: Ent   • ABDOMINAL HYSTERECTOMY TOTAL     • APPENDECTOMY     • BREAST RECONSTRUCTION Bilateral    • CATARACT EXTRACTION WITH IOL Bilateral     Dr. Wilder   • CRANIOTOMY     • EYE SURGERY     • HYSTERECTOMY, TOTAL ABDOMINAL      at age 37 for uterine fibroid, still has both ovaries   • KNEE ARTHROSCOPY, ACL RECON W/ TIBIALIS TENDON AUTOGRAFT           Current Outpatient Medications   Medication Sig Dispense Refill   • simvastatin (ZOCOR) 10 MG Tab TAKE 1 TABLET EVERY EVENING 90 Tab 2   • niacinamide 500 MG tablet Take 1 Tab by  mouth 2 times a day. 60 Tab 3   • Coenzyme Q10 200 MG Cap Take  by mouth.     • MINIVELLE 0.025 MG/24HR PATCH BIWEEKLY Apply 1 Patch to skin as directed.     • Multiple Vitamins-Minerals (MULTIVITAMIN ADULTS 50+ PO) Take  by mouth.     • Probiotic Product (PROBIOTIC DAILY PO) Take  by mouth.     • Alpha Lipoic Acid 200 MG Cap Take  by mouth.     • Calcium Carbonate-Vitamin D (CALCIUM + D) 600-200 MG-UNIT TABS Take  by mouth every day.     • docosahexanoic acid (OMEGA 3) 1000 MG CAPS Take 1,000 mg by mouth every day.     • vitamin D (CHOLECALCIFEROL) 1000 UNIT TABS Take 2,000 Units by mouth every day. TAKE AS DIRECTED.     • hydrOXYzine pamoate (VISTARIL) 50 MG Cap      • methylPREDNISolone (MEDROL DOSEPAK) 4 MG Tablet Therapy Pack      • tramadol (ULTRAM) 50 MG Tab      • benzonatate (TESSALON) 200 MG capsule Take 1 Cap by mouth 3 times a day as needed for Cough. (Patient not taking: Reported on 7/22/2020) 60 Cap 0   • TURMERIC PO Take 1,000 mg by mouth 2 Times a Day.       No current facility-administered medications for this visit.          Allergies   Allergen Reactions   • Septra [Bactrim] Hives   • Sulfa Drugs Hives         Family History   Problem Relation Age of Onset   • Stroke Mother    • Hypertension Mother    • Cancer Father         prostate cancer   • Lung Disease Sister         asthma   • No Known Problems Brother    • No Known Problems Maternal Grandmother    • No Known Problems Maternal Grandfather    • Other Brother         cerabral palsy         Social History     Socioeconomic History   • Marital status:      Spouse name: Not on file   • Number of children: Not on file   • Years of education: Not on file   • Highest education level: Not on file   Occupational History   • Not on file   Social Needs   • Financial resource strain: Not on file   • Food insecurity     Worry: Not on file     Inability: Not on file   • Transportation needs     Medical: Not on file     Non-medical: Not on file  "  Tobacco Use   • Smoking status: Never Smoker   • Smokeless tobacco: Never Used   Substance and Sexual Activity   • Alcohol use: No   • Drug use: No   • Sexual activity: Never   Lifestyle   • Physical activity     Days per week: Not on file     Minutes per session: Not on file   • Stress: Not on file   Relationships   • Social connections     Talks on phone: Not on file     Gets together: Not on file     Attends Bahai service: Not on file     Active member of club or organization: Not on file     Attends meetings of clubs or organizations: Not on file     Relationship status: Not on file   • Intimate partner violence     Fear of current or ex partner: Not on file     Emotionally abused: Not on file     Physically abused: Not on file     Forced sexual activity: Not on file   Other Topics Concern   • Not on file   Social History Narrative   • Not on file         Physical Exam:  Ambulatory Vitals  /74 (BP Location: Left arm, Patient Position: Sitting)   Pulse 68   Ht 1.651 m (5' 5\")   Wt 68 kg (150 lb)   SpO2 95%    Oxygen Therapy:  Pulse Oximetry: 95 %  BP Readings from Last 4 Encounters:   07/22/20 140/74   05/20/20 118/86   12/24/19 138/90   12/04/19 128/84       Weight/BMI: Body mass index is 24.96 kg/m².  Wt Readings from Last 4 Encounters:   07/22/20 68 kg (150 lb)   05/20/20 69.1 kg (152 lb 6.4 oz)   12/24/19 65.3 kg (144 lb)   12/04/19 66.2 kg (146 lb)       General: Well appearing and in no apparent distress  Eyes: nl conjunctiva  ENT: OP covered by mask, normal external appearance of nose and ears  Neck: JVP <8 cm H20  Lungs: normal respiratory effort, CTAB  Heart: RRR, 2/6 systolic murmur, no rubs or gallops, no edema bilateral lower extremities. No LV/RV heave on cardiac palpatation. 2+ bilateral radial pulses.  2+ bilateral dp pulses.   Abdomen: soft, non tender, non distended, no masses, normal bowel sounds.  No HSM.  Extremities/MSK: no clubbing, no cyanosis  Neurological: No focal sensory " deficits  Psychiatric: Appropriate affect, A/O x 3, intact judgement and insight  Skin: Warm extremities    Exam repeated in full and unchanged except for as noted above.    Lab Data Review:  Lab Results   Component Value Date/Time    CHOLSTRLTOT 200 (H) 05/11/2020 07:31 AM     (H) 05/11/2020 07:31 AM    HDL 59 05/11/2020 07:31 AM    TRIGLYCERIDE 179 (H) 05/11/2020 07:31 AM       Lab Results   Component Value Date/Time    SODIUM 140 05/11/2020 07:31 AM    SODIUM 138 07/12/2019 04:17 PM    POTASSIUM 4.3 05/11/2020 07:31 AM    POTASSIUM 4.2 07/12/2019 04:17 PM    CHLORIDE 103 05/11/2020 07:31 AM    CHLORIDE 105 07/12/2019 04:17 PM    CO2 22 05/11/2020 07:31 AM    CO2 27 07/12/2019 04:17 PM    GLUCOSE 104 (H) 05/11/2020 07:31 AM    GLUCOSE 91 07/12/2019 04:17 PM    BUN 15 05/11/2020 07:31 AM    BUN 13 07/12/2019 04:17 PM    CREATININE 0.81 05/11/2020 07:31 AM    CREATININE 0.94 07/12/2019 04:17 PM    BUNCREATRAT 19 05/11/2020 07:31 AM     Lab Results   Component Value Date/Time    ALKPHOSPHAT 62 05/11/2020 07:31 AM    ASTSGOT 26 05/11/2020 07:31 AM    ALTSGPT 32 05/11/2020 07:31 AM    TBILIRUBIN 0.3 05/11/2020 07:31 AM      Lab Results   Component Value Date/Time    WBC 4.7 05/11/2020 07:31 AM    WBC 5.6 07/12/2019 04:17 PM     No components found for: HBGA1C  No components found for: TROPONIN  No components found for: BNP      Cardiac Imaging and Procedures Review:    EKG dated 5/30/2018 : My personal interpretation is NSR LAE    Echo dated 10/24/2017:   CONCLUSIONS  Prior echo done 12/06/16.Normal left ventricular chamber size.  Left ventricular ejection fraction is visually estimated to be 60%.  Aortic sclerosis without stenosis.  Moderate aortic insufficiency.  Mild mitral regurgitation.  Mild tricuspid regurgitation.  Mild to moderate pulmonic insufficiency.  No pericardial effusion seen.  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    Echo 10/12/2018:  CONCLUSIONS  Compared to the images of the  prior study done - 10/24/17, no   significant changes are noted.  Normal left ventricular systolic function.  Left ventricular ejection fraction is visually estimated to be 60%.  Normal diastolic function.  Normal inferior vena cava size and inspiratory collapse.  Estimated right ventricular systolic pressure  is 29 mmHg.  Aortic sclerosis without stenosis.    TTE 10/2019:  CONCLUSIONS  Normal right and left ventricular size and function.   Left ventricular ejection fraction is visually estimated to be 70%.  Aortic sclerosis without stenosis.  Mild aortic insufficiency.  Right atrial pressure is estimated to be 3 mmHg.  Right ventricular systolic pressure is estimated to be 35 mmHg.     Compared to the images of the prior study done 10/12/2018: The aortic   insufficiency now appears clearly mild.       CCS 2018:  Coronary calcification:  LMA - 0.0  LCX - 0.0  LAD - 0.0  RCA - 0.0  PDA - 0.0    Calcium score:  0    Carotid US 2018:  Vascular Laboratory   CONCLUSIONS   1.  Minimal plaque in the right carotid bulb without stenosis.    2.  Normal left carotid arterial exam.    3.  Normal bilateral subclavian and vertebral arterial exam.      Medical Decision Makin. MVP (mitral valve prolapse)  Not seen on recent echos. No need for monitoring.     2. Mild aortic regurgitation  Repeat echo Q 3 years.     3. Essential hypertension  Well controlled now off medications.  Significant symptoms even on low dose losartan.     4. Primary prevention  -continue statin if she wants, CCS 0.     5. Palpitations  2 week ziopatch vs SEEQ monitor to check for a fib if they do worsen in severity.     6. Dizziness - likely BPPV, will discuss with PCP. negative carotid US. Has had extremely extensive work-up with ENT and neurology including ambulatory EEG it sounds like. Will request records.  -request Dr. Hilliard and Dr. Dailey records.   -2 week ziopatch to rule out arrhythmia.   -doubt tilt table will be useful, but could  perform.     Return in about 1 year (around 7/22/2021).      Hans Higuera MD, [facility], [facility]  [address] [address]  Phone: [phone]  Fax: [phone]

## 2020-08-05 ENCOUNTER — PHYSICAL THERAPY (OUTPATIENT)
Dept: PHYSICAL THERAPY | Facility: REHABILITATION | Age: 65
End: 2020-08-05
Attending: ORTHOPAEDIC SURGERY
Payer: MEDICARE

## 2020-08-05 DIAGNOSIS — R22.40 LOCALIZED SWELLING, MASS, OR LUMP OF LOWER EXTREMITY, UNSPECIFIED LATERALITY: ICD-10-CM

## 2020-08-05 DIAGNOSIS — M79.671 PAIN IN RIGHT FOOT: ICD-10-CM

## 2020-08-05 PROCEDURE — 97162 PT EVAL MOD COMPLEX 30 MIN: CPT

## 2020-08-05 PROCEDURE — 97110 THERAPEUTIC EXERCISES: CPT

## 2020-08-05 SDOH — ECONOMIC STABILITY: GENERAL: QUALITY OF LIFE: GOOD

## 2020-08-05 ASSESSMENT — ENCOUNTER SYMPTOMS
EXACERBATED BY: STAIR CLIMBING
PAIN TIMING: CONSTANT
EXACERBATED BY: WALKING
PAIN SCALE: 1
QUALITY: SHARP
QUALITY: DULL ACHE
EXACERBATED BY: RUNNING
AWAKENINGS PER NIGHT: 3
PAIN SCALE AT HIGHEST: 4
PAIN SCALE AT LOWEST: 0
PAIN TIMING: IN THE EVENING
ALLEVIATING FACTORS: HOME EXERCISE PROGRAM
EXACERBATED BY: STANDING
ALLEVIATING FACTORS: COLD/ICE

## 2020-08-05 ASSESSMENT — ACTIVITIES OF DAILY LIVING (ADL): AMBULATION_WITHOUT_ASSISTIVE_DEVICE: INDEPENDENT

## 2020-08-05 NOTE — OP THERAPY EVALUATION
Outpatient Physical Therapy  INITIAL EVALUATION    [facility] Physical Therapy [address], [address]  [place] [address]  Phone:  [phone]    Date of Evaluation: 2020    Patient: Shahrzad Scott  YOB: 1955  MRN: [id number]     Referring Provider: Souleymane Diamond M.D.  [address] [place] [address]   Referring Diagnosis Pain in right foot [M79.671];Localized swelling, mass and lump, unspecified lower limb [R22.40]     Time Calculation    Start time: 0130  Stop time: 0230 Time Calculation (min): 60 minutes         Chief Complaint: Foot Problem and Ankle Problem    Visit Diagnoses     ICD-10-CM   1. Localized swelling, mass, or lump of lower extremity, unspecified laterality  R22.40   2. Pain in right foot  M79.671         Subjective:   History of Present Illness:     Date of onset:  2020    Mechanism of injury:  The patient is a 65 year old female who reports (R) foot/ ankle post op (R) cyst removal; hx of plantarfascitis. Patient had surgery on 20 and has been limited in walking going up and down the steps and standing > 1 hour at a time. Previously used a boot to walk with < 2 weeks and is not using any support. She would like to increase her strength, ROM and improve gait ans decrease the nerve The patient has numbness to digits 1-2 of the (R) foot. She was taking Tylenol and Advil for her pain ut hs not needed any in the last 2 days.  Quality of life:  Good  Sleep disturbance:  Interrupted sleep  # Times/Night awakened:  3  Pain:     Current pain ratin    At best pain ratin    At worst pain ratin    Quality:  Dull ache and sharp    Pain timing:  In the evening and constant    Relieving factors:  Cold/ice and home exercise program    Aggravating factors:  Walking, stair climbing, running and standing    Pain Comments::  Difficulty putting on a shoe cannot wear shoes wears sandals due to swelling in foot.  Social Support:     Lives in:   One-story house    Lives with:  Alone  Hand dominance:  Left  Treatments:     Previous treatment:  Physical therapy and medication    Current treatment:  Ice, elevation and rest  Patient Goals:     Patient goals for therapy:  Decreased pain, increased motion and independence with ADLs/IADLs    Other patient goals:  Increase mobility and decrease pain       Past Medical History:   Diagnosis Date   • Anesthesia     may req scopalamine patch   • Aortic regurgitation 1996   • Arrhythmia    • Arthritis     hands   • Breath shortness    • Cataract     bilat IOL   • Disorder of thyroid     nodules   • Hemorrhagic disorder (HCC)     nose bleeds   • High cholesterol    • HLD (hyperlipidemia)    • Indigestion    • Mitral regurgitation    • MVP (mitral valve prolapse)    • Palpitations    • PONV (postoperative nausea and vomiting)      Past Surgical History:   Procedure Laterality Date   • THYROID LOBECTOMY Right 7/18/2019    Procedure: LOBECTOMY, THYROID WITH FROZEN SECTION;  Surgeon: Dirk Hilliard M.D.;  Location: SURGERY SAME DAY [facility] ORS;  Service: Ent   • MYRINGOTOMY Bilateral 7/18/2019    Procedure: MYRINGOTOMY WITH TUBES;  Surgeon: Dirk Hilliard M.D.;  Location: SURGERY SAME DAY [facility] ORS;  Service: Ent   • ABDOMINAL HYSTERECTOMY TOTAL     • APPENDECTOMY     • BREAST RECONSTRUCTION Bilateral    • CATARACT EXTRACTION WITH IOL Bilateral     Dr. Wilder   • CRANIOTOMY     • EYE SURGERY     • HYSTERECTOMY, TOTAL ABDOMINAL      at age 37 for uterine fibroid, still has both ovaries   • KNEE ARTHROSCOPY, ACL RECON W/ TIBIALIS TENDON AUTOGRAFT       Social History     Tobacco Use   • Smoking status: Never Smoker   • Smokeless tobacco: Never Used   Substance Use Topics   • Alcohol use: No     Family and Occupational History     Socioeconomic History   • Marital status:      Spouse name: Not on file   • Number of children: Not on file   • Years of education: Not on file   • Highest education level: Not on file    Occupational History   • Not on file       Objective     Static Posture     Ankle/Foot   Ankle/Foot (Right): Pes cavus and supinated.     Observations     Right Ankle/Foot   Positive for edema and incision.     Additional Observation Details  Patient has tenderness to the incisional region between digits 1-2 of (R) foot    Neurological Testing     Sensation     Ankle/Foot   Left Ankle/Foot   Intact: light touch and proprioception    Right Ankle/Foot   Diminished: light touch and proprioception    Tenderness     Right Ankle/Foot   Tenderness in the first metatarsal head.     Additional Tenderness Details  Tenderness between digits 1-2 of (R) between the web of the foot    Active Range of Motion   Left Ankle/Foot   Dorsiflexion (ke): 12 degrees   Plantar flexion: 60 degrees   Inversion: 40 degrees   Eversion: 40 degrees     Right Ankle/Foot   Dorsiflexion (ke): 10 degrees   Plantar flexion: 60 degrees   Inversion: 40 degrees   Eversion: 35 degrees     Strength:      Left Ankle/Foot   Dorsiflexion: 5  Plantar flexion: 5  Inversion: 5  Eversion: 5  Great toe flexion: 5  Great toe extension: 5    Right Ankle/Foot   Dorsiflexion: 5  Plantar flexion: 4+  Inversion: 4+  Eversion: 5  Great toe flexion: 4+  Great toe extension: 4+  Ambulation   Weight-Bearing Status   Weight-Bearing Status (Left): flat foot weight-bearing   Weight-Bearing Status (Right): flat foot weight-bearing    Assistive device used: none    Additional Weight-Bearing Status Details  Patient walking with supinated (R) foot during gait pattern    Ambulation: Level Surfaces   Ambulation without assistive device: independent    Observational Gait   Gait: asymmetric   Left swing time, right swing time, left step length and right step length within functional limits. Increased walking speed. Decreased stride length and right stance time.     Functional Assessment   Squat   Trunk lean left and sitting toward left side.     Single Leg Stance   Left: 15  seconds  Right: 3 seconds    Comments  Unable to stand over the (R) foot due to continued pain to the ball of the foot; has to deviate stance and foot posture to lateral side of the foot.         Therapeutic Exercises (CPT 17764):     1. Heel/toe raises, 10x    Therapeutic Treatments and Modalities:     1. Manual Therapy (CPT 19255), (R) foot/ankle , STM between (R) phalanx and 2nd digit; tenderness to incison and dorsum bottom of foot    Time-based treatments/modalities:    Physical Therapy Timed Treatment Charges  Manual therapy minutes (CPT 02988): 8 minutes  Therapeutic exercise minutes (CPT 35544): 8 minutes      Assessment, Response and Plan:   Impairments: activity intolerance, impaired functional mobility, lacks appropriate home exercise program and pain with function    Assessment details:  The patient is a 65 year old female who reports having (R) foot pain post op surgery for tumor removal. Patient has limitations in weightbearing and deviated gait pattern walking with supination to the (R) foot. Patient has diminished sensation to (R) foot in digits 1-2.   AROM in (R) foot eversion at 35 deg; observed in gait pattern as well. Patient has increase pain with manual strength testing in plantarflexion and inversion; great toe in flexion and extension due to pain. Patient would benefit from skilled physical therapy to improve gait and balance, AROM, strength and conditioning and activity toleration.  Barriers to therapy:  None  Prognosis: good    Goals:   Short Term Goals:   1) Indep with HEP   2) Walking increasing distances with indep of proper gait pattern 25-50% of the time  Short term goal time span:  2-4 weeks      Long Term Goals:    1) Progressions/regression advancing HEP   2) Decreased pain while weightbearing over (R) foot while taking steps 50% or more   3) Negotiates steps reciprically without pain   Long term goal time span:  4-6 weeks    Plan:   Therapy options:  Physical therapy treatment to  continue  Planned therapy interventions:  E Stim Unattended (CPT 15724), Functional Training, Self Care (CPT 45025), Gait Training (CPT 15335), Manual Therapy (CPT 80982), Neuromuscular Re-education (CPT 67284), Self Care ADL Training (CPT 01583), Therapeutic Activities (CPT 42765) and Therapeutic Exercise (CPT 52911)  Frequency:  2x week  Duration in weeks:  6  Duration in visits:  12  Discussed with:  Patient      Functional Assessment Used        Referring provider co-signature:  I have reviewed this plan of care and my co-signature certifies the need for services.    Certification Period: 08/05/2020 to  09/16/20    Physician Signature: ________________________________ Date: ______________

## 2020-08-06 NOTE — ASSESSMENT & PLAN NOTE
Patient is taking ranitidine 150 mg twice a day. Her symptoms has intermittent flareup depending on that diet. She has never tried to stop taking ranitidine. She will try to control her diet and will try to stop taking ranitidine if she can.   EP PROCEDURE NOTE

## 2020-08-07 ENCOUNTER — NON-PROVIDER VISIT (OUTPATIENT)
Dept: CARDIOLOGY | Facility: MEDICAL CENTER | Age: 65
End: 2020-08-07
Attending: INTERNAL MEDICINE
Payer: MEDICARE

## 2020-08-07 ENCOUNTER — TELEPHONE (OUTPATIENT)
Dept: CARDIOLOGY | Facility: MEDICAL CENTER | Age: 65
End: 2020-08-07

## 2020-08-07 DIAGNOSIS — R42 DIZZINESS: ICD-10-CM

## 2020-08-07 DIAGNOSIS — R00.2 PALPITATIONS: ICD-10-CM

## 2020-08-07 DIAGNOSIS — I47.10 SVT (SUPRAVENTRICULAR TACHYCARDIA) (HCC): ICD-10-CM

## 2020-08-07 PROCEDURE — 93268 ECG RECORD/REVIEW: CPT | Performed by: INTERNAL MEDICINE

## 2020-08-07 NOTE — TELEPHONE ENCOUNTER
Patient enrolled in the ePatch monitoring  Program per .  In-Clinic hookup.    >Currently pending EOS.

## 2020-08-12 ENCOUNTER — PHYSICAL THERAPY (OUTPATIENT)
Dept: PHYSICAL THERAPY | Facility: REHABILITATION | Age: 65
End: 2020-08-12
Attending: ORTHOPAEDIC SURGERY
Payer: MEDICARE

## 2020-08-12 DIAGNOSIS — M79.671 PAIN IN RIGHT FOOT: ICD-10-CM

## 2020-08-12 DIAGNOSIS — R22.40 LOCALIZED SWELLING, MASS, OR LUMP OF LOWER EXTREMITY, UNSPECIFIED LATERALITY: ICD-10-CM

## 2020-08-12 PROCEDURE — 97140 MANUAL THERAPY 1/> REGIONS: CPT

## 2020-08-12 PROCEDURE — 97110 THERAPEUTIC EXERCISES: CPT

## 2020-08-12 NOTE — OP THERAPY DAILY TREATMENT
Outpatient Physical Therapy  DAILY TREATMENT     [facility] Outpatient Physical Therapy [address], [address]  [place] [address]  Phone:  [phone]    Date: 08/12/2020    Patient: Shahrzad Scott  YOB: 1955  MRN: [id number]     Time Calculation    Start time: 0130  Stop time: 0200 Time Calculation (min): 30 minutes         Chief Complaint: Ankle Problem and Foot Problem    Visit #: 2    SUBJECTIVE:  The patient reports less pain tenderness and swelling over the past 1-2 weeks; feels as though her numbness is less intense to the dorsum of the (R) foot    OBJECTIVE:  Current objective measures:     balance and strengthening of (R) LE       Therapeutic Exercises (CPT 83704):     1. Heel/toe raises, 10x    2. Nu step bike , L5 at 7 minuters     3. Gastroc stretch, 3 x30 sec    4. Toe scrunches, 3'    5. 1/2 foam roller inversion/eversion/ plantarflexion/ dorsiflexion, 4'    Therapeutic Treatments and Modalities:     1. Manual Therapy (CPT 04706), (R) foot/ankle , STM between (R) phalanx and 2nd digit; tenderness to incison and dorsum bottom of foot    2. Neuromuscular Re-education (CPT 47914), (R) LE , proprioception, balance, SLS over foam roller, walking on toes     Time-based treatments/modalities:    Physical Therapy Timed Treatment Charges  Manual therapy minutes (CPT 71574): 8 minutes  Therapeutic exercise minutes (CPT 54613): 19 minutes        ASSESSMENT:   Response to treatment:   STM to the (R) phalanx and 2nd digit; patient has less tenderness with moderate pressure;  Patient performed short base of support with activity over 1/2 foam roller in inversion/eversion and plantarflexion and dorsiflexion; occasional support needed to compensate     PLAN/RECOMMENDATIONS:   Plan for treatment: therapy treatment to continue next visit.  Planned interventions for next visit: continue with current treatment.

## 2020-08-14 ENCOUNTER — APPOINTMENT (OUTPATIENT)
Dept: PHYSICAL THERAPY | Facility: REHABILITATION | Age: 65
End: 2020-08-14
Attending: ORTHOPAEDIC SURGERY
Payer: MEDICARE

## 2020-08-17 DIAGNOSIS — E78.00 PURE HYPERCHOLESTEROLEMIA: ICD-10-CM

## 2020-08-19 ENCOUNTER — APPOINTMENT (OUTPATIENT)
Dept: PHYSICAL THERAPY | Facility: REHABILITATION | Age: 65
End: 2020-08-19
Attending: ORTHOPAEDIC SURGERY
Payer: MEDICARE

## 2020-08-19 RX ORDER — SIMVASTATIN 10 MG
TABLET ORAL
Qty: 90 TAB | Refills: 3 | Status: SHIPPED | OUTPATIENT
Start: 2020-08-19 | End: 2021-08-16

## 2020-08-21 ENCOUNTER — APPOINTMENT (OUTPATIENT)
Dept: PHYSICAL THERAPY | Facility: REHABILITATION | Age: 65
End: 2020-08-21
Attending: ORTHOPAEDIC SURGERY
Payer: MEDICARE

## 2020-08-24 ENCOUNTER — APPOINTMENT (RX ONLY)
Dept: URBAN - METROPOLITAN AREA CLINIC 4 | Facility: CLINIC | Age: 65
Setting detail: DERMATOLOGY
End: 2020-08-24

## 2020-08-24 DIAGNOSIS — L81.4 OTHER MELANIN HYPERPIGMENTATION: ICD-10-CM

## 2020-08-24 DIAGNOSIS — D22 MELANOCYTIC NEVI: ICD-10-CM

## 2020-08-24 DIAGNOSIS — L57.0 ACTINIC KERATOSIS: ICD-10-CM

## 2020-08-24 DIAGNOSIS — L50.3 DERMATOGRAPHIC URTICARIA: ICD-10-CM

## 2020-08-24 PROBLEM — D22.62 MELANOCYTIC NEVI OF LEFT UPPER LIMB, INCLUDING SHOULDER: Status: ACTIVE | Noted: 2020-08-24

## 2020-08-24 PROBLEM — D22.5 MELANOCYTIC NEVI OF TRUNK: Status: ACTIVE | Noted: 2020-08-24

## 2020-08-24 PROCEDURE — ? LIQUID NITROGEN

## 2020-08-24 PROCEDURE — 17000 DESTRUCT PREMALG LESION: CPT

## 2020-08-24 PROCEDURE — 99213 OFFICE O/P EST LOW 20 MIN: CPT | Mod: 25

## 2020-08-24 PROCEDURE — ? MEDICATION COUNSELING

## 2020-08-24 PROCEDURE — ? PRESCRIPTION

## 2020-08-24 PROCEDURE — ? COUNSELING

## 2020-08-24 PROCEDURE — ? ADDITIONAL NOTES

## 2020-08-24 PROCEDURE — 17003 DESTRUCT PREMALG LES 2-14: CPT

## 2020-08-24 RX ORDER — CLOBETASOL PROPIONATE 0.5 MG/G
CREAM TOPICAL
Qty: 1 | Refills: 2 | Status: ERX

## 2020-08-24 ASSESSMENT — LOCATION ZONE DERM
LOCATION ZONE: FACE
LOCATION ZONE: NOSE
LOCATION ZONE: HAND
LOCATION ZONE: TRUNK
LOCATION ZONE: ARM

## 2020-08-24 ASSESSMENT — LOCATION SIMPLE DESCRIPTION DERM
LOCATION SIMPLE: ABDOMEN
LOCATION SIMPLE: LEFT CHEEK
LOCATION SIMPLE: NOSE
LOCATION SIMPLE: RIGHT HAND
LOCATION SIMPLE: LEFT BUTTOCK
LOCATION SIMPLE: LEFT UPPER ARM

## 2020-08-24 ASSESSMENT — LOCATION DETAILED DESCRIPTION DERM
LOCATION DETAILED: RIGHT RADIAL DORSAL HAND
LOCATION DETAILED: RIGHT RIB CAGE
LOCATION DETAILED: LEFT CENTRAL BUCCAL CHEEK
LOCATION DETAILED: NASAL ROOT
LOCATION DETAILED: LEFT BUTTOCK
LOCATION DETAILED: LEFT ANTERIOR PROXIMAL UPPER ARM

## 2020-08-24 NOTE — PROCEDURE: COUNSELING
Detail Level: Generalized
Detail Level: Zone
Patient Specific Counseling (Will Not Stick From Patient To Patient): F/u 6 months, sooner if recurring and growing; ddx: inflamed lentigines
Detail Level: Detailed

## 2020-08-24 NOTE — PROCEDURE: ADDITIONAL NOTES
Additional Notes: Pt takes Claritin daily without benefit.  May change to alternate nonsedating antihistamine.  \\nRecommend patient starting antihistamine such as Benadryl once before bedtime\\nFeels clobetasol is helpful when flaring.  Warned against chronic daily use.
Detail Level: Simple

## 2020-08-24 NOTE — PROCEDURE: MEDICATION COUNSELING
Rituxan Counseling:  I discussed with the patient the risks of Rituxan infusions. Side effects can include infusion reactions, severe drug rashes including mucocutaneous reactions, reactivation of latent hepatitis and other infections and rarely progressive multifocal leukoencephalopathy.  All of the patient's questions and concerns were addressed.
Nsaids Counseling: NSAID Counseling: I discussed with the patient that NSAIDs should be taken with food. Prolonged use of NSAIDs can result in the development of stomach ulcers.  Patient advised to stop taking NSAIDs if abdominal pain occurs.  The patient verbalized understanding of the proper use and possible adverse effects of NSAIDs.  All of the patient's questions and concerns were addressed.
Xeljanz Counseling: I discussed with the patient the risks of Xeljanz therapy including increased risk of infection, liver issues, headache, diarrhea, or cold symptoms. Live vaccines should be avoided. They were instructed to call if they have any problems.
Bactrim Counseling:  I discussed with the patient the risks of sulfa antibiotics including but not limited to GI upset, allergic reaction, drug rash, diarrhea, dizziness, photosensitivity, and yeast infections.  Rarely, more serious reactions can occur including but not limited to aplastic anemia, agranulocytosis, methemoglobinemia, blood dyscrasias, liver or kidney failure, lung infiltrates or desquamative/blistering drug rashes.
Colchicine Counseling:  Patient counseled regarding adverse effects including but not limited to stomach upset (nausea, vomiting, stomach pain, or diarrhea).  Patient instructed to limit alcohol consumption while taking this medication.  Colchicine may reduce blood counts especially with prolonged use.  The patient understands that monitoring of kidney function and blood counts may be required, especially at baseline. The patient verbalized understanding of the proper use and possible adverse effects of colchicine.  All of the patient's questions and concerns were addressed.
Ketoconazole Counseling:   Patient counseled regarding improving absorption with orange juice.  Adverse effects include but are not limited to breast enlargement, headache, diarrhea, nausea, upset stomach, liver function test abnormalities, taste disturbance, and stomach pain.  There is a rare possibility of liver failure that can occur when taking ketoconazole. The patient understands that monitoring of LFTs may be required, especially at baseline. The patient verbalized understanding of the proper use and possible adverse effects of ketoconazole.  All of the patient's questions and concerns were addressed.
Calcipotriene Counseling:  I discussed with the patient the risks of calcipotriene including but not limited to erythema, scaling, itching, and irritation.
Ivermectin Pregnancy And Lactation Text: This medication is Pregnancy Category C and it isn't known if it is safe during pregnancy. It is also excreted in breast milk.
Thalidomide Counseling: I discussed with the patient the risks of thalidomide including but not limited to birth defects, anxiety, weakness, chest pain, dizziness, cough and severe allergy.
Quinolones Pregnancy And Lactation Text: This medication is Pregnancy Category C and it isn't know if it is safe during pregnancy. It is also excreted in breast milk.
Acitretin Pregnancy And Lactation Text: This medication is Pregnancy Category X and should not be given to women who are pregnant or may become pregnant in the future. This medication is excreted in breast milk.
Topical Clindamycin Pregnancy And Lactation Text: This medication is Pregnancy Category B and is considered safe during pregnancy. It is unknown if it is excreted in breast milk.
Colchicine Pregnancy And Lactation Text: This medication is Pregnancy Category C and isn't considered safe during pregnancy. It is excreted in breast milk.
Mirvaso Counseling: Mirvaso is a topical medication which can decrease superficial blood flow where applied. Side effects are uncommon and include stinging, redness and allergic reactions.
Ketoconazole Pregnancy And Lactation Text: This medication is Pregnancy Category C and it isn't know if it is safe during pregnancy. It is also excreted in breast milk and breast feeding isn't recommended.
Rituxan Pregnancy And Lactation Text: This medication is Pregnancy Category C and it isn't know if it is safe during pregnancy. It is unknown if this medication is excreted in breast milk but similar antibodies are known to be excreted.
Nsaids Pregnancy And Lactation Text: These medications are considered safe up to 30 weeks gestation. It is excreted in breast milk.
Xelflynnz Pregnancy And Lactation Text: This medication is Pregnancy Category D and is not considered safe during pregnancy.  The risk during breast feeding is also uncertain.
Calcipotriene Pregnancy And Lactation Text: This medication has not been proven safe during pregnancy. It is unknown if this medication is excreted in breast milk.
Bactrim Pregnancy And Lactation Text: This medication is Pregnancy Category D and is known to cause fetal risk.  It is also excreted in breast milk.
Rifampin Counseling: I discussed with the patient the risks of rifampin including but not limited to liver damage, kidney damage, red-orange body fluids, nausea/vomiting and severe allergy.
Mirvaso Pregnancy And Lactation Text: This medication has not been assigned a Pregnancy Risk Category. It is unknown if the medication is excreted in breast milk.
Topical Sulfur Applications Counseling: Topical Sulfur Counseling: Patient counseled that this medication may cause skin irritation or allergic reactions.  In the event of skin irritation, the patient was advised to reduce the amount of the drug applied or use it less frequently.   The patient verbalized understanding of the proper use and possible adverse effects of topical sulfur application.  All of the patient's questions and concerns were addressed.
Thalidomide Pregnancy And Lactation Text: This medication is Pregnancy Category X and is absolutely contraindicated during pregnancy. It is unknown if it is excreted in breast milk.
Bexarotene Counseling:  I discussed with the patient the risks of bexarotene including but not limited to hair loss, dry lips/skin/eyes, liver abnormalities, hyperlipidemia, pancreatitis, depression/suicidal ideation, photosensitivity, drug rash/allergic reactions, hypothyroidism, anemia, leukopenia, infection, cataracts, and teratogenicity.  Patient understands that they will need regular blood tests to check lipid profile, liver function tests, white blood cell count, thyroid function tests and pregnancy test if applicable.
Xolair Counseling:  Patient informed of potential adverse effects including but not limited to fever, muscle aches, rash and allergic reactions.  The patient verbalized understanding of the proper use and possible adverse effects of Xolair.  All of the patient's questions and concerns were addressed.
Cephalexin Counseling: I counseled the patient regarding use of cephalexin as an antibiotic for prophylactic and/or therapeutic purposes. Cephalexin (commonly prescribed under brand name Keflex) is a cephalosporin antibiotic which is active against numerous classes of bacteria, including most skin bacteria. Side effects may include nausea, diarrhea, gastrointestinal upset, rash, hives, yeast infections, and in rare cases, hepatitis, kidney disease, seizures, fever, confusion, neurologic symptoms, and others. Patients with severe allergies to penicillin medications are cautioned that there is about a 10% incidence of cross-reactivity with cephalosporins. When possible, patients with penicillin allergies should use alternatives to cephalosporins for antibiotic therapy.
Dapsone Counseling: I discussed with the patient the risks of dapsone including but not limited to hemolytic anemia, agranulocytosis, rashes, methemoglobinemia, kidney failure, peripheral neuropathy, headaches, GI upset, and liver toxicity.  Patients who start dapsone require monitoring including baseline LFTs and weekly CBCs for the first month, then every month thereafter.  The patient verbalized understanding of the proper use and possible adverse effects of dapsone.  All of the patient's questions and concerns were addressed.
Terbinafine Counseling: Patient counseling regarding adverse effects of terbinafine including but not limited to headache, diarrhea, rash, upset stomach, liver function test abnormalities, itching, taste/smell disturbance, nausea, abdominal pain, and flatulence.  There is a rare possibility of liver failure that can occur when taking terbinafine.  The patient understands that a baseline LFT and kidney function test may be required. The patient verbalized understanding of the proper use and possible adverse effects of terbinafine.  All of the patient's questions and concerns were addressed.
5-Fu Counseling: 5-Fluorouracil Counseling:  I discussed with the patient the risks of 5-fluorouracil including but not limited to erythema, scaling, itching, weeping, crusting, and pain.
Odomzo Counseling- I discussed with the patient the risks of Odomzo including but not limited to nausea, vomiting, diarrhea, constipation, weight loss, changes in the sense of taste, decreased appetite, muscle spasms, and hair loss.  The patient verbalized understanding of the proper use and possible adverse effects of Odomzo.  All of the patient's questions and concerns were addressed.
Tranexamic Acid Counseling:  Patient advised of the small risk of bleeding problems with tranexamic acid. They were also instructed to call if they developed any nausea, vomiting or diarrhea. All of the patient's questions and concerns were addressed.
Siliq Counseling:  I discussed with the patient the risks of Siliq including but not limited to new or worsening depression, suicidal thoughts and behavior, immunosuppression, malignancy, posterior leukoencephalopathy syndrome, and serious infections.  The patient understands that monitoring is required including a PPD at baseline and must alert us or the primary physician if symptoms of infection or other concerning signs are noted. There is also a special program designed to monitor depression which is required with Siliq.
Bexarotene Pregnancy And Lactation Text: This medication is Pregnancy Category X and should not be given to women who are pregnant or may become pregnant. This medication should not be used if you are breast feeding.
Rifampin Pregnancy And Lactation Text: This medication is Pregnancy Category C and it isn't know if it is safe during pregnancy. It is also excreted in breast milk and should not be used if you are breast feeding.
Picato Counseling:  I discussed with the patient the risks of Picato including but not limited to erythema, scaling, itching, weeping, crusting, and pain.
Topical Sulfur Applications Pregnancy And Lactation Text: This medication is Pregnancy Category C and has an unknown safety profile during pregnancy. It is unknown if this topical medication is excreted in breast milk.
Dapsone Pregnancy And Lactation Text: This medication is Pregnancy Category C and is not considered safe during pregnancy or breast feeding.
Xolair Pregnancy And Lactation Text: This medication is Pregnancy Category B and is considered safe during pregnancy. This medication is excreted in breast milk.
Cephalexin Pregnancy And Lactation Text: This medication is Pregnancy Category B and considered safe during pregnancy.  It is also excreted in breast milk but can be used safely for shorter doses.
Terbinafine Pregnancy And Lactation Text: This medication is Pregnancy Category B and is considered safe during pregnancy. It is also excreted in breast milk and breast feeding isn't recommended.
5-Fu Pregnancy And Lactation Text: This medication is Pregnancy Category X and contraindicated in pregnancy and in women who may become pregnant. It is unknown if this medication is excreted in breast milk.
Tranexamic Acid Pregnancy And Lactation Text: It is unknown if this medication is safe during pregnancy or breast feeding.
Wartpeel Counseling:  I discussed with the patient the risks of Wartpeel including but not limited to erythema, scaling, itching, weeping, crusting, and pain.
Siliq Pregnancy And Lactation Text: The risk during pregnancy and breastfeeding is uncertain with this medication.
Picato Pregnancy And Lactation Text: This medication is Pregnancy Category C. It is unknown if this medication is excreted in breast milk.
Isotretinoin Counseling: Patient should get monthly blood tests, not donate blood, not drive at night if vision affected, not share medication, and not undergo elective surgery for 6 months after tx completed. Side effects reviewed, pt to contact office should one occur.
Erivedge Counseling- I discussed with the patient the risks of Erivedge including but not limited to nausea, vomiting, diarrhea, constipation, weight loss, changes in the sense of taste, decreased appetite, muscle spasms, and hair loss.  The patient verbalized understanding of the proper use and possible adverse effects of Erivedge.  All of the patient's questions and concerns were addressed.
Sarecycline Counseling: Patient advised regarding possible photosensitivity and discoloration of the teeth, skin, lips, tongue and gums.  Patient instructed to avoid sunlight, if possible.  When exposed to sunlight, patients should wear protective clothing, sunglasses, and sunscreen.  The patient was instructed to call the office immediately if the following severe adverse effects occur:  hearing changes, easy bruising/bleeding, severe headache, or vision changes.  The patient verbalized understanding of the proper use and possible adverse effects of sarecycline.  All of the patient's questions and concerns were addressed.
Drysol Counseling:  I discussed with the patient the risks of drysol/aluminum chloride including but not limited to skin rash, itching, irritation, burning.
Otezla Counseling: The side effects of Otezla were discussed with the patient, including but not limited to worsening or new depression, weight loss, diarrhea, nausea, upper respiratory tract infection, and headache. Patient instructed to call the office should any adverse effect occur.  The patient verbalized understanding of the proper use and possible adverse effects of Otezla.  All the patient's questions and concerns were addressed.
Cimzia Counseling:  I discussed with the patient the risks of Cimzia including but not limited to immunosuppression, allergic reactions and infections.  The patient understands that monitoring is required including a PPD at baseline and must alert us or the primary physician if symptoms of infection or other concerning signs are noted.
Simponi Counseling:  I discussed with the patient the risks of golimumab including but not limited to myelosuppression, immunosuppression, autoimmune hepatitis, demyelinating diseases, lymphoma, and serious infections.  The patient understands that monitoring is required including a PPD at baseline and must alert us or the primary physician if symptoms of infection or other concerning signs are noted.
Valtrex Counseling: I discussed with the patient the risks of valacyclovir including but not limited to kidney damage, nausea, vomiting and severe allergy.  The patient understands that if the infection seems to be worsening or is not improving, they are to call.
Clindamycin Counseling: I counseled the patient regarding use of clindamycin as an antibiotic for prophylactic and/or therapeutic purposes. Clindamycin is active against numerous classes of bacteria, including skin bacteria. Side effects may include nausea, diarrhea, gastrointestinal upset, rash, hives, yeast infections, and in rare cases, colitis.
Azathioprine Counseling:  I discussed with the patient the risks of azathioprine including but not limited to myelosuppression, immunosuppression, hepatotoxicity, lymphoma, and infections.  The patient understands that monitoring is required including baseline LFTs, Creatinine, possible TPMP genotyping and weekly CBCs for the first month and then every 2 weeks thereafter.  The patient verbalized understanding of the proper use and possible adverse effects of azathioprine.  All of the patient's questions and concerns were addressed.
Protopic Counseling: Patient may experience a mild burning sensation during topical application. Protopic is not approved in children less than 2 years of age. There have been case reports of hematologic and skin malignancies in patients using topical calcineurin inhibitors although causality is questionable.
Isotretinoin Pregnancy And Lactation Text: This medication is Pregnancy Category X and is considered extremely dangerous during pregnancy. It is unknown if it is excreted in breast milk.
Sarecycline Pregnancy And Lactation Text: This medication is Pregnancy Category D and not consider safe during pregnancy. It is also excreted in breast milk.
Cimetidine Counseling:  I discussed with the patient the risks of Cimetidine including but not limited to gynecomastia, headache, diarrhea, nausea, drowsiness, arrhythmias, pancreatitis, skin rashes, psychosis, bone marrow suppression and kidney toxicity.
Drysol Pregnancy And Lactation Text: This medication is considered safe during pregnancy and breast feeding.
Otezla Pregnancy And Lactation Text: This medication is Pregnancy Category C and it isn't known if it is safe during pregnancy. It is unknown if it is excreted in breast milk.
Valtrex Pregnancy And Lactation Text: this medication is Pregnancy Category B and is considered safe during pregnancy. This medication is not directly found in breast milk but it's metabolite acyclovir is present.
Cimzia Pregnancy And Lactation Text: This medication crosses the placenta but can be considered safe in certain situations. Cimzia may be excreted in breast milk.
Protopic Pregnancy And Lactation Text: This medication is Pregnancy Category C. It is unknown if this medication is excreted in breast milk when applied topically.
Clindamycin Pregnancy And Lactation Text: This medication can be used in pregnancy if certain situations. Clindamycin is also present in breast milk.
Zyclara Counseling:  I discussed with the patient the risks of imiquimod including but not limited to erythema, scaling, itching, weeping, crusting, and pain.  Patient understands that the inflammatory response to imiquimod is variable from person to person and was educated regarded proper titration schedule.  If flu-like symptoms develop, patient knows to discontinue the medication and contact us.
High Dose Vitamin A Counseling: Side effects reviewed, pt to contact office should one occur.
Oxybutynin Counseling:  I discussed with the patient the risks of oxybutynin including but not limited to skin rash, drowsiness, dry mouth, difficulty urinating, and blurred vision.
Tetracycline Counseling: Patient counseled regarding possible photosensitivity and increased risk for sunburn.  Patient instructed to avoid sunlight, if possible.  When exposed to sunlight, patients should wear protective clothing, sunglasses, and sunscreen.  The patient was instructed to call the office immediately if the following severe adverse effects occur:  hearing changes, easy bruising/bleeding, severe headache, or vision changes.  The patient verbalized understanding of the proper use and possible adverse effects of tetracycline.  All of the patient's questions and concerns were addressed. Patient understands to avoid pregnancy while on therapy due to potential birth defects.
Finasteride Male Counseling: Finasteride Counseling:  I discussed with the patient the risks of use of finasteride including but not limited to decreased libido, decreased ejaculate volume, gynecomastia, and depression. Women should not handle medication.  All of the patient's questions and concerns were addressed.
Detail Level: Simple
Cosentyx Counseling:  I discussed with the patient the risks of Cosentyx including but not limited to worsening of Crohn's disease, immunosuppression, allergic reactions and infections.  The patient understands that monitoring is required including a PPD at baseline and must alert us or the primary physician if symptoms of infection or other concerning signs are noted.
Elidel Counseling: Patient may experience a mild burning sensation during topical application. Elidel is not approved in children less than 2 years of age. There have been case reports of hematologic and skin malignancies in patients using topical calcineurin inhibitors although causality is questionable.
Doxycycline Counseling:  Patient counseled regarding possible photosensitivity and increased risk for sunburn.  Patient instructed to avoid sunlight, if possible.  When exposed to sunlight, patients should wear protective clothing, sunglasses, and sunscreen.  The patient was instructed to call the office immediately if the following severe adverse effects occur:  hearing changes, easy bruising/bleeding, severe headache, or vision changes.  The patient verbalized understanding of the proper use and possible adverse effects of doxycycline.  All of the patient's questions and concerns were addressed.
Azathioprine Pregnancy And Lactation Text: This medication is Pregnancy Category D and isn't considered safe during pregnancy. It is unknown if this medication is excreted in breast milk.
Use Enhanced Medication Counseling?: No
Skyrizi Counseling: I discussed with the patient the risks of risankizumab-rzaa including but not limited to immunosuppression, and serious infections.  The patient understands that monitoring is required including a PPD at baseline and must alert us or the primary physician if symptoms of infection or other concerning signs are noted.
Finasteride Pregnancy And Lactation Text: This medication is absolutely contraindicated during pregnancy. It is unknown if it is excreted in breast milk.
Rhofade Counseling: Rhofade is a topical medication which can decrease superficial blood flow where applied. Side effects are uncommon and include stinging, redness and allergic reactions.
High Dose Vitamin A Pregnancy And Lactation Text: High dose vitamin A therapy is contraindicated during pregnancy and breast feeding.
Cellcept Counseling:  I discussed with the patient the risks of mycophenolate mofetil including but not limited to infection/immunosuppression, GI upset, hypokalemia, hypercholesterolemia, bone marrow suppression, lymphoproliferative disorders, malignancy, GI ulceration/bleed/perforation, colitis, interstitial lung disease, kidney failure, progressive multifocal leukoencephalopathy, and birth defects.  The patient understands that monitoring is required including a baseline creatinine and regular CBC testing. In addition, patient must alert us immediately if symptoms of infection or other concerning signs are noted.
Cosentyx Pregnancy And Lactation Text: This medication is Pregnancy Category B and is considered safe during pregnancy. It is unknown if this medication is excreted in breast milk.
Doxycycline Pregnancy And Lactation Text: This medication is Pregnancy Category D and not consider safe during pregnancy. It is also excreted in breast milk but is considered safe for shorter treatment courses.
Doxepin Counseling:  Patient advised that the medication is sedating and not to drive a car after taking this medication. Patient informed of potential adverse effects including but not limited to dry mouth, urinary retention, and blurry vision.  The patient verbalized understanding of the proper use and possible adverse effects of doxepin.  All of the patient's questions and concerns were addressed.
Propranolol Counseling:  I discussed with the patient the risks of propranolol including but not limited to low heart rate, low blood pressure, low blood sugar, restlessness and increased cold sensitivity. They should call the office if they experience any of these side effects.
Gabapentin Counseling: I discussed with the patient the risks of gabapentin including but not limited to dizziness, somnolence, fatigue and ataxia.
Eucrisa Counseling: Patient may experience a mild burning sensation during topical application. Eucrisa is not approved in children less than 2 years of age.
Doxepin Pregnancy And Lactation Text: This medication is Pregnancy Category C and it isn't known if it is safe during pregnancy. It is also excreted in breast milk and breast feeding isn't recommended.
Dupixent Counseling: I discussed with the patient the risks of dupilumab including but not limited to eye infection and irritation, cold sores, injection site reactions, worsening of asthma, allergic reactions and increased risk of parasitic infection.  Live vaccines should be avoided while taking dupilumab. Dupilumab will also interact with certain medications such as warfarin and cyclosporine. The patient understands that monitoring is required and they must alert us or the primary physician if symptoms of infection or other concerning signs are noted.
Stelara Counseling:  I discussed with the patient the risks of ustekinumab including but not limited to immunosuppression, malignancy, posterior leukoencephalopathy syndrome, and serious infections.  The patient understands that monitoring is required including a PPD at baseline and must alert us or the primary physician if symptoms of infection or other concerning signs are noted.
Solaraze Counseling:  I discussed with the patient the risks of Solaraze including but not limited to erythema, scaling, itching, weeping, crusting, and pain.
Opioid Counseling: I discussed with the patient the potential side effects of opioids including but not limited to addiction, altered mental status, and depression. I stressed avoiding alcohol, benzodiazepines, muscle relaxants and sleep aids unless specifically okayed by a physician. The patient verbalized understanding of the proper use and possible adverse effects of opioids. All of the patient's questions and concerns were addressed. They were instructed to flush the remaining pills down the toilet if they did not need them for pain.
Erythromycin Counseling:  I discussed with the patient the risks of erythromycin including but not limited to GI upset, allergic reaction, drug rash, diarrhea, increase in liver enzymes, and yeast infections.
Propranolol Pregnancy And Lactation Text: This medication is Pregnancy Category C and it isn't known if it is safe during pregnancy. It is excreted in breast milk.
Fluconazole Counseling:  Patient counseled regarding adverse effects of fluconazole including but not limited to headache, diarrhea, nausea, upset stomach, liver function test abnormalities, taste disturbance, and stomach pain.  There is a rare possibility of liver failure that can occur when taking fluconazole.  The patient understands that monitoring of LFTs and kidney function test may be required, especially at baseline. The patient verbalized understanding of the proper use and possible adverse effects of fluconazole.  All of the patient's questions and concerns were addressed.
Humira Counseling:  I discussed with the patient the risks of adalimumab including but not limited to myelosuppression, immunosuppression, autoimmune hepatitis, demyelinating diseases, lymphoma, and serious infections.  The patient understands that monitoring is required including a PPD at baseline and must alert us or the primary physician if symptoms of infection or other concerning signs are noted.
Cyclophosphamide Counseling:  I discussed with the patient the risks of cyclophosphamide including but not limited to hair loss, hormonal abnormalities, decreased fertility, abdominal pain, diarrhea, nausea and vomiting, bone marrow suppression and infection. The patient understands that monitoring is required while taking this medication.
Eucrisa Pregnancy And Lactation Text: This medication has not been assigned a Pregnancy Risk Category but animal studies failed to show danger with the topical medication. It is unknown if the medication is excreted in breast milk.
Opioid Pregnancy And Lactation Text: These medications can lead to premature delivery and should be avoided during pregnancy. These medications are also present in breast milk in small amounts.
Glycopyrrolate Counseling:  I discussed with the patient the risks of glycopyrrolate including but not limited to skin rash, drowsiness, dry mouth, difficulty urinating, and blurred vision.
Erythromycin Pregnancy And Lactation Text: This medication is Pregnancy Category B and is considered safe during pregnancy. It is also excreted in breast milk.
Hydroxyzine Counseling: Patient advised that the medication is sedating and not to drive a car after taking this medication.  Patient informed of potential adverse effects including but not limited to dry mouth, urinary retention, and blurry vision.  The patient verbalized understanding of the proper use and possible adverse effects of hydroxyzine.  All of the patient's questions and concerns were addressed.
Methotrexate Counseling:  Patient counseled regarding adverse effects of methotrexate including but not limited to nausea, vomiting, abnormalities in liver function tests. Patients may develop mouth sores, rash, diarrhea, and abnormalities in blood counts. The patient understands that monitoring is required including LFT's and blood counts.  There is a rare possibility of scarring of the liver and lung problems that can occur when taking methotrexate. Persistent nausea, loss of appetite, pale stools, dark urine, cough, and shortness of breath should be reported immediately. Patient advised to discontinue methotrexate treatment at least three months before attempting to become pregnant.  I discussed the need for folate supplements while taking methotrexate.  These supplements can decrease side effects during methotrexate treatment. The patient verbalized understanding of the proper use and possible adverse effects of methotrexate.  All of the patient's questions and concerns were addressed.
Birth Control Pills Counseling: Birth Control Pill Counseling: I discussed with the patient the potential side effects of OCPs including but not limited to increased risk of stroke, heart attack, thrombophlebitis, deep venous thrombosis, hepatic adenomas, breast changes, GI upset, headaches, and depression.  The patient verbalized understanding of the proper use and possible adverse effects of OCPs. All of the patient's questions and concerns were addressed.
Dupixent Pregnancy And Lactation Text: This medication likely crosses the placenta but the risk for the fetus is uncertain. This medication is excreted in breast milk.
Solaraze Pregnancy And Lactation Text: This medication is Pregnancy Category B and is considered safe. There is some data to suggest avoiding during the third trimester. It is unknown if this medication is excreted in breast milk.
Hydroquinone Counseling:  Patient advised that medication may result in skin irritation, lightening (hypopigmentation), dryness, and burning.  In the event of skin irritation, the patient was advised to reduce the amount of the drug applied or use it less frequently.  Rarely, spots that are treated with hydroquinone can become darker (pseudoochronosis).  Should this occur, patient instructed to stop medication and call the office. The patient verbalized understanding of the proper use and possible adverse effects of hydroquinone.  All of the patient's questions and concerns were addressed.
Metronidazole Counseling:  I discussed with the patient the risks of metronidazole including but not limited to seizures, nausea/vomiting, a metallic taste in the mouth, nausea/vomiting and severe allergy.
Glycopyrrolate Pregnancy And Lactation Text: This medication is Pregnancy Category B and is considered safe during pregnancy. It is unknown if it is excreted breast milk.
Cyclophosphamide Pregnancy And Lactation Text: This medication is Pregnancy Category D and it isn't considered safe during pregnancy. This medication is excreted in breast milk.
Hydroxyzine Pregnancy And Lactation Text: This medication is not safe during pregnancy and should not be taken. It is also excreted in breast milk and breast feeding isn't recommended.
Topical Retinoid counseling:  Patient advised to apply a pea-sized amount only at bedtime and wait 30 minutes after washing their face before applying.  If too drying, patient may add a non-comedogenic moisturizer. The patient verbalized understanding of the proper use and possible adverse effects of retinoids.  All of the patient's questions and concerns were addressed.
Methotrexate Pregnancy And Lactation Text: This medication is Pregnancy Category X and is known to cause fetal harm. This medication is excreted in breast milk.
Birth Control Pills Pregnancy And Lactation Text: This medication should be avoided if pregnant and for the first 30 days post-partum.
Enbrel Counseling:  I discussed with the patient the risks of etanercept including but not limited to myelosuppression, immunosuppression, autoimmune hepatitis, demyelinating diseases, lymphoma, and infections.  The patient understands that monitoring is required including a PPD at baseline and must alert us or the primary physician if symptoms of infection or other concerning signs are noted.
Taltz Counseling: I discussed with the patient the risks of ixekizumab including but not limited to immunosuppression, serious infections, worsening of inflammatory bowel disease and drug reactions.  The patient understands that monitoring is required including a PPD at baseline and must alert us or the primary physician if symptoms of infection or other concerning signs are noted.
Cyclosporine Counseling:  I discussed with the patient the risks of cyclosporine including but not limited to hypertension, gingival hyperplasia,myelosuppression, immunosuppression, liver damage, kidney damage, neurotoxicity, lymphoma, and serious infections. The patient understands that monitoring is required including baseline blood pressure, CBC, CMP, lipid panel and uric acid, and then 1-2 times monthly CMP and blood pressure.
Griseofulvin Counseling:  I discussed with the patient the risks of griseofulvin including but not limited to photosensitivity, cytopenia, liver damage, nausea/vomiting and severe allergy.  The patient understands that this medication is best absorbed when taken with a fatty meal (e.g., ice cream or french fries).
Ilumya Counseling: I discussed with the patient the risks of tildrakizumab including but not limited to immunosuppression, malignancy, posterior leukoencephalopathy syndrome, and serious infections.  The patient understands that monitoring is required including a PPD at baseline and must alert us or the primary physician if symptoms of infection or other concerning signs are noted.
Arava Counseling:  Patient counseled regarding adverse effects of Arava including but not limited to nausea, vomiting, abnormalities in liver function tests. Patients may develop mouth sores, rash, diarrhea, and abnormalities in blood counts. The patient understands that monitoring is required including LFTs and blood counts.  There is a rare possibility of scarring of the liver and lung problems that can occur when taking methotrexate. Persistent nausea, loss of appetite, pale stools, dark urine, cough, and shortness of breath should be reported immediately. Patient advised to discontinue Arava treatment and consult with a physician prior to attempting conception. The patient will have to undergo a treatment to eliminate Arava from the body prior to conception.
Hydroxychloroquine Counseling:  I discussed with the patient that a baseline ophthalmologic exam is needed at the start of therapy and every year thereafter while on therapy. A CBC may also be warranted for monitoring.  The side effects of this medication were discussed with the patient, including but not limited to agranulocytosis, aplastic anemia, seizures, rashes, retinopathy, and liver toxicity. Patient instructed to call the office should any adverse effect occur.  The patient verbalized understanding of the proper use and possible adverse effects of Plaquenil.  All the patient's questions and concerns were addressed.
Benzoyl Peroxide Counseling: Patient counseled that medicine may cause skin irritation and bleach clothing.  In the event of skin irritation, the patient was advised to reduce the amount of the drug applied or use it less frequently.   The patient verbalized understanding of the proper use and possible adverse effects of benzoyl peroxide.  All of the patient's questions and concerns were addressed.
Metronidazole Pregnancy And Lactation Text: This medication is Pregnancy Category B and considered safe during pregnancy.  It is also excreted in breast milk.
Spironolactone Counseling: Patient advised regarding risks of diarrhea, abdominal pain, hyperkalemia, birth defects (for female patients), liver toxicity and renal toxicity. The patient may need blood work to monitor liver and kidney function and potassium levels while on therapy. The patient verbalized understanding of the proper use and possible adverse effects of spironolactone.  All of the patient's questions and concerns were addressed.
Imiquimod Counseling:  I discussed with the patient the risks of imiquimod including but not limited to erythema, scaling, itching, weeping, crusting, and pain.  Patient understands that the inflammatory response to imiquimod is variable from person to person and was educated regarded proper titration schedule.  If flu-like symptoms develop, patient knows to discontinue the medication and contact us.
Prednisone Counseling:  I discussed with the patient the risks of prolonged use of prednisone including but not limited to weight gain, insomnia, osteoporosis, mood changes, diabetes, susceptibility to infection, glaucoma and high blood pressure.  In cases where prednisone use is prolonged, patients should be monitored with blood pressure checks, serum glucose levels and an eye exam.  Additionally, the patient may need to be placed on GI prophylaxis, PCP prophylaxis, and calcium and vitamin D supplementation and/or a bisphosphonate.  The patient verbalized understanding of the proper use and the possible adverse effects of prednisone.  All of the patient's questions and concerns were addressed.
Griseofulvin Pregnancy And Lactation Text: This medication is Pregnancy Category X and is known to cause serious birth defects. It is unknown if this medication is excreted in breast milk but breast feeding should be avoided.
Hydroxychloroquine Pregnancy And Lactation Text: This medication has been shown to cause fetal harm but it isn't assigned a Pregnancy Risk Category. There are small amounts excreted in breast milk.
Cyclosporine Pregnancy And Lactation Text: This medication is Pregnancy Category C and it isn't know if it is safe during pregnancy. This medication is excreted in breast milk.
Albendazole Counseling:  I discussed with the patient the risks of albendazole including but not limited to cytopenia, kidney damage, nausea/vomiting and severe allergy.  The patient understands that this medication is being used in an off-label manner.
Benzoyl Peroxide Pregnancy And Lactation Text: This medication is Pregnancy Category C. It is unknown if benzoyl peroxide is excreted in breast milk.
Tremfya Counseling: I discussed with the patient the risks of guselkumab including but not limited to immunosuppression, serious infections, worsening of inflammatory bowel disease and drug reactions.  The patient understands that monitoring is required including a PPD at baseline and must alert us or the primary physician if symptoms of infection or other concerning signs are noted.
Spironolactone Pregnancy And Lactation Text: This medication can cause feminization of the male fetus and should be avoided during pregnancy. The active metabolite is also found in breast milk.
Minocycline Counseling: Patient advised regarding possible photosensitivity and discoloration of the teeth, skin, lips, tongue and gums.  Patient instructed to avoid sunlight, if possible.  When exposed to sunlight, patients should wear protective clothing, sunglasses, and sunscreen.  The patient was instructed to call the office immediately if the following severe adverse effects occur:  hearing changes, easy bruising/bleeding, severe headache, or vision changes.  The patient verbalized understanding of the proper use and possible adverse effects of minocycline.  All of the patient's questions and concerns were addressed.
Tazorac Counseling:  Patient advised that medication is irritating and drying.  Patient may need to apply sparingly and wash off after an hour before eventually leaving it on overnight.  The patient verbalized understanding of the proper use and possible adverse effects of tazorac.  All of the patient's questions and concerns were addressed.
Itraconazole Counseling:  I discussed with the patient the risks of itraconazole including but not limited to liver damage, nausea/vomiting, neuropathy, and severe allergy.  The patient understands that this medication is best absorbed when taken with acidic beverages such as non-diet cola or ginger ale.  The patient understands that monitoring is required including baseline LFTs and repeat LFTs at intervals.  The patient understands that they are to contact us or the primary physician if concerning signs are noted.
Niacinamide Counseling: I recommended taking niacin or niacinamide, also know as vitamin B3, twice daily. Recent evidence suggests that taking vitamin B3 (500 mg twice daily) can reduce the risk of actinic keratoses and non-melanoma skin cancers. Side effects of vitamin B3 include flushing and headache.
Infliximab Counseling:  I discussed with the patient the risks of infliximab including but not limited to myelosuppression, immunosuppression, autoimmune hepatitis, demyelinating diseases, lymphoma, and serious infections.  The patient understands that monitoring is required including a PPD at baseline and must alert us or the primary physician if symptoms of infection or other concerning signs are noted.
Clofazimine Counseling:  I discussed with the patient the risks of clofazimine including but not limited to skin and eye pigmentation, liver damage, nausea/vomiting, gastrointestinal bleeding and allergy.
Azithromycin Counseling:  I discussed with the patient the risks of azithromycin including but not limited to GI upset, allergic reaction, drug rash, diarrhea, and yeast infections.
Carac Counseling:  I discussed with the patient the risks of Carac including but not limited to erythema, scaling, itching, weeping, crusting, and pain.
Tazorac Pregnancy And Lactation Text: This medication is not safe during pregnancy. It is unknown if this medication is excreted in breast milk.
SSKI Counseling:  I discussed with the patient the risks of SSKI including but not limited to thyroid abnormalities, metallic taste, GI upset, fever, headache, acne, arthralgias, paraesthesias, lymphadenopathy, easy bleeding, arrhythmias, and allergic reaction.
Azithromycin Pregnancy And Lactation Text: This medication is considered safe during pregnancy and is also secreted in breast milk.
Minoxidil Counseling: Minoxidil is a topical medication which can increase blood flow where it is applied. It is uncertain how this medication increases hair growth. Side effects are uncommon and include stinging and allergic reactions.
Niacinamide Pregnancy And Lactation Text: These medications are considered safe during pregnancy.
Acitretin Counseling:  I discussed with the patient the risks of acitretin including but not limited to hair loss, dry lips/skin/eyes, liver damage, hyperlipidemia, depression/suicidal ideation, photosensitivity.  Serious rare side effects can include but are not limited to pancreatitis, pseudotumor cerebri, bony changes, clot formation/stroke/heart attack.  Patient understands that alcohol is contraindicated since it can result in liver toxicity and significantly prolong the elimination of the drug by many years.
Quinolones Counseling:  I discussed with the patient the risks of fluoroquinolones including but not limited to GI upset, allergic reaction, drug rash, diarrhea, dizziness, photosensitivity, yeast infections, liver function test abnormalities, tendonitis/tendon rupture.
Sski Pregnancy And Lactation Text: This medication is Pregnancy Category D and isn't considered safe during pregnancy. It is excreted in breast milk.
Topical Clindamycin Counseling: Patient counseled that this medication may cause skin irritation or allergic reactions.  In the event of skin irritation, the patient was advised to reduce the amount of the drug applied or use it less frequently.   The patient verbalized understanding of the proper use and possible adverse effects of clindamycin.  All of the patient's questions and concerns were addressed.
Ivermectin Counseling:  Patient instructed to take medication on an empty stomach with a full glass of water.  Patient informed of potential adverse effects including but not limited to nausea, diarrhea, dizziness, itching, and swelling of the extremities or lymph nodes.  The patient verbalized understanding of the proper use and possible adverse effects of ivermectin.  All of the patient's questions and concerns were addressed.

## 2020-08-26 ENCOUNTER — PHYSICAL THERAPY (OUTPATIENT)
Dept: PHYSICAL THERAPY | Facility: REHABILITATION | Age: 65
End: 2020-08-26
Attending: ORTHOPAEDIC SURGERY
Payer: MEDICARE

## 2020-08-26 DIAGNOSIS — R22.40 LOCALIZED SWELLING, MASS, OR LUMP OF LOWER EXTREMITY, UNSPECIFIED LATERALITY: ICD-10-CM

## 2020-08-26 DIAGNOSIS — M79.671 PAIN IN RIGHT FOOT: ICD-10-CM

## 2020-08-26 PROCEDURE — 97112 NEUROMUSCULAR REEDUCATION: CPT

## 2020-08-26 PROCEDURE — 97110 THERAPEUTIC EXERCISES: CPT

## 2020-08-26 NOTE — OP THERAPY DAILY TREATMENT
Outpatient Physical Therapy  DAILY TREATMENT     [facility] Out[facility] Physical Therapy [address] [facility], [address]  [place] [address]  Phone:  [phone]    Date: 08/26/2020    Patient: Shahrzad Scott  YOB: 1955  MRN: [id number]     Time Calculation    Start time: 0130  Stop time: 0200 Time Calculation (min): 30 minutes         Chief Complaint: Ankle Problem and Foot Problem    Visit #: 3    SUBJECTIVE:  The patient reports walking and the numbness is still present and a few occasional sharp pain. Increased weightbearing increases her pain level after > 2 hours. She is wearing regular shoes      OBJECTIVE:  Current objective measures:       Balance and sensitization to (R) plantar      Therapeutic Exercises (CPT 16597):     1. Heel/toe raises, 10x    2. Nu step bike / Upright bike , L5 at 7 minuters     3. Gastroc stretch, 3 x30 sec    4. Toe scrunches, 3'    5. 1/2 foam roller inversion/eversion/ plantarflexion/ dorsiflexion, 4'    6. [place] pick ups    7. Isometric ball pick ups    8. 1/2 foam roller plantarflexion/ dorsiflexion, 20x     Therapeutic Treatments and Modalities:     1. Manual Therapy (CPT 79967), (R) foot/ankle , STM between (R) phalanx and 2nd digit; tenderness to incison and dorsum bottom of foot    2. Neuromuscular Re-education (CPT 81370), (R) LE , proprioception, balance, SLS over foam roller, walking on toes     Time-based treatments/modalities:    Physical Therapy Timed Treatment Charges  Manual therapy minutes (CPT 64114): 10 minutes  Neuromusc re-ed, balance, coor, post minutes (CPT 38337): 10 minutes  Therapeutic exercise minutes (CPT 95055): 10 minutes      ASSESSMENT:   Response to treatment:   STM/ friction massage to webbing space between 1st big toe and 2nd digit of (R) foot; desensitization still present but improving with soft tissue mobilization; patient has some tenderness to plantar side of (R) foot. Patient demonstrates good control standing over 1/2 foam  roller in plantarflexion/dorsiflexion and inversion/eversion. Gave patient handout for HEP.     PLAN/RECOMMENDATIONS:   Plan for treatment: therapy treatment to continue next visit.  Planned interventions for next visit: continue with current treatment.

## 2020-10-20 ENCOUNTER — HOSPITAL ENCOUNTER (OUTPATIENT)
Dept: CARDIOLOGY | Facility: MEDICAL CENTER | Age: 65
End: 2020-10-20
Attending: FAMILY MEDICINE
Payer: MEDICARE

## 2020-10-20 DIAGNOSIS — I35.1 MODERATE AORTIC REGURGITATION: ICD-10-CM

## 2020-10-20 LAB
LV EJECT FRACT  99904: 60
LV EJECT FRACT MOD 2C 99903: 66.32
LV EJECT FRACT MOD 4C 99902: 40.88
LV EJECT FRACT MOD BP 99901: 55.64

## 2020-10-20 PROCEDURE — 93306 TTE W/DOPPLER COMPLETE: CPT

## 2020-10-20 PROCEDURE — 93306 TTE W/DOPPLER COMPLETE: CPT | Mod: 26 | Performed by: INTERNAL MEDICINE

## 2020-12-07 ENCOUNTER — HOSPITAL ENCOUNTER (OUTPATIENT)
Dept: RADIOLOGY | Facility: MEDICAL CENTER | Age: 65
End: 2020-12-07
Attending: FAMILY MEDICINE
Payer: MEDICARE

## 2020-12-07 DIAGNOSIS — Z12.31 ENCOUNTER FOR SCREENING MAMMOGRAM FOR BREAST CANCER: ICD-10-CM

## 2020-12-07 PROCEDURE — 77067 SCR MAMMO BI INCL CAD: CPT

## 2020-12-09 DIAGNOSIS — R92.8 ABNORMAL MAMMOGRAM: ICD-10-CM

## 2020-12-18 ENCOUNTER — HOSPITAL ENCOUNTER (OUTPATIENT)
Dept: RADIOLOGY | Facility: MEDICAL CENTER | Age: 65
End: 2020-12-18
Attending: FAMILY MEDICINE
Payer: MEDICARE

## 2020-12-18 DIAGNOSIS — R92.8 ABNORMAL MAMMOGRAM: ICD-10-CM

## 2020-12-18 PROCEDURE — 76642 ULTRASOUND BREAST LIMITED: CPT | Mod: RT

## 2021-03-02 ENCOUNTER — HOSPITAL ENCOUNTER (OUTPATIENT)
Dept: RADIOLOGY | Facility: MEDICAL CENTER | Age: 66
End: 2021-03-02
Attending: FAMILY MEDICINE
Payer: MEDICARE

## 2021-03-02 ENCOUNTER — OFFICE VISIT (OUTPATIENT)
Dept: MEDICAL GROUP | Age: 66
End: 2021-03-02
Payer: MEDICARE

## 2021-03-02 VITALS
HEIGHT: 65 IN | DIASTOLIC BLOOD PRESSURE: 70 MMHG | OXYGEN SATURATION: 97 % | SYSTOLIC BLOOD PRESSURE: 120 MMHG | WEIGHT: 149 LBS | BODY MASS INDEX: 24.83 KG/M2 | TEMPERATURE: 98.4 F | HEART RATE: 79 BPM

## 2021-03-02 DIAGNOSIS — R05.9 COUGH: ICD-10-CM

## 2021-03-02 PROCEDURE — 99214 OFFICE O/P EST MOD 30 MIN: CPT | Performed by: FAMILY MEDICINE

## 2021-03-02 PROCEDURE — 71046 X-RAY EXAM CHEST 2 VIEWS: CPT

## 2021-03-02 RX ORDER — FEXOFENADINE HCL 180 MG/1
180 TABLET ORAL DAILY
Qty: 90 TABLET | Refills: 0 | Status: SHIPPED | OUTPATIENT
Start: 2021-03-02 | End: 2022-05-05

## 2021-03-02 RX ORDER — PREDNISONE 20 MG/1
TABLET ORAL
Qty: 12 TABLET | Refills: 0 | Status: SHIPPED | OUTPATIENT
Start: 2021-03-02 | End: 2022-02-15

## 2021-03-02 RX ORDER — OMEPRAZOLE 20 MG/1
20 CAPSULE, DELAYED RELEASE ORAL DAILY
Qty: 30 CAPSULE | Refills: 2 | Status: SHIPPED | OUTPATIENT
Start: 2021-03-02 | End: 2021-05-21

## 2021-03-02 ASSESSMENT — PATIENT HEALTH QUESTIONNAIRE - PHQ9: CLINICAL INTERPRETATION OF PHQ2 SCORE: 0

## 2021-03-02 ASSESSMENT — FIBROSIS 4 INDEX: FIB4 SCORE: 1.08

## 2021-03-02 NOTE — PROGRESS NOTES
This medical record contains text that has been entered with the assistance of computer voice recognition and dictation software.  Therefore, it may contain unintended errors in text, spelling, punctuation, or grammar      Chief Complaint   Patient presents with   • Cough     X 4 months, dry cough most of the time         Shahrzad Scott is a 65 y.o. female here evaluation and management of: Cough about 4 months      HPI:     1. Cough  The patient is a 65-year-old female who presents to clinic with a chief complaint of cough.  She states that the cough is dry, as nothing comes up, she states that Allegra sometimes helps.  She states that she does have a history of GERD and actually had to use her Pig ([facility]) Prilosec who also has GERD.  She denies any blood in the cough, no fevers, no chills, no night sweats, no generalized malaise, and she does not feel ill.  She states the cough is so bad that sometimes she cannot sleep, usually worse when she does deep breathing.  Patient denies any postnasal drip, she states she knows what that feels like she had in the past.    Current medicines (including changes today)  Current Outpatient Medications   Medication Sig Dispense Refill   • fexofenadine (ALLEGRA) 180 MG tablet Take 1 tablet by mouth every day. 90 tablet 0   • predniSONE (DELTASONE) 20 MG Tab Take 3 tabs po for 2 days then 2 tabs for 2 days then 1 for 2 days 12 tablet 0   • omeprazole (PRILOSEC) 20 MG delayed-release capsule Take 1 capsule by mouth every day. 30 capsule 2   • simvastatin (ZOCOR) 10 MG Tab TAKE 1 TABLET EVERY EVENING 90 Tab 3   • niacinamide 500 MG tablet Take 1 Tab by mouth 2 times a day. 60 Tab 3   • TURMERIC PO Take 1,000 mg by mouth 2 Times a Day.     • Coenzyme Q10 200 MG Cap Take  by mouth.     • MINIVELLE 0.025 MG/24HR PATCH BIWEEKLY Apply 1 Patch to skin as directed.     • Multiple Vitamins-Minerals (MULTIVITAMIN ADULTS 50+ PO) Take  by mouth.     • Probiotic Product (PROBIOTIC DAILY  PO) Take  by mouth.     • Alpha Lipoic Acid 200 MG Cap Take  by mouth.     • Calcium Carbonate-Vitamin D (CALCIUM + D) 600-200 MG-UNIT TABS Take  by mouth every day.     • docosahexanoic acid (OMEGA 3) 1000 MG CAPS Take 1,000 mg by mouth every day.     • vitamin D (CHOLECALCIFEROL) 1000 UNIT TABS Take 2,000 Units by mouth every day. TAKE AS DIRECTED.       No current facility-administered medications for this visit.     She  has a past medical history of Anesthesia, Aortic regurgitation (), Arrhythmia, Arthritis, Breath shortness, Cataract, Disorder of thyroid, Hemorrhagic disorder (), High cholesterol, HLD (hyperlipidemia), Indigestion, Mitral regurgitation, MVP (mitral valve prolapse), Palpitations, and PONV (postoperative nausea and vomiting).  She  has a past surgical history that includes hysterectomy, total abdominal; cataract extraction with iol (Bilateral); breast reconstruction (Bilateral); appendectomy; knee arthroscopy, acl recon w/ tibialis tendon autograft; craniotomy; abdominal hysterectomy total; eye surgery; thyroid lobectomy (Right, 2019); and myringotomy (Bilateral, 2019).  Social History     Tobacco Use   • Smoking status: Never Smoker   • Smokeless tobacco: Never Used   Substance Use Topics   • Alcohol use: No   • Drug use: No     Social History     Social History Narrative   • Not on file     Family History   Problem Relation Age of Onset   • Stroke Mother    • Hypertension Mother    • Cancer Father         prostate cancer   • Lung Disease Sister         asthma   • No Known Problems Brother    • No Known Problems Maternal Grandmother    • No Known Problems Maternal Grandfather    • Other Brother         cerabral palsy     Family Status   Relation Name Status   • Mo   at age 76        HYPERTENSION(cause of death, not taking meds)   • Fa          PARKINSON'S DISEASE(cause of death), PNEUMONIA.   • Sis  Alive   • Bro  Alive   • MGMo     • MGFa   at  "age 102   • Bro     • PGMo unknown    • PGFa unknown          ROS    The pertinent  ROS findings can be seen in the HPI above.     All other systems reviewed and are negative     Objective:     /70 (BP Location: Right arm, Patient Position: Sitting, BP Cuff Size: Adult)   Pulse 79   Temp 36.9 °C (98.4 °F) (Temporal)   Ht 1.651 m (5' 5\")   Wt 67.6 kg (149 lb)   SpO2 97%  Body mass index is 24.79 kg/m².      Physical Exam:    Constitutional: Alert, no distress.  Skin: no rashes  Eye: Equal, round and reactive, conjunctiva clear, lids normal.  ENMT: Lips without lesions, good dentition, oropharynx clear.  Neck: Trachea midline, no masses, no thyromegaly. No cervical or supraclavicular lymphadenopathy.  Respiratory: Unlabored respiratory effort, lungs clear to auscultation, no wheezes, no ronchi.  Cardiovascular: Normal S1, S2, no murmur, no edema  Abdomen: Soft, non-tender, no masses, no hepatosplenomegaly.        Assessment and Plan:   The following treatment plan was discussed      1. Cough  We discussed the 3 most common causes of cough in [place].    #1 Post nasal Drip  #2 GERD  #3 Asthma    Since Allegra seem to help and will refill Allegra, will also treat GERD.  Also obtain a chest x-ray.  - fexofenadine (ALLEGRA) 180 MG tablet; Take 1 tablet by mouth every day.  Dispense: 90 tablet; Refill: 0  - DX-CHEST-2 VIEWS; Future  - predniSONE (DELTASONE) 20 MG Tab; Take 3 tabs po for 2 days then 2 tabs for 2 days then 1 for 2 days  Dispense: 12 tablet; Refill: 0  - omeprazole (PRILOSEC) 20 MG delayed-release capsule; Take 1 capsule by mouth every day.  Dispense: 30 capsule; Refill: 2        Instructed to Follow up in clinic or ER for worsening symptoms, difficulty breathing, lack of expected recovery, or should new symptoms or problems arise.    Followup: Return in about 3 months (around 2021).             "

## 2021-03-05 RX ORDER — BENZONATATE 100 MG/1
100 CAPSULE ORAL 3 TIMES DAILY PRN
Qty: 60 CAPSULE | Refills: 0 | Status: SHIPPED | OUTPATIENT
Start: 2021-03-05 | End: 2022-02-15

## 2021-03-10 ENCOUNTER — APPOINTMENT (RX ONLY)
Dept: URBAN - METROPOLITAN AREA CLINIC 4 | Facility: CLINIC | Age: 66
Setting detail: DERMATOLOGY
End: 2021-03-10

## 2021-03-10 DIAGNOSIS — D22 MELANOCYTIC NEVI: ICD-10-CM | Status: STABLE

## 2021-03-10 DIAGNOSIS — L92.0 GRANULOMA ANNULARE: ICD-10-CM

## 2021-03-10 DIAGNOSIS — L81.4 OTHER MELANIN HYPERPIGMENTATION: ICD-10-CM | Status: STABLE

## 2021-03-10 PROBLEM — D22.5 MELANOCYTIC NEVI OF TRUNK: Status: ACTIVE | Noted: 2021-03-10

## 2021-03-10 PROBLEM — D22.62 MELANOCYTIC NEVI OF LEFT UPPER LIMB, INCLUDING SHOULDER: Status: ACTIVE | Noted: 2021-03-10

## 2021-03-10 PROCEDURE — ? COUNSELING

## 2021-03-10 PROCEDURE — 99213 OFFICE O/P EST LOW 20 MIN: CPT | Mod: 25

## 2021-03-10 PROCEDURE — ? INTRALESIONAL KENALOG

## 2021-03-10 PROCEDURE — 11900 INJECT SKIN LESIONS </W 7: CPT

## 2021-03-10 ASSESSMENT — LOCATION SIMPLE DESCRIPTION DERM
LOCATION SIMPLE: ABDOMEN
LOCATION SIMPLE: LEFT HAND
LOCATION SIMPLE: LEFT BUTTOCK
LOCATION SIMPLE: RIGHT HAND
LOCATION SIMPLE: LEFT UPPER ARM

## 2021-03-10 ASSESSMENT — LOCATION DETAILED DESCRIPTION DERM
LOCATION DETAILED: RIGHT RIB CAGE
LOCATION DETAILED: LEFT DORSAL RING METACARPOPHALANGEAL JOINT
LOCATION DETAILED: LEFT ANTERIOR PROXIMAL UPPER ARM
LOCATION DETAILED: LEFT BUTTOCK
LOCATION DETAILED: RIGHT RADIAL DORSAL HAND

## 2021-03-10 ASSESSMENT — LOCATION ZONE DERM
LOCATION ZONE: TRUNK
LOCATION ZONE: ARM
LOCATION ZONE: HAND

## 2021-03-10 NOTE — PROCEDURE: INTRALESIONAL KENALOG
Kenalog Preparation: Kenalog
Administered By (Optional): Dr. Yeni Cha MD
Consent: The risks of atrophy were reviewed with the patient.
X Size Of Lesion In Cm (Optional): 0
Medical Necessity Clause: This procedure was medically necessary because the lesions that were treated were:
Total Volume (Ccs): .2
Detail Level: Detailed
Concentration Of Kenalog Solution Injected (Mg/Ml): 10.0
Include Z78.9 (Other Specified Conditions Influencing Health Status) As An Associated Diagnosis?: No

## 2021-03-29 RX ORDER — MULTIVIT WITH MINERALS/LUTEIN
1000 TABLET ORAL 2 TIMES DAILY
COMMUNITY
End: 2022-03-18

## 2021-03-29 RX ORDER — ZINC SULFATE 50(220)MG
50 CAPSULE ORAL DAILY
COMMUNITY
End: 2022-02-15

## 2021-04-02 RX ORDER — PHENAZOPYRIDINE HYDROCHLORIDE 200 MG/1
200 TABLET, FILM COATED ORAL 3 TIMES DAILY PRN
Qty: 6 TABLET | Refills: 0 | Status: SHIPPED | OUTPATIENT
Start: 2021-04-02 | End: 2021-04-04

## 2021-04-02 RX ORDER — NITROFURANTOIN 25; 75 MG/1; MG/1
100 CAPSULE ORAL 2 TIMES DAILY
Qty: 20 CAPSULE | Refills: 0 | Status: SHIPPED | OUTPATIENT
Start: 2021-04-02 | End: 2021-04-12

## 2021-05-21 DIAGNOSIS — R05.9 COUGH: ICD-10-CM

## 2021-05-21 RX ORDER — OMEPRAZOLE 20 MG/1
20 CAPSULE, DELAYED RELEASE ORAL DAILY
Qty: 90 CAPSULE | Refills: 3 | Status: SHIPPED | OUTPATIENT
Start: 2021-05-21 | End: 2022-05-05 | Stop reason: SDUPTHER

## 2021-05-25 DIAGNOSIS — R92.8 ABNORMAL MAMMOGRAM: ICD-10-CM

## 2021-06-07 ENCOUNTER — HOSPITAL ENCOUNTER (OUTPATIENT)
Dept: RADIOLOGY | Facility: MEDICAL CENTER | Age: 66
End: 2021-06-07
Attending: FAMILY MEDICINE
Payer: MEDICARE

## 2021-06-07 DIAGNOSIS — R92.8 ABNORMAL MAMMOGRAM: ICD-10-CM

## 2021-06-07 PROCEDURE — 76642 ULTRASOUND BREAST LIMITED: CPT | Mod: RT

## 2021-08-16 DIAGNOSIS — E78.00 PURE HYPERCHOLESTEROLEMIA: ICD-10-CM

## 2021-08-17 RX ORDER — SIMVASTATIN 10 MG
TABLET ORAL
Qty: 90 TABLET | Refills: 3 | Status: SHIPPED | OUTPATIENT
Start: 2021-08-17 | End: 2022-07-29

## 2021-10-20 DIAGNOSIS — I35.1 MODERATE AORTIC REGURGITATION: ICD-10-CM

## 2021-10-27 ENCOUNTER — APPOINTMENT (RX ONLY)
Dept: URBAN - METROPOLITAN AREA CLINIC 4 | Facility: CLINIC | Age: 66
Setting detail: DERMATOLOGY
End: 2021-10-27

## 2021-10-27 DIAGNOSIS — L82.1 OTHER SEBORRHEIC KERATOSIS: ICD-10-CM

## 2021-10-27 DIAGNOSIS — L92.0 GRANULOMA ANNULARE: ICD-10-CM | Status: RESOLVED

## 2021-10-27 DIAGNOSIS — L68.0 HIRSUTISM: ICD-10-CM

## 2021-10-27 DIAGNOSIS — D22 MELANOCYTIC NEVI: ICD-10-CM

## 2021-10-27 DIAGNOSIS — L57.0 ACTINIC KERATOSIS: ICD-10-CM

## 2021-10-27 DIAGNOSIS — L81.4 OTHER MELANIN HYPERPIGMENTATION: ICD-10-CM

## 2021-10-27 PROBLEM — D22.5 MELANOCYTIC NEVI OF TRUNK: Status: ACTIVE | Noted: 2021-10-27

## 2021-10-27 PROBLEM — D22.62 MELANOCYTIC NEVI OF LEFT UPPER LIMB, INCLUDING SHOULDER: Status: ACTIVE | Noted: 2021-10-27

## 2021-10-27 PROCEDURE — ? PRESCRIPTION

## 2021-10-27 PROCEDURE — ? LIQUID NITROGEN

## 2021-10-27 PROCEDURE — ? COUNSELING

## 2021-10-27 PROCEDURE — 99213 OFFICE O/P EST LOW 20 MIN: CPT | Mod: 25

## 2021-10-27 PROCEDURE — ? ADDITIONAL NOTES

## 2021-10-27 PROCEDURE — 17000 DESTRUCT PREMALG LESION: CPT

## 2021-10-27 RX ORDER — H-QUINONE/TRETINOIN/HYDROCORT 8-0.05-1 %
EMULSION (GRAM) TOPICAL
Qty: 30 | Refills: 3 | Status: ERX | COMMUNITY
Start: 2021-10-27

## 2021-10-27 RX ORDER — EFLORNITHINE HYDROCHLORIDE 139 MG/G
CREAM TOPICAL BID
Qty: 45 | Refills: 3

## 2021-10-27 RX ADMIN — Medication PEA SIZE AMOUNT: at 00:00

## 2021-10-27 ASSESSMENT — LOCATION SIMPLE DESCRIPTION DERM
LOCATION SIMPLE: LEFT FOREHEAD
LOCATION SIMPLE: LEFT BUTTOCK
LOCATION SIMPLE: LEFT UPPER BACK
LOCATION SIMPLE: LEFT PRETIBIAL REGION
LOCATION SIMPLE: ABDOMEN
LOCATION SIMPLE: LEFT UPPER ARM
LOCATION SIMPLE: RIGHT FOREHEAD
LOCATION SIMPLE: LEFT HAND

## 2021-10-27 ASSESSMENT — LOCATION DETAILED DESCRIPTION DERM
LOCATION DETAILED: LEFT ANTERIOR PROXIMAL UPPER ARM
LOCATION DETAILED: RIGHT INFERIOR MEDIAL FOREHEAD
LOCATION DETAILED: RIGHT RIB CAGE
LOCATION DETAILED: LEFT DISTAL PRETIBIAL REGION
LOCATION DETAILED: LEFT DORSAL RING METACARPOPHALANGEAL JOINT
LOCATION DETAILED: LEFT BUTTOCK
LOCATION DETAILED: LEFT INFERIOR MEDIAL FOREHEAD
LOCATION DETAILED: LEFT SUPERIOR LATERAL UPPER BACK

## 2021-10-27 ASSESSMENT — LOCATION ZONE DERM
LOCATION ZONE: TRUNK
LOCATION ZONE: ARM
LOCATION ZONE: FACE
LOCATION ZONE: LEG
LOCATION ZONE: HAND

## 2021-11-02 ENCOUNTER — HOSPITAL ENCOUNTER (OUTPATIENT)
Dept: CARDIOLOGY | Facility: MEDICAL CENTER | Age: 66
End: 2021-11-02
Attending: FAMILY MEDICINE
Payer: MEDICARE

## 2021-11-02 DIAGNOSIS — I35.1 MODERATE AORTIC REGURGITATION: ICD-10-CM

## 2021-11-02 LAB
LV EJECT FRACT  99904: 70
LV EJECT FRACT MOD 2C 99903: 75.02
LV EJECT FRACT MOD 4C 99902: 62.88
LV EJECT FRACT MOD BP 99901: 68.84

## 2021-11-02 PROCEDURE — 93306 TTE W/DOPPLER COMPLETE: CPT

## 2021-11-02 PROCEDURE — 93306 TTE W/DOPPLER COMPLETE: CPT | Mod: 26 | Performed by: INTERNAL MEDICINE

## 2021-11-30 ENCOUNTER — HOSPITAL ENCOUNTER (OUTPATIENT)
Dept: LAB | Facility: MEDICAL CENTER | Age: 66
End: 2021-11-30
Attending: SPECIALIST
Payer: MEDICARE

## 2021-11-30 LAB
BUN SERPL-MCNC: 15 MG/DL (ref 8–22)
CREAT SERPL-MCNC: 0.85 MG/DL (ref 0.5–1.4)

## 2021-11-30 PROCEDURE — 36415 COLL VENOUS BLD VENIPUNCTURE: CPT

## 2021-11-30 PROCEDURE — 82565 ASSAY OF CREATININE: CPT

## 2021-11-30 PROCEDURE — 84520 ASSAY OF UREA NITROGEN: CPT

## 2021-12-06 ENCOUNTER — PATIENT MESSAGE (OUTPATIENT)
Dept: MEDICAL GROUP | Age: 66
End: 2021-12-06

## 2021-12-06 DIAGNOSIS — N64.4 BREAST PAIN: ICD-10-CM

## 2021-12-06 DIAGNOSIS — N63.0 BREAST MASS: ICD-10-CM

## 2021-12-07 ENCOUNTER — HOSPITAL ENCOUNTER (OUTPATIENT)
Dept: RADIOLOGY | Facility: MEDICAL CENTER | Age: 66
End: 2021-12-07
Attending: SPECIALIST
Payer: MEDICARE

## 2021-12-07 DIAGNOSIS — H93.A1 PULSATILE TINNITUS OF RIGHT EAR: ICD-10-CM

## 2021-12-14 ENCOUNTER — HOSPITAL ENCOUNTER (OUTPATIENT)
Dept: RADIOLOGY | Facility: MEDICAL CENTER | Age: 66
End: 2021-12-14
Attending: SPECIALIST
Payer: MEDICARE

## 2021-12-14 PROCEDURE — 700117 HCHG RX CONTRAST REV CODE 255: Performed by: SPECIALIST

## 2021-12-14 PROCEDURE — 70496 CT ANGIOGRAPHY HEAD: CPT | Mod: MH

## 2021-12-14 PROCEDURE — 70498 CT ANGIOGRAPHY NECK: CPT | Mod: MH

## 2021-12-14 RX ADMIN — IOHEXOL 80 ML: 350 INJECTION, SOLUTION INTRAVENOUS at 15:11

## 2022-01-07 ENCOUNTER — HOSPITAL ENCOUNTER (OUTPATIENT)
Dept: RADIOLOGY | Facility: MEDICAL CENTER | Age: 67
End: 2022-01-07
Attending: FAMILY MEDICINE
Payer: MEDICARE

## 2022-01-07 DIAGNOSIS — Z12.31 VISIT FOR SCREENING MAMMOGRAM: ICD-10-CM

## 2022-01-07 PROCEDURE — 77063 BREAST TOMOSYNTHESIS BI: CPT

## 2022-01-10 ENCOUNTER — HOSPITAL ENCOUNTER (OUTPATIENT)
Dept: RADIOLOGY | Facility: MEDICAL CENTER | Age: 67
End: 2022-01-10
Attending: SPECIALIST
Payer: MEDICARE

## 2022-01-10 DIAGNOSIS — E04.2 NONTOXIC MULTINODULAR GOITER: ICD-10-CM

## 2022-01-10 DIAGNOSIS — E07.9 DISEASE OF THYROID GLAND: ICD-10-CM

## 2022-01-10 PROCEDURE — 76536 US EXAM OF HEAD AND NECK: CPT

## 2022-02-11 ENCOUNTER — HOSPITAL ENCOUNTER (OUTPATIENT)
Dept: RADIOLOGY | Facility: MEDICAL CENTER | Age: 67
End: 2022-02-11
Attending: SPECIALIST
Payer: MEDICARE

## 2022-02-11 DIAGNOSIS — E04.2 MULTINODULAR THYROID: ICD-10-CM

## 2022-02-11 DIAGNOSIS — E07.9 THYROID MASS: ICD-10-CM

## 2022-02-11 LAB — CYTOLOGY REG CYTOL: NORMAL

## 2022-02-11 PROCEDURE — 88112 CYTOPATH CELL ENHANCE TECH: CPT

## 2022-02-11 PROCEDURE — 10005 FNA BX W/US GDN 1ST LES: CPT

## 2022-02-12 NOTE — PROGRESS NOTES
US guided Left thyroid nodule fine needle aspiration done by Dr. Kumar; NON-SEDATION (no H&P required as this is a NON SEDATION procedure) left anterior aspect of neck access site; 1 jar of cytolyt obtained and sent to pathology lab; pt tolerated the procedure well; pt hemodynamically stable pre/intra/post procedure; all questions and concerns answered prior to being d/c; patient provided with appropriate education for procedure; pt d/c home.

## 2022-02-15 ENCOUNTER — RESEARCH ENCOUNTER (OUTPATIENT)
Dept: CARDIOLOGY | Facility: MEDICAL CENTER | Age: 67
End: 2022-02-15
Attending: INTERNAL MEDICINE

## 2022-02-15 ENCOUNTER — OFFICE VISIT (OUTPATIENT)
Dept: CARDIOLOGY | Facility: MEDICAL CENTER | Age: 67
End: 2022-02-15
Payer: MEDICARE

## 2022-02-15 VITALS
OXYGEN SATURATION: 99 % | SYSTOLIC BLOOD PRESSURE: 150 MMHG | RESPIRATION RATE: 16 BRPM | HEART RATE: 80 BPM | HEIGHT: 65 IN | DIASTOLIC BLOOD PRESSURE: 82 MMHG | BODY MASS INDEX: 24.59 KG/M2 | WEIGHT: 147.6 LBS

## 2022-02-15 DIAGNOSIS — I10 ESSENTIAL HYPERTENSION, BENIGN: ICD-10-CM

## 2022-02-15 DIAGNOSIS — Z00.6 RESEARCH STUDY PATIENT: ICD-10-CM

## 2022-02-15 DIAGNOSIS — I35.1 MODERATE AORTIC REGURGITATION: ICD-10-CM

## 2022-02-15 PROCEDURE — 99214 OFFICE O/P EST MOD 30 MIN: CPT | Performed by: INTERNAL MEDICINE

## 2022-02-15 RX ORDER — HYDROCHLOROTHIAZIDE 12.5 MG/1
12.5 CAPSULE, GELATIN COATED ORAL DAILY
Qty: 90 CAPSULE | Refills: 3 | Status: SHIPPED | OUTPATIENT
Start: 2022-02-15 | End: 2022-10-26

## 2022-02-15 RX ORDER — POTASSIUM CHLORIDE 750 MG/1
10 TABLET, FILM COATED, EXTENDED RELEASE ORAL DAILY
Qty: 90 TABLET | Refills: 3 | Status: SHIPPED | OUTPATIENT
Start: 2022-02-15 | End: 2023-01-17 | Stop reason: SDUPTHER

## 2022-02-15 ASSESSMENT — ENCOUNTER SYMPTOMS
DIZZINESS: 1
SHORTNESS OF BREATH: 1
PALPITATIONS: 1

## 2022-02-15 ASSESSMENT — FIBROSIS 4 INDEX: FIB4 SCORE: 1.1

## 2022-02-15 NOTE — PATIENT INSTRUCTIONS
Please start hydrochlorothiazide 12.5mg once a day to help with blood pressure.     Please start potassium 10mEq once a day.     Please get non-fasting blood tests in 1 week.

## 2022-02-15 NOTE — PROGRESS NOTES
Cardiology Follow-up Consultation Note    Date of note: 2/15/2022  Primary Care Provider: Chloe Donnelly M.D.  Referring Provider: Sharath Macias M.D.     Patient Name: Shahrzad Scott   YOB: 1955  MRN:              [id number]    Chief Complaint: dizziness    History of Present Illness: Shahrzad Scott is a 66 y.o. female whose current medical problems include dyslipidemia, hypertension, and aortic regurgitation secondary to phen phen who is here for follow-up.    At our visit, 5/30/2018:  She does have occasional mild severity palpitations, sometimes not for a couple months, sometimes every day, and at most 30 minutes. Sometimes these do stop with coughing.  She does not know if her heart rate was irregular.     In terms of hypertension, usually 120s/80s. Did have hypotension with losartan 12.5mg PO Daily, now taking it every other day.     Does not exercise.    At our visit,  11/13/2018:  In terms of hypertension, Had severe dizziness even on taking losartan 12.5mg PO every other day. Currently without medication, BP 110s-120s at home.     HR in the 60s-70s.    She gets dizzy 2-3 times a week, can be severe and feels like she's off balance.  Happens in the shower, when driving in a specific spot on [employer].  No syncope. This started about a month ago.  It is much worse when she moves her head back.     Continues to have occasional palpitations.     At our visit, 5/23/2019:  Has worsening thyroid nodules, awaiting thyroidectomy.     At our visit, 7/22/2020:  Hypertension well controlled at home.     Continues to have mild to moderate severity dizziness at times, typically once a day which lasts from 2-30 minutes. Gets dizzy everytime she steps into the shower. Sometimes associated with abnormal smells. Has seen ENT and Dr. Dailey. Dizziness is not related to standing up or orthostasis or palpitations. Meclizine does improve this.     Interim Events:   BP ranges from 120-140. Worried  because her mother ignored having high blood pressure and passed away from a stroke because of it.     Still with rare mild palpitations.     SOB improved since she retired.       Review of Systems   HENT: Positive for tinnitus.    Cardiovascular: Positive for palpitations.   Respiratory: Positive for shortness of breath.    Neurological: Positive for dizziness.   Allergic/Immunologic: Positive for environmental allergies.     + phantom smell development along with unchanged dizziness.     All other systems reviewed and discussed using a comprehensive questionnaire and are negative.       Past Medical History:   Diagnosis Date   • Anesthesia     may req scopalamine patch   • Aortic regurgitation 1996   • Arrhythmia    • Arthritis     hands   • Breath shortness    • Cataract     bilat IOL   • Disorder of thyroid     nodules   • Hemorrhagic disorder (HCC)     nose bleeds   • High cholesterol    • HLD (hyperlipidemia)    • Indigestion    • Mitral regurgitation    • MVP (mitral valve prolapse)    • Palpitations    • PONV (postoperative nausea and vomiting)          Past Surgical History:   Procedure Laterality Date   • THYROID LOBECTOMY Right 7/18/2019    Procedure: LOBECTOMY, THYROID WITH FROZEN SECTION;  Surgeon: Dirk Hilliard M.D.;  Location: SURGERY SAME DAY [facility] ORS;  Service: Ent   • MYRINGOTOMY Bilateral 7/18/2019    Procedure: MYRINGOTOMY WITH TUBES;  Surgeon: Dirk Hilliard M.D.;  Location: SURGERY SAME DAY [facility] ORS;  Service: Ent   • ABDOMINAL HYSTERECTOMY TOTAL     • APPENDECTOMY     • BREAST RECONSTRUCTION Bilateral    • CATARACT EXTRACTION WITH IOL Bilateral     Dr. Wilder   • CRANIOTOMY     • EYE SURGERY     • HYSTERECTOMY, TOTAL ABDOMINAL      at age 37 for uterine fibroid, still has both ovaries   • RECONSTRUCTION, KNEE, ACL, ARTHROSCOPIC, USING TIBIALIS TENDON AUTOGRAFT           Current Outpatient Medications   Medication Sig Dispense Refill   • simvastatin (ZOCOR) 10 MG Tab TAKE 1 TABLET  EVERY EVENING 90 Tablet 3   • omeprazole (PRILOSEC) 20 MG delayed-release capsule TAKE 1 CAPSULE BY MOUTH EVERY DAY 90 capsule 3   • Ascorbic Acid (VITAMIN C) 1000 MG Tab Take 1,000 mg by mouth 2 Times a Day.     • Emollient (COLLAGEN EX) Apply  topically. 2500mg, one scoop per day     • MAGNESIUM CITRATE PO Take 135 mg by mouth 2 Times a Day.     • fexofenadine (ALLEGRA) 180 MG tablet Take 1 tablet by mouth every day. 90 tablet 0   • niacinamide 500 MG tablet Take 1 Tab by mouth 2 times a day. 60 Tab 3   • TURMERIC PO Take 1,000 mg by mouth 2 Times a Day.     • Coenzyme Q10 200 MG Cap Take  by mouth.     • MINIVELLE 0.025 MG/24HR PATCH BIWEEKLY Apply 1 Patch to skin as directed.     • Multiple Vitamins-Minerals (MULTIVITAMIN ADULTS 50+ PO) Take  by mouth.     • Probiotic Product (PROBIOTIC DAILY PO) Take  by mouth.     • Alpha Lipoic Acid 200 MG Cap Take  by mouth every day.     • Calcium Carbonate-Vitamin D (CALCIUM + D) 600-200 MG-UNIT TABS Take  by mouth every day.     • vitamin D (CHOLECALCIFEROL) 1000 UNIT TABS Take 2,000 Units by mouth every day. TAKE AS DIRECTED.     • zinc sulfate (ZINCATE) 220 (50 Zn) MG Cap Take 50 mg by mouth every day.       No current facility-administered medications for this visit.         Allergies   Allergen Reactions   • Septra [Bactrim] Hives   • Sulfa Drugs Hives         Family History   Problem Relation Age of Onset   • Stroke Mother    • Hypertension Mother    • Cancer Father         prostate cancer   • Lung Disease Sister         asthma   • No Known Problems Brother    • No Known Problems Maternal Grandmother    • No Known Problems Maternal Grandfather    • Other Brother         cerabral palsy         Social History     Socioeconomic History   • Marital status:      Spouse name: Not on file   • Number of children: Not on file   • Years of education: Not on file   • Highest education level: Not on file   Occupational History   • Not on file   Tobacco Use   • Smoking  "status: Never Smoker   • Smokeless tobacco: Never Used   Vaping Use   • Vaping Use: Never used   Substance and Sexual Activity   • Alcohol use: No   • Drug use: No   • Sexual activity: Never   Other Topics Concern   • Not on file   Social History Narrative   • Not on file     Social Determinants of Health     Financial Resource Strain:    • Difficulty of Paying Living Expenses: Not on file   Food Insecurity:    • Worried About Running Out of Food in the Last Year: Not on file   • Ran Out of Food in the Last Year: Not on file   Transportation Needs:    • Lack of Transportation (Medical): Not on file   • Lack of Transportation (Non-Medical): Not on file   Physical Activity:    • Days of Exercise per Week: Not on file   • Minutes of Exercise per Session: Not on file   Stress:    • Feeling of Stress : Not on file   Social Connections:    • Frequency of Communication with Friends and Family: Not on file   • Frequency of Social Gatherings with Friends and Family: Not on file   • Attends Jain Services: Not on file   • Active Member of Clubs or Organizations: Not on file   • Attends Club or Organization Meetings: Not on file   • Marital Status: Not on file   Intimate Partner Violence:    • Fear of Current or Ex-Partner: Not on file   • Emotionally Abused: Not on file   • Physically Abused: Not on file   • Sexually Abused: Not on file   Housing Stability:    • Unable to Pay for Housing in the Last Year: Not on file   • Number of Places Lived in the Last Year: Not on file   • Unstable Housing in the Last Year: Not on file         Physical Exam:  Ambulatory Vitals  /82 (BP Location: Left arm, Patient Position: Sitting, BP Cuff Size: Adult)   Pulse 80   Resp 16   Ht 1.651 m (5' 5\")   Wt 67 kg (147 lb 9.6 oz)   SpO2 99%    Oxygen Therapy:  Pulse Oximetry: 99 %  BP Readings from Last 4 Encounters:   02/15/22 150/82   03/02/21 120/70   07/22/20 140/74   05/20/20 118/86       Weight/BMI: Body mass index is 24.56 " kg/m².  Wt Readings from Last 4 Encounters:   02/15/22 67 kg (147 lb 9.6 oz)   03/02/21 67.6 kg (149 lb)   07/22/20 68 kg (150 lb)   05/20/20 69.1 kg (152 lb 6.4 oz)       General: No apparent distress  Eyes: nl conjunctiva  ENT: OP covered by mask  Neck: JVP <8 cm H20  Lungs: normal respiratory effort, CTAB  Heart: RRR, 1/6 systolic murmur, no rubs or gallops, no edema bilateral lower extremities. No LV/RV heave on cardiac palpatation. 2+ bilateral radial pulses.   Abdomen: soft, non tender, non distended, no masses, normal bowel sounds.  No HSM.  Extremities/MSK: no clubbing, no cyanosis  Neurological: No focal sensory deficits  Psychiatric: Appropriate affect, A/O x 3, intact judgement and insight  Skin: Warm extremities    Exam repeated in full and unchanged except for as noted above.      Lab Data Review:  Lab Results   Component Value Date/Time    CHOLSTRLTOT 200 (H) 05/11/2020 07:31 AM     (H) 05/11/2020 07:31 AM    HDL 59 05/11/2020 07:31 AM    TRIGLYCERIDE 179 (H) 05/11/2020 07:31 AM       Lab Results   Component Value Date/Time    SODIUM 140 05/11/2020 07:31 AM    SODIUM 138 07/12/2019 04:17 PM    POTASSIUM 4.3 05/11/2020 07:31 AM    POTASSIUM 4.2 07/12/2019 04:17 PM    CHLORIDE 103 05/11/2020 07:31 AM    CHLORIDE 105 07/12/2019 04:17 PM    CO2 22 05/11/2020 07:31 AM    CO2 27 07/12/2019 04:17 PM    GLUCOSE 104 (H) 05/11/2020 07:31 AM    GLUCOSE 91 07/12/2019 04:17 PM    BUN 15 11/30/2021 01:48 PM    CREATININE 0.85 11/30/2021 01:48 PM    BUNCREATRAT 19 05/11/2020 07:31 AM     Lab Results   Component Value Date/Time    ALKPHOSPHAT 62 05/11/2020 07:31 AM    ASTSGOT 26 05/11/2020 07:31 AM    ALTSGPT 32 05/11/2020 07:31 AM    TBILIRUBIN 0.3 05/11/2020 07:31 AM      Lab Results   Component Value Date/Time    WBC 4.7 05/11/2020 07:31 AM    WBC 5.6 07/12/2019 04:17 PM     No components found for: HBGA1C  No components found for: TROPONIN  No results found for: BNP      Cardiac Imaging and Procedures  Review:    EKG dated 2018 : My personal interpretation is NSR, LAE    Echo dated 2021:  CONCLUSIONS  Compared to the images of the prior study 10/20/2020, no change.  Normal left ventricular systolic function.   No evidence of valvular abnormality based on Doppler evaluation.   Right ventricular systolic pressure is estimated to be 32 mmHg.    Mild AI      biotel 2020:  CONCLUSIONS:   * 14 day biotel reviewed   * 20 episodes of SVT, all brief and asymptomatic.   * Artifact without clear arrhythmia during triggered events/symptoms   * No atrial fibrillation   * Rare ectopy.     CCS 2018:  Coronary calcification:  LMA - 0.0  LCX - 0.0  LAD - 0.0  RCA - 0.0  PDA - 0.0    Calcium score:  0    Carotid US 2018:  Vascular Laboratory   CONCLUSIONS   1.  Minimal plaque in the right carotid bulb without stenosis.    2.  Normal left carotid arterial exam.    3.  Normal bilateral subclavian and vertebral arterial exam.      Medical Decision Makin. MVP (mitral valve prolapse)  Not seen on recent echos. No need for monitoring.     2. Mild aortic regurgitation  Repeat echo Q 3 years. Next 2024    3. Essential hypertension  Poorly controlled  -start hctz 12.5mg PO daily  -start kcl 10mEq PO daily  -BMP in 1 week.     4. Primary prevention  CCS 0. No indication for statin or aspirin.      5. Palpitations  2 week ziopatch vs SEEQ monitor to check for a fib if they do worsen in severity.     6. Dizziness - likely BPPV, will discuss with PCP. negative carotid US. Has had extremely extensive work-up with ENT and neurology including ambulatory EEG it sounds like and CTA of the head.   -will try hctz.   -suggested evaluation at [place] or [facility].   -doubt tilt table will be useful, but could perform.     Return in about 1 year (around 2/15/2023).      Hans Higuera MD, [facility]  [facility], [facility]  [address] [place] [address]  [address]  Phone: [phone]  Fax: [phone]

## 2022-02-24 ENCOUNTER — HOSPITAL ENCOUNTER (OUTPATIENT)
Dept: LAB | Facility: MEDICAL CENTER | Age: 67
End: 2022-02-24
Attending: INTERNAL MEDICINE
Payer: MEDICARE

## 2022-02-24 DIAGNOSIS — I10 ESSENTIAL HYPERTENSION, BENIGN: ICD-10-CM

## 2022-02-24 LAB
ANION GAP SERPL CALC-SCNC: 13 MMOL/L (ref 7–16)
BUN SERPL-MCNC: 20 MG/DL (ref 8–22)
CALCIUM SERPL-MCNC: 9.5 MG/DL (ref 8.5–10.5)
CHLORIDE SERPL-SCNC: 101 MMOL/L (ref 96–112)
CO2 SERPL-SCNC: 25 MMOL/L (ref 20–33)
CREAT SERPL-MCNC: 0.76 MG/DL (ref 0.5–1.4)
GLUCOSE SERPL-MCNC: 143 MG/DL (ref 65–99)
POTASSIUM SERPL-SCNC: 3.7 MMOL/L (ref 3.6–5.5)
SODIUM SERPL-SCNC: 139 MMOL/L (ref 135–145)

## 2022-02-24 PROCEDURE — 36415 COLL VENOUS BLD VENIPUNCTURE: CPT

## 2022-02-24 PROCEDURE — 80048 BASIC METABOLIC PNL TOTAL CA: CPT

## 2022-03-02 ENCOUNTER — HOSPITAL ENCOUNTER (OUTPATIENT)
Dept: LAB | Facility: MEDICAL CENTER | Age: 67
End: 2022-03-02
Attending: SPECIALIST
Payer: MEDICARE

## 2022-03-02 LAB
CALCIUM SERPL-MCNC: 9.5 MG/DL (ref 8.5–10.5)
PTH-INTACT SERPL-MCNC: 31.3 PG/ML (ref 14–72)
TSH SERPL DL<=0.005 MIU/L-ACNC: 3.45 UIU/ML (ref 0.38–5.33)

## 2022-03-02 PROCEDURE — 84443 ASSAY THYROID STIM HORMONE: CPT

## 2022-03-02 PROCEDURE — 86800 THYROGLOBULIN ANTIBODY: CPT

## 2022-03-02 PROCEDURE — 36415 COLL VENOUS BLD VENIPUNCTURE: CPT

## 2022-03-02 PROCEDURE — 82310 ASSAY OF CALCIUM: CPT

## 2022-03-02 PROCEDURE — 83970 ASSAY OF PARATHORMONE: CPT

## 2022-03-06 LAB — THYROGLOB AB SERPL-ACNC: <0.9 IU/ML (ref 0–4)

## 2022-03-08 ENCOUNTER — PATIENT MESSAGE (OUTPATIENT)
Dept: CARDIOLOGY | Facility: MEDICAL CENTER | Age: 67
End: 2022-03-08
Payer: MEDICARE

## 2022-03-10 ENCOUNTER — PATIENT MESSAGE (OUTPATIENT)
Dept: CARDIOLOGY | Facility: MEDICAL CENTER | Age: 67
End: 2022-03-10
Payer: MEDICARE

## 2022-03-10 DIAGNOSIS — I10 ESSENTIAL HYPERTENSION, BENIGN: ICD-10-CM

## 2022-03-14 NOTE — PATIENT COMMUNICATION
Spoke to JM and he stated BMP labs every 6-12 months, and reorder a BMP to complete in August.     Lab ordered and mailed to the patient.

## 2022-03-18 ENCOUNTER — PRE-ADMISSION TESTING (OUTPATIENT)
Dept: ADMISSIONS | Facility: MEDICAL CENTER | Age: 67
End: 2022-03-18
Attending: SPECIALIST
Payer: MEDICARE

## 2022-03-18 DIAGNOSIS — Z01.810 PRE-OPERATIVE CARDIOVASCULAR EXAMINATION: ICD-10-CM

## 2022-03-18 DIAGNOSIS — Z01.812 PRE-OPERATIVE LABORATORY EXAMINATION: ICD-10-CM

## 2022-03-18 LAB
ANION GAP SERPL CALC-SCNC: 12 MMOL/L (ref 7–16)
BUN SERPL-MCNC: 22 MG/DL (ref 8–22)
CALCIUM SERPL-MCNC: 9.2 MG/DL (ref 8.5–10.5)
CHLORIDE SERPL-SCNC: 104 MMOL/L (ref 96–112)
CO2 SERPL-SCNC: 26 MMOL/L (ref 20–33)
CREAT SERPL-MCNC: 0.96 MG/DL (ref 0.5–1.4)
EKG IMPRESSION: NORMAL
ERYTHROCYTE [DISTWIDTH] IN BLOOD BY AUTOMATED COUNT: 44.2 FL (ref 35.9–50)
GFR SERPLBLD CREATININE-BSD FMLA CKD-EPI: 65 ML/MIN/1.73 M 2
GLUCOSE SERPL-MCNC: 104 MG/DL (ref 65–99)
HCT VFR BLD AUTO: 37.8 % (ref 37–47)
HGB BLD-MCNC: 12.7 G/DL (ref 12–16)
MCH RBC QN AUTO: 32.7 PG (ref 27–33)
MCHC RBC AUTO-ENTMCNC: 33.6 G/DL (ref 33.6–35)
MCV RBC AUTO: 97.4 FL (ref 81.4–97.8)
PLATELET # BLD AUTO: 263 K/UL (ref 164–446)
PMV BLD AUTO: 9.8 FL (ref 9–12.9)
POTASSIUM SERPL-SCNC: 4.4 MMOL/L (ref 3.6–5.5)
RBC # BLD AUTO: 3.88 M/UL (ref 4.2–5.4)
SARS-COV-2 RNA RESP QL NAA+PROBE: NOTDETECTED
SODIUM SERPL-SCNC: 142 MMOL/L (ref 135–145)
SPECIMEN SOURCE: NORMAL
WBC # BLD AUTO: 4.9 K/UL (ref 4.8–10.8)

## 2022-03-18 PROCEDURE — 93005 ELECTROCARDIOGRAM TRACING: CPT

## 2022-03-18 PROCEDURE — 93010 ELECTROCARDIOGRAM REPORT: CPT | Performed by: INTERNAL MEDICINE

## 2022-03-18 PROCEDURE — C9803 HOPD COVID-19 SPEC COLLECT: HCPCS

## 2022-03-18 PROCEDURE — 80048 BASIC METABOLIC PNL TOTAL CA: CPT

## 2022-03-18 PROCEDURE — U0005 INFEC AGEN DETEC AMPLI PROBE: HCPCS

## 2022-03-18 PROCEDURE — U0003 INFECTIOUS AGENT DETECTION BY NUCLEIC ACID (DNA OR RNA); SEVERE ACUTE RESPIRATORY SYNDROME CORONAVIRUS 2 (SARS-COV-2) (CORONAVIRUS DISEASE [COVID-19]), AMPLIFIED PROBE TECHNIQUE, MAKING USE OF HIGH THROUGHPUT TECHNOLOGIES AS DESCRIBED BY CMS-2020-01-R: HCPCS

## 2022-03-18 PROCEDURE — 36415 COLL VENOUS BLD VENIPUNCTURE: CPT

## 2022-03-18 PROCEDURE — 85027 COMPLETE CBC AUTOMATED: CPT

## 2022-03-18 RX ORDER — ASCORBIC ACID 500 MG
500 TABLET ORAL DAILY
COMMUNITY

## 2022-03-18 ASSESSMENT — FIBROSIS 4 INDEX: FIB4 SCORE: 1.1

## 2022-03-21 LAB
APOB+LDLR+PCSK9 GENE MUT ANL BLD/T: NOT DETECTED
BRCA1+BRCA2 DEL+DUP + FULL MUT ANL BLD/T: NOT DETECTED
MLH1+MSH2+MSH6+PMS2 GN DEL+DUP+FUL M: NOT DETECTED

## 2022-03-21 NOTE — RESULT ENCOUNTER NOTE
[facility] results reviewed and are negative for the initial tier 1 diseases. Patient Notified.

## 2022-03-23 NOTE — OR NURSING
COVID-19 Pre-Surgery Screenin. Do you have an undiagnosed respiratory illness or symptoms such as coughing or sneezing? NO     • Onset of Sx: -    • Acute vs. chronic respiratory illness: -     2. Do you have an unexplained fever greater than 100.4 degrees Fahrenheit or 38 degrees Celsius? NO  ?  3. Have you had direct exposure to a patient who tested positive for Covid-19? NO    Patient informed of current visitation and mask policies by this RN.

## 2022-03-24 ENCOUNTER — ANESTHESIA (OUTPATIENT)
Dept: SURGERY | Facility: MEDICAL CENTER | Age: 67
End: 2022-03-24
Payer: MEDICARE

## 2022-03-24 ENCOUNTER — HOSPITAL ENCOUNTER (OUTPATIENT)
Facility: MEDICAL CENTER | Age: 67
End: 2022-03-25
Attending: SPECIALIST | Admitting: SPECIALIST
Payer: MEDICARE

## 2022-03-24 ENCOUNTER — ANESTHESIA EVENT (OUTPATIENT)
Dept: SURGERY | Facility: MEDICAL CENTER | Age: 67
End: 2022-03-24
Payer: MEDICARE

## 2022-03-24 PROBLEM — E04.2 GOITER, NONTOXIC, MULTINODULAR: Status: ACTIVE | Noted: 2022-03-24

## 2022-03-24 LAB
CALCIUM SERPL-MCNC: 8.6 MG/DL (ref 8.5–10.5)
CALCIUM SERPL-MCNC: 9 MG/DL (ref 8.5–10.5)
PATHOLOGY CONSULT NOTE: NORMAL
PTH-INTACT SERPL-MCNC: 41.2 PG/ML (ref 14–72)

## 2022-03-24 PROCEDURE — 700105 HCHG RX REV CODE 258: Performed by: SPECIALIST

## 2022-03-24 PROCEDURE — 82310 ASSAY OF CALCIUM: CPT

## 2022-03-24 PROCEDURE — 501838 HCHG SUTURE GENERAL: Performed by: SPECIALIST

## 2022-03-24 PROCEDURE — 88307 TISSUE EXAM BY PATHOLOGIST: CPT

## 2022-03-24 PROCEDURE — G0378 HOSPITAL OBSERVATION PER HR: HCPCS

## 2022-03-24 PROCEDURE — 700102 HCHG RX REV CODE 250 W/ 637 OVERRIDE(OP): Performed by: ANESTHESIOLOGY

## 2022-03-24 PROCEDURE — 700101 HCHG RX REV CODE 250: Performed by: SPECIALIST

## 2022-03-24 PROCEDURE — 160041 HCHG SURGERY MINUTES - EA ADDL 1 MIN LEVEL 4: Performed by: SPECIALIST

## 2022-03-24 PROCEDURE — 160035 HCHG PACU - 1ST 60 MINS PHASE I: Performed by: SPECIALIST

## 2022-03-24 PROCEDURE — 160029 HCHG SURGERY MINUTES - 1ST 30 MINS LEVEL 4: Performed by: SPECIALIST

## 2022-03-24 PROCEDURE — A9270 NON-COVERED ITEM OR SERVICE: HCPCS | Performed by: SPECIALIST

## 2022-03-24 PROCEDURE — 160002 HCHG RECOVERY MINUTES (STAT): Performed by: SPECIALIST

## 2022-03-24 PROCEDURE — 700102 HCHG RX REV CODE 250 W/ 637 OVERRIDE(OP): Performed by: SPECIALIST

## 2022-03-24 PROCEDURE — 700101 HCHG RX REV CODE 250: Performed by: ANESTHESIOLOGY

## 2022-03-24 PROCEDURE — 160009 HCHG ANES TIME/MIN: Performed by: SPECIALIST

## 2022-03-24 PROCEDURE — 83970 ASSAY OF PARATHORMONE: CPT

## 2022-03-24 PROCEDURE — 502594 HCHG SCISSOR HANDLE, HARMONIC ACE: Performed by: SPECIALIST

## 2022-03-24 PROCEDURE — 88311 DECALCIFY TISSUE: CPT

## 2022-03-24 PROCEDURE — 160048 HCHG OR STATISTICAL LEVEL 1-5: Performed by: SPECIALIST

## 2022-03-24 PROCEDURE — A9270 NON-COVERED ITEM OR SERVICE: HCPCS | Performed by: ANESTHESIOLOGY

## 2022-03-24 PROCEDURE — 700111 HCHG RX REV CODE 636 W/ 250 OVERRIDE (IP): Performed by: ANESTHESIOLOGY

## 2022-03-24 RX ORDER — CELECOXIB 200 MG/1
200 CAPSULE ORAL ONCE
Status: COMPLETED | OUTPATIENT
Start: 2022-03-24 | End: 2022-03-24

## 2022-03-24 RX ORDER — MIDAZOLAM HYDROCHLORIDE 1 MG/ML
INJECTION INTRAMUSCULAR; INTRAVENOUS PRN
Status: DISCONTINUED | OUTPATIENT
Start: 2022-03-24 | End: 2022-03-24 | Stop reason: SURG

## 2022-03-24 RX ORDER — LIDOCAINE HYDROCHLORIDE AND EPINEPHRINE 10; 10 MG/ML; UG/ML
INJECTION, SOLUTION INFILTRATION; PERINEURAL
Status: DISPENSED
Start: 2022-03-24 | End: 2022-03-24

## 2022-03-24 RX ORDER — OXYCODONE HCL 5 MG/5 ML
10 SOLUTION, ORAL ORAL
Status: DISCONTINUED | OUTPATIENT
Start: 2022-03-24 | End: 2022-03-24 | Stop reason: HOSPADM

## 2022-03-24 RX ORDER — SUCCINYLCHOLINE CHLORIDE 20 MG/ML
INJECTION INTRAMUSCULAR; INTRAVENOUS PRN
Status: DISCONTINUED | OUTPATIENT
Start: 2022-03-24 | End: 2022-03-24 | Stop reason: SURG

## 2022-03-24 RX ORDER — ROCURONIUM BROMIDE 10 MG/ML
INJECTION, SOLUTION INTRAVENOUS PRN
Status: DISCONTINUED | OUTPATIENT
Start: 2022-03-24 | End: 2022-03-24 | Stop reason: SURG

## 2022-03-24 RX ORDER — ACETAMINOPHEN 500 MG
1000 TABLET ORAL ONCE
Status: COMPLETED | OUTPATIENT
Start: 2022-03-24 | End: 2022-03-24

## 2022-03-24 RX ORDER — HYDROCODONE BITARTRATE AND ACETAMINOPHEN 5; 325 MG/1; MG/1
1 TABLET ORAL EVERY 4 HOURS PRN
Status: DISCONTINUED | OUTPATIENT
Start: 2022-03-24 | End: 2022-03-25 | Stop reason: HOSPADM

## 2022-03-24 RX ORDER — ACETAMINOPHEN 500 MG
1000 TABLET ORAL EVERY 6 HOURS PRN
Status: DISCONTINUED | OUTPATIENT
Start: 2022-03-24 | End: 2022-03-25 | Stop reason: HOSPADM

## 2022-03-24 RX ORDER — DIPHENHYDRAMINE HYDROCHLORIDE 50 MG/ML
12.5 INJECTION INTRAMUSCULAR; INTRAVENOUS
Status: DISCONTINUED | OUTPATIENT
Start: 2022-03-24 | End: 2022-03-24 | Stop reason: HOSPADM

## 2022-03-24 RX ORDER — IBUPROFEN 200 MG
950 CAPSULE ORAL
Status: DISCONTINUED | OUTPATIENT
Start: 2022-03-24 | End: 2022-03-25 | Stop reason: HOSPADM

## 2022-03-24 RX ORDER — GABAPENTIN 300 MG/1
300 CAPSULE ORAL ONCE
Status: COMPLETED | OUTPATIENT
Start: 2022-03-24 | End: 2022-03-24

## 2022-03-24 RX ORDER — MAGNESIUM SULFATE HEPTAHYDRATE 40 MG/ML
INJECTION, SOLUTION INTRAVENOUS PRN
Status: DISCONTINUED | OUTPATIENT
Start: 2022-03-24 | End: 2022-03-24 | Stop reason: SURG

## 2022-03-24 RX ORDER — DEXAMETHASONE SODIUM PHOSPHATE 4 MG/ML
INJECTION, SOLUTION INTRA-ARTICULAR; INTRALESIONAL; INTRAMUSCULAR; INTRAVENOUS; SOFT TISSUE PRN
Status: DISCONTINUED | OUTPATIENT
Start: 2022-03-24 | End: 2022-03-24 | Stop reason: SURG

## 2022-03-24 RX ORDER — HALOPERIDOL 5 MG/ML
1 INJECTION INTRAMUSCULAR
Status: DISCONTINUED | OUTPATIENT
Start: 2022-03-24 | End: 2022-03-24 | Stop reason: HOSPADM

## 2022-03-24 RX ORDER — ONDANSETRON 2 MG/ML
INJECTION INTRAMUSCULAR; INTRAVENOUS PRN
Status: DISCONTINUED | OUTPATIENT
Start: 2022-03-24 | End: 2022-03-24 | Stop reason: SURG

## 2022-03-24 RX ORDER — SCOLOPAMINE TRANSDERMAL SYSTEM 1 MG/1
1 PATCH, EXTENDED RELEASE TRANSDERMAL ONCE
Status: DISCONTINUED | OUTPATIENT
Start: 2022-03-24 | End: 2022-03-25 | Stop reason: HOSPADM

## 2022-03-24 RX ORDER — ONDANSETRON 2 MG/ML
4 INJECTION INTRAMUSCULAR; INTRAVENOUS EVERY 4 HOURS PRN
Status: DISCONTINUED | OUTPATIENT
Start: 2022-03-24 | End: 2022-03-25 | Stop reason: HOSPADM

## 2022-03-24 RX ORDER — LIDOCAINE HYDROCHLORIDE AND EPINEPHRINE 10; 10 MG/ML; UG/ML
INJECTION, SOLUTION INFILTRATION; PERINEURAL
Status: DISCONTINUED | OUTPATIENT
Start: 2022-03-24 | End: 2022-03-24 | Stop reason: HOSPADM

## 2022-03-24 RX ORDER — SODIUM CHLORIDE, SODIUM LACTATE, POTASSIUM CHLORIDE, CALCIUM CHLORIDE 600; 310; 30; 20 MG/100ML; MG/100ML; MG/100ML; MG/100ML
INJECTION, SOLUTION INTRAVENOUS CONTINUOUS
Status: DISCONTINUED | OUTPATIENT
Start: 2022-03-24 | End: 2022-03-24 | Stop reason: HOSPADM

## 2022-03-24 RX ORDER — OXYCODONE HCL 5 MG/5 ML
5 SOLUTION, ORAL ORAL
Status: DISCONTINUED | OUTPATIENT
Start: 2022-03-24 | End: 2022-03-24 | Stop reason: HOSPADM

## 2022-03-24 RX ORDER — REMIFENTANIL HYDROCHLORIDE 1 MG/ML
INJECTION, POWDER, LYOPHILIZED, FOR SOLUTION INTRAVENOUS
Status: DISCONTINUED | OUTPATIENT
Start: 2022-03-24 | End: 2022-03-24 | Stop reason: SURG

## 2022-03-24 RX ORDER — MIDAZOLAM HYDROCHLORIDE 1 MG/ML
1 INJECTION INTRAMUSCULAR; INTRAVENOUS
Status: DISCONTINUED | OUTPATIENT
Start: 2022-03-24 | End: 2022-03-24 | Stop reason: HOSPADM

## 2022-03-24 RX ORDER — SODIUM CHLORIDE, SODIUM LACTATE, POTASSIUM CHLORIDE, CALCIUM CHLORIDE 600; 310; 30; 20 MG/100ML; MG/100ML; MG/100ML; MG/100ML
INJECTION, SOLUTION INTRAVENOUS CONTINUOUS
Status: ACTIVE | OUTPATIENT
Start: 2022-03-24 | End: 2022-03-24

## 2022-03-24 RX ORDER — LIDOCAINE HYDROCHLORIDE 20 MG/ML
INJECTION, SOLUTION EPIDURAL; INFILTRATION; INTRACAUDAL; PERINEURAL PRN
Status: DISCONTINUED | OUTPATIENT
Start: 2022-03-24 | End: 2022-03-24 | Stop reason: SURG

## 2022-03-24 RX ADMIN — Medication 950 MG: at 17:18

## 2022-03-24 RX ADMIN — PROPOFOL 30 MG: 10 INJECTION, EMULSION INTRAVENOUS at 08:45

## 2022-03-24 RX ADMIN — GABAPENTIN 300 MG: 300 CAPSULE ORAL at 07:51

## 2022-03-24 RX ADMIN — ACETAMINOPHEN 1000 MG: 500 TABLET ORAL at 13:13

## 2022-03-24 RX ADMIN — Medication 950 MG: at 13:13

## 2022-03-24 RX ADMIN — PROPOFOL 100 MCG/KG/MIN: 10 INJECTION, EMULSION INTRAVENOUS at 08:11

## 2022-03-24 RX ADMIN — SUCCINYLCHOLINE CHLORIDE 100 MG: 20 INJECTION, SOLUTION INTRAMUSCULAR; INTRAVENOUS; PARENTERAL at 08:13

## 2022-03-24 RX ADMIN — MAGNESIUM SULFATE HEPTAHYDRATE 4 G: 40 INJECTION, SOLUTION INTRAVENOUS at 08:21

## 2022-03-24 RX ADMIN — MIDAZOLAM HYDROCHLORIDE 2 MG: 1 INJECTION, SOLUTION INTRAMUSCULAR; INTRAVENOUS at 08:10

## 2022-03-24 RX ADMIN — ROCURONIUM BROMIDE 10 MG: 10 INJECTION, SOLUTION INTRAVENOUS at 08:13

## 2022-03-24 RX ADMIN — ACETAMINOPHEN 1000 MG: 500 TABLET ORAL at 07:51

## 2022-03-24 RX ADMIN — DEXAMETHASONE SODIUM PHOSPHATE 8 MG: 4 INJECTION, SOLUTION INTRA-ARTICULAR; INTRALESIONAL; INTRAMUSCULAR; INTRAVENOUS; SOFT TISSUE at 08:10

## 2022-03-24 RX ADMIN — PROPOFOL 30 MG: 10 INJECTION, EMULSION INTRAVENOUS at 08:35

## 2022-03-24 RX ADMIN — SCOPALAMINE 1 PATCH: 1 PATCH, EXTENDED RELEASE TRANSDERMAL at 07:51

## 2022-03-24 RX ADMIN — PROPOFOL 100 MG: 10 INJECTION, EMULSION INTRAVENOUS at 08:13

## 2022-03-24 RX ADMIN — ACETAMINOPHEN 1000 MG: 500 TABLET ORAL at 19:52

## 2022-03-24 RX ADMIN — CELECOXIB 200 MG: 200 CAPSULE ORAL at 07:51

## 2022-03-24 RX ADMIN — ONDANSETRON 4 MG: 2 INJECTION INTRAMUSCULAR; INTRAVENOUS at 08:10

## 2022-03-24 RX ADMIN — SODIUM CHLORIDE, POTASSIUM CHLORIDE, SODIUM LACTATE AND CALCIUM CHLORIDE: 600; 310; 30; 20 INJECTION, SOLUTION INTRAVENOUS at 07:47

## 2022-03-24 RX ADMIN — REMIFENTANIL HYDROCHLORIDE 0.1 MCG/KG/MIN: 1 INJECTION, POWDER, LYOPHILIZED, FOR SOLUTION INTRAVENOUS at 08:11

## 2022-03-24 RX ADMIN — LIDOCAINE HYDROCHLORIDE 100 MG: 20 INJECTION, SOLUTION EPIDURAL; INFILTRATION; INTRACAUDAL at 08:10

## 2022-03-24 ASSESSMENT — LIFESTYLE VARIABLES
HAVE YOU EVER FELT YOU SHOULD CUT DOWN ON YOUR DRINKING: NO
HAVE PEOPLE ANNOYED YOU BY CRITICIZING YOUR DRINKING: NO
TOTAL SCORE: 0
AVERAGE NUMBER OF DAYS PER WEEK YOU HAVE A DRINK CONTAINING ALCOHOL: 0
ALCOHOL_USE: NO
TOTAL SCORE: 0
EVER HAD A DRINK FIRST THING IN THE MORNING TO STEADY YOUR NERVES TO GET RID OF A HANGOVER: NO
HOW MANY TIMES IN THE PAST YEAR HAVE YOU HAD 5 OR MORE DRINKS IN A DAY: 0
TOTAL SCORE: 0
ON A TYPICAL DAY WHEN YOU DRINK ALCOHOL HOW MANY DRINKS DO YOU HAVE: 0
DOES PATIENT WANT TO STOP DRINKING: NO
CONSUMPTION TOTAL: NEGATIVE
EVER FELT BAD OR GUILTY ABOUT YOUR DRINKING: NO

## 2022-03-24 ASSESSMENT — PATIENT HEALTH QUESTIONNAIRE - PHQ9
2. FEELING DOWN, DEPRESSED, IRRITABLE, OR HOPELESS: NOT AT ALL
1. LITTLE INTEREST OR PLEASURE IN DOING THINGS: NOT AT ALL
SUM OF ALL RESPONSES TO PHQ9 QUESTIONS 1 AND 2: 0

## 2022-03-24 ASSESSMENT — FIBROSIS 4 INDEX: FIB4 SCORE: 1.17

## 2022-03-24 ASSESSMENT — PAIN DESCRIPTION - PAIN TYPE: TYPE: ACUTE PAIN

## 2022-03-24 NOTE — OR NURSING
0954 - Patient arrived from OR, report received from anesthesia and RN. Orders verified, [id number] in place. Patient awake and alert, denies pain.   Mid cervical incision clen/dry/intact w/ steri-strips     1024 - Patient assisted to restroom, voided     1044 - serum PTH calcium collected and sent to lab per order     1104 - Patient ambulated to restroom, steady gait.   Patient sister, Kylie updated via telephone. All questions answered     1138 - Report given to RN on CDU. Patient taken to inpatient room, T210 in stable condition w/ all belongings

## 2022-03-24 NOTE — OR SURGEON
Immediate Post OP Note    PreOp Diagnosis: non-toxic multinodular goiter    PostOp Diagnosis: same      Procedure(s):  THYROIDECTOMY - REMOVAL REMAINING TISSUE, FOLLOWING PREVIOUS PARTIAL REMOVAL, PORTION THYROID (LEFT)  - Wound Class: Clean;  NEEDLE EMG FACIAL NERVE WITH INTRAOPERATIVE NEUROPHYSIOLOGY MONITORING    Surgeon(s):  LICHA Urbina M.D C. [place], ms4.    Anesthesiologist/Type of Anesthesia:  Anesthesiologist: Kalen Peoples M.D./General    Surgical Staff:  Circulator: HONEY Yuan Circulator: HONEY De La Fuente Scrub: Basilia White  Scrub Person: Linda Fuentes    Specimens removed if any:  ID Type Source Tests Collected by Time Destination   A : left thyroid gland suture marks isthmus Tissue Thyroid PATHOLOGY SPECIMEN Dirk Hilliard M.D. 3/24/2022  9:04 AM        Estimated Blood Loss: <25ml    Findings:     Complications: none        3/24/2022 9:54 AM Dirk Hilliard M.D.

## 2022-03-24 NOTE — OP REPORT
DATE OF SERVICE:  03/24/2022     SURGEON:  Dirk Hilliard MD     ASSISTANT:  Gely Garcia MD and Anthony Castaneda MS-4     ANESTHESIA:  General given per endotracheal tube.     ANESTHESIOLOGIST:  Kalen Peoples MD     PREOPERATIVE DIAGNOSIS:  Nontoxic multinodular goiter.     POSTOPERATIVE DIAGNOSIS:  Nontoxic multinodular goiter, pathology pending.     PROCEDURES PERFORMED:  Completion thyroidectomy (left); needle EMG of facial   nerve with intraoperative neurophysiology monitoring and testing.     INDICATIONS:  The patient is a 66-year-old woman with a history of   multinodular goiter.  She underwent previous right thyroidectomy for enlarged   nodules in the past.  Nodules on the left side had been followed and observed   with some enlargement noted.  Additionally, ultrasonography characteristics of   the left thyroid nodule suggested a TI-RAD score indicating a highly   suspicious nodule.  Needle aspiration biopsy was initially benign, but a   repeat biopsy with Afirma testing did show possible 50% chance of malignancy.    She presents now for removal of the remaining thyroid tissue on elective   basis.     DESCRIPTION OF PROCEDURE:  The patient was brought into the operating room and   placed in supine position on the operating room table.  General anesthesia   was induced and the patient was endotracheally intubated without difficulty.    Eyes were protected with taping.  Shoulder roll was placed beneath the   shoulders to extend the neck.  The patient had been endotracheally intubated   with nerve integrity monitor compatible endotracheal tube.  Grounding   electrodes for the nerve integrity monitor placed on the chest and the   interelectrode impedance was noted to be 0.1 kilo ohms for both sets of   electrodes.  Appropriate EMG responses were noted with external stimulation on   either side of the larynx.  Continuous monitoring was then undertaken   throughout the procedure.     Approximately 6 mL of 1%  Xylocaine with 1:100,000 use of epinephrine solution   was used to infiltrate the skin and subcutaneous tissue along the low anterior   neck along the area of her previous thyroidectomy scar.  The patient's neck   and upper chest were then prepped and the patient was draped to expose the   neck for surgery.     A slightly curvilinear incision was outlined along the previous scar and   extended towards the left side to allow for removal of the left thyroid.    Incision was made through the skin and subcutaneous tissue and bleeding was   controlled with electrocautery as necessary.  Scar tissue along the area of   the strap muscles was identified.  The midline raphe was divided.  Scarring of   the left strap muscle to the left thyroid capsule was noted.  Dissection was   undertaken along the thyroid capsule to separate and free up the strap muscle.    Retraction of the straps then allowed for identification of the middle   thyroid vein.  This was divided using the Harmonic Focus device.  Dissection   was undertaken along the capsule of the thyroid into the superior pole area.    Superior pole vessels were then delineated and divided between clamps with   silk tie ligature being used to control bleeding.  Care was taken with   dissection along the area of the cricothyroid.     Attention was then directed to the inferior pole area.  Tissue along the   inferior pole area was carefully divided away from the thyroid capsule with   what appears to be an inferior parathyroid along the capsule being identified   and preserved.  This was dissected from a superior to inferior direction to   maintain vascular integrity.  Bleeding was controlled with bipolar cautery as   necessary.  Further dissection along the undersurface of the thyroid was then   undertaken inferiorly and laterally.  The thyroid lobe was rotated onto the   trachea and dissection along the tracheoesophageal groove was undertaken to   identify the recurrent  laryngeal nerve.  Once the recurrent laryngeal nerve   was identified, this was confirmed using the nerve integrity monitor with a   nerve stimulus probe at a stimulus intensity of 1.0 and then 0.5 milliamps.    Appropriate EMG responses noted.     The recurrent laryngeal nerve was then dissected from an inferior to superior   direction up to the area of the cricoarytenoid.  Tissue along the thyroid   capsule was carefully divided and reflected laterally using the bipolar   cautery and blunt dissection.  What appears to be a superior parathyroid   adjacent to the recurrent laryngeal nerve was also identified and dissected   away from the thyroid capsule and preserved.  Further dissection of the   thyroid off of the anterior trachea along its undersurface was undertaken to   allow for removal of the left lobe of the thyroid.  The thyroid isthmus area   was marked with a marking stitch and the specimen was sent in formalin to   pathology for permanent sections.     The wound was then irrigated with saline.  A few bleeding points were   controlled with bipolar cautery.  The recurrent laryngeal nerve was again   identified and stimulated with appropriate EMG response noted.  The   parathyroids appeared to be intact and did not appear to be significantly   devascularized.  Significant bleeding is not apparent.  Fibrillar Surgicel was   applied to the tracheoesophageal groove area.  The strap muscles were then   approximated using interrupted stitches of 3-0 Vicryl suture in a loose   fashion.     The platysma and subcutaneous tissue were then closed using interrupted buried   stitches of 3-0 Vicryl.  A 4-0 Vicryl was used to approximate the skin   margins with subcutaneous stitches in buried fashion.  A 5-0 Monocryl was then   used in a running intradermal/subcuticular fashion to approximate the skin   margins, followed by application of Steri-Strips as a dressing.     The procedure was then terminated.  The patient was  subsequently awakened from   anesthesia and extubated without difficulty.  She was taken from the   operating room to the recovery area, awake, breathing on her own in stable   condition.  There were no complications.  Sponge and needle counts were   correct.  Blood loss was less than 25 mL.  No drains were utilized.        ______________________________  MD INDIRA DUMONT/KANDIS    DD:  03/24/2022 10:20  DT:  03/24/2022 11:14    Job#:  [id number]

## 2022-03-24 NOTE — ANESTHESIA PROCEDURE NOTES
Airway    Date/Time: 3/24/2022 8:16 AM  Performed by: Kalen Peoples M.D.  Authorized by: Kalen Peoples M.D.     Location:  OR  Urgency:  Elective  Indications for Airway Management:  Anesthesia      Spontaneous Ventilation: absent    Sedation Level:  Deep  Preoxygenated: Yes    Patient Position:  Sniffing  Mask Difficulty Assessment:  0 - not attempted  Final Airway Type:  Endotracheal airway  Final Endotracheal Airway:  ETT and NIM tube  Cuffed: Yes    Technique Used for Successful ETT Placement:  Direct laryngoscopy    Insertion Site:  Oral  Blade Type:  Yin  Laryngoscope Blade/Videolaryngoscope Blade Size:  3  ETT Size (mm):  7.0  Measured from:  Teeth  ETT to Teeth (cm):  20  Placement Verified by: auscultation and capnometry    Cormack-Lehane Classification:  Grade I - full view of glottis  Number of Attempts at Approach:  1

## 2022-03-24 NOTE — ANESTHESIA TIME REPORT
Anesthesia Start and Stop Event Times     Date Time Event    3/24/2022 0757 Ready for Procedure     0809 Anesthesia Start     0957 Anesthesia Stop        Responsible Staff  03/24/22    Name Role Begin End    Kalen Peoples M.D. Anesth 0809 0957        Preop Diagnosis (Free Text):  Pre-op Diagnosis     E04.2, E07.9        Preop Diagnosis (Codes):    Premium Reason  Non-Premium    Comments:

## 2022-03-24 NOTE — ANESTHESIA POSTPROCEDURE EVALUATION
Patient: Shahrzad Scott    Procedure Summary     Date: 03/24/22 Room / Location: [facility] ROOM 23 / SURGERY SAME DAY [facility]    Anesthesia Start: 0809 Anesthesia Stop: 0957    Procedure: THYROIDECTOMY - REMOVAL REMAINING TISSUE, FOLLOWING PREVIOUS PARTIAL REMOVAL, PORTION THYROID using NIMS (Left Neck) Diagnosis: (multinodular goiter, disorder of the thyroid)    Surgeons: Dirk Hilliard M.D. Responsible Provider: Kalen Peoples M.D.    Anesthesia Type: general ASA Status: 2          Final Anesthesia Type: general  Last vitals  BP   Blood Pressure : 149/101    Temp   36.4 °C (97.6 °F)    Pulse   (!) 101   Resp   18    SpO2   97 %      Anesthesia Post Evaluation    Patient location during evaluation: PACU  Patient participation: complete - patient participated  Level of consciousness: awake and alert    Airway patency: patent  Anesthetic complications: no  Cardiovascular status: hemodynamically stable  Respiratory status: acceptable  Hydration status: euvolemic    PONV: none          No complications documented.     Nurse Pain Score: 0 (NPRS)

## 2022-03-24 NOTE — ANESTHESIA PREPROCEDURE EVALUATION
Case: [id number] Date/Time: 03/24/22 0800    Procedure: THYROIDECTOMY - REMOVAL REMAINING TISSUE, FOLLOWING PREVIOUS PARTIAL REMOVAL, PORTION THYROID (Left )    Pre-op diagnosis: E04.2, E07.9    Location: [facility] ROOM 23 / SURGERY SAME DAY [facility]    Surgeons: Dirk Hilliard M.D.        PONV    Relevant Problems   CARDIAC   (positive) Moderate aortic regurgitation      GI   (positive) Gastroesophageal reflux disease without esophagitis      Other   (positive) IFG (impaired fasting glucose)   (positive) Left sided sciatica   (positive) Pure hypercholesterolemia       Physical Exam    Airway   Mallampati: II  TM distance: >3 FB  Neck ROM: full       Cardiovascular - normal exam  Rhythm: regular  Rate: normal  (-) murmur     Dental - normal exam           Pulmonary - normal exam  Breath sounds clear to auscultation     Abdominal    Neurological - normal exam                 Anesthesia Plan    ASA 2       Plan - general       Airway plan will be ETT          Induction: intravenous      Pertinent diagnostic labs and testing reviewed    Informed Consent:    Anesthetic plan and risks discussed with patient.

## 2022-03-24 NOTE — PROGRESS NOTES
4 Eyes Skin Assessment Completed by kamran, RN and GAMALIEL flowers.    Head WDL  Ears WDL  Nose WDL  Mouth WDL  Neck Incision, EILEEN, steri strips, CDI  Breast/Chest WDL  Shoulder Blades WDL  Spine WDL  (R) Arm/Elbow/Hand WDL  (L) Arm/Elbow/Hand WDL  Abdomen WDL  Groin WDL  Scrotum/Coccyx/Buttocks WDL  (R) Leg WDL  (L) Leg WDL  (R) Heel/Foot/Toe WDL  (L) Heel/Foot/Toe WDL          Devices In Places Pulse Ox      Interventions In Place N/A    Possible Skin Injury No    Pictures Uploaded Into Epic N/A  Wound Consult Placed N/A  RN Wound Prevention Protocol Ordered No

## 2022-03-25 VITALS
OXYGEN SATURATION: 94 % | RESPIRATION RATE: 16 BRPM | DIASTOLIC BLOOD PRESSURE: 72 MMHG | HEART RATE: 62 BPM | WEIGHT: 143.3 LBS | BODY MASS INDEX: 24.46 KG/M2 | HEIGHT: 64 IN | TEMPERATURE: 97.1 F | SYSTOLIC BLOOD PRESSURE: 132 MMHG

## 2022-03-25 PROBLEM — E04.2 GOITER, NONTOXIC, MULTINODULAR: Status: RESOLVED | Noted: 2022-03-24 | Resolved: 2022-03-25

## 2022-03-25 LAB
CALCIUM SERPL-MCNC: 8.8 MG/DL (ref 8.5–10.5)
CALCIUM SERPL-MCNC: 8.9 MG/DL (ref 8.5–10.5)
CALCIUM SERPL-MCNC: 9 MG/DL (ref 8.5–10.5)

## 2022-03-25 PROCEDURE — 82310 ASSAY OF CALCIUM: CPT

## 2022-03-25 PROCEDURE — G0378 HOSPITAL OBSERVATION PER HR: HCPCS

## 2022-03-25 PROCEDURE — A9270 NON-COVERED ITEM OR SERVICE: HCPCS | Performed by: SPECIALIST

## 2022-03-25 PROCEDURE — 700102 HCHG RX REV CODE 250 W/ 637 OVERRIDE(OP): Performed by: SPECIALIST

## 2022-03-25 RX ORDER — LEVOTHYROXINE SODIUM 0.12 MG/1
125 TABLET ORAL
Qty: 90 TABLET | Refills: 3 | OUTPATIENT
Start: 2022-03-25

## 2022-03-25 RX ADMIN — Medication 950 MG: at 08:28

## 2022-03-25 RX ADMIN — ACETAMINOPHEN 1000 MG: 500 TABLET ORAL at 06:06

## 2022-03-25 RX ADMIN — Medication 950 MG: at 12:29

## 2022-03-25 ASSESSMENT — PAIN DESCRIPTION - PAIN TYPE: TYPE: ACUTE PAIN

## 2022-03-25 ASSESSMENT — PATIENT HEALTH QUESTIONNAIRE - PHQ9
2. FEELING DOWN, DEPRESSED, IRRITABLE, OR HOPELESS: NOT AT ALL
SUM OF ALL RESPONSES TO PHQ9 QUESTIONS 1 AND 2: 0
1. LITTLE INTEREST OR PLEASURE IN DOING THINGS: NOT AT ALL

## 2022-03-25 NOTE — PROGRESS NOTES
Report received.  Assumed care.  Pt in bed, resting. AOx4, responds appropriately. Pain 2/10.  Denies SOB, n/v. Reviewed POC, call light and belongings within reach, treaded slipper socks on, bed in lowest position.

## 2022-03-25 NOTE — DISCHARGE INSTRUCTIONS
ACTIVITY: Rest and take it easy for the first 24 hours.  A responsible adult is recommended to remain with you during that time.  It is normal to feel sleepy.  We encourage you to not do anything that requires balance, judgment or coordination.    MILD FLU-LIKE SYMPTOMS ARE NORMAL. YOU MAY EXPERIENCE GENERALIZED MUSCLE ACHES, THROAT IRRITATION, HEADACHE AND/OR SOME NAUSEA.    FOR 24 HOURS DO NOT:  Drive, operate machinery or run household appliances.  Drink beer or alcoholic beverages.   Make important decisions or sign legal documents.    SPECIAL INSTRUCTIONS:      DIET: To avoid nausea, slowly advance diet as tolerated, avoiding spicy or greasy foods for the first day.  Add more substantial food to your diet according to your physician's instructions.  Babies can be fed formula or breast milk as soon as they are hungry.  INCREASE FLUIDS AND FIBER TO AVOID CONSTIPATION.    SURGICAL DRESSING/BATHING:          You should CALL YOUR PHYSICIAN if you develop:  Fever greater than 101 degrees F.  Pain not relieved by medication, or persistent nausea or vomiting.  Excessive bleeding (blood soaking through dressing) or unexpected drainage from the wound.  Extreme redness or swelling around the incision site, drainage of pus or foul smelling drainage.  Inability to urinate or empty your bladder within 8 hours.  Problems with breathing or chest pain.    You should call 911 if you develop problems with breathing or chest pain.  If you are unable to contact your doctor or surgical center, you should go to the nearest emergency room or urgent care center.  Physician's telephone #:      If any questions arise, call your doctor.  If your doctor is not available, please feel free to call the Surgical Center at [phone].     A registered nurse may call you a few days after your surgery to see how you are doing after your procedure.    MEDICATIONS: Resume taking daily medication.  Take prescribed pain medication with food.   If no medication is prescribed, you may take non-aspirin pain medication if needed.  PAIN MEDICATION CAN BE VERY CONSTIPATING.  Take a stool softener or laxative such as senokot, pericolace, or milk of magnesia if needed.    .    If your physician has prescribed pain medication that includes Acetaminophen (Tylenol), do not take additional Acetaminophen (Tylenol) while taking the prescribed medication.    Depression / Suicide Risk    As you are discharged from this [facility] facility, it is important to learn how to keep safe from harming yourself.    Recognize the warning signs:  · Abrupt changes in personality, positive or negative- including increase in energy   · Giving away possessions  · Change in eating patterns- significant weight changes-  positive or negative  · Change in sleeping patterns- unable to sleep or sleeping all the time   · Unwillingness or inability to communicate  · Depression  · Unusual sadness, discouragement and loneliness  · Talk of wanting to die  · Neglect of personal appearance   · Rebelliousness- reckless behavior  · Withdrawal from people/activities they love  · Confusion- inability to concentrate     If you or a loved one observes any of these behaviors or has concerns about self-harm, here's what you can do:  · Talk about it- your feelings and reasons for harming yourself  · Remove any means that you might use to hurt yourself (examples: pills, rope, extension cords, firearm)  · Get professional help from the community (Mental Health, Substance Abuse, psychological counseling)  · Do not be alone:Call your Safe Contact- someone whom you trust who will be there for you.  · Call your local CRISIS HOTLINE 032-4517 or 575-635-4200  · Call your local Children's Mobile Crisis Response Team [place] (734) 505-5781 or www.XMOS  · Call the toll free National Suicide Prevention Hotlines   · National Suicide Prevention Lifeline 636-317-HWIB (3495)  · National Hope Line  Network 800-SUICIDE (995-8173)      Thyroidectomy, Care After  This sheet gives you information about how to care for yourself after your procedure. Your health care provider may also give you more specific instructions. If you have problems or questions, contact your health care provider.  What can I expect after the procedure?  After the procedure, it is common to have:  · Mild pain in the neck or upper body, especially when swallowing.  · A swollen neck.  · A sore throat.  · A weak or hoarse voice.  · Slight tingling or numbness around your mouth, or in your fingers or toes. This may last for a day or two after surgery. This condition is caused by low levels of calcium. You may be given calcium supplements to treat it.  Follow these instructions at home:    Medicines  · Take over-the-counter and prescription medicines only as told by your health care provider.  · Do not drive or use heavy machinery while taking prescription pain medicine.  · Do not take medicines that contain aspirin and ibuprofen until your health care provider says that you can. These medicines can increase your risk of bleeding.  · Take a thyroid hormone medicine as recommended by your health care provider. You will have to take this medicine for the rest of your life if your entire thyroid was removed.  Eating and drinking  · Start slowly with eating. You may need to have only liquids and soft foods for a few days or as directed by your health care provider.  · To prevent or treat constipation while you are taking prescription pain medicine, your health care provider may recommend that you:  ? Drink enough fluid to keep your urine pale yellow.  ? Take over-the-counter or prescription medicines.  ? Eat foods that are high in fiber, such as fresh fruits and vegetables, whole grains, and beans.  ? Limit foods that are high in fat and processed sugars, such as fried and sweet foods.  Incision care  · Follow instructions from your health care  provider about how to take care of your incision. Make sure you:  ? Wash your hands with soap and water before you change your bandage (dressing). If soap and water are not available, use hand .  ? Change your dressing as told by your health care provider.  ? Leave stitches (sutures), skin glue, or adhesive strips in place. These skin closures may need to stay in place for 2 weeks or longer. If adhesive strip edges start to loosen and curl up, you may trim the loose edges. Do not remove adhesive strips completely unless your health care provider tells you to do that.  · Check your incision area every day for signs of infection. Check for:  ? Redness, swelling, or pain.  ? Fluid or blood.  ? Warmth.  ? Pus or a bad smell.  · Do not take baths, swim, or use a hot tub until your health care provider approves.  Activity  · For the first 10 days after the procedure or as instructed by your health care provider:  ? Do not lift anything that is heavier than 10 lb (4.5 kg).  ? Do not jog, swim, or do other strenuous exercises.  ? Do not play contact sports.  · Avoid sitting for a long time without moving. Get up to take short walks every 1-2 hours. This is needed to improve blood flow and breathing. Ask for help if you feel weak or unsteady.  · Return to your normal activities as told by your health care provider. Ask your health care provider what activities are safe for you.  General instructions  · Do not use any products that contain nicotine or tobacco, such as cigarettes and e-cigarettes. These can delay healing after surgery. If you need help quitting, ask your health care provider.  · Keep all follow-up visits as told by your health care provider. This is important. Your health care provider needs to monitor the calcium level in your blood to make sure that it does not become low.  Contact a health care provider if you:  · Have a fever.  · Have more redness, swelling, or pain around your incision  area.  · Have fluid or blood coming from your incision area.  · Notice that your incision area feels warm to the touch.  · Have pus or a bad smell coming from your incision area.  · Have trouble talking.  · Have nausea or vomiting for more than 2 days.  Get help right away if you:  · Have trouble breathing.  · Have trouble swallowing.  · Develop a rash.  · Develop a cough that gets worse.  · Notice that your speech changes, or you have hoarseness that gets worse.  · Develop numbness, tingling, or muscle spasms in the arms, hands, feet, or face.  Summary  · After the procedure, it is common to feel mild pain in the neck or upper body, especially when swallowing.  · Take medicines as told by your health care provider. These include pain medicines and thyroid hormones, if required.  · Follow instructions from your health care provider about how to take care of your incision. Watch for signs of infection.  · Keep all follow-up visits as told by your health care provider. This is important. Your health care provider needs to monitor the calcium level in your blood to make sure that it does not become low.  · Get help right away if you develop difficulty breathing, or numbness, tingling, or muscle spasms in the arms, hands, feet, or face.  This information is not intended to replace advice given to you by your health care provider. Make sure you discuss any questions you have with your health care provider.  Document Released: 07/07/2006 Document Revised: 02/13/2020 Document Reviewed: 10/23/2018  Elsevier Patient Education © 2020 [employer]

## 2022-03-25 NOTE — CARE PLAN
The patient is Stable - Low risk of patient condition declining or worsening    Shift Goals  Clinical Goals: pain control, trend calcium  Patient Goals: rest, pain control  Family Goals: n/a    Progress made toward(s) clinical / shift goals:  Pt able to rest this shift. Calcium levels trending accordingly.    Patient is not progressing towards the following goals:

## 2022-04-05 ENCOUNTER — HOSPITAL ENCOUNTER (EMERGENCY)
Facility: MEDICAL CENTER | Age: 67
End: 2022-04-05
Attending: EMERGENCY MEDICINE
Payer: MEDICARE

## 2022-04-05 VITALS
WEIGHT: 143 LBS | RESPIRATION RATE: 22 BRPM | OXYGEN SATURATION: 96 % | HEART RATE: 84 BPM | SYSTOLIC BLOOD PRESSURE: 140 MMHG | HEIGHT: 64 IN | TEMPERATURE: 98 F | DIASTOLIC BLOOD PRESSURE: 81 MMHG | BODY MASS INDEX: 24.41 KG/M2

## 2022-04-05 DIAGNOSIS — H81.10 BENIGN PAROXYSMAL POSITIONAL VERTIGO, UNSPECIFIED LATERALITY: ICD-10-CM

## 2022-04-05 LAB
ALBUMIN SERPL BCP-MCNC: 4.7 G/DL (ref 3.2–4.9)
ALBUMIN/GLOB SERPL: 1.8 G/DL
ALP SERPL-CCNC: 63 U/L (ref 30–99)
ALT SERPL-CCNC: 25 U/L (ref 2–50)
ANION GAP SERPL CALC-SCNC: 12 MMOL/L (ref 7–16)
AST SERPL-CCNC: 25 U/L (ref 12–45)
BASOPHILS # BLD AUTO: 0.4 % (ref 0–1.8)
BASOPHILS # BLD: 0.03 K/UL (ref 0–0.12)
BILIRUB SERPL-MCNC: 0.3 MG/DL (ref 0.1–1.5)
BUN SERPL-MCNC: 16 MG/DL (ref 8–22)
CALCIUM SERPL-MCNC: 9.5 MG/DL (ref 8.5–10.5)
CHLORIDE SERPL-SCNC: 101 MMOL/L (ref 96–112)
CO2 SERPL-SCNC: 24 MMOL/L (ref 20–33)
CREAT SERPL-MCNC: 0.68 MG/DL (ref 0.5–1.4)
EOSINOPHIL # BLD AUTO: 0.01 K/UL (ref 0–0.51)
EOSINOPHIL NFR BLD: 0.1 % (ref 0–6.9)
ERYTHROCYTE [DISTWIDTH] IN BLOOD BY AUTOMATED COUNT: 42.5 FL (ref 35.9–50)
GFR SERPLBLD CREATININE-BSD FMLA CKD-EPI: 95 ML/MIN/1.73 M 2
GLOBULIN SER CALC-MCNC: 2.6 G/DL (ref 1.9–3.5)
GLUCOSE SERPL-MCNC: 125 MG/DL (ref 65–99)
HCT VFR BLD AUTO: 40.4 % (ref 37–47)
HGB BLD-MCNC: 14.1 G/DL (ref 12–16)
IMM GRANULOCYTES # BLD AUTO: 0.04 K/UL (ref 0–0.11)
IMM GRANULOCYTES NFR BLD AUTO: 0.5 % (ref 0–0.9)
LYMPHOCYTES # BLD AUTO: 0.84 K/UL (ref 1–4.8)
LYMPHOCYTES NFR BLD: 10.3 % (ref 22–41)
MCH RBC QN AUTO: 33.3 PG (ref 27–33)
MCHC RBC AUTO-ENTMCNC: 34.9 G/DL (ref 33.6–35)
MCV RBC AUTO: 95.5 FL (ref 81.4–97.8)
MONOCYTES # BLD AUTO: 0.28 K/UL (ref 0–0.85)
MONOCYTES NFR BLD AUTO: 3.4 % (ref 0–13.4)
NEUTROPHILS # BLD AUTO: 6.98 K/UL (ref 2–7.15)
NEUTROPHILS NFR BLD: 85.3 % (ref 44–72)
NRBC # BLD AUTO: 0 K/UL
NRBC BLD-RTO: 0 /100 WBC
PLATELET # BLD AUTO: 287 K/UL (ref 164–446)
PMV BLD AUTO: 9.1 FL (ref 9–12.9)
POTASSIUM SERPL-SCNC: 3.7 MMOL/L (ref 3.6–5.5)
PROT SERPL-MCNC: 7.3 G/DL (ref 6–8.2)
RBC # BLD AUTO: 4.23 M/UL (ref 4.2–5.4)
SODIUM SERPL-SCNC: 137 MMOL/L (ref 135–145)
TROPONIN T SERPL-MCNC: 11 NG/L (ref 6–19)
WBC # BLD AUTO: 8.2 K/UL (ref 4.8–10.8)

## 2022-04-05 PROCEDURE — 84484 ASSAY OF TROPONIN QUANT: CPT

## 2022-04-05 PROCEDURE — 36415 COLL VENOUS BLD VENIPUNCTURE: CPT

## 2022-04-05 PROCEDURE — 99284 EMERGENCY DEPT VISIT MOD MDM: CPT

## 2022-04-05 PROCEDURE — 80053 COMPREHEN METABOLIC PANEL: CPT

## 2022-04-05 PROCEDURE — 85025 COMPLETE CBC W/AUTO DIFF WBC: CPT

## 2022-04-05 PROCEDURE — 700105 HCHG RX REV CODE 258: Performed by: EMERGENCY MEDICINE

## 2022-04-05 RX ORDER — MECLIZINE HYDROCHLORIDE 25 MG/1
25 TABLET ORAL 3 TIMES DAILY PRN
Status: SHIPPED | COMMUNITY
End: 2022-05-04

## 2022-04-05 RX ORDER — ONDANSETRON 2 MG/ML
4 INJECTION INTRAMUSCULAR; INTRAVENOUS ONCE
Status: DISCONTINUED | OUTPATIENT
Start: 2022-04-05 | End: 2022-04-05 | Stop reason: HOSPADM

## 2022-04-05 RX ORDER — SODIUM CHLORIDE 9 MG/ML
1000 INJECTION, SOLUTION INTRAVENOUS ONCE
Status: COMPLETED | OUTPATIENT
Start: 2022-04-05 | End: 2022-04-05

## 2022-04-05 RX ORDER — ONDANSETRON 4 MG/1
4 TABLET, ORALLY DISINTEGRATING ORAL EVERY 8 HOURS PRN
Qty: 10 TABLET | Refills: 0 | Status: SHIPPED | OUTPATIENT
Start: 2022-04-05 | End: 2022-05-05

## 2022-04-05 RX ORDER — DIAZEPAM 5 MG/ML
5 INJECTION, SOLUTION INTRAMUSCULAR; INTRAVENOUS ONCE
Status: DISCONTINUED | OUTPATIENT
Start: 2022-04-05 | End: 2022-04-05 | Stop reason: HOSPADM

## 2022-04-05 RX ORDER — ACETAMINOPHEN 500 MG
500-1000 TABLET ORAL EVERY 6 HOURS PRN
Status: SHIPPED | COMMUNITY
End: 2022-05-04

## 2022-04-05 RX ADMIN — SODIUM CHLORIDE 1000 ML: 9 INJECTION, SOLUTION INTRAVENOUS at 09:15

## 2022-04-05 ASSESSMENT — LIFESTYLE VARIABLES: DO YOU DRINK ALCOHOL: NO

## 2022-04-05 ASSESSMENT — FIBROSIS 4 INDEX: FIB4 SCORE: 1.17

## 2022-04-05 NOTE — ED PROVIDER NOTES
ED Provider Note    CHIEF COMPLAINT  Chief Complaint   Patient presents with   • Vertigo     Since 0300 this morning. Pt reports worsening nausea when lifting head off bed.  Pt denies headache.  Pt reports taking Meclizine X2 at home.      • N/V     X1 this morning upon sitting up in bed       HPI  Shahrzad Scott is a 66 y.o. female who presents for evaluation of a spinning sensation she describes as vertigo and vomiting.  This began acutely this morning.  She states that she took a dose of her meclizine at onset and had a minimal improvement of her symptoms.  She then little while later took a second dose of meclizine and states her symptoms are improved albeit not completely resolved.  If she lays flat she is okay, she can now open her eyes after the meclizine which she was unable to do before.  However with any movement of her head, particularly if she tries to sit up, she experiences intense spinning and vomiting.  She has a history of dizziness albeit never this intense, this dates back to 2018 and since then she has been evaluated by a neurologist, ENT, cardiology and ophthalmology, she reports having had work-ups including MRIs, ultrasounds, EEGs and no etiology was ever been found.  She states that today symptoms are definitely the worst they have ever been.  She has not had a headache.  No focal weakness numbness or tingling.  No other complaints.  No head injury.    REVIEW OF SYSTEMS  Negative for fever, rash, chest pain, dyspnea, abdominal pain, diarrhea, headache, focal weakness, focal numbness, focal tingling, back pain. All other systems are negative.     PAST MEDICAL HISTORY  Past Medical History:   Diagnosis Date   • Anesthesia     n/v, may req scopalamine patch   • Aortic regurgitation 1996   • Arthritis     hands   • Breath shortness    • Cataract     bilat IOL   • Disorder of thyroid     nodules   • GERD (gastroesophageal reflux disease)    • Hemorrhagic disorder (HCC)     nose bleeds   • High  cholesterol    • History of aortic insufficiency 1996    seeing Dr. Higuera   • HLD (hyperlipidemia)    • Hypertension    • Mitral regurgitation    • MVP (mitral valve prolapse)    • Palpitations    • PONV (postoperative nausea and vomiting)    • Urinary incontinence        FAMILY HISTORY  Family History   Problem Relation Age of Onset   • Stroke Mother    • Hypertension Mother    • Cancer Father         prostate cancer   • Lung Disease Sister         asthma   • No Known Problems Brother    • No Known Problems Maternal Grandmother    • No Known Problems Maternal Grandfather    • Other Brother         cerabral palsy       SOCIAL HISTORY  Social History     Tobacco Use   • Smoking status: Never Smoker   • Smokeless tobacco: Never Used   Vaping Use   • Vaping Use: Never used   Substance Use Topics   • Alcohol use: Not Currently   • Drug use: Not Currently       SURGICAL HISTORY  Past Surgical History:   Procedure Laterality Date   • THYROIDECTOMY Left 3/24/2022    Procedure: THYROIDECTOMY - REMOVAL REMAINING TISSUE, FOLLOWING PREVIOUS PARTIAL REMOVAL, PORTION THYROID using NIMS;  Surgeon: Dirk Hilliard M.D.;  Location: SURGERY SAME DAY [facility];  Service: Ent   • THYROID LOBECTOMY Right 7/18/2019    Procedure: LOBECTOMY, THYROID WITH FROZEN SECTION;  Surgeon: Dirk Hilliard M.D.;  Location: SURGERY SAME DAY [facility];  Service: Ent   • MYRINGOTOMY Bilateral 7/18/2019    Procedure: MYRINGOTOMY WITH TUBES;  Surgeon: Dirk Hilliard M.D.;  Location: SURGERY SAME DAY [facility];  Service: Ent   • HAND SURGERY Bilateral 2005    thumb fusions    • HYSTERECTOMY, VAGINAL  1992   • BLEPHAROPLASTY, ELAINE (UPPER OR LOWER)  1990   • APPENDECTOMY     • BREAST RECONSTRUCTION Bilateral    • CATARACT EXTRACTION WITH IOL Bilateral     Dr. Wilder   • OTHER ORTHOPEDIC SURGERY  [id number]    multiplre knee surgeries.    • RECONSTRUCTION, KNEE, ACL, ARTHROSCOPIC, USING TIBIALIS TENDON AUTOGRAFT         CURRENT MEDICATIONS  I  "personally reviewed the medication list in the charting documentation.     ALLERGIES  Allergies   Allergen Reactions   • Septra [Bactrim] Hives   • Sulfa Drugs Hives       MEDICAL RECORD  I have reviewed patient's medical record and pertinent results are listed above.      PHYSICAL EXAM  VITAL SIGNS: /94   Pulse 89   Temp 36.7 °C (98 °F) (Oral)   Resp 16   Ht 1.626 m (5' 4\")   Wt 64.9 kg (143 lb)   LMP  (LMP Unknown)   SpO2 100%   BMI 24.55 kg/m²    Constitutional: Very pleasant woman, laying in bed very still, eyes open, no acute distress  HENT: Normocephalic, no obvious evidence of acute trauma.  Eyes: No scleral icterus. Normal conjunctiva.  Pupils are equal reactive.  Nystagmus with leftward gaze.  Neck: Comfortable movement without any obvious restriction in the range of motion.  Cardiovascular: Upon ascultation I appreciate a regular heart rhythm and a normal rate.   Thorax & Lungs: Normal nonlabored respirations.  Upon application of the stethoscope for auscultation I find there to be no associated chest wall tenderness.  I appreciate no wheezing, rhonchi or rales. There is normal air movement.    Abdomen: The abdomen is not visibly distended. Upon palpation, I find it to be without tenderness.  No mass appreciated.  Skin: The exposed portions of skin reveal no obvious rash or other abnormalities.  Extremities/Musculoskeletal: No obvious sign of acute trauma. No asymmetric calf tenderness or edema.   Neurologic: Alert & oriented.  Normal and symmetric upper and lower extremity motor and sensory function bilaterally.  Psychiatric: Normal affect appropriate for the clinical situation.    DIAGNOSTIC STUDIES / PROCEDURES    LABS/EKGs  Results for orders placed or performed during the hospital encounter of 04/05/22   CBC WITH DIFFERENTIAL   Result Value Ref Range    WBC 8.2 4.8 - 10.8 K/uL    RBC 4.23 4.20 - 5.40 M/uL    Hemoglobin 14.1 12.0 - 16.0 g/dL    Hematocrit 40.4 37.0 - 47.0 %    MCV 95.5 " 81.4 - 97.8 fL    MCH 33.3 (H) 27.0 - 33.0 pg    MCHC 34.9 33.6 - 35.0 g/dL    RDW 42.5 35.9 - 50.0 fL    Platelet Count 287 164 - 446 K/uL    MPV 9.1 9.0 - 12.9 fL    Neutrophils-Polys 85.30 (H) 44.00 - 72.00 %    Lymphocytes 10.30 (L) 22.00 - 41.00 %    Monocytes 3.40 0.00 - 13.40 %    Eosinophils 0.10 0.00 - 6.90 %    Basophils 0.40 0.00 - 1.80 %    Immature Granulocytes 0.50 0.00 - 0.90 %    Nucleated RBC 0.00 /100 WBC    Neutrophils (Absolute) 6.98 2.00 - 7.15 K/uL    Lymphs (Absolute) 0.84 (L) 1.00 - 4.80 K/uL    Monos (Absolute) 0.28 0.00 - 0.85 K/uL    Eos (Absolute) 0.01 0.00 - 0.51 K/uL    Baso (Absolute) 0.03 0.00 - 0.12 K/uL    Immature Granulocytes (abs) 0.04 0.00 - 0.11 K/uL    NRBC (Absolute) 0.00 K/uL   COMP METABOLIC PANEL   Result Value Ref Range    Sodium 137 135 - 145 mmol/L    Potassium 3.7 3.6 - 5.5 mmol/L    Chloride 101 96 - 112 mmol/L    Co2 24 20 - 33 mmol/L    Anion Gap 12.0 7.0 - 16.0    Glucose 125 (H) 65 - 99 mg/dL    Bun 16 8 - 22 mg/dL    Creatinine 0.68 0.50 - 1.40 mg/dL    Calcium 9.5 8.5 - 10.5 mg/dL    AST(SGOT) 25 12 - 45 U/L    ALT(SGPT) 25 2 - 50 U/L    Alkaline Phosphatase 63 30 - 99 U/L    Total Bilirubin 0.3 0.1 - 1.5 mg/dL    Albumin 4.7 3.2 - 4.9 g/dL    Total Protein 7.3 6.0 - 8.2 g/dL    Globulin 2.6 1.9 - 3.5 g/dL    A-G Ratio 1.8 g/dL   TROPONIN   Result Value Ref Range    Troponin T 11 6 - 19 ng/L   ESTIMATED GFR   Result Value Ref Range    GFR (CKD-EPI) 95 >60 mL/min/1.73 m 2        COURSE & MEDICAL DECISION MAKING  I have reviewed any medical record information, laboratory studies and radiographic results as noted above.    Encounter Summary: This is a very pleasant 66 y.o. female who unfortunately required evaluation in the emergency department today with classic BPPV, history of dizziness, has meclizine, has been worked up by neurology, cardiology, ophthalmology and ENT.  Today is the worst episode.  She took meclizine twice this morning, feeling better albeit  still symptomatic.  No focal neurologic complaints or findings.  I do not think this is central vertigo.  The plan is for treatment with IV fluids, Zofran and Valium although at this point the patient would like to receive only the IV fluids and for now is deferring Zofran and Valium, she will be reevaluated.  In the meantime blood work will be obtained to exclude significant electrolyte abnormalities that she is on diuretics as well as significant anemia ------- reassuring blood work.  Symptoms have resolved with just IV fluids, never required medication.  I will prescribe Zofran.  Return instructions provided, will follow up with primary care provider.      DISPOSITION: Discharged home in stable condition      FINAL IMPRESSION  1. Benign paroxysmal positional vertigo, unspecified laterality           This dictation was created using voice recognition software. The accuracy of the dictation is limited to the abilities of the software. I expect there may be some errors of grammar and possibly content. The nursing notes were reviewed and certain aspects of this information were incorporated into this note.    Electronically signed by: Rojelio Larsen M.D., 4/5/2022 8:53 AM

## 2022-04-05 NOTE — ED NOTES
"Pt ambulatory to BR to void.  PT reports dizziness \"getting better, but not gone\"\", about 300 ml fluid left to infuse.  "

## 2022-04-05 NOTE — ED TRIAGE NOTES
"Chief Complaint   Patient presents with   • Vertigo     Since 0300 this morning. Pt reports worsening nausea when lifting head off bed.  Pt denies headache.  Pt reports taking Meclizine X2 at home.      • N/V     X1 this morning upon sitting up in bed     Pt BIB [facility] from home with above complaint.     /94   Pulse 89   Temp 36.7 °C (98 °F) (Oral)   Resp 16   Ht 1.626 m (5' 4\")   Wt 64.9 kg (143 lb)   LMP  (LMP Unknown)   SpO2 100%   BMI 24.55 kg/m²     "

## 2022-04-06 ENCOUNTER — PATIENT MESSAGE (OUTPATIENT)
Dept: CARDIOLOGY | Facility: MEDICAL CENTER | Age: 67
End: 2022-04-06
Payer: MEDICARE

## 2022-04-07 ENCOUNTER — PATIENT MESSAGE (OUTPATIENT)
Dept: CARDIOLOGY | Facility: MEDICAL CENTER | Age: 67
End: 2022-04-07
Payer: MEDICARE

## 2022-04-13 RX ORDER — LEVOTHYROXINE SODIUM 0.12 MG/1
125 TABLET ORAL
COMMUNITY
End: 2022-08-12

## 2022-04-13 NOTE — DISCHARGE SUMMARY
DATE OF ADMISSION:  03/24/2022   DATE OF DISCHARGE:  03/25/2022     The patient underwent completion thyroidectomy on 3/24/2022.  Postoperatively,   she is doing well.  Her calcium is in the normal range.  Surgical wound site   was unremarkable with the flap down and no evidence of hematoma or bleeding.    She is discharged home on levothyroxine 125 mcg per day.  Followup is in the   office in 1-2 weeks.        ______________________________  MD INDIRA DUMONT/JENNY/PETRE    DD:  04/12/2022 18:37  DT:  04/12/2022 19:09    Job#:  [id number]

## 2022-04-27 ENCOUNTER — APPOINTMENT (RX ONLY)
Dept: URBAN - METROPOLITAN AREA CLINIC 4 | Facility: CLINIC | Age: 67
Setting detail: DERMATOLOGY
End: 2022-04-27

## 2022-04-27 DIAGNOSIS — T07XXXA ABRASION OR FRICTION BURN OF OTHER, MULTIPLE, AND UNSPECIFIED SITES, WITHOUT MENTION OF INFECTION: ICD-10-CM

## 2022-04-27 DIAGNOSIS — L81.4 OTHER MELANIN HYPERPIGMENTATION: ICD-10-CM | Status: STABLE

## 2022-04-27 DIAGNOSIS — L90.5 SCAR CONDITIONS AND FIBROSIS OF SKIN: ICD-10-CM

## 2022-04-27 DIAGNOSIS — L82.1 OTHER SEBORRHEIC KERATOSIS: ICD-10-CM

## 2022-04-27 DIAGNOSIS — D22 MELANOCYTIC NEVI: ICD-10-CM

## 2022-04-27 PROBLEM — D22.62 MELANOCYTIC NEVI OF LEFT UPPER LIMB, INCLUDING SHOULDER: Status: ACTIVE | Noted: 2022-04-27

## 2022-04-27 PROBLEM — S80.811A ABRASION, RIGHT LOWER LEG, INITIAL ENCOUNTER: Status: ACTIVE | Noted: 2022-04-27

## 2022-04-27 PROBLEM — D22.5 MELANOCYTIC NEVI OF TRUNK: Status: ACTIVE | Noted: 2022-04-27

## 2022-04-27 PROCEDURE — 99213 OFFICE O/P EST LOW 20 MIN: CPT

## 2022-04-27 PROCEDURE — ? COUNSELING

## 2022-04-27 PROCEDURE — ? PHOTO-DOCUMENTATION

## 2022-04-27 PROCEDURE — ? ADDITIONAL NOTES

## 2022-04-27 ASSESSMENT — LOCATION DETAILED DESCRIPTION DERM
LOCATION DETAILED: RIGHT MID-UPPER BACK
LOCATION DETAILED: LEFT BUTTOCK
LOCATION DETAILED: RIGHT LATERAL DISTAL PRETIBIAL REGION
LOCATION DETAILED: RIGHT INFERIOR MEDIAL FOREHEAD
LOCATION DETAILED: RIGHT RIB CAGE
LOCATION DETAILED: LEFT INFERIOR ANTERIOR NECK
LOCATION DETAILED: RIGHT MEDIAL UPPER BACK
LOCATION DETAILED: LEFT SUPERIOR LATERAL UPPER BACK
LOCATION DETAILED: LEFT ANTERIOR PROXIMAL UPPER ARM

## 2022-04-27 ASSESSMENT — LOCATION SIMPLE DESCRIPTION DERM
LOCATION SIMPLE: LEFT UPPER ARM
LOCATION SIMPLE: RIGHT UPPER BACK
LOCATION SIMPLE: LEFT UPPER BACK
LOCATION SIMPLE: RIGHT PRETIBIAL REGION
LOCATION SIMPLE: LEFT BUTTOCK
LOCATION SIMPLE: ABDOMEN
LOCATION SIMPLE: LEFT ANTERIOR NECK
LOCATION SIMPLE: RIGHT FOREHEAD

## 2022-04-27 ASSESSMENT — LOCATION ZONE DERM
LOCATION ZONE: TRUNK
LOCATION ZONE: LEG
LOCATION ZONE: ARM
LOCATION ZONE: FACE
LOCATION ZONE: NECK

## 2022-04-27 NOTE — PROCEDURE: PHOTO-DOCUMENTATION
Attending Only
Photo Preface (Leave Blank If You Do Not Want): Photographs were obtained today
Detail Level: Simple

## 2022-04-27 NOTE — PROCEDURE: ADDITIONAL NOTES
Additional Notes: Patient states she applies medication that was prescribed last visit for 3 months on and 2 months off.
Render Risk Assessment In Note?: no
Detail Level: Detailed
Hypertrophy of adenoids

## 2022-05-03 ENCOUNTER — PATIENT MESSAGE (OUTPATIENT)
Dept: MEDICAL GROUP | Age: 67
End: 2022-05-03
Payer: MEDICARE

## 2022-05-03 DIAGNOSIS — R05.9 COUGH: ICD-10-CM

## 2022-05-04 ENCOUNTER — OFFICE VISIT (OUTPATIENT)
Dept: MEDICAL GROUP | Age: 67
End: 2022-05-04
Payer: MEDICARE

## 2022-05-04 VITALS
TEMPERATURE: 98.8 F | HEIGHT: 65 IN | HEART RATE: 86 BPM | OXYGEN SATURATION: 96 % | SYSTOLIC BLOOD PRESSURE: 116 MMHG | WEIGHT: 145.2 LBS | BODY MASS INDEX: 24.19 KG/M2 | DIASTOLIC BLOOD PRESSURE: 64 MMHG

## 2022-05-04 DIAGNOSIS — E04.1 THYROID NODULE: ICD-10-CM

## 2022-05-04 DIAGNOSIS — E89.0 H/O PARTIAL THYROIDECTOMY: ICD-10-CM

## 2022-05-04 DIAGNOSIS — L03.221 CELLULITIS, NECK: ICD-10-CM

## 2022-05-04 DIAGNOSIS — Z11.59 NEED FOR HEPATITIS C SCREENING TEST: ICD-10-CM

## 2022-05-04 PROCEDURE — 99214 OFFICE O/P EST MOD 30 MIN: CPT | Performed by: INTERNAL MEDICINE

## 2022-05-04 RX ORDER — CLINDAMYCIN HYDROCHLORIDE 300 MG/1
300 CAPSULE ORAL 3 TIMES DAILY
Qty: 20 CAPSULE | Refills: 0 | Status: SHIPPED | OUTPATIENT
Start: 2022-05-04 | End: 2022-08-12

## 2022-05-04 ASSESSMENT — FIBROSIS 4 INDEX: FIB4 SCORE: 1.17

## 2022-05-05 PROBLEM — H69.91 EUSTACHIAN TUBE DYSFUNCTION, RIGHT: Status: RESOLVED | Noted: 2019-07-18 | Resolved: 2022-05-05

## 2022-05-05 PROBLEM — E89.0 H/O PARTIAL THYROIDECTOMY: Status: ACTIVE | Noted: 2022-05-05

## 2022-05-05 PROBLEM — H81.13 BENIGN PAROXYSMAL POSITIONAL VERTIGO DUE TO BILATERAL VESTIBULAR DISORDER: Status: RESOLVED | Noted: 2019-11-20 | Resolved: 2022-05-05

## 2022-05-05 NOTE — PROGRESS NOTES
"CHIEF COMPLAINT  Chief Complaint   Patient presents with   • Drainage from Incision     \"its uncomfortable\"     HPI  Shahrzad Scott is a 66 y.o. female who presents today for the following     History of recent partial thyroidectomy due to thyroid nodule, cellulitis  The patient had recent thyroid surgery, stitches were removed, wound initially healed well, until few days ago, when she noticed redness and clear discharge, discomfort and slight tenderness to palpation.  She did not have fever or chills.    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 22 g 0   • clindamycin (CLEOCIN) 300 MG Cap Take 1 Capsule by mouth 3 times a day. 20 Capsule 0   • levothyroxine (SYNTHROID) 125 MCG Tab Take 125 mcg by mouth every morning on an empty stomach.     • CALCIUM PO Take 1 Tablet by mouth 2 times a day.     • COLLAGEN PO Take 1 Dose by mouth every day.     • MAGNESIUM PO Take 1 Tablet by mouth 2 times a day.     • ascorbic acid (ASCORBIC ACID) 500 MG Tab Take 500 mg by mouth every day.     • hydrochlorothiazide (MICROZIDE) 12.5 MG capsule Take 1 Capsule by mouth every day. 90 Capsule 3   • " potassium chloride ER (KLOR-CON) 10 MEQ tablet Take 1 Tablet by mouth every day. 90 Tablet 3   • simvastatin (ZOCOR) 10 MG Tab TAKE 1 TABLET EVERY EVENING (Patient taking differently: Take 10 mg by mouth every evening. TAKE 1 TABLET EVERY EVENING) 90 Tablet 3   • omeprazole (PRILOSEC) 20 MG delayed-release capsule TAKE 1 CAPSULE BY MOUTH EVERY DAY 90 capsule 3   • niacinamide 500 MG tablet Take 1 Tab by mouth 2 times a day. 60 Tab 3   • TURMERIC PO Take 1 Tablet by mouth 2 times a day.     • COENZYME Q10 PO Take 1 Capsule by mouth every day.     • MINIVELLE 0.025 MG/24HR PATCH BIWEEKLY Place 1 Patch on the skin two times a week. Apply on Wednesday and Saturday  Indications: Moderate to Severe Vasomotor Symptoms     • Multiple Vitamins-Minerals (MULTIVITAMIN ADULTS 50+ PO) Take 1 Tablet by mouth every day.     • Probiotic Product (PROBIOTIC DAILY PO) Take 1 Tablet by mouth every day.     • Alpha Lipoic Acid 200 MG Cap Take 200 mg by mouth every day.     • vitamin D (CHOLECALCIFEROL) 1000 UNIT TABS Take 1,000 Units by mouth 2 times a day.     • ondansetron (ZOFRAN ODT) 4 MG TABLET DISPERSIBLE Take 1 Tablet by mouth every 8 hours as needed. 10 Tablet 0   • fexofenadine (ALLEGRA) 180 MG tablet Take 1 tablet by mouth every day. 90 tablet 0     No current facility-administered medications for this visit.     ALLERGIES  Allergies: Septra [bactrim] and Sulfa drugs  PAST MEDICAL HISTORY  Past Medical History:   Diagnosis Date   • Aortic regurgitation 1996   • History of aortic insufficiency 1996    seeing Dr. Higuera   • Anesthesia     n/v, may req scopalamine patch   • Arthritis     hands   • Breath shortness    • Cataract     bilat IOL   • Disorder of thyroid     nodules   • GERD (gastroesophageal reflux disease)    • Hemorrhagic disorder (HCC)     nose bleeds   • High cholesterol    • HLD (hyperlipidemia)    • Hypertension    • Mitral regurgitation    • MVP (mitral valve prolapse)    • Palpitations    • PONV (postoperative  nausea and vomiting)    • Urinary incontinence      SURGICAL HISTORY  She  has a past surgical history that includes cataract extraction with iol (Bilateral); breast reconstruction (Bilateral); appendectomy; reconstruction, knee, acl, arthroscopic, using tibialis tendon autograft; thyroid lobectomy (Right, 2019); myringotomy (Bilateral, 2019); hysterectomy, vaginal (); blepharoplasty, jevon (upper or lower) (); hand surgery (Bilateral, ); other orthopedic surgery ([id number]); and thyroidectomy (Left, 3/24/2022).  SOCIAL HISTORY  Social History     Tobacco Use   • Smoking status: Never Smoker   • Smokeless tobacco: Never Used   Vaping Use   • Vaping Use: Never used   Substance Use Topics   • Alcohol use: Not Currently   • Drug use: Not Currently     Social History     Social History Narrative   • Not on file     FAMILY HISTORY  Family History   Problem Relation Age of Onset   • Stroke Mother    • Hypertension Mother    • Cancer Father         prostate cancer   • Lung Disease Sister         asthma   • No Known Problems Brother    • No Known Problems Maternal Grandmother    • No Known Problems Maternal Grandfather    • Other Brother         cerabral palsy     Family Status   Relation Name Status   • Mo   at age 76        HYPERTENSION(cause of death, not taking meds)   • Fa          PARKINSON'S DISEASE(cause of death), PNEUMONIA.   • Sis  Alive   • Bro  Alive   • MGMo     • MGFa   at age 102   • Bro     • PGMo unknown    • PGFa unknown        ROS   Constitutional: Negative for fever, chills, fatigue.  HENT: Negative for congestion, sore throat.  Eyes: Negative for vision problems.   Respiratory: Negative for cough, shortness of breath.  Cardiovascular: Negative for chest pain, palpitations.   Gastrointestinal: Negative for heartburn, nausea, abdominal pain.   Genitourinary: Negative for dysuria.  Musculoskeletal: Negative for significant  "myalgia, back and joint pain.   Skin: Negative for rash.   Neuro: Negative for dizziness, weakness and headaches.   Endo/Heme/Allergies: Does not bruise/bleed easily.   Psychiatric/Behavioral: Negative for depression.    PHYSICAL EXAM   Blood Pressure 116/64 (BP Location: Left arm, Patient Position: Sitting, BP Cuff Size: Adult)   Pulse 86   Temperature 37.1 °C (98.8 °F)   Height 1.638 m (5' 4.5\")   Weight 65.9 kg (145 lb 3.2 oz)   Oxygen Saturation 96%  Body mass index is 24.54 kg/m².  General:  NAD, well appearing  HEENT:   NC/AT, PERRLA, EOMI. redness and swelling around the incision at the base of the neck;   I did not notice any discharge.  Cardiovascular: unlabored breathing, no peripheral cyanosis or swelling.  Lungs:   no respiratory distress.  Abdomen: non- distended.  Extremities:  No LE swelling.  Skin:  Warm, dry.  No erythema. No rash.   Neurologic: Alert & oriented x 3. CN II-XII grossly intact. No focal deficits.  Psychiatric:  Affect normal, mood normal, judgment normal.    Labs     Labs are reviewed and discussed with a patient  Lab Results   Component Value Date/Time    CHOLSTRLTOT 200 (H) 05/11/2020 07:31 AM     (H) 05/11/2020 07:31 AM    HDL 59 05/11/2020 07:31 AM    TRIGLYCERIDE 179 (H) 05/11/2020 07:31 AM       Lab Results   Component Value Date/Time    SODIUM 137 04/05/2022 09:05 AM    POTASSIUM 3.7 04/05/2022 09:05 AM    CHLORIDE 101 04/05/2022 09:05 AM    CO2 24 04/05/2022 09:05 AM    GLUCOSE 125 (H) 04/05/2022 09:05 AM    BUN 16 04/05/2022 09:05 AM    CREATININE 0.68 04/05/2022 09:05 AM    BUNCREATRAT 19 05/11/2020 07:31 AM     Lab Results   Component Value Date/Time    ALKPHOSPHAT 63 04/05/2022 09:05 AM    ASTSGOT 25 04/05/2022 09:05 AM    ALTSGPT 25 04/05/2022 09:05 AM    TBILIRUBIN 0.3 04/05/2022 09:05 AM      No results found for: HBA1C  No results found for: TSH  No results found for: FREET4    Lab Results   Component Value Date/Time    WBC 8.2 04/05/2022 09:05 AM    RBC " 4.23 04/05/2022 09:05 AM    HEMOGLOBIN 14.1 04/05/2022 09:05 AM    HEMATOCRIT 40.4 04/05/2022 09:05 AM    MCV 95.5 04/05/2022 09:05 AM    MCH 33.3 (H) 04/05/2022 09:05 AM    MCHC 34.9 04/05/2022 09:05 AM    MPV 9.1 04/05/2022 09:05 AM    NEUTSPOLYS 85.30 (H) 04/05/2022 09:05 AM    LYMPHOCYTES 10.30 (L) 04/05/2022 09:05 AM    MONOCYTES 3.40 04/05/2022 09:05 AM    EOSINOPHILS 0.10 04/05/2022 09:05 AM    BASOPHILS 0.40 04/05/2022 09:05 AM      Imaging     None    Assessment and Plan     Shahrzad Scott is a 66 y.o. female    1. H/O partial thyroidectomy  2. Cellulitis, neck   -Patient is infection, advised to schedule appointment with surgeon  - mupirocin (BACTROBAN) 2 % Ointment; Apply 1 Application topically 2 times a day.  Dispense: 22 g; Refill: 0  - clindamycin (CLEOCIN) 300 MG Cap; Take 1 Capsule by mouth 3 times a day.  Dispense: 20 Capsule; Refill: 0  - HEP C VIRUS ANTIBODY; Future    3. Thyroid nodule  As above    4. Need for hepatitis C screening test  - HEP C VIRUS ANTIBODY; Future    Counseling:   - Smoking:  Nonsmoker    Followup: Return if symptoms worsen or fail to improve.    All questions are answered.    Please note that this dictation was created using voice recognition software, and that there might be errors of korin and possibly content.

## 2022-05-06 RX ORDER — OMEPRAZOLE 20 MG/1
20 CAPSULE, DELAYED RELEASE ORAL DAILY
Qty: 90 CAPSULE | Refills: 3 | Status: SHIPPED | OUTPATIENT
Start: 2022-05-06 | End: 2023-04-04

## 2022-07-29 DIAGNOSIS — E78.00 PURE HYPERCHOLESTEROLEMIA: ICD-10-CM

## 2022-07-29 DIAGNOSIS — E03.9 ACQUIRED HYPOTHYROIDISM: ICD-10-CM

## 2022-07-29 DIAGNOSIS — E55.9 VITAMIN D DEFICIENCY: ICD-10-CM

## 2022-08-09 SDOH — ECONOMIC STABILITY: INCOME INSECURITY: HOW HARD IS IT FOR YOU TO PAY FOR THE VERY BASICS LIKE FOOD, HOUSING, MEDICAL CARE, AND HEATING?: NOT HARD AT ALL

## 2022-08-09 SDOH — ECONOMIC STABILITY: HOUSING INSECURITY
IN THE LAST 12 MONTHS, WAS THERE A TIME WHEN YOU DID NOT HAVE A STEADY PLACE TO SLEEP OR SLEPT IN A SHELTER (INCLUDING NOW)?: NO

## 2022-08-09 SDOH — HEALTH STABILITY: PHYSICAL HEALTH: ON AVERAGE, HOW MANY DAYS PER WEEK DO YOU ENGAGE IN MODERATE TO STRENUOUS EXERCISE (LIKE A BRISK WALK)?: 3 DAYS

## 2022-08-09 SDOH — HEALTH STABILITY: MENTAL HEALTH
STRESS IS WHEN SOMEONE FEELS TENSE, NERVOUS, ANXIOUS, OR CAN'T SLEEP AT NIGHT BECAUSE THEIR MIND IS TROUBLED. HOW STRESSED ARE YOU?: NOT AT ALL

## 2022-08-09 SDOH — ECONOMIC STABILITY: TRANSPORTATION INSECURITY
IN THE PAST 12 MONTHS, HAS LACK OF TRANSPORTATION KEPT YOU FROM MEETINGS, WORK, OR FROM GETTING THINGS NEEDED FOR DAILY LIVING?: NO

## 2022-08-09 SDOH — HEALTH STABILITY: PHYSICAL HEALTH: ON AVERAGE, HOW MANY MINUTES DO YOU ENGAGE IN EXERCISE AT THIS LEVEL?: 30 MIN

## 2022-08-09 SDOH — ECONOMIC STABILITY: FOOD INSECURITY: WITHIN THE PAST 12 MONTHS, THE FOOD YOU BOUGHT JUST DIDN'T LAST AND YOU DIDN'T HAVE MONEY TO GET MORE.: NEVER TRUE

## 2022-08-09 SDOH — ECONOMIC STABILITY: HOUSING INSECURITY: IN THE LAST 12 MONTHS, HOW MANY PLACES HAVE YOU LIVED?: 1

## 2022-08-09 SDOH — ECONOMIC STABILITY: INCOME INSECURITY: IN THE LAST 12 MONTHS, WAS THERE A TIME WHEN YOU WERE NOT ABLE TO PAY THE MORTGAGE OR RENT ON TIME?: NO

## 2022-08-09 SDOH — ECONOMIC STABILITY: FOOD INSECURITY: WITHIN THE PAST 12 MONTHS, YOU WORRIED THAT YOUR FOOD WOULD RUN OUT BEFORE YOU GOT MONEY TO BUY MORE.: NEVER TRUE

## 2022-08-09 SDOH — ECONOMIC STABILITY: TRANSPORTATION INSECURITY
IN THE PAST 12 MONTHS, HAS THE LACK OF TRANSPORTATION KEPT YOU FROM MEDICAL APPOINTMENTS OR FROM GETTING MEDICATIONS?: NO

## 2022-08-09 SDOH — ECONOMIC STABILITY: TRANSPORTATION INSECURITY
IN THE PAST 12 MONTHS, HAS LACK OF RELIABLE TRANSPORTATION KEPT YOU FROM MEDICAL APPOINTMENTS, MEETINGS, WORK OR FROM GETTING THINGS NEEDED FOR DAILY LIVING?: NO

## 2022-08-09 ASSESSMENT — SOCIAL DETERMINANTS OF HEALTH (SDOH)
DO YOU BELONG TO ANY CLUBS OR ORGANIZATIONS SUCH AS CHURCH GROUPS UNIONS, FRATERNAL OR ATHLETIC GROUPS, OR SCHOOL GROUPS?: NO
HOW OFTEN DO YOU ATTENT MEETINGS OF THE CLUB OR ORGANIZATION YOU BELONG TO?: NEVER
HOW MANY DRINKS CONTAINING ALCOHOL DO YOU HAVE ON A TYPICAL DAY WHEN YOU ARE DRINKING: PATIENT DOES NOT DRINK
HOW OFTEN DO YOU HAVE A DRINK CONTAINING ALCOHOL: NEVER
DO YOU BELONG TO ANY CLUBS OR ORGANIZATIONS SUCH AS CHURCH GROUPS UNIONS, FRATERNAL OR ATHLETIC GROUPS, OR SCHOOL GROUPS?: NO
HOW OFTEN DO YOU ATTEND CHURCH OR RELIGIOUS SERVICES?: NEVER
IN A TYPICAL WEEK, HOW MANY TIMES DO YOU TALK ON THE PHONE WITH FAMILY, FRIENDS, OR NEIGHBORS?: MORE THAN THREE TIMES A WEEK
WITHIN THE PAST 12 MONTHS, YOU WORRIED THAT YOUR FOOD WOULD RUN OUT BEFORE YOU GOT THE MONEY TO BUY MORE: NEVER TRUE
HOW OFTEN DO YOU ATTEND CHURCH OR RELIGIOUS SERVICES?: NEVER
HOW HARD IS IT FOR YOU TO PAY FOR THE VERY BASICS LIKE FOOD, HOUSING, MEDICAL CARE, AND HEATING?: NOT HARD AT ALL
HOW OFTEN DO YOU HAVE SIX OR MORE DRINKS ON ONE OCCASION: NEVER
HOW OFTEN DO YOU GET TOGETHER WITH FRIENDS OR RELATIVES?: MORE THAN THREE TIMES A WEEK
IN A TYPICAL WEEK, HOW MANY TIMES DO YOU TALK ON THE PHONE WITH FAMILY, FRIENDS, OR NEIGHBORS?: MORE THAN THREE TIMES A WEEK
HOW OFTEN DO YOU GET TOGETHER WITH FRIENDS OR RELATIVES?: MORE THAN THREE TIMES A WEEK
HOW OFTEN DO YOU ATTENT MEETINGS OF THE CLUB OR ORGANIZATION YOU BELONG TO?: NEVER

## 2022-08-09 ASSESSMENT — LIFESTYLE VARIABLES
HOW MANY STANDARD DRINKS CONTAINING ALCOHOL DO YOU HAVE ON A TYPICAL DAY: PATIENT DOES NOT DRINK
HOW OFTEN DO YOU HAVE SIX OR MORE DRINKS ON ONE OCCASION: NEVER
SKIP TO QUESTIONS 9-10: 1
AUDIT-C TOTAL SCORE: 0
HOW OFTEN DO YOU HAVE A DRINK CONTAINING ALCOHOL: NEVER

## 2022-08-10 ENCOUNTER — HOSPITAL ENCOUNTER (OUTPATIENT)
Dept: LAB | Facility: MEDICAL CENTER | Age: 67
End: 2022-08-10
Attending: FAMILY MEDICINE
Payer: MEDICARE

## 2022-08-10 ENCOUNTER — HOSPITAL ENCOUNTER (OUTPATIENT)
Dept: LAB | Facility: MEDICAL CENTER | Age: 67
End: 2022-08-10
Attending: INTERNAL MEDICINE
Payer: MEDICARE

## 2022-08-10 DIAGNOSIS — E78.00 PURE HYPERCHOLESTEROLEMIA: ICD-10-CM

## 2022-08-10 DIAGNOSIS — E03.9 ACQUIRED HYPOTHYROIDISM: ICD-10-CM

## 2022-08-10 DIAGNOSIS — E55.9 VITAMIN D DEFICIENCY: ICD-10-CM

## 2022-08-10 LAB
25(OH)D3 SERPL-MCNC: 51 NG/ML (ref 30–100)
ALBUMIN SERPL BCP-MCNC: 4.6 G/DL (ref 3.2–4.9)
ALBUMIN/GLOB SERPL: 1.8 G/DL
ALP SERPL-CCNC: 66 U/L (ref 30–99)
ALT SERPL-CCNC: 24 U/L (ref 2–50)
ANION GAP SERPL CALC-SCNC: 9 MMOL/L (ref 7–16)
AST SERPL-CCNC: 23 U/L (ref 12–45)
BASOPHILS # BLD AUTO: 0.6 % (ref 0–1.8)
BASOPHILS # BLD: 0.03 K/UL (ref 0–0.12)
BILIRUB SERPL-MCNC: 0.4 MG/DL (ref 0.1–1.5)
BUN SERPL-MCNC: 16 MG/DL (ref 8–22)
CALCIUM SERPL-MCNC: 9.6 MG/DL (ref 8.5–10.5)
CHLORIDE SERPL-SCNC: 105 MMOL/L (ref 96–112)
CHOLEST SERPL-MCNC: 181 MG/DL (ref 100–199)
CO2 SERPL-SCNC: 26 MMOL/L (ref 20–33)
CREAT SERPL-MCNC: 0.78 MG/DL (ref 0.5–1.4)
EOSINOPHIL # BLD AUTO: 0.08 K/UL (ref 0–0.51)
EOSINOPHIL NFR BLD: 1.6 % (ref 0–6.9)
ERYTHROCYTE [DISTWIDTH] IN BLOOD BY AUTOMATED COUNT: 42 FL (ref 35.9–50)
GFR SERPLBLD CREATININE-BSD FMLA CKD-EPI: 83 ML/MIN/1.73 M 2
GLOBULIN SER CALC-MCNC: 2.6 G/DL (ref 1.9–3.5)
GLUCOSE SERPL-MCNC: 107 MG/DL (ref 65–99)
HCT VFR BLD AUTO: 40.7 % (ref 37–47)
HDLC SERPL-MCNC: 53 MG/DL
HGB BLD-MCNC: 13.9 G/DL (ref 12–16)
IMM GRANULOCYTES # BLD AUTO: 0.01 K/UL (ref 0–0.11)
IMM GRANULOCYTES NFR BLD AUTO: 0.2 % (ref 0–0.9)
LDLC SERPL CALC-MCNC: 98 MG/DL
LYMPHOCYTES # BLD AUTO: 1.66 K/UL (ref 1–4.8)
LYMPHOCYTES NFR BLD: 33.7 % (ref 22–41)
MCH RBC QN AUTO: 32.7 PG (ref 27–33)
MCHC RBC AUTO-ENTMCNC: 34.2 G/DL (ref 33.6–35)
MCV RBC AUTO: 95.8 FL (ref 81.4–97.8)
MONOCYTES # BLD AUTO: 0.47 K/UL (ref 0–0.85)
MONOCYTES NFR BLD AUTO: 9.6 % (ref 0–13.4)
NEUTROPHILS # BLD AUTO: 2.67 K/UL (ref 2–7.15)
NEUTROPHILS NFR BLD: 54.3 % (ref 44–72)
NRBC # BLD AUTO: 0 K/UL
NRBC BLD-RTO: 0 /100 WBC
PLATELET # BLD AUTO: 266 K/UL (ref 164–446)
PMV BLD AUTO: 10 FL (ref 9–12.9)
POTASSIUM SERPL-SCNC: 4.5 MMOL/L (ref 3.6–5.5)
PROT SERPL-MCNC: 7.2 G/DL (ref 6–8.2)
RBC # BLD AUTO: 4.25 M/UL (ref 4.2–5.4)
SODIUM SERPL-SCNC: 140 MMOL/L (ref 135–145)
T3FREE SERPL-MCNC: 3.64 PG/ML (ref 2–4.4)
T4 FREE SERPL-MCNC: 2.49 NG/DL (ref 0.93–1.7)
TRIGL SERPL-MCNC: 149 MG/DL (ref 0–149)
TSH SERPL DL<=0.005 MIU/L-ACNC: 0.01 UIU/ML (ref 0.38–5.33)
WBC # BLD AUTO: 4.9 K/UL (ref 4.8–10.8)

## 2022-08-10 PROCEDURE — 84481 FREE ASSAY (FT-3): CPT

## 2022-08-10 PROCEDURE — 84439 ASSAY OF FREE THYROXINE: CPT

## 2022-08-10 PROCEDURE — 82306 VITAMIN D 25 HYDROXY: CPT

## 2022-08-10 PROCEDURE — 80061 LIPID PANEL: CPT

## 2022-08-10 PROCEDURE — 80053 COMPREHEN METABOLIC PANEL: CPT

## 2022-08-10 PROCEDURE — 36415 COLL VENOUS BLD VENIPUNCTURE: CPT

## 2022-08-10 PROCEDURE — 85025 COMPLETE CBC W/AUTO DIFF WBC: CPT

## 2022-08-10 PROCEDURE — 84443 ASSAY THYROID STIM HORMONE: CPT

## 2022-08-12 ENCOUNTER — OFFICE VISIT (OUTPATIENT)
Dept: MEDICAL GROUP | Age: 67
End: 2022-08-12
Payer: MEDICARE

## 2022-08-12 VITALS
BODY MASS INDEX: 24.06 KG/M2 | HEIGHT: 65 IN | TEMPERATURE: 96.8 F | SYSTOLIC BLOOD PRESSURE: 114 MMHG | OXYGEN SATURATION: 98 % | HEART RATE: 80 BPM | WEIGHT: 144.4 LBS | DIASTOLIC BLOOD PRESSURE: 66 MMHG | RESPIRATION RATE: 16 BRPM

## 2022-08-12 DIAGNOSIS — E78.00 PURE HYPERCHOLESTEROLEMIA: ICD-10-CM

## 2022-08-12 DIAGNOSIS — E03.9 ACQUIRED HYPOTHYROIDISM: ICD-10-CM

## 2022-08-12 DIAGNOSIS — Z00.00 MEDICARE ANNUAL WELLNESS VISIT, SUBSEQUENT: Primary | ICD-10-CM

## 2022-08-12 DIAGNOSIS — R73.01 IMPAIRED FASTING GLUCOSE: ICD-10-CM

## 2022-08-12 DIAGNOSIS — Z00.00 MEDICARE ANNUAL WELLNESS VISIT, INITIAL: ICD-10-CM

## 2022-08-12 DIAGNOSIS — R42 VERTIGO: ICD-10-CM

## 2022-08-12 PROCEDURE — G0439 PPPS, SUBSEQ VISIT: HCPCS | Performed by: FAMILY MEDICINE

## 2022-08-12 RX ORDER — SIMVASTATIN 10 MG
10 TABLET ORAL NIGHTLY
COMMUNITY
End: 2022-10-23 | Stop reason: SDUPTHER

## 2022-08-12 RX ORDER — LEVOTHYROXINE SODIUM 112 UG/1
112 TABLET ORAL
Qty: 90 TABLET | Refills: 0 | Status: SHIPPED | OUTPATIENT
Start: 2022-08-12 | End: 2022-10-13

## 2022-08-12 RX ORDER — FEXOFENADINE HCL 180 MG/1
180 TABLET ORAL DAILY
COMMUNITY

## 2022-08-12 ASSESSMENT — ACTIVITIES OF DAILY LIVING (ADL): BATHING_REQUIRES_ASSISTANCE: 0

## 2022-08-12 ASSESSMENT — FIBROSIS 4 INDEX: FIB4 SCORE: 1.2

## 2022-08-12 ASSESSMENT — ENCOUNTER SYMPTOMS: GENERAL WELL-BEING: EXCELLENT

## 2022-08-12 ASSESSMENT — PATIENT HEALTH QUESTIONNAIRE - PHQ9: CLINICAL INTERPRETATION OF PHQ2 SCORE: 0

## 2022-08-12 NOTE — PROGRESS NOTES
Chief Complaint   Patient presents with    Annual Wellness Visit       HPI:  Shahrzad Scott is a 67 y.o. 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      fexofenadine (ALLEGRA) 180 MG tablet Take 180 mg by mouth every day.      levothyroxine (SYNTHROID) 112 MCG Tab Take 1 Tablet by mouth every morning on an empty stomach. 90 Tablet 0    omeprazole (PRILOSEC) 20 MG delayed-release capsule Take 1 Capsule by mouth every day. 90 Capsule 3    CALCIUM PO Take 750 mg by mouth 2 times a day.      COLLAGEN PO Take 1 Dose by mouth every day.      MAGNESIUM PO Take 1 Tablet by mouth 2 times a day.      ascorbic acid (ASCORBIC ACID) 500 MG Tab Take 500 mg by mouth every day.      hydrochlorothiazide (MICROZIDE) 12.5 MG capsule Take 1 Capsule by mouth every day. 90 Capsule 3    potassium chloride ER (KLOR-CON) 10 MEQ tablet Take 1 Tablet by mouth every day. 90 Tablet 3    niacinamide 500 MG tablet Take 1 Tab by mouth 2 times a day. 60 Tab 3    TURMERIC PO Take 1 Tablet by mouth 2 times a day.      COENZYME Q10 PO Take 1 Capsule by mouth every day.      MINIVELLE 0.025 MG/24HR PATCH BIWEEKLY Place 1 Patch on the skin two times a week. Apply on Wednesday and  Saturday  Indications: Moderate to Severe Vasomotor Symptoms      Multiple Vitamins-Minerals (MULTIVITAMIN ADULTS 50+ PO) Take 1 Tablet by mouth every day.      Probiotic Product (PROBIOTIC DAILY PO) Take 1 Tablet by mouth every day.      Alpha Lipoic Acid 200 MG Cap Take 200 mg by mouth every day.      vitamin D (CHOLECALCIFEROL) 1000 UNIT TABS Take 1,000 Units by mouth 2 times a day.       No current facility-administered medications for this visit.          Current supplements as per medication list.     Allergies: Septra [bactrim] and Sulfa drugs    Current social contact/activities: Walking, spend time with friends friends/dogs, eat out    She  reports that she has never smoked. She has never used smokeless tobacco. She reports that she does not currently use alcohol. She reports that she does not currently use drugs.  Counseling given: Not Answered      DPA/Advanced Directive:  Patient has Advanced Directive on file.     ROS:    Gait: Uses no assistive device  Ostomy: No  Other tubes: No  Amputations: No  Chronic oxygen use: No  Last eye exam: 7/2022  Wears hearing aids: No   : Denies any urinary leakage during the last 6 months    Screening:  Annual  Depression Screening  Little interest or pleasure in doing things?  0 - not at all  Feeling down, depressed , or hopeless? 0 - not at all  Patient Health Questionnaire Score: 0     If depressive symptoms identified deferred to follow up visit unless specifically addressed in assessment and plan.    Interpretation of PHQ-9 Total Score   Score Severity   1-4 No Depression   5-9 Mild Depression   10-14 Moderate Depression   15-19 Moderately Severe Depression   20-27 Severe Depression    Screening for Cognitive Impairment  Three Minute Recall (daughter, heaven, mountain) 3/3    Shaquille clock face with all 12 numbers and set the hands to show 10 past 11.  Yes    Cognitive concerns identified deferred for follow up unless specifically addressed in assessment and  plan.    Fall Risk Assessment  Has the patient had two or more falls in the last year or any fall with injury in the last year?  No    Safety Assessment  Throw rugs on floor.  Yes  Handrails on all stairs.  No  Good lighting in all hallways.  Yes  Difficulty hearing.  No  Patient counseled about all safety risks that were identified.    Functional Assessment ADLs  Are there any barriers preventing you from cooking for yourself or meeting nutritional needs?  No.    Are there any barriers preventing you from driving safely or obtaining transportation?  No.    Are there any barriers preventing you from using a telephone or calling for help?  No.    Are there any barriers preventing you from shopping?  No.    Are there any barriers preventing you from taking care of your own finances?  No.    Are there any barriers preventing you from managing your medications?  No.    Are there any barriers preventing you from showering, bathing or dressing yourself?  No.    Are you currently engaging in any exercise or physical activity?  Yes.     What is your perception of your health?  Excellent.      Health Maintenance Summary            Overdue - IMM HEP B VACCINE (1 of 3 - 3-dose series) Overdue - never done      No completion history exists for this topic.              Ordered - HEPATITIS C SCREENING (Once) Ordered on 5/4/2022      No completion history exists for this topic.              IMM INFLUENZA (1) Next due on 9/1/2022 09/23/2021  Imm Admin: Influenza Vaccine Adult HD     08/29/2020  Imm Admin: Influenza Vaccine Adult HD     09/27/2019  Imm Admin: Influenza Vaccine Adult HD     09/19/2018  Imm Admin: INFLUENZA TIV (IM)     09/19/2018  Imm Admin: Influenza Vaccine Quad Inj (Pf)     Only the first 5 history entries have been loaded, but more history exists.            MAMMOGRAM (Yearly) Next due on 1/7/2023 01/07/2022  MA-SCREENING MAMMO BILAT W/TOMOSYNTHESIS W/CAD     12/07/2020  MA-SCREENING MAMMO BILAT  W/TOMOSYNTHESIS W/CAD     05/23/2020  FOREIGN FILM MAMMOGRAPHY     05/23/2020  FOREIGN FILM MAMMOGRAPHY     05/23/2020  FOREIGN FILM MAMMOGRAPHY     Only the first 5 history entries have been loaded, but more history exists.            IMM DTaP/Tdap/Td Vaccine (2 - Td or Tdap) Next due on 8/22/2025 08/22/2015  Imm Admin: Tdap Vaccine             COLORECTAL CANCER SCREENING (COLONOSCOPY - Every 10 Years) Next due on 4/11/2027 04/11/2017  REFERRAL TO GI FOR COLONOSCOPY     03/21/2017  REFERRAL TO GI FOR COLONOSCOPY             BONE DENSITY (Every 5 Years) Tentatively due on 5/4/2027 05/04/2022  DS-BONE DENSITY STUDY (DEXA)     11/29/2016  DS-BONE DENSITY STUDY (DEXA)             IMM ZOSTER VACCINES (Series Information) Completed      03/23/2019  Imm Admin: Zoster Vaccine Recombinant (RZV) (SHINGRIX)     01/03/2019  Imm Admin: Zoster Vaccine Recombinant (RZV) (SHINGRIX)     08/14/2014  Imm Admin: Zoster Vaccine Live (ZVL) (Zostavax) - HISTORICAL DATA             IMM PNEUMOCOCCAL VACCINE: 65+ Years (Series Information) Completed      09/30/2020  Imm Admin: Pneumococcal polysaccharide vaccine (PPSV-23)     09/27/2019  Imm Admin: Pneumococcal Conjugate Vaccine (Prevnar/PCV-13)             COVID-19 Vaccine (Series Information) Completed      04/11/2022  Imm Admin: PFIZER KO CAP SARS-COV-2 VACCINATION (12+)     10/29/2021  Imm Admin: Moderna SARS-CoV-2 Vaccine     02/22/2021  Imm Admin: Moderna SARS-CoV-2 Vaccine     01/25/2021  Imm Admin: Moderna SARS-CoV-2 Vaccine             Annual Wellness Visit  Completed      08/12/2022  Visit Dx: Medicare annual wellness visit, initial             IMM MENINGOCOCCAL ACWY VACCINE (Series Information) Aged Out      No completion history exists for this topic.              Discontinued - PAP SMEAR  Discontinued      No completion history exists for this topic.                    Patient Care Team:  Yony Villarreal M.D. as PCP - General (Family Medicine)  Sharath BARAHONA  THONG Florian. as Consulting Physician (Obstetrics & Gynecology)  Dony Larsen M.D. as Consulting Physician (Gastroenterology)  Sharath Macias M.D. (Inactive) as Consulting Physician (Cardiovascular Disease (Cardiology))  Sherry Key O.D. as Consulting Physician (Optometry)  Kim Pulido M.D. (Obstetrics & Gynecology)  Hans Higuera M.D. as Consulting Physician (Cardiovascular Disease (Cardiology))  Dirk Hilliard M.D. (Otolaryngology)  Mykel Gonzalez, PT as Physical Therapist (Physical Therapy)        Social History     Tobacco Use    Smoking status: Never    Smokeless tobacco: Never   Vaping Use    Vaping Use: Never used   Substance Use Topics    Alcohol use: Not Currently    Drug use: Not Currently     Family History   Problem Relation Age of Onset    Stroke Mother     Hypertension Mother     Cancer Father         prostate cancer    Lung Disease Sister         asthma    No Known Problems Brother     No Known Problems Maternal Grandmother     No Known Problems Maternal Grandfather     Other Brother         cerabral palsy     She  has a past medical history of Anesthesia, Aortic regurgitation (1996), Arthritis, Breath shortness, Cataract, Disorder of thyroid, GERD (gastroesophageal reflux disease), Hemorrhagic disorder (HCC), High cholesterol, History of aortic insufficiency (1996), HLD (hyperlipidemia), Hypertension, Mitral regurgitation, MVP (mitral valve prolapse), Palpitations, PONV (postoperative nausea and vomiting), and Urinary incontinence.   Past Surgical History:   Procedure Laterality Date    THYROIDECTOMY Left 3/24/2022    Procedure: THYROIDECTOMY - REMOVAL REMAINING TISSUE, FOLLOWING PREVIOUS PARTIAL REMOVAL, PORTION THYROID using NIMS;  Surgeon: Dirk Hilliard M.D.;  Location: SURGERY SAME DAY [facility];  Service: Ent    THYROID LOBECTOMY Right 7/18/2019    Procedure: LOBECTOMY, THYROID WITH FROZEN SECTION;  Surgeon: Dirk Hilliard M.D.;  Location: SURGERY SAME DAY [facility]  "ORS;  Service: Ent    MYRINGOTOMY Bilateral 7/18/2019    Procedure: MYRINGOTOMY WITH TUBES;  Surgeon: Dirk Hilliard M.D.;  Location: SURGERY SAME DAY [facility];  Service: Ent    HAND SURGERY Bilateral 2005    thumb fusions     HYSTERECTOMY, VAGINAL  1992    BLEPHAROPLASTY, ELAINE (UPPER OR LOWER)  1990    APPENDECTOMY      BREAST RECONSTRUCTION Bilateral     CATARACT EXTRACTION WITH IOL Bilateral     Dr. iWlder    OTHER ORTHOPEDIC SURGERY  [id number]    multiplre knee surgeries.     RECONSTRUCTION, KNEE, ACL, ARTHROSCOPIC, USING TIBIALIS TENDON AUTOGRAFT         Exam:   Pulse 80   Temp 36 °C (96.8 °F) (Temporal)   Resp 16   Ht 1.638 m (5' 4.5\")   Wt 65.5 kg (144 lb 6.4 oz)   SpO2 98%  Body mass index is 24.4 kg/m².    Hearing excellent.    Dentition good  Alert, oriented in no acute distress.  Eye contact is good, speech goal directed, affect calm    Assessment and Plan. The following treatment and monitoring plan is recommended:    1. Medicare annual wellness visit, initial    2. Acquired hypothyroidism  - levothyroxine (SYNTHROID) 112 MCG Tab; Take 1 Tablet by mouth every morning on an empty stomach.  Dispense: 90 Tablet; Refill: 0  - TSH+FREE T4  - T3 FREE; Future    3. IFG (impaired fasting glucose)    4. Vertigo  - Referral to Physical Therapy    5. Pure hypercholesterolemia    Other orders  - simvastatin (ZOCOR) 10 MG Tab; Take 10 mg by mouth every evening.  - fexofenadine (ALLEGRA) 180 MG tablet; Take 180 mg by mouth every day.    Plan    Completed    2.  She is overtreated, decrease levothyroxine to 112 mcg p.o. daily  Repeat thyroid panel in 6 to 8 weeks    3.I recommended  that the patient  should  eat  low Glycemic Index (GI) foods.  There are now a variety of low GI foods available to implement a low GI diet   A useful resource is www.[email or web address].      4.  Refer to physical therapy for vestibular rehabilitation    5. Patient has been stable with current management  We will make no changes " for now    Services suggested: No services needed at this time  Health Care Screening: Age-appropriate preventive services recommended by USPTF and ACIP covered by Medicare were discussed today. Services ordered if indicated and agreed upon by the patient.  Referrals offered: Community-based lifestyle interventions to reduce health risks and promote self-management and wellness, fall prevention, nutrition, physical activity, tobacco-use cessation, weight loss, and mental health services as per orders if indicated.    Discussion today about general wellness and lifestyle habits:    Prevent falls and reduce trip hazards; Cautioned about securing or removing rugs.  Have a working fire alarm and carbon monoxide detector;   Engage in regular physical activity and social activities     Follow-up: Return in about 6 months (around 2/12/2023) for Reevaluation, labs.

## 2022-10-12 ENCOUNTER — HOSPITAL ENCOUNTER (OUTPATIENT)
Dept: LAB | Facility: MEDICAL CENTER | Age: 67
End: 2022-10-12
Attending: FAMILY MEDICINE
Payer: MEDICARE

## 2022-10-12 DIAGNOSIS — E03.9 ACQUIRED HYPOTHYROIDISM: ICD-10-CM

## 2022-10-12 LAB
T3FREE SERPL-MCNC: 2.71 PG/ML (ref 2–4.4)
T4 FREE SERPL-MCNC: 1.86 NG/DL (ref 0.93–1.7)
TSH SERPL DL<=0.005 MIU/L-ACNC: 0.02 UIU/ML (ref 0.38–5.33)

## 2022-10-12 PROCEDURE — 36415 COLL VENOUS BLD VENIPUNCTURE: CPT

## 2022-10-12 PROCEDURE — 84443 ASSAY THYROID STIM HORMONE: CPT

## 2022-10-12 PROCEDURE — 84439 ASSAY OF FREE THYROXINE: CPT

## 2022-10-12 PROCEDURE — 84481 FREE ASSAY (FT-3): CPT

## 2022-10-13 DIAGNOSIS — E03.9 ACQUIRED HYPOTHYROIDISM: ICD-10-CM

## 2022-10-13 RX ORDER — LEVOTHYROXINE SODIUM 0.1 MG/1
100 TABLET ORAL
Qty: 90 TABLET | Refills: 0 | Status: SHIPPED | OUTPATIENT
Start: 2022-10-13 | End: 2023-01-06

## 2022-10-13 NOTE — RESULT ENCOUNTER NOTE
I discussed results and plan with patient, who stated understanding.       Yony Villarreal MD  [employer], [address] [address]

## 2022-10-14 ENCOUNTER — APPOINTMENT (OUTPATIENT)
Dept: PHYSICAL THERAPY | Facility: REHABILITATION | Age: 67
End: 2022-10-14
Attending: FAMILY MEDICINE
Payer: MEDICARE

## 2022-10-19 ENCOUNTER — APPOINTMENT (RX ONLY)
Dept: URBAN - METROPOLITAN AREA CLINIC 4 | Facility: CLINIC | Age: 67
Setting detail: DERMATOLOGY
End: 2022-10-19

## 2022-10-19 DIAGNOSIS — Z87.2 PERSONAL HISTORY OF DISEASES OF THE SKIN AND SUBCUTANEOUS TISSUE: ICD-10-CM

## 2022-10-19 DIAGNOSIS — L90.5 SCAR CONDITIONS AND FIBROSIS OF SKIN: ICD-10-CM

## 2022-10-19 DIAGNOSIS — L82.1 OTHER SEBORRHEIC KERATOSIS: ICD-10-CM

## 2022-10-19 DIAGNOSIS — D22 MELANOCYTIC NEVI: ICD-10-CM | Status: STABLE

## 2022-10-19 DIAGNOSIS — L81.4 OTHER MELANIN HYPERPIGMENTATION: ICD-10-CM

## 2022-10-19 PROBLEM — D22.62 MELANOCYTIC NEVI OF LEFT UPPER LIMB, INCLUDING SHOULDER: Status: ACTIVE | Noted: 2022-10-19

## 2022-10-19 PROBLEM — D22.5 MELANOCYTIC NEVI OF TRUNK: Status: ACTIVE | Noted: 2022-10-19

## 2022-10-19 PROCEDURE — ? DIAGNOSIS COMMENT

## 2022-10-19 PROCEDURE — 99213 OFFICE O/P EST LOW 20 MIN: CPT

## 2022-10-19 PROCEDURE — ? ADDITIONAL NOTES

## 2022-10-19 PROCEDURE — ? TREATMENT REGIMEN

## 2022-10-19 PROCEDURE — ? COUNSELING

## 2022-10-19 ASSESSMENT — LOCATION SIMPLE DESCRIPTION DERM
LOCATION SIMPLE: LEFT UPPER BACK
LOCATION SIMPLE: RIGHT UPPER BACK
LOCATION SIMPLE: RIGHT LOWER BACK
LOCATION SIMPLE: LEFT ANTERIOR NECK
LOCATION SIMPLE: LEFT THIGH
LOCATION SIMPLE: LEFT UPPER ARM
LOCATION SIMPLE: ABDOMEN
LOCATION SIMPLE: RIGHT FOREHEAD
LOCATION SIMPLE: LEFT BUTTOCK

## 2022-10-19 ASSESSMENT — LOCATION DETAILED DESCRIPTION DERM
LOCATION DETAILED: RIGHT INFERIOR MEDIAL FOREHEAD
LOCATION DETAILED: LEFT BUTTOCK
LOCATION DETAILED: LEFT INFERIOR ANTERIOR NECK
LOCATION DETAILED: LEFT ANTERIOR PROXIMAL THIGH
LOCATION DETAILED: RIGHT SUPERIOR MEDIAL MIDBACK
LOCATION DETAILED: LEFT ANTERIOR PROXIMAL UPPER ARM
LOCATION DETAILED: RIGHT RIB CAGE
LOCATION DETAILED: LEFT SUPERIOR LATERAL UPPER BACK
LOCATION DETAILED: RIGHT MEDIAL UPPER BACK
LOCATION DETAILED: LEFT ANTERIOR DISTAL THIGH

## 2022-10-19 ASSESSMENT — LOCATION ZONE DERM
LOCATION ZONE: NECK
LOCATION ZONE: ARM
LOCATION ZONE: FACE
LOCATION ZONE: LEG
LOCATION ZONE: TRUNK

## 2022-10-19 NOTE — PROCEDURE: DIAGNOSIS COMMENT
Detail Level: Detailed
Comment: See body map
Render Risk Assessment In Note?: no
Comment: [id number], hx of Lentigious Junctional Proliferation, (evolving lentiginous MM IS) see [facility] Pathology report. Treated by Dr. Dsouza in 2009.
Comment: Biopsy scar performed by PCP from rash patient had on left distal anterior thigh, see photo
Comment: Slightly atypical, see photos

## 2022-10-20 NOTE — OP THERAPY EVALUATION
"  Outpatient Physical Therapy  VESTIBULAR EVALUATION    [address]  [address]  [place] [address]  Phone:  [phone]  Fax:  [phone]    Date of Evaluation: 10/21/2022    Patient: Shahrzad Scott  YOB: 1955  MRN: [id number]     Referring Provider: [place] [address] [address]   Referring Diagnosis: Dizziness and giddiness [R42]     Time Calculation    Start time: 1415  Stop time: 1457 Time Calculation (min): 42 minutes           Chief Complaint: Vertigo and Loss Of Balance    Visit Diagnoses     ICD-10-CM   1. Vertigo  R42   2. Loss of balance  R26.89         History of Present Illness:     Mechanism of injury:  Onset of dizziness began 5-6 years ago while she was driving home from [place] and got severely dizzy. Dizziness did not occur again until 6 months later. Ignored it until 2017 when it began to come on more often. Continues to get dizzy while driving. Experiences 2/10 dizziness 95% of the time while awake. Takes meclizine when going places she believes will make her dizziness increase. Symptoms worsen when looking different directions. Occasionally walks into walls while ambulating. States symptoms continue to progress and she wants to get it under control. Reports her dizziness feels like being on a boat. Closing eyes can help dizziness to diminish.    Currently lives alone with 4 dogs. Denies headaches, neck pain, falls, numbness/tingling, vision changes, nausea. Getting to shower triggers dizziness every time but does not know why. No dizziness on treadmill but has to hang on to get on/off. Occasionally becomes dizzy while driving but will avoid if dizziness symptoms are too severe. Has been avoiding trips due to fear of dizziness.     Has been to multiple doctors to try and figure out what is going on. Received 2 MRIs which were clear. ENT report 2019 \"demonstrated a caloric Reduced Vestibular Response (RVR) " "of 3% in the right ear. This RVR value is within normal limits. From the four irrigations, left beating nystagmus was 9% stronger than the right beating nystagmus.\"    2 years ago got phantom smell of gasoline and comes on periodically since.    Had severe vertigo in 2022 that occurred after getting out of bed and ended up calling ambulance when symptoms persisted. Took meclizine to reduce symptoms. Has not experienced vertigo since.       VAI occurred after working with a chiropractor x 30 years ago    Headaches: no headaches    Symptoms:     Current symptom ratin    At best symptom ratin    At worst symptom ratin    Aggravating factors:  Movement (Head movement)    Alleviating factors: Closing eyes.  Patient goals:     Patient goals for therapy:  Improved balance (Decrease dizziness)    Past Medical History:   Diagnosis Date    Anesthesia     n/v, may req scopalamine patch    Aortic regurgitation     Arthritis     hands    Breath shortness     Cataract     bilat IOL    Disorder of thyroid     nodules    GERD (gastroesophageal reflux disease)     Hemorrhagic disorder (HCC)     nose bleeds    High cholesterol     History of aortic insufficiency     seeing Dr. Higuera    HLD (hyperlipidemia)     Hypertension     Mitral regurgitation     MVP (mitral valve prolapse)     Palpitations     PONV (postoperative nausea and vomiting)     Urinary incontinence      Past Surgical History:   Procedure Laterality Date    THYROIDECTOMY Left 3/24/2022    Procedure: THYROIDECTOMY - REMOVAL REMAINING TISSUE, FOLLOWING PREVIOUS PARTIAL REMOVAL, PORTION THYROID using NIMS;  Surgeon: Dirk Hilliard M.D.;  Location: SURGERY SAME DAY [facility];  Service: Ent    THYROID LOBECTOMY Right 2019    Procedure: LOBECTOMY, THYROID WITH FROZEN SECTION;  Surgeon: Dirk Hilliard M.D.;  Location: SURGERY SAME DAY [facility];  Service: Ent    MYRINGOTOMY Bilateral 2019    Procedure: MYRINGOTOMY WITH TUBES;  " Surgeon: Dirk Hilliard M.D.;  Location: SURGERY SAME DAY [facility];  Service: Ent    HAND SURGERY Bilateral 2005    thumb fusions     HYSTERECTOMY, VAGINAL  1992    BLEPHAROPLASTY, ELAINE (UPPER OR LOWER)  1990    APPENDECTOMY      BREAST RECONSTRUCTION Bilateral     CATARACT EXTRACTION WITH IOL Bilateral     Dr. Wilder    OTHER ORTHOPEDIC SURGERY  [id number]    multiplre knee surgeries.     RECONSTRUCTION, KNEE, ACL, ARTHROSCOPIC, USING TIBIALIS TENDON AUTOGRAFT       Social History     Tobacco Use    Smoking status: Never    Smokeless tobacco: Never   Substance Use Topics    Alcohol use: Not Currently     Family and Occupational History     Socioeconomic History    Marital status:      Spouse name: Not on file    Number of children: Not on file    Years of education: Not on file    Highest education level: Some college, no degree   Occupational History    Not on file       Objective:    Vital Signs:     Comments: BP:150/88 HR:88 O2: 98  Oculomotor Exam:         Details: No spontaneous nystagmus central gaze          Details: No spontaneous nystagmus eccentric gaze    No saccadic eye movements    Smooth pursuit present (Possible eccentric nystagmus but only observed once.)    Convergence:        Normal convergence  Active Range of Motion:     Within functional limits  Limb Ataxia Exam:     Finger-to-Nose:         Intention tremor: none        Overshooting: none    Dysdiadochokinesia: pronation-supination  Vertebrobasilar Exam:    Comments: VAT (-) bilateral  Vestibulo-Ocular Exam:     Abnormal head thrust test        Details: corrective saccade on head moving left  BPPV Exam:     Normal Jordan-Hallpike    Abnormal roll test    Comments: Pt subjectively reported increase dizziness in R roll test position.     Exercises/Treatment  Time-based treatments/modalities:             Assessment:     Functional impairments:  Positive BPPV (right), decreased gaze stabilization and decreased utilization of  VIS/vest/somato    Assessment details:  Pt is a 67 year old female referred to clinic for chronic vertigo. She has some signs and symptoms consistent with vestibular hypofunction, vestibular migraine due to constant dizziness, and BPPV based on positional testing(currently subjectively positive) Will continue to assess and treat appropriately. Based on assessment pt is at increased risk for falls due to inconsistency of symptoms. Pt motivated to get better and willing to do exercises to help decrease her symptoms. Pt will benefit from therapy 1x/wk for 8wks to reduce dizziness and improve stability/balance. She has a good prognosis due to pt motivation.       Barriers to therapy:  Comorbidities and psychosocial    Prognosis: good    Goals:     Short term goals:  Pt will be compliant with daily HEP for decrease in dizziness symptoms and improve stability.   Pt will report no symptoms with BPPV testing.   Pt will perform epley maneuver/Guofni maneuver independently.   Pt will complete most appropriate balance assessment.      Short term goal time span:  2-4 weeks    Long term goals:  Pt will demonstrate subjective improvement in symptoms with DHI score of 30 or better.   Pt will demonstrate normal head impulse test.   Pt will score low fall risk on most appropriate balance assessment.   Pt will report ability to shower with mod increase in symptoms.     Long term goal time span:  6-8 weeks  Plan:     Therapy options:  Physical therapy treatment to continue    Planned therapy interventions:  Canalith Repositioning (CPT 36022), Neuromuscular Re-education (CPT 37802), Therapeutic Exercise (CPT 72961), Therapeutic Activities (CPT 63680), Gait Training (CPT 57817) and Functional Training, Self Care (CPT 35075)    Frequency:  1x week    Duration in weeks:  8    Discussed with:  Patient    Functional Assessment Used  Dizziness Handicap Inventory - Physical Items Score: 16  Dizziness Handicap Inventory - Emotional Items  Score: 16  Dizziness Handicap Inventory - Functional Items Score: 18  Dizziness Handicap Inventory - Total Score: 50       Referring provider co-signature:  I have reviewed this plan of care and my co-signature certifies the need for services.    Certification Period: 10/21/2022 to  12/16/22    Physician Signature: ________________________________ Date: ______________       [x] As the licensed therapist supervising this student, I was present during the entire treatment session directing the care and reviewing the assessment plan.  I reviewed all documentation prior to signing. The following changes or alternations were made by the licensed therapist.

## 2022-10-21 ENCOUNTER — PHYSICAL THERAPY (OUTPATIENT)
Dept: PHYSICAL THERAPY | Facility: REHABILITATION | Age: 67
End: 2022-10-21
Attending: FAMILY MEDICINE
Payer: MEDICARE

## 2022-10-21 DIAGNOSIS — R26.89 LOSS OF BALANCE: ICD-10-CM

## 2022-10-21 DIAGNOSIS — R42 VERTIGO: ICD-10-CM

## 2022-10-21 PROCEDURE — 97162 PT EVAL MOD COMPLEX 30 MIN: CPT

## 2022-10-21 ASSESSMENT — ENCOUNTER SYMPTOMS
PAIN SCALE: 1
PAIN SCALE AT HIGHEST: 7
PAIN SCALE AT LOWEST: 1
EXACERBATED BY: MOVEMENT

## 2022-10-23 ENCOUNTER — PATIENT MESSAGE (OUTPATIENT)
Dept: MEDICAL GROUP | Age: 67
End: 2022-10-23
Payer: MEDICARE

## 2022-10-24 DIAGNOSIS — I10 ESSENTIAL HYPERTENSION, BENIGN: ICD-10-CM

## 2022-10-25 RX ORDER — SIMVASTATIN 10 MG
10 TABLET ORAL NIGHTLY
Qty: 100 TABLET | Refills: 3 | Status: SHIPPED | OUTPATIENT
Start: 2022-10-25 | End: 2023-01-23

## 2022-10-25 NOTE — TELEPHONE ENCOUNTER
Is the patient due for a refill? No    Was the patient seen the past year? Yes    Date of last office visit: 2/15/22    Does the patient have an upcoming appointment?  No       Provider to refill: SIMRAN     Does the patients insurance require a 100 day supply?  No    hydrochlorothiazide (MICROZIDE) 12.5 MG capsule 90 Capsule 3/3 2/15/2022     Sig - Route: Take 1 Capsule by mouth every day. - Oral    Sent to pharmacy as: hydroCHLOROthiazide 12.5 MG Oral Capsule (MICROZIDE)    E-Prescribing Status: Receipt confirmed by pharmacy (2/15/2022  3:53 PM PST)

## 2022-10-26 RX ORDER — HYDROCHLOROTHIAZIDE 12.5 MG/1
12.5 CAPSULE, GELATIN COATED ORAL DAILY
Qty: 90 CAPSULE | Refills: 1 | Status: SHIPPED | OUTPATIENT
Start: 2022-10-26 | End: 2023-01-17 | Stop reason: SDUPTHER

## 2022-11-04 ENCOUNTER — PHYSICAL THERAPY (OUTPATIENT)
Dept: PHYSICAL THERAPY | Facility: REHABILITATION | Age: 67
End: 2022-11-04
Attending: FAMILY MEDICINE
Payer: MEDICARE

## 2022-11-04 ENCOUNTER — TELEPHONE (OUTPATIENT)
Dept: HEALTH INFORMATION MANAGEMENT | Facility: OTHER | Age: 67
End: 2022-11-04
Payer: MEDICARE

## 2022-11-04 DIAGNOSIS — R42 VERTIGO: ICD-10-CM

## 2022-11-04 DIAGNOSIS — R26.89 LOSS OF BALANCE: ICD-10-CM

## 2022-11-04 PROCEDURE — 97112 NEUROMUSCULAR REEDUCATION: CPT

## 2022-11-04 PROCEDURE — 95992 CANALITH REPOSITIONING PROC: CPT | Mod: 59

## 2022-11-04 NOTE — OP THERAPY DAILY TREATMENT
Outpatient Physical Therapy  DAILY TREATMENT     [facility] Physical [address]  [address]  [place] [address]  Phone:  [phone]  Fax:  [phone]    Date: 11/04/2022    Patient: Shahrzad Scott  YOB: 1955  MRN: [id number]     Time Calculation    Start time: 1330  Stop time: 1414 Time Calculation (min): 44 minutes         Chief Complaint: Difficulty Walking and Loss Of Balance    Visit #: 2    SUBJECTIVE:  Feet better after 1st visit. Has not experienced any spinning since. Does have a baseline of dizziness today feeling off balance. 1-2/10 dizziness reported.    OBJECTIVE:  Current objective measures:   BPPV testing:  - R roll test: Positive. Subjective reports of increased dizziness.     mCTSIB  - Condition 1: WNL  - Condition 2: 83%  - Condition 3: WNL  - Condition 4: 47%  - Total: 45%    FGA  1. Gait Level surface: 3  2. Change in gait speed: 3  3. Gait with horizontal head turns: 2  4. Gait with vertical head turns: 2  5. Gait and pivot turn: 3  6. Step over obstacle: 2  7. Gait with Narrow GINGER: 2  8. Gait EC: 2  9.  Ambulating backwards: 2  10. Stairs: 2 (required railing due to knee pain)    Total: 23/30    Scoring  3. Normal  2. Mild impairment  1. Moderate impairment  0. Severe Impairment           Therapeutic Treatments and Modalities:     1. Neuromuscular Re-education (CPT 93388)    2. Canalith Repositioning (CPT 66478)    Therapeutic Treatment and Modalities Summary: Neuro Re-ed:  - mCTSIB  - FGA  - BPPV testing    VOR x 1 HEP. Education on VOR, appropriate performance of speed, stop if nauseous.    Canalith Repositioning:  - Gufoni R side x2. No nystagmus observed. Subjective complaints of dizziness. Latency 10s, duration 30s.  Time-based treatments/modalities:    NMR 97112 x 2 units, 30 min    ASSESSMENT:   Response to treatment: Pt presents to therapy with dizziness and balance impairments. Pt continues to report subjective findings of BPPV. Will  continue to monitor and retest as needed. During balance testing pt showed significant reliance on visual system for balance based on mCTSIB results. She does have difficulty ambulating with head turns, as shown in FGA results, indicating vestibular hypofunction. She will continue to benefit from therapy to improve dizziness symptoms and balance.     PLAN/RECOMMENDATIONS:   Plan for treatment: therapy treatment to continue next visit.  Planned interventions for next visit: continue with current treatment.      [x] As the licensed therapist supervising this student, I was present during the entire treatment session directing the care and reviewing the assessment plan.  I reviewed all documentation prior to signing.

## 2022-11-09 ENCOUNTER — APPOINTMENT (OUTPATIENT)
Dept: PHYSICAL THERAPY | Facility: REHABILITATION | Age: 67
End: 2022-11-09
Attending: FAMILY MEDICINE
Payer: MEDICARE

## 2022-11-11 ENCOUNTER — PATIENT MESSAGE (OUTPATIENT)
Dept: HEALTH INFORMATION MANAGEMENT | Facility: OTHER | Age: 67
End: 2022-11-11

## 2022-11-16 ENCOUNTER — PHYSICAL THERAPY (OUTPATIENT)
Dept: PHYSICAL THERAPY | Facility: REHABILITATION | Age: 67
End: 2022-11-16
Attending: FAMILY MEDICINE
Payer: MEDICARE

## 2022-11-16 DIAGNOSIS — R42 VERTIGO: ICD-10-CM

## 2022-11-16 DIAGNOSIS — R26.89 LOSS OF BALANCE: ICD-10-CM

## 2022-11-16 PROCEDURE — 97112 NEUROMUSCULAR REEDUCATION: CPT

## 2022-11-16 NOTE — OP THERAPY DAILY TREATMENT
Outpatient Physical Therapy  DAILY TREATMENT     [facility] Physical [address].  [address]  [place] [address]  Phone:  [phone]  Fax:  [phone]    Date: 11/16/2022    Patient: Shahrzad Scott  YOB: 1955  MRN: [id number]     Time Calculation    Start time: 1330  Stop time: 1409 Time Calculation (min): 39 minutes         Chief Complaint: Loss Of Balance    Visit #: 3    SUBJECTIVE:  Dizziness has been improved. She states that she goes through phases of dizziness and is currently in a good phase. Exercise is going well.    OBJECTIVE:  Current objective measures:     Completed 11/4  mCTSIB  - C1: WNL  - C2: 83%  - C3: WNL  - C4: 47%  - Total: 45%    FGA: 23/30          Therapeutic Exercises (CPT 44576):       Therapeutic Exercise Summary:   HEP:  - VOR 1: 3x15-20 daily  - Balance progression exercises: 5-10 min/day    Therapeutic Treatments and Modalities:     1. Neuromuscular Re-education (CPT 79475)    Therapeutic Treatment and Modalities Summary: Neuro re-ed: To improve balance and decrease fall risk.   - Static balance progressions: Performed each for x30s w/ SPV. Pt able to recover with UE support if LOB occurred. Pt had most difficulties with EC and head circles with reduced GINGER. Educated on how to perform and different ways to progress exercises at home. Also educated to use countertop for safety if LOB were to occur.   Narrow GINGER, split stance, semi tandem, tandem  Vertical head turns, horizontal head turns, head circles CW/CCW, EC, EC horizontal head turns, EC vertical head turns, EC head circles CW/CWW.    Time-based treatments/modalities:    Physical Therapy Timed Treatment Charges  Neuromusc re-ed, balance, coor, post minutes (CPT 55572): 39 minutes        ASSESSMENT:   Response to treatment:   Pt dizziness symptoms have improved however are able to be elicited with head circles and vertical head turns during balance exercises. Educated on balance  progression HEP today. Able to complete w/ SPV and educated on safety while performing at home. Pt also educated on VOR 1 exercise to ensure target remains in focus. Pt will continue to benefit from therapy to improve balance and dizziness symptoms.     PLAN/RECOMMENDATIONS: teach epley, VOR progressions  Plan for treatment: therapy treatment to continue next visit.  Planned interventions for next visit: continue with current treatment.      [x] As the licensed therapist supervising this student, I was present during the entire treatment session directing the care and reviewing the assessment plan.  I reviewed all documentation prior to signing.

## 2022-11-17 ENCOUNTER — OFFICE VISIT (OUTPATIENT)
Dept: MEDICAL GROUP | Age: 67
End: 2022-11-17
Payer: MEDICARE

## 2022-11-17 VITALS
BODY MASS INDEX: 25.13 KG/M2 | HEART RATE: 77 BPM | SYSTOLIC BLOOD PRESSURE: 122 MMHG | TEMPERATURE: 96.8 F | HEIGHT: 64 IN | DIASTOLIC BLOOD PRESSURE: 78 MMHG | RESPIRATION RATE: 16 BRPM | WEIGHT: 147.2 LBS | OXYGEN SATURATION: 94 %

## 2022-11-17 DIAGNOSIS — I35.1 MODERATE AORTIC REGURGITATION: ICD-10-CM

## 2022-11-17 DIAGNOSIS — B02.9 HERPES ZOSTER WITHOUT COMPLICATION: ICD-10-CM

## 2022-11-17 DIAGNOSIS — E78.00 PURE HYPERCHOLESTEROLEMIA: ICD-10-CM

## 2022-11-17 DIAGNOSIS — E03.9 ACQUIRED HYPOTHYROIDISM: ICD-10-CM

## 2022-11-17 PROCEDURE — 99214 OFFICE O/P EST MOD 30 MIN: CPT | Performed by: FAMILY MEDICINE

## 2022-11-17 RX ORDER — VALACYCLOVIR HYDROCHLORIDE 1 G/1
1000 TABLET, FILM COATED ORAL 2 TIMES DAILY
Qty: 14 TABLET | Refills: 0 | Status: SHIPPED | OUTPATIENT
Start: 2022-11-17 | End: 2022-11-24

## 2022-11-17 ASSESSMENT — FIBROSIS 4 INDEX: FIB4 SCORE: 1.2

## 2022-11-17 NOTE — PROGRESS NOTES
This medical record contains text that has been entered with the assistance of computer voice recognition and dictation software.  Therefore, it may contain unintended errors in text, spelling, punctuation, or grammar      Chief Complaint   Patient presents with    Follow-Up    Medication Management    Requesting Labs     Thyroid, TSH    Hair Loss     Possibly due to Thyroid issues    Preseptal Cellulitis     Concerns, on L Leg         Shahrzad Scott is a 67 y.o. female here evaluation and management of: Routine follow-up      HPI:     HCC Gap Form    Last edited 11/17/22 13:54 PST by Yony Villarreal M.D.           1. Pure hypercholesterolemia  Simvastatin 10 mg p.o. nightly    The patient has been on a statin for years and tolerating this fine. The patient denies any muscle aches, no abdominal pain and no history of elevated liver enzymes.     2. Acquired hypothyroidism  Levothyroxine 100 mcg p.o. daily    We just changed her thyroid medication because she was overtreated.  She will be due for repeat labs in December.       Latest Reference Range & Units 10/12/22 14:05   TSH 0.380 - 5.330 uIU/mL 0.020 (L)   Free T-4 0.93 - 1.70 ng/dL 1.86 (H)   T3,Free 2.00 - 4.40 pg/mL 2.71   (L): Data is abnormally low  (H): Data is abnormally high       3. Moderate aortic regurgitation    the patient has yearly echocardiograms for aortic regurgitation.    4. Herpes zoster without complication  Patient states she developed a scaly painful rash on her shin he has been putting some Polysporin cream on it.  He turned red.  But the main issue is that its very painful.    Current medicines (including changes today)  Current Outpatient Medications   Medication Sig Dispense Refill    valacyclovir (VALTREX) 1 GM Tab Take 1 Tablet by mouth 2 times a day for 7 days. 14 Tablet 0    hydrochlorothiazide (MICROZIDE) 12.5 MG capsule TAKE 1 CAPSULE BY MOUTH EVERY DAY 90 Capsule 1    simvastatin (ZOCOR) 10 MG Tab Take 1 Tablet by mouth every  evening for 90 days. 100 Tablet 3    levothyroxine (SYNTHROID) 100 MCG Tab Take 1 Tablet by mouth every morning on an empty stomach. 90 Tablet 0    fexofenadine (ALLEGRA) 180 MG tablet Take 1 Tablet by mouth every day.      omeprazole (PRILOSEC) 20 MG delayed-release capsule Take 1 Capsule by mouth every day. 90 Capsule 3    CALCIUM PO Take 750 mg by mouth 2 times a day.      COLLAGEN PO Take 1 Dose by mouth every day.      MAGNESIUM PO Take 1 Tablet by mouth 2 times a day.      ascorbic acid (ASCORBIC ACID) 500 MG Tab Take 1 Tablet by mouth every day.      potassium chloride ER (KLOR-CON) 10 MEQ tablet Take 1 Tablet by mouth every day. 90 Tablet 3    niacinamide 500 MG tablet Take 1 Tablet by mouth 2 times a day. 60 Tab 3    TURMERIC PO Take 1 Tablet by mouth 2 times a day.      COENZYME Q10 PO Take 1 Capsule by mouth every day.      MINIVELLE 0.025 MG/24HR PATCH BIWEEKLY Place 1 Patch on the skin two times a week. Apply on Wednesday and Saturday  Indications: Moderate to Severe Vasomotor Symptoms      Multiple Vitamins-Minerals (MULTIVITAMIN ADULTS 50+ PO) Take 1 Tablet by mouth every day.      Probiotic Product (PROBIOTIC DAILY PO) Take 1 Tablet by mouth every day.      Alpha Lipoic Acid 200 MG Cap Take 200 mg by mouth every day.      vitamin D (CHOLECALCIFEROL) 1000 UNIT TABS Take 1 Tablet by mouth 2 times a day.       No current facility-administered medications for this visit.     She  has a past medical history of Anesthesia, Aortic regurgitation (1996), Arthritis, Breath shortness, Cataract, Disorder of thyroid, GERD (gastroesophageal reflux disease), Hemorrhagic disorder (HCC), High cholesterol, History of aortic insufficiency (1996), HLD (hyperlipidemia), Hypertension, Mitral regurgitation, MVP (mitral valve prolapse), Palpitations, PONV (postoperative nausea and vomiting), and Urinary incontinence.  She  has a past surgical history that includes cataract extraction with iol (Bilateral); breast  "reconstruction (Bilateral); appendectomy; reconstruction, knee, acl, arthroscopic, using tibialis tendon autograft; thyroid lobectomy (Right, 2019); myringotomy (Bilateral, 2019); hysterectomy, vaginal (); blepharoplasty, jevon (upper or lower) (); hand surgery (Bilateral, ); other orthopedic surgery (1644-1418); and thyroidectomy (Left, 3/24/2022).  Social History     Tobacco Use    Smoking status: Never    Smokeless tobacco: Never   Vaping Use    Vaping Use: Never used   Substance Use Topics    Alcohol use: Not Currently    Drug use: Not Currently     Social History     Social History Narrative    Not on file     Family History   Problem Relation Age of Onset    Stroke Mother     Hypertension Mother     Cancer Father         prostate cancer    Lung Disease Sister         asthma    No Known Problems Brother     No Known Problems Maternal Grandmother     No Known Problems Maternal Grandfather     Other Brother         cerabral palsy     Family Status   Relation Name Status    Mo   at age 76        HYPERTENSION(cause of death, not taking meds)    Fa          PARKINSON'S DISEASE(cause of death), PNEUMONIA.    Sis  Alive    Bro  Alive    MGMo      MGFa   at age 102    Bro      PGMo unknown     PGFa unknown          ROS    The pertinent  ROS findings can be seen in the HPI above.     All other systems reviewed and are negative     Objective:     /78 (BP Location: Right arm, Patient Position: Sitting, BP Cuff Size: Adult long)   Pulse 77   Temp 36 °C (96.8 °F) (Temporal)   Resp 16   Ht 1.626 m (5' 4\")   Wt 66.8 kg (147 lb 3.2 oz)   SpO2 94%  Body mass index is 25.27 kg/m².      Physical Exam:    Constitutional: Alert, no distress.  Skin: Right lower shin reveals scab with very mild redness surrounding it no vesicles, nonfluctuant   Eye: Equal, round and reactive, conjunctiva clear, lids normal.  ENMT: Lips without lesions, good " dentition, oropharynx clear.  Neck: Trachea midline, no masses, no thyromegaly. No cervical or supraclavicular lymphadenopathy.  Respiratory: Unlabored respiratory effort, lungs clear to auscultation, no wheezes, no ronchi.  Cardiovascular: Normal S1, S2, no murmur, no edema  Abdomen: Soft, non-tender, no masses, no hepatosplenomegaly.        Assessment and Plan:   The following treatment plan was discussed      1. Pure hypercholesterolemia    We will obtain new labs to update clinical profile.  Then we will adjust therapy as needed.   - CBC WITH DIFFERENTIAL; Future  - COMP METABOLIC PANEL  - HEP C VIRUS ANTIBODY; Future    2. Acquired hypothyroidism    We will obtain new labs to update clinical profile.  Then we will adjust therapy as needed.   - TSH+FREE T4  - TSH; Future  - FREE THYROXINE; Future  - GLOM FILT RATE, ESTIMATED    3. Moderate aortic regurgitation    - EC-ECHOCARDIOGRAM COMPLETE W/O CONT; Future    4. Herpes zoster without complication    This would be a very typical case of shingles  We will use a trial of Valtrex    - valacyclovir (VALTREX) 1 GM Tab; Take 1 Tablet by mouth 2 times a day for 7 days.  Dispense: 14 Tablet; Refill: 0             Instructed to Follow up in clinic or ER for worsening symptoms, difficulty breathing, lack of expected recovery, or should new symptoms or problems arise.    Followup: No follow-ups on file.

## 2022-11-22 ENCOUNTER — PHYSICAL THERAPY (OUTPATIENT)
Dept: PHYSICAL THERAPY | Facility: REHABILITATION | Age: 67
End: 2022-11-22
Attending: FAMILY MEDICINE
Payer: MEDICARE

## 2022-11-22 DIAGNOSIS — R26.89 LOSS OF BALANCE: ICD-10-CM

## 2022-11-22 DIAGNOSIS — R42 VERTIGO: ICD-10-CM

## 2022-11-22 PROCEDURE — 97535 SELF CARE MNGMENT TRAINING: CPT

## 2022-11-22 PROCEDURE — 97112 NEUROMUSCULAR REEDUCATION: CPT

## 2022-11-22 NOTE — OP THERAPY DAILY TREATMENT
[email or web address]  Outpatient Physical Therapy  DAILY TREATMENT     [facility] Physical [address].  [address]  [place] [address]  Phone:  [phone]  Fax:  [phone]    Date: 11/22/2022    Patient: Shahrzad Scott  YOB: 1955  MRN: [id number]     Time Calculation    Start time: 1415  Stop time: 1454 Time Calculation (min): 39 minutes         Chief Complaint: Loss Of Balance    Visit #: 4    SUBJECTIVE:  Went to ENT. Received referral to see Dr. Francisco. Continues to have baseline dizziness but is not bad. Has been performing exercises and believe they are going well. Agrees to d/c in 30 days if symptoms do not return.     OBJECTIVE:  Current objective measures:     Head impulse test: abnormal with small corrective saccade on head moving left.    mCTSIB  - C1: WNL  - C2: 22%  - C3: WNL  - C4: WNL  - Total: WNL    FGA  1. Gait Level surface: 3  2. Change in gait speed: 3  3. Gait with horizontal head turns: 3  4. Gait with vertical head turns: 3  5. Gait and pivot turn: 3  6. Step over obstacle: 3  7. Gait with Narrow GINGER: 3  8. Gait EC: 1  9. Ambulating backwards: 2  10. Stairs: 2 (required railing due to knee pain)  FGA: 26/30    Completed 11/4  mCTSIB  - C1: WNL  - C2: 83%  - C3: WNL  - C4: 47%  - Total: 45%  FGA: 23/30          Therapeutic Exercises (CPT 98373):       Therapeutic Exercise Summary:   HEP:  - VOR 1: 3x15-20 daily  - Balance progression exercises: 5-10 min/day    Therapeutic Treatments and Modalities:     1. Neuromuscular Re-education (CPT 43758)    2. Self Care ADL Training (CPT 41286)    Therapeutic Treatment and Modalities Summary: Neuro re-ed:  - mCTSIB: see objective  - FGA: see objective    Self Care:  - Epley maneuver education x2. Pt was educated on how to perform and why to choose R vs L sided maneuver. Able to perform correctly.     Time-based treatments/modalities:    Physical Therapy Timed Treatment Charges  Functional training, self care minutes (CPT  08817): 15 minutes  Neuromusc re-ed, balance, coor, post minutes (CPT 29714): 24 minutes      ASSESSMENT:   Pt is a 67 year old female who presents to therapy with dizziness. Since starting therapy she has shown improvement in all assessments and does not present as a fall risk. She has met most goals and will continue to progress the remaining with to compliance to HEP. Pt educated on Epley maneuver and how to perform at home if BPPV symptoms were to return. She was able to demonstrate correct sequence of maneuvers for both R and L and verbalized understanding. Unless symptoms return pt chart will be d/c in 30 days. If symptoms return will continue POC.     Goals:     Short term goals:    Pt will be compliant with daily HEP for decrease in dizziness symptoms and improve stability. (Met)  Pt will report no symptoms with BPPV testing. (Met)  Pt will perform epley maneuver/Guofni maneuver independently. (Met)  Pt will complete most appropriate balance assessment.  (met)      Short term goal time span:  2-4 weeks    Long term goals:    Pt will demonstrate subjective improvement in symptoms with DHI score of 30 or better. (D/c goal, did not assess)  Pt will demonstrate normal head impulse test. (Not met)  Pt will score low fall risk on most appropriate balance assessment. (Met)  Pt will report ability to shower with mod increase in symptoms. (d/c goal, did not assess)      Long term goal time span:  6-8 weeks    PLAN/RECOMMENDATIONS:   Plan for treatment: therapy treatment to continue next visit.  Planned interventions for next visit: continue with current treatment.      [x] As the licensed therapist supervising this student, I was present during the entire treatment session directing the care and reviewing the assessment plan.  I reviewed all documentation prior to signing.

## 2022-11-30 ENCOUNTER — APPOINTMENT (OUTPATIENT)
Dept: PHYSICAL THERAPY | Facility: REHABILITATION | Age: 67
End: 2022-11-30
Attending: FAMILY MEDICINE
Payer: MEDICARE

## 2022-12-07 ENCOUNTER — APPOINTMENT (OUTPATIENT)
Dept: PHYSICAL THERAPY | Facility: REHABILITATION | Age: 67
End: 2022-12-07
Attending: FAMILY MEDICINE
Payer: MEDICARE

## 2022-12-14 ENCOUNTER — APPOINTMENT (OUTPATIENT)
Dept: PHYSICAL THERAPY | Facility: REHABILITATION | Age: 67
End: 2022-12-14
Attending: FAMILY MEDICINE
Payer: MEDICARE

## 2022-12-20 ENCOUNTER — TELEPHONE (OUTPATIENT)
Dept: MEDICAL GROUP | Age: 67
End: 2022-12-20
Payer: MEDICARE

## 2022-12-20 DIAGNOSIS — Z12.31 VISIT FOR SCREENING MAMMOGRAM: ICD-10-CM

## 2022-12-24 ENCOUNTER — HOSPITAL ENCOUNTER (OUTPATIENT)
Dept: LAB | Facility: MEDICAL CENTER | Age: 67
End: 2022-12-24
Attending: FAMILY MEDICINE
Payer: MEDICARE

## 2022-12-24 DIAGNOSIS — E78.00 PURE HYPERCHOLESTEROLEMIA: ICD-10-CM

## 2022-12-24 DIAGNOSIS — L03.221 CELLULITIS, NECK: ICD-10-CM

## 2022-12-24 DIAGNOSIS — E03.9 ACQUIRED HYPOTHYROIDISM: ICD-10-CM

## 2022-12-24 DIAGNOSIS — Z11.59 NEED FOR HEPATITIS C SCREENING TEST: ICD-10-CM

## 2022-12-24 LAB
ALBUMIN SERPL BCP-MCNC: 4.4 G/DL (ref 3.2–4.9)
ALBUMIN/GLOB SERPL: 1.6 G/DL
ALP SERPL-CCNC: 63 U/L (ref 30–99)
ALT SERPL-CCNC: 22 U/L (ref 2–50)
ANION GAP SERPL CALC-SCNC: 11 MMOL/L (ref 7–16)
AST SERPL-CCNC: 22 U/L (ref 12–45)
BASOPHILS # BLD AUTO: 0.4 % (ref 0–1.8)
BASOPHILS # BLD: 0.02 K/UL (ref 0–0.12)
BILIRUB SERPL-MCNC: 0.4 MG/DL (ref 0.1–1.5)
BUN SERPL-MCNC: 15 MG/DL (ref 8–22)
CALCIUM ALBUM COR SERPL-MCNC: 8.8 MG/DL (ref 8.5–10.5)
CALCIUM SERPL-MCNC: 9.1 MG/DL (ref 8.5–10.5)
CHLORIDE SERPL-SCNC: 104 MMOL/L (ref 96–112)
CO2 SERPL-SCNC: 26 MMOL/L (ref 20–33)
CREAT SERPL-MCNC: 0.79 MG/DL (ref 0.5–1.4)
EOSINOPHIL # BLD AUTO: 0.05 K/UL (ref 0–0.51)
EOSINOPHIL NFR BLD: 1.1 % (ref 0–6.9)
ERYTHROCYTE [DISTWIDTH] IN BLOOD BY AUTOMATED COUNT: 46.3 FL (ref 35.9–50)
GFR SERPLBLD CREATININE-BSD FMLA CKD-EPI: 81 ML/MIN/1.73 M 2
GLOBULIN SER CALC-MCNC: 2.7 G/DL (ref 1.9–3.5)
GLUCOSE SERPL-MCNC: 99 MG/DL (ref 65–99)
HCT VFR BLD AUTO: 40.6 % (ref 37–47)
HCV AB SER QL: NORMAL
HGB BLD-MCNC: 13.6 G/DL (ref 12–16)
IMM GRANULOCYTES # BLD AUTO: 0.01 K/UL (ref 0–0.11)
IMM GRANULOCYTES NFR BLD AUTO: 0.2 % (ref 0–0.9)
LYMPHOCYTES # BLD AUTO: 1.29 K/UL (ref 1–4.8)
LYMPHOCYTES NFR BLD: 28.1 % (ref 22–41)
MCH RBC QN AUTO: 33.7 PG (ref 27–33)
MCHC RBC AUTO-ENTMCNC: 33.5 G/DL (ref 33.6–35)
MCV RBC AUTO: 100.5 FL (ref 81.4–97.8)
MONOCYTES # BLD AUTO: 0.42 K/UL (ref 0–0.85)
MONOCYTES NFR BLD AUTO: 9.2 % (ref 0–13.4)
NEUTROPHILS # BLD AUTO: 2.8 K/UL (ref 2–7.15)
NEUTROPHILS NFR BLD: 61 % (ref 44–72)
NRBC # BLD AUTO: 0 K/UL
NRBC BLD-RTO: 0 /100 WBC
PLATELET # BLD AUTO: 270 K/UL (ref 164–446)
PMV BLD AUTO: 10.2 FL (ref 9–12.9)
POTASSIUM SERPL-SCNC: 4.2 MMOL/L (ref 3.6–5.5)
PROT SERPL-MCNC: 7.1 G/DL (ref 6–8.2)
RBC # BLD AUTO: 4.04 M/UL (ref 4.2–5.4)
SODIUM SERPL-SCNC: 141 MMOL/L (ref 135–145)
T4 FREE SERPL-MCNC: 1.69 NG/DL (ref 0.93–1.7)
TSH SERPL DL<=0.005 MIU/L-ACNC: 0.23 UIU/ML (ref 0.38–5.33)
WBC # BLD AUTO: 4.6 K/UL (ref 4.8–10.8)

## 2022-12-24 PROCEDURE — 84439 ASSAY OF FREE THYROXINE: CPT

## 2022-12-24 PROCEDURE — 80053 COMPREHEN METABOLIC PANEL: CPT

## 2022-12-24 PROCEDURE — 36415 COLL VENOUS BLD VENIPUNCTURE: CPT

## 2022-12-24 PROCEDURE — 84443 ASSAY THYROID STIM HORMONE: CPT

## 2022-12-24 PROCEDURE — 85025 COMPLETE CBC W/AUTO DIFF WBC: CPT

## 2022-12-24 PROCEDURE — 86803 HEPATITIS C AB TEST: CPT

## 2023-01-03 ENCOUNTER — TELEPHONE (OUTPATIENT)
Dept: MEDICAL GROUP | Age: 68
End: 2023-01-03
Payer: MEDICARE

## 2023-01-03 ENCOUNTER — TELEPHONE (OUTPATIENT)
Dept: PHYSICAL THERAPY | Facility: REHABILITATION | Age: 68
End: 2023-01-03
Payer: MEDICARE

## 2023-01-03 NOTE — OP THERAPY DISCHARGE SUMMARY
Outpatient Physical Therapy  DISCHARGE SUMMARY NOTE      [facility] Physical Therapy [address]  [address]  [place] [address]  Phone:  [phone]  Fax:  [phone]    Date of Visit: 01/03/2023    Patient: Shahrzad Scott  YOB: 1955  MRN: [id number]     Referring Provider: [address] [address]   Referring Diagnosis: Dizziness and giddiness [R42]            Functional Assessment Used        Your patient is being discharged from Physical Therapy with the following comments:   Patient has failed to schedule or reschedule follow-up visits    Comments:  Pt last seen 11/22. No appointments scheduled. Appropriate to KODY.     Luci Lewis, PT, DPT    Date: 1/3/2023

## 2023-01-04 ENCOUNTER — APPOINTMENT (RX ONLY)
Dept: URBAN - METROPOLITAN AREA CLINIC 4 | Facility: CLINIC | Age: 68
Setting detail: DERMATOLOGY
End: 2023-01-04

## 2023-01-04 DIAGNOSIS — L92.0 GRANULOMA ANNULARE: ICD-10-CM

## 2023-01-04 PROBLEM — D48.5 NEOPLASM OF UNCERTAIN BEHAVIOR OF SKIN: Status: ACTIVE | Noted: 2023-01-04

## 2023-01-04 PROCEDURE — 11900 INJECT SKIN LESIONS </W 7: CPT

## 2023-01-04 PROCEDURE — ? BIOPSY BY SHAVE METHOD

## 2023-01-04 PROCEDURE — ? COUNSELING

## 2023-01-04 PROCEDURE — 11102 TANGNTL BX SKIN SINGLE LES: CPT

## 2023-01-04 PROCEDURE — ? INTRALESIONAL KENALOG

## 2023-01-04 ASSESSMENT — LOCATION DETAILED DESCRIPTION DERM: LOCATION DETAILED: LEFT DORSAL RING METACARPOPHALANGEAL JOINT

## 2023-01-04 ASSESSMENT — LOCATION ZONE DERM: LOCATION ZONE: HAND

## 2023-01-04 ASSESSMENT — LOCATION SIMPLE DESCRIPTION DERM: LOCATION SIMPLE: LEFT HAND

## 2023-01-04 NOTE — PROCEDURE: INTRALESIONAL KENALOG
How Many Mls Were Removed From The 80 Mg/Ml (5ml) Vial When Preparing The Injectable Solution?: 0
Validate Note Data When Using Inventory: Yes
Concentration Of Kenalog Solution Injected (Mg/Ml): 10.0
Kenalog Preparation: Kenalog
Expiration Date For Kenalog (Optional): April 2024
Include Z78.9 (Other Specified Conditions Influencing Health Status) As An Associated Diagnosis?: No
Consent: The risks of atrophy were reviewed with the patient.
Total Volume (Ccs): 0.2
Administered By (Optional): Dr. Yeni Cha MD
Detail Level: Detailed
Medical Necessity Clause: This procedure was medically necessary because the lesions that were treated were:
Lot # For Kenalog (Optional): [id number]

## 2023-01-04 NOTE — PROCEDURE: BIOPSY BY SHAVE METHOD
[phone]
Detail Level: Detailed
Depth Of Biopsy: dermis
Was A Bandage Applied: Yes
Size Of Lesion In Cm: 0.6
X Size Of Lesion In Cm: 0
Biopsy Type: H and E
Biopsy Method: Personna blade
Anesthesia Type: 1% lidocaine with 1:100,000 epinephrine and a 1:10 solution of 8.4% sodium bicarbonate
Anesthesia Volume In Cc: 1
Hemostasis: Aluminum Chloride
Wound Care: Petrolatum
Dressing: bandage
Type Of Destruction Used: Cryotherapy
Curettage Text: The wound bed was treated with curettage after the biopsy was performed.
Cryotherapy Text: The wound bed was treated with cryotherapy after the biopsy was performed.
Electrodesiccation Text: The wound bed was treated with electrodesiccation after the biopsy was performed.
Electrodesiccation And Curettage Text: The wound bed was treated with electrodesiccation and curettage after the biopsy was performed.
Silver Nitrate Text: The wound bed was treated with silver nitrate after the biopsy was performed.
Lab: [id number]
Lab Facility: 
Render Path Notes In Note?: No
Consent: Written consent was obtained and risks were reviewed including but not limited to scarring, infection, bleeding, scabbing, incomplete removal, nerve damage and allergy to anesthesia.
Post-Care Instructions: I reviewed with the patient in detail post-care instructions. Patient is to keep the biopsy site dry overnight, and then apply bacitracin/petroleum twice daily until healed.
Notification Instructions: Patient will be notified of biopsy results. However, patient instructed to call the office if not contacted within 2 weeks.
Billing Type: Third-Party Bill
Information: Selecting Yes will display possible errors in your note based on the variables you have selected. This validation is only offered as a suggestion for you. PLEASE NOTE THAT THE VALIDATION TEXT WILL BE REMOVED WHEN YOU FINALIZE YOUR NOTE. IF YOU WANT TO FAX A PRELIMINARY NOTE YOU WILL NEED TO TOGGLE THIS TO 'NO' IF YOU DO NOT WANT IT IN YOUR FAXED NOTE.

## 2023-01-05 ENCOUNTER — TELEPHONE (OUTPATIENT)
Dept: MEDICAL GROUP | Age: 68
End: 2023-01-05
Payer: MEDICARE

## 2023-01-05 DIAGNOSIS — E03.9 ACQUIRED HYPOTHYROIDISM: ICD-10-CM

## 2023-01-06 DIAGNOSIS — E03.9 ACQUIRED HYPOTHYROIDISM: ICD-10-CM

## 2023-01-06 RX ORDER — LEVOTHYROXINE SODIUM 0.1 MG/1
100 TABLET ORAL
Qty: 90 TABLET | Refills: 0 | Status: SHIPPED | OUTPATIENT
Start: 2023-01-06

## 2023-01-10 ENCOUNTER — HOSPITAL ENCOUNTER (OUTPATIENT)
Dept: RADIOLOGY | Facility: MEDICAL CENTER | Age: 68
End: 2023-01-10
Attending: FAMILY MEDICINE
Payer: MEDICARE

## 2023-01-10 DIAGNOSIS — Z12.31 VISIT FOR SCREENING MAMMOGRAM: ICD-10-CM

## 2023-01-10 RX ORDER — LEVOTHYROXINE SODIUM 0.1 MG/1
100 TABLET ORAL
Qty: 90 TABLET | Refills: 0 | Status: SHIPPED | OUTPATIENT
Start: 2023-01-10

## 2023-01-13 ENCOUNTER — TELEPHONE (OUTPATIENT)
Dept: MEDICAL GROUP | Age: 68
End: 2023-01-13
Payer: MEDICARE

## 2023-01-13 DIAGNOSIS — N64.4 BREAST PAIN: ICD-10-CM

## 2023-01-16 ENCOUNTER — RX ONLY (OUTPATIENT)
Age: 68
Setting detail: RX ONLY
End: 2023-01-16

## 2023-01-16 RX ORDER — H-QUINONE/TRETINOIN/HYDROCORT 8-0.05-1 %
EMULSION (GRAM) TOPICAL
Qty: 30 | Refills: 3 | Status: ERX

## 2023-01-17 ENCOUNTER — PATIENT MESSAGE (OUTPATIENT)
Dept: CARDIOLOGY | Facility: MEDICAL CENTER | Age: 68
End: 2023-01-17
Payer: MEDICARE

## 2023-01-17 DIAGNOSIS — I10 ESSENTIAL HYPERTENSION, BENIGN: ICD-10-CM

## 2023-01-17 NOTE — PATIENT COMMUNICATION
Is the patient due for a refill? No- pharmacy does not have on their end    Was the patient seen the past year? Yes    Date of last office visit: 2/15/2022    Does the patient have an upcoming appointment?  No   If yes, When?     Provider to refill:SIMRAN    Does the patients insurance require a 100 day supply?  No

## 2023-01-18 RX ORDER — POTASSIUM CHLORIDE 750 MG/1
10 TABLET, FILM COATED, EXTENDED RELEASE ORAL DAILY
Qty: 90 TABLET | Refills: 1 | Status: SHIPPED | OUTPATIENT
Start: 2023-01-18 | End: 2023-07-18 | Stop reason: SDUPTHER

## 2023-01-18 RX ORDER — HYDROCHLOROTHIAZIDE 12.5 MG/1
12.5 CAPSULE, GELATIN COATED ORAL DAILY
Qty: 90 CAPSULE | Refills: 1 | Status: SHIPPED | OUTPATIENT
Start: 2023-01-18 | End: 2023-07-18 | Stop reason: SDUPTHER

## 2023-02-01 ENCOUNTER — HOSPITAL ENCOUNTER (OUTPATIENT)
Dept: RADIOLOGY | Facility: MEDICAL CENTER | Age: 68
End: 2023-02-01
Attending: FAMILY MEDICINE
Payer: MEDICARE

## 2023-02-01 DIAGNOSIS — N64.4 BREAST PAIN: ICD-10-CM

## 2023-02-01 PROCEDURE — G0279 TOMOSYNTHESIS, MAMMO: HCPCS

## 2023-02-01 PROCEDURE — 76642 ULTRASOUND BREAST LIMITED: CPT | Mod: RT

## 2023-02-08 ENCOUNTER — APPOINTMENT (RX ONLY)
Dept: URBAN - METROPOLITAN AREA CLINIC 4 | Facility: CLINIC | Age: 68
Setting detail: DERMATOLOGY
End: 2023-02-08

## 2023-02-08 DIAGNOSIS — L92.0 GRANULOMA ANNULARE: ICD-10-CM | Status: INADEQUATELY CONTROLLED

## 2023-02-08 PROBLEM — C44.722 SQUAMOUS CELL CARCINOMA OF SKIN OF RIGHT LOWER LIMB, INCLUDING HIP: Status: ACTIVE | Noted: 2023-02-08

## 2023-02-08 PROCEDURE — 17262 DSTRJ MAL LES T/A/L 1.1-2.0: CPT

## 2023-02-08 PROCEDURE — ? COUNSELING

## 2023-02-08 PROCEDURE — ? ADDITIONAL NOTES

## 2023-02-08 PROCEDURE — ? CURETTAGE AND DESTRUCTION

## 2023-02-08 PROCEDURE — ? PRESCRIPTION

## 2023-02-08 PROCEDURE — ? MEDICATION COUNSELING

## 2023-02-08 PROCEDURE — 99213 OFFICE O/P EST LOW 20 MIN: CPT | Mod: 25

## 2023-02-08 PROCEDURE — ? DIAGNOSIS COMMENT

## 2023-02-08 RX ORDER — CLOBETASOL PROPIONATE 0.5 MG/G
CREAM TOPICAL
Qty: 30 | Refills: 0 | Status: ERX | COMMUNITY
Start: 2023-02-08

## 2023-02-08 RX ADMIN — CLOBETASOL PROPIONATE THIN LAYER: 0.5 CREAM TOPICAL at 00:00

## 2023-02-08 ASSESSMENT — LOCATION SIMPLE DESCRIPTION DERM: LOCATION SIMPLE: LEFT HAND

## 2023-02-08 ASSESSMENT — LOCATION DETAILED DESCRIPTION DERM: LOCATION DETAILED: LEFT DORSAL RING METACARPOPHALANGEAL JOINT

## 2023-02-08 ASSESSMENT — LOCATION ZONE DERM: LOCATION ZONE: HAND

## 2023-02-08 NOTE — PROCEDURE: DIAGNOSIS COMMENT
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
Detail Level: Detailed
Render Risk Assessment In Note?: no

## 2023-02-08 NOTE — PROCEDURE: ADDITIONAL NOTES
Additional Notes: Became more inflamed and crusted following ILK.  Recommend clobetasol x2 weeks; recheck next month as scheduled
Render Risk Assessment In Note?: no
Detail Level: Simple

## 2023-02-08 NOTE — PROCEDURE: CURETTAGE AND DESTRUCTION
Detail Level: Detailed
Biopsy Photograph Reviewed: Yes
Number Of Curettages: 3
Size Of Lesion In Cm: 1.3
Size Of Lesion After Curettage: 1.5
Add Intralesional Injection: No
Total Volume (Ccs): 1
Anesthesia Type: 1% lidocaine with 1:100,000 epinephrine and a 1:10 solution of 8.4% sodium bicarbonate
Cautery Type: electrodesiccation
What Was Performed First?: Curettage
Final Size Statement: The size of the lesion after curettage was
Additional Information: (Optional): The wound was cleaned, and a pressure dressing was applied.  The patient received detailed post-op instructions.
Consent was obtained from the patient. The risks, benefits and alternatives to therapy were discussed in detail. Specifically, the risks of infection, scarring, bleeding, prolonged wound healing, nerve injury, incomplete removal, allergy to anesthesia and recurrence were addressed. Alternatives to ED&C, such as: surgical removal and XRT were also discussed.  Prior to the procedure, the treatment site was clearly identified and confirmed by the patient. All components of Universal Protocol/PAUSE Rule completed.
Post-Care Instructions: I reviewed with the patient in detail post-care instructions. Patient is to keep the biopsy site dry overnight, and then apply bacitracin/petroleum twice daily until healed.
Bill As A Line Item Or As Units: Line Item

## 2023-02-08 NOTE — PROCEDURE: MEDICATION COUNSELING
Propranolol Counseling:  I discussed with the patient the risks of propranolol including but not limited to low heart rate, low blood pressure, low blood sugar, restlessness and increased cold sensitivity. They should call the office if they experience any of these side effects.
Terbinafine Counseling: Patient counseling regarding adverse effects of terbinafine including but not limited to headache, diarrhea, rash, upset stomach, liver function test abnormalities, itching, taste/smell disturbance, nausea, abdominal pain, and flatulence.  There is a rare possibility of liver failure that can occur when taking terbinafine.  The patient understands that a baseline LFT and kidney function test may be required. The patient verbalized understanding of the proper use and possible adverse effects of terbinafine.  All of the patient's questions and concerns were addressed.
Minocycline Counseling: Patient advised regarding possible photosensitivity and discoloration of the teeth, skin, lips, tongue and gums.  Patient instructed to avoid sunlight, if possible.  When exposed to sunlight, patients should wear protective clothing, sunglasses, and sunscreen.  The patient was instructed to call the office immediately if the following severe adverse effects occur:  hearing changes, easy bruising/bleeding, severe headache, or vision changes.  The patient verbalized understanding of the proper use and possible adverse effects of minocycline.  All of the patient's questions and concerns were addressed.
Taltz Counseling: I discussed with the patient the risks of ixekizumab including but not limited to immunosuppression, serious infections, worsening of inflammatory bowel disease and drug reactions.  The patient understands that monitoring is required including a PPD at baseline and must alert us or the primary physician if symptoms of infection or other concerning signs are noted.
Qbrexza Pregnancy And Lactation Text: There is no available data on Qbrexza use in pregnant women.  There is no available data on Qbrexza use in lactation.
Calcipotriene Pregnancy And Lactation Text: This medication has not been proven safe during pregnancy. It is unknown if this medication is excreted in breast milk.
Olumiant Pregnancy And Lactation Text: Based on animal studies, Olumiant may cause embryo-fetal harm when administered to pregnant women.  The medication should not be used in pregnancy.  Breastfeeding is not recommended during treatment.
Topical Clindamycin Counseling: Patient counseled that this medication may cause skin irritation or allergic reactions.  In the event of skin irritation, the patient was advised to reduce the amount of the drug applied or use it less frequently.   The patient verbalized understanding of the proper use and possible adverse effects of clindamycin.  All of the patient's questions and concerns were addressed.
Tetracycline Pregnancy And Lactation Text: This medication is Pregnancy Category D and not consider safe during pregnancy. It is also excreted in breast milk.
VTAMA Counseling: I discussed with the patient that VTAMA is not for use in the eyes, mouth or mouth. They should call the office if they develop any signs of allergic reactions to VTAMA. The patient verbalized understanding of the proper use and possible adverse effects of VTAMA.  All of the patient's questions and concerns were addressed.
Cephalexin Pregnancy And Lactation Text: This medication is Pregnancy Category B and considered safe during pregnancy.  It is also excreted in breast milk but can be used safely for shorter doses.
Libtayo Pregnancy And Lactation Text: This medication is contraindicated in pregnancy and when breast feeding.
Nsaids Pregnancy And Lactation Text: These medications are considered safe up to 30 weeks gestation. It is excreted in breast milk.
Aklief counseling:  Patient advised to apply a pea-sized amount only at bedtime and wait 30 minutes after washing their face before applying.  If too drying, patient may add a non-comedogenic moisturizer.  The most commonly reported side effects including irritation, redness, scaling, dryness, stinging, burning, itching, and increased risk of sunburn.  The patient verbalized understanding of the proper use and possible adverse effects of retinoids.  All of the patient's questions and concerns were addressed.
Eucrisa Pregnancy And Lactation Text: This medication has not been assigned a Pregnancy Risk Category but animal studies failed to show danger with the topical medication. It is unknown if the medication is excreted in breast milk.
Glycopyrrolate Counseling:  I discussed with the patient the risks of glycopyrrolate including but not limited to skin rash, drowsiness, dry mouth, difficulty urinating, and blurred vision.
Rituxan Pregnancy And Lactation Text: This medication is Pregnancy Category C and it isn't know if it is safe during pregnancy. It is unknown if this medication is excreted in breast milk but similar antibodies are known to be excreted.
Acitretin Counseling:  I discussed with the patient the risks of acitretin including but not limited to hair loss, dry lips/skin/eyes, liver damage, hyperlipidemia, depression/suicidal ideation, photosensitivity.  Serious rare side effects can include but are not limited to pancreatitis, pseudotumor cerebri, bony changes, clot formation/stroke/heart attack.  Patient understands that alcohol is contraindicated since it can result in liver toxicity and significantly prolong the elimination of the drug by many years.
Spironolactone Counseling: Patient advised regarding risks of diarrhea, abdominal pain, hyperkalemia, birth defects (for female patients), liver toxicity and renal toxicity. The patient may need blood work to monitor liver and kidney function and potassium levels while on therapy. The patient verbalized understanding of the proper use and possible adverse effects of spironolactone.  All of the patient's questions and concerns were addressed.
Detail Level: Simple
Enbrel Counseling:  I discussed with the patient the risks of etanercept including but not limited to myelosuppression, immunosuppression, autoimmune hepatitis, demyelinating diseases, lymphoma, and infections.  The patient understands that monitoring is required including a PPD at baseline and must alert us or the primary physician if symptoms of infection or other concerning signs are noted.
Mirvaso Counseling: Mirvaso is a topical medication which can decrease superficial blood flow where applied. Side effects are uncommon and include stinging, redness and allergic reactions.
Use Enhanced Medication Counseling?: No
Arava Pregnancy And Lactation Text: This medication is Pregnancy Category X and is absolutely contraindicated during pregnancy. It is unknown if it is excreted in breast milk.
Propranolol Pregnancy And Lactation Text: This medication is Pregnancy Category C and it isn't known if it is safe during pregnancy. It is excreted in breast milk.
Topical Clindamycin Pregnancy And Lactation Text: This medication is Pregnancy Category B and is considered safe during pregnancy. It is unknown if it is excreted in breast milk.
Cyclosporine Pregnancy And Lactation Text: This medication is Pregnancy Category C and it isn't know if it is safe during pregnancy. This medication is excreted in breast milk.
Albendazole Pregnancy And Lactation Text: This medication is Pregnancy Category C and it isn't known if it is safe during pregnancy. It is also excreted in breast milk.
Olanzapine Counseling- I discussed with the patient the common side effects of olanzapine including but are not limited to: lack of energy, dry mouth, increased appetite, sleepiness, tremor, constipation, dizziness, changes in behavior, or restlessness.  Explained that teenagers are more likely to experience headaches, abdominal pain, pain in the arms or legs, tiredness, and sleepiness.  Serious side effects include but are not limited: increased risk of death in elderly patients who are confused, have memory loss, or dementia-related psychosis; hyperglycemia; increased cholesterol and triglycerides; and weight gain.
Terbinafine Pregnancy And Lactation Text: This medication is Pregnancy Category B and is considered safe during pregnancy. It is also excreted in breast milk and breast feeding isn't recommended.
Clindamycin Counseling: I counseled the patient regarding use of clindamycin as an antibiotic for prophylactic and/or therapeutic purposes. Clindamycin is active against numerous classes of bacteria, including skin bacteria. Side effects may include nausea, diarrhea, gastrointestinal upset, rash, hives, yeast infections, and in rare cases, colitis.
Taltz Pregnancy And Lactation Text: The risk during pregnancy and breastfeeding is uncertain with this medication.
Rhofade Counseling: Rhofade is a topical medication which can decrease superficial blood flow where applied. Side effects are uncommon and include stinging, redness and allergic reactions.
Cantharidin Counseling: Calcipotriene Counseling:  I discussed with the patient the risks of calcipotriene including but not limited to erythema, scaling, itching, and irritation.
Rinvoq Counseling: I discussed with the patient the risks of Rinvoq therapy including but not limited to upper respiratory tract infections, shingles, cold sores, bronchitis, nausea, cough, fever, acne, and headache. Live vaccines should be avoided.  This medication has been linked to serious infections; higher rate of mortality; malignancy and lymphoproliferative disorders; major adverse cardiovascular events; thrombosis; thrombocytopenia, anemia, and neutropenia; lipid elevations; liver enzyme elevations; and gastrointestinal perforations.
Vtama Pregnancy And Lactation Text: It is unknown if this medication can cause problems during pregnancy and breastfeeding.
Glycopyrrolate Pregnancy And Lactation Text: This medication is Pregnancy Category B and is considered safe during pregnancy. It is unknown if it is excreted breast milk.
Mirvaso Pregnancy And Lactation Text: This medication has not been assigned a Pregnancy Risk Category. It is unknown if the medication is excreted in breast milk.
Odomzo Counseling- I discussed with the patient the risks of Odomzo including but not limited to nausea, vomiting, diarrhea, constipation, weight loss, changes in the sense of taste, decreased appetite, muscle spasms, and hair loss.  The patient verbalized understanding of the proper use and possible adverse effects of Odomzo.  All of the patient's questions and concerns were addressed.
Aklief Pregnancy And Lactation Text: It is unknown if this medication is safe to use during pregnancy.  It is unknown if this medication is excreted in breast milk.  Breastfeeding women should use the topical cream on the smallest area of the skin for the shortest time needed while breastfeeding.  Do not apply to nipple and areola.
Siliq Counseling:  I discussed with the patient the risks of Siliq including but not limited to new or worsening depression, suicidal thoughts and behavior, immunosuppression, malignancy, posterior leukoencephalopathy syndrome, and serious infections.  The patient understands that monitoring is required including a PPD at baseline and must alert us or the primary physician if symptoms of infection or other concerning signs are noted. There is also a special program designed to monitor depression which is required with Siliq.
Fluconazole Counseling:  Patient counseled regarding adverse effects of fluconazole including but not limited to headache, diarrhea, nausea, upset stomach, liver function test abnormalities, taste disturbance, and stomach pain.  There is a rare possibility of liver failure that can occur when taking fluconazole.  The patient understands that monitoring of LFTs and kidney function test may be required, especially at baseline. The patient verbalized understanding of the proper use and possible adverse effects of fluconazole.  All of the patient's questions and concerns were addressed.
Topical Ketoconazole Counseling: Patient counseled that this medication may cause skin irritation or allergic reactions.  In the event of skin irritation, the patient was advised to reduce the amount of the drug applied or use it less frequently.   The patient verbalized understanding of the proper use and possible adverse effects of ketoconazole.  All of the patient's questions and concerns were addressed.
Hydroquinone Counseling:  Patient advised that medication may result in skin irritation, lightening (hypopigmentation), dryness, and burning.  In the event of skin irritation, the patient was advised to reduce the amount of the drug applied or use it less frequently.  Rarely, spots that are treated with hydroquinone can become darker (pseudoochronosis).  Should this occur, patient instructed to stop medication and call the office. The patient verbalized understanding of the proper use and possible adverse effects of hydroquinone.  All of the patient's questions and concerns were addressed.
Enbrel Pregnancy And Lactation Text: This medication is Pregnancy Category B and is considered safe during pregnancy. It is unknown if this medication is excreted in breast milk.
Spironolactone Pregnancy And Lactation Text: This medication can cause feminization of the male fetus and should be avoided during pregnancy. The active metabolite is also found in breast milk.
Acitretin Pregnancy And Lactation Text: This medication is Pregnancy Category X and should not be given to women who are pregnant or may become pregnant in the future. This medication is excreted in breast milk.
Methotrexate Counseling:  Patient counseled regarding adverse effects of methotrexate including but not limited to nausea, vomiting, abnormalities in liver function tests. Patients may develop mouth sores, rash, diarrhea, and abnormalities in blood counts. The patient understands that monitoring is required including LFT's and blood counts.  There is a rare possibility of scarring of the liver and lung problems that can occur when taking methotrexate. Persistent nausea, loss of appetite, pale stools, dark urine, cough, and shortness of breath should be reported immediately. Patient advised to discontinue methotrexate treatment at least three months before attempting to become pregnant.  I discussed the need for folate supplements while taking methotrexate.  These supplements can decrease side effects during methotrexate treatment. The patient verbalized understanding of the proper use and possible adverse effects of methotrexate.  All of the patient's questions and concerns were addressed.
Rinvoq Pregnancy And Lactation Text: Based on animal studies, Rinvoq may cause embryo-fetal harm when administered to pregnant women.  The medication should not be used in pregnancy.  Breastfeeding is not recommended during treatment and for 6 days after the last dose.
Clofazimine Counseling:  I discussed with the patient the risks of clofazimine including but not limited to skin and eye pigmentation, liver damage, nausea/vomiting, gastrointestinal bleeding and allergy.
Quinolones Counseling:  I discussed with the patient the risks of fluoroquinolones including but not limited to GI upset, allergic reaction, drug rash, diarrhea, dizziness, photosensitivity, yeast infections, liver function test abnormalities, tendonitis/tendon rupture.
Zoryve Counseling:  I discussed with the patient that Zoryve is not for use in the eyes, mouth or vagina. The most commonly reported side effects include diarrhea, headache, insomnia, application site pain, upper respiratory tract infections, and urinary tract infections.  All of the patient's questions and concerns were addressed.
Ivermectin Counseling:  Patient instructed to take medication on an empty stomach with a full glass of water.  Patient informed of potential adverse effects including but not limited to nausea, diarrhea, dizziness, itching, and swelling of the extremities or lymph nodes.  The patient verbalized understanding of the proper use and possible adverse effects of ivermectin.  All of the patient's questions and concerns were addressed.
SSKI Counseling:  I discussed with the patient the risks of SSKI including but not limited to thyroid abnormalities, metallic taste, GI upset, fever, headache, acne, arthralgias, paraesthesias, lymphadenopathy, easy bleeding, arrhythmias, and allergic reaction.
Cantharidin Pregnancy And Lactation Text: The use of this medication during pregnancy or lactation is not recommended as there is insufficient data.
Olanzapine Pregnancy And Lactation Text: This medication is pregnancy category C.   There are no adequate and well controlled trials with olanzapine in pregnant females.  Olanzapine should be used during pregnancy only if the potential benefit justifies the potential risk to the fetus.   In a study in lactating healthy women, olanzapine was excreted in breast milk.  It is recommended that women taking olanzapine should not breast feed.
Fluconazole Pregnancy And Lactation Text: This medication is Pregnancy Category C and it isn't know if it is safe during pregnancy. It is also excreted in breast milk.
Clindamycin Pregnancy And Lactation Text: This medication can be used in pregnancy if certain situations. Clindamycin is also present in breast milk.
Azelaic Acid Counseling: Patient counseled that medicine may cause skin irritation and to avoid applying near the eyes.  In the event of skin irritation, the patient was advised to reduce the amount of the drug applied or use it less frequently.   The patient verbalized understanding of the proper use and possible adverse effects of azelaic acid.  All of the patient's questions and concerns were addressed.
Opzelura Counseling:  I discussed with the patient the risks of Opzelura including but not limited to nasopharngitis, bronchitis, ear infection, eosinophila, hives, diarrhea, folliculitis, tonsillitis, and rhinorrhea.  Taken orally, this medication has been linked to serious infections; higher rate of mortality; malignancy and lymphoproliferative disorders; major adverse cardiovascular events; thrombosis; thrombocytopenia, anemia, and neutropenia; and lipid elevations.
Tremfya Counseling: I discussed with the patient the risks of guselkumab including but not limited to immunosuppression, serious infections, worsening of inflammatory bowel disease and drug reactions.  The patient understands that monitoring is required including a PPD at baseline and must alert us or the primary physician if symptoms of infection or other concerning signs are noted.
Topical Steroids Applications Pregnancy And Lactation Text: Most topical steroids are considered safe to use during pregnancy and lactation.  Any topical steroid applied to the breast or nipple should be washed off before breastfeeding.
Methotrexate Pregnancy And Lactation Text: This medication is Pregnancy Category X and is known to cause fetal harm. This medication is excreted in breast milk.
Cimetidine Counseling:  I discussed with the patient the risks of Cimetidine including but not limited to gynecomastia, headache, diarrhea, nausea, drowsiness, arrhythmias, pancreatitis, skin rashes, psychosis, bone marrow suppression and kidney toxicity.
Hydroxychloroquine Counseling:  I discussed with the patient that a baseline ophthalmologic exam is needed at the start of therapy and every year thereafter while on therapy. A CBC may also be warranted for monitoring.  The side effects of this medication were discussed with the patient, including but not limited to agranulocytosis, aplastic anemia, seizures, rashes, retinopathy, and liver toxicity. Patient instructed to call the office should any adverse effect occur.  The patient verbalized understanding of the proper use and possible adverse effects of Plaquenil.  All the patient's questions and concerns were addressed.
Bexarotene Counseling:  I discussed with the patient the risks of bexarotene including but not limited to hair loss, dry lips/skin/eyes, liver abnormalities, hyperlipidemia, pancreatitis, depression/suicidal ideation, photosensitivity, drug rash/allergic reactions, hypothyroidism, anemia, leukopenia, infection, cataracts, and teratogenicity.  Patient understands that they will need regular blood tests to check lipid profile, liver function tests, white blood cell count, thyroid function tests and pregnancy test if applicable.
5-Fu Counseling: 5-Fluorouracil Counseling:  I discussed with the patient the risks of 5-fluorouracil including but not limited to erythema, scaling, itching, weeping, crusting, and pain.
Sski Pregnancy And Lactation Text: This medication is Pregnancy Category D and isn't considered safe during pregnancy. It is excreted in breast milk.
Clofazimine Pregnancy And Lactation Text: This medication is Pregnancy Category C and isn't considered safe during pregnancy. It is excreted in breast milk.
Humira Counseling:  I discussed with the patient the risks of adalimumab including but not limited to myelosuppression, immunosuppression, autoimmune hepatitis, demyelinating diseases, lymphoma, and serious infections.  The patient understands that monitoring is required including a PPD at baseline and must alert us or the primary physician if symptoms of infection or other concerning signs are noted.
Griseofulvin Counseling:  I discussed with the patient the risks of griseofulvin including but not limited to photosensitivity, cytopenia, liver damage, nausea/vomiting and severe allergy.  The patient understands that this medication is best absorbed when taken with a fatty meal (e.g., ice cream or french fries).
Opzelura Pregnancy And Lactation Text: There is insufficient data to evaluate drug-associated risk for major birth defects, miscarriage, or other adverse maternal or fetal outcomes.  There is a pregnancy registry that monitors pregnancy outcomes in pregnant persons exposed to the medication during pregnancy.  It is unknown if this medication is excreted in breast milk.  Do not breastfeed during treatment and for about 4 weeks after the last dose.
Oral Minoxidil Counseling- I discussed with the patient the risks of oral minoxidil including but not limited to shortness of breath, swelling of the feet or ankles, dizziness, lightheadedness, unwanted hair growth and allergic reaction.  The patient verbalized understanding of the proper use and possible adverse effects of oral minoxidil.  All of the patient's questions and concerns were addressed.
Solaraze Counseling:  I discussed with the patient the risks of Solaraze including but not limited to erythema, scaling, itching, weeping, crusting, and pain.
Azathioprine Counseling:  I discussed with the patient the risks of azathioprine including but not limited to myelosuppression, immunosuppression, hepatotoxicity, lymphoma, and infections.  The patient understands that monitoring is required including baseline LFTs, Creatinine, possible TPMP genotyping and weekly CBCs for the first month and then every 2 weeks thereafter.  The patient verbalized understanding of the proper use and possible adverse effects of azathioprine.  All of the patient's questions and concerns were addressed.
Hydroxychloroquine Pregnancy And Lactation Text: This medication has been shown to cause fetal harm but it isn't assigned a Pregnancy Risk Category. There are small amounts excreted in breast milk.
Bexarotene Pregnancy And Lactation Text: This medication is Pregnancy Category X and should not be given to women who are pregnant or may become pregnant. This medication should not be used if you are breast feeding.
Simponi Counseling:  I discussed with the patient the risks of golimumab including but not limited to myelosuppression, immunosuppression, autoimmune hepatitis, demyelinating diseases, lymphoma, and serious infections.  The patient understands that monitoring is required including a PPD at baseline and must alert us or the primary physician if symptoms of infection or other concerning signs are noted.
Azelaic Acid Pregnancy And Lactation Text: This medication is considered safe during pregnancy and breast feeding.
Topical Sulfur Applications Counseling: Topical Sulfur Counseling: Patient counseled that this medication may cause skin irritation or allergic reactions.  In the event of skin irritation, the patient was advised to reduce the amount of the drug applied or use it less frequently.   The patient verbalized understanding of the proper use and possible adverse effects of topical sulfur application.  All of the patient's questions and concerns were addressed.
Doxycycline Counseling:  Patient counseled regarding possible photosensitivity and increased risk for sunburn.  Patient instructed to avoid sunlight, if possible.  When exposed to sunlight, patients should wear protective clothing, sunglasses, and sunscreen.  The patient was instructed to call the office immediately if the following severe adverse effects occur:  hearing changes, easy bruising/bleeding, severe headache, or vision changes.  The patient verbalized understanding of the proper use and possible adverse effects of doxycycline.  All of the patient's questions and concerns were addressed.
Thalidomide Counseling: I discussed with the patient the risks of thalidomide including but not limited to birth defects, anxiety, weakness, chest pain, dizziness, cough and severe allergy.
Colchicine Counseling:  Patient counseled regarding adverse effects including but not limited to stomach upset (nausea, vomiting, stomach pain, or diarrhea).  Patient instructed to limit alcohol consumption while taking this medication.  Colchicine may reduce blood counts especially with prolonged use.  The patient understands that monitoring of kidney function and blood counts may be required, especially at baseline. The patient verbalized understanding of the proper use and possible adverse effects of colchicine.  All of the patient's questions and concerns were addressed.
Prednisone Counseling:  I discussed with the patient the risks of prolonged use of prednisone including but not limited to weight gain, insomnia, osteoporosis, mood changes, diabetes, susceptibility to infection, glaucoma and high blood pressure.  In cases where prednisone use is prolonged, patients should be monitored with blood pressure checks, serum glucose levels and an eye exam.  Additionally, the patient may need to be placed on GI prophylaxis, PCP prophylaxis, and calcium and vitamin D supplementation and/or a bisphosphonate.  The patient verbalized understanding of the proper use and the possible adverse effects of prednisone.  All of the patient's questions and concerns were addressed.
Topical Steroids Counseling: I discussed with the patient that prolonged use of topical steroids can result in the increased appearance of superficial blood vessels (telangiectasias), lightening (hypopigmentation) and thinning of the skin (atrophy).  Patient understands to avoid using high potency steroids in skin folds, the groin or the face.  The patient verbalized understanding of the proper use and possible adverse effects of topical steroids.  All of the patient's questions and concerns were addressed.
Imiquimod Counseling:  I discussed with the patient the risks of imiquimod including but not limited to erythema, scaling, itching, weeping, crusting, and pain.  Patient understands that the inflammatory response to imiquimod is variable from person to person and was educated regarded proper titration schedule.  If flu-like symptoms develop, patient knows to discontinue the medication and contact us.
Azathioprine Pregnancy And Lactation Text: This medication is Pregnancy Category D and isn't considered safe during pregnancy. It is unknown if this medication is excreted in breast milk.
Rifampin Counseling: I discussed with the patient the risks of rifampin including but not limited to liver damage, kidney damage, red-orange body fluids, nausea/vomiting and severe allergy.
Cimzia Counseling:  I discussed with the patient the risks of Cimzia including but not limited to immunosuppression, allergic reactions and infections.  The patient understands that monitoring is required including a PPD at baseline and must alert us or the primary physician if symptoms of infection or other concerning signs are noted.
Xolair Counseling:  Patient informed of potential adverse effects including but not limited to fever, muscle aches, rash and allergic reactions.  The patient verbalized understanding of the proper use and possible adverse effects of Xolair.  All of the patient's questions and concerns were addressed.
Solaraze Pregnancy And Lactation Text: This medication is Pregnancy Category B and is considered safe. There is some data to suggest avoiding during the third trimester. It is unknown if this medication is excreted in breast milk.
5-Fu Pregnancy And Lactation Text: This medication is Pregnancy Category X and contraindicated in pregnancy and in women who may become pregnant. It is unknown if this medication is excreted in breast milk.
Dutasteride Male Counseling: Dustasteride Counseling:  I discussed with the patient the risks of use of dutasteride including but not limited to decreased libido, decreased ejaculate volume, and gynecomastia. Women who can become pregnant should not handle medication.  All of the patient's questions and concerns were addressed.
Zyclara Counseling:  I discussed with the patient the risks of imiquimod including but not limited to erythema, scaling, itching, weeping, crusting, and pain.  Patient understands that the inflammatory response to imiquimod is variable from person to person and was educated regarded proper titration schedule.  If flu-like symptoms develop, patient knows to discontinue the medication and contact us.
Doxycycline Pregnancy And Lactation Text: This medication is Pregnancy Category D and not consider safe during pregnancy. It is also excreted in breast milk but is considered safe for shorter treatment courses.
Griseofulvin Pregnancy And Lactation Text: This medication is Pregnancy Category X and is known to cause serious birth defects. It is unknown if this medication is excreted in breast milk but breast feeding should be avoided.
Adbry Counseling: I discussed with the patient the risks of tralokinumab including but not limited to eye infection and irritation, cold sores, injection site reactions, worsening of asthma, allergic reactions and increased risk of parasitic infection.  Live vaccines should be avoided while taking tralokinumab. The patient understands that monitoring is required and they must alert us or the primary physician if symptoms of infection or other concerning signs are noted.
Low Dose Naltrexone Counseling- I discussed with the patient the potential risks and side effects of low dose naltrexone including but not limited to: more vivid dreams, headaches, nausea, vomiting, abdominal pain, fatigue, dizziness, and anxiety.
Topical Sulfur Applications Pregnancy And Lactation Text: This medication is Pregnancy Category C and has an unknown safety profile during pregnancy. It is unknown if this topical medication is excreted in breast milk.
Oral Minoxidil Pregnancy And Lactation Text: This medication should only be used when clearly needed if you are pregnant, attempting to become pregnant or breast feeding.
Benzoyl Peroxide Counseling: Patient counseled that medicine may cause skin irritation and bleach clothing.  In the event of skin irritation, the patient was advised to reduce the amount of the drug applied or use it less frequently.   The patient verbalized understanding of the proper use and possible adverse effects of benzoyl peroxide.  All of the patient's questions and concerns were addressed.
Imiquimod Pregnancy And Lactation Text: This medication is Pregnancy Category C. It is unknown if this medication is excreted in breast milk.
Azithromycin Counseling:  I discussed with the patient the risks of azithromycin including but not limited to GI upset, allergic reaction, drug rash, diarrhea, and yeast infections.
Ilumya Counseling: I discussed with the patient the risks of tildrakizumab including but not limited to immunosuppression, malignancy, posterior leukoencephalopathy syndrome, and serious infections.  The patient understands that monitoring is required including a PPD at baseline and must alert us or the primary physician if symptoms of infection or other concerning signs are noted.
Isotretinoin Counseling: Patient should get monthly blood tests, not donate blood, not drive at night if vision affected, not share medication, and not undergo elective surgery for 6 months after tx completed. Side effects reviewed, pt to contact office should one occur.
Picato Counseling:  I discussed with the patient the risks of Picato including but not limited to erythema, scaling, itching, weeping, crusting, and pain.
Doxepin Counseling:  Patient advised that the medication is sedating and not to drive a car after taking this medication. Patient informed of potential adverse effects including but not limited to dry mouth, urinary retention, and blurry vision.  The patient verbalized understanding of the proper use and possible adverse effects of doxepin.  All of the patient's questions and concerns were addressed.
Dutasteride Pregnancy And Lactation Text: This medication is absolutely contraindicated in women, especially during pregnancy and breast feeding. Feminization of male fetuses is possible if taking while pregnant.
Topical Retinoid counseling:  Patient advised to apply a pea-sized amount only at bedtime and wait 30 minutes after washing their face before applying.  If too drying, patient may add a non-comedogenic moisturizer. The patient verbalized understanding of the proper use and possible adverse effects of retinoids.  All of the patient's questions and concerns were addressed.
Cellcept Counseling:  I discussed with the patient the risks of mycophenolate mofetil including but not limited to infection/immunosuppression, GI upset, hypokalemia, hypercholesterolemia, bone marrow suppression, lymphoproliferative disorders, malignancy, GI ulceration/bleed/perforation, colitis, interstitial lung disease, kidney failure, progressive multifocal leukoencephalopathy, and birth defects.  The patient understands that monitoring is required including a baseline creatinine and regular CBC testing. In addition, patient must alert us immediately if symptoms of infection or other concerning signs are noted.
Benzoyl Peroxide Pregnancy And Lactation Text: This medication is Pregnancy Category C. It is unknown if benzoyl peroxide is excreted in breast milk.
Otezla Counseling: The side effects of Otezla were discussed with the patient, including but not limited to worsening or new depression, weight loss, diarrhea, nausea, upper respiratory tract infection, and headache. Patient instructed to call the office should any adverse effect occur.  The patient verbalized understanding of the proper use and possible adverse effects of Otezla.  All the patient's questions and concerns were addressed.
Cimzia Pregnancy And Lactation Text: This medication crosses the placenta but can be considered safe in certain situations. Cimzia may be excreted in breast milk.
Adbry Pregnancy And Lactation Text: It is unknown if this medication will adversely affect pregnancy or breast feeding.
Erythromycin Counseling:  I discussed with the patient the risks of erythromycin including but not limited to GI upset, allergic reaction, drug rash, diarrhea, increase in liver enzymes, and yeast infections.
Drysol Counseling:  I discussed with the patient the risks of drysol/aluminum chloride including but not limited to skin rash, itching, irritation, burning.
Xolair Pregnancy And Lactation Text: This medication is Pregnancy Category B and is considered safe during pregnancy. This medication is excreted in breast milk.
Rifampin Pregnancy And Lactation Text: This medication is Pregnancy Category C and it isn't know if it is safe during pregnancy. It is also excreted in breast milk and should not be used if you are breast feeding.
Wartpeel Counseling:  I discussed with the patient the risks of Wartpeel including but not limited to erythema, scaling, itching, weeping, crusting, and pain.
Low Dose Naltrexone Pregnancy And Lactation Text: Naltrexone is pregnancy category C.  There have been no adequate and well-controlled studies in pregnant women.  It should be used in pregnancy only if the potential benefit justifies the potential risk to the fetus.   Limited data indicates that naltrexone is minimally excreted into breastmilk.
Itraconazole Counseling:  I discussed with the patient the risks of itraconazole including but not limited to liver damage, nausea/vomiting, neuropathy, and severe allergy.  The patient understands that this medication is best absorbed when taken with acidic beverages such as non-diet cola or ginger ale.  The patient understands that monitoring is required including baseline LFTs and repeat LFTs at intervals.  The patient understands that they are to contact us or the primary physician if concerning signs are noted.
Sotyktu Counseling:  I discussed the most common side effects of Sotyktu including: common cold, sore throat, sinus infections, cold sores, canker sores, folliculitis, and acne.  I also discussed more serious side effects of Sotyktu including but not limited to: serious allergic reactions; increased risk for infections such as TB; cancers such as lymphomas; rhabdomyolysis and elevated CPK; and elevated triglycerides and liver enzymes. 
Skyrizi Counseling: I discussed with the patient the risks of risankizumab-rzaa including but not limited to immunosuppression, and serious infections.  The patient understands that monitoring is required including a PPD at baseline and must alert us or the primary physician if symptoms of infection or other concerning signs are noted.
Dapsone Counseling: I discussed with the patient the risks of dapsone including but not limited to hemolytic anemia, agranulocytosis, rashes, methemoglobinemia, kidney failure, peripheral neuropathy, headaches, GI upset, and liver toxicity.  Patients who start dapsone require monitoring including baseline LFTs and weekly CBCs for the first month, then every month thereafter.  The patient verbalized understanding of the proper use and possible adverse effects of dapsone.  All of the patient's questions and concerns were addressed.
Finasteride Male Counseling: Finasteride Counseling:  I discussed with the patient the risks of use of finasteride including but not limited to decreased libido, decreased ejaculate volume, gynecomastia, and depression. Women should not handle medication.  All of the patient's questions and concerns were addressed.
Isotretinoin Pregnancy And Lactation Text: This medication is Pregnancy Category X and is considered extremely dangerous during pregnancy. It is unknown if it is excreted in breast milk.
Doxepin Pregnancy And Lactation Text: This medication is Pregnancy Category C and it isn't known if it is safe during pregnancy. It is also excreted in breast milk and breast feeding isn't recommended.
Klisyri Counseling:  I discussed with the patient the risks of Klisyri including but not limited to erythema, scaling, itching, weeping, crusting, and pain.
Azithromycin Pregnancy And Lactation Text: This medication is considered safe during pregnancy and is also secreted in breast milk.
Sarecycline Counseling: Patient advised regarding possible photosensitivity and discoloration of the teeth, skin, lips, tongue and gums.  Patient instructed to avoid sunlight, if possible.  When exposed to sunlight, patients should wear protective clothing, sunglasses, and sunscreen.  The patient was instructed to call the office immediately if the following severe adverse effects occur:  hearing changes, easy bruising/bleeding, severe headache, or vision changes.  The patient verbalized understanding of the proper use and possible adverse effects of sarecycline.  All of the patient's questions and concerns were addressed.
Otezla Pregnancy And Lactation Text: This medication is Pregnancy Category C and it isn't known if it is safe during pregnancy. It is unknown if it is excreted in breast milk.
Tranexamic Acid Counseling:  Patient advised of the small risk of bleeding problems with tranexamic acid. They were also instructed to call if they developed any nausea, vomiting or diarrhea. All of the patient's questions and concerns were addressed.
Cosentyx Counseling:  I discussed with the patient the risks of Cosentyx including but not limited to worsening of Crohn's disease, immunosuppression, allergic reactions and infections.  The patient understands that monitoring is required including a PPD at baseline and must alert us or the primary physician if symptoms of infection or other concerning signs are noted.
Protopic Counseling: Patient may experience a mild burning sensation during topical application. Protopic is not approved in children less than 2 years of age. There have been case reports of hematologic and skin malignancies in patients using topical calcineurin inhibitors although causality is questionable.
Erythromycin Pregnancy And Lactation Text: This medication is Pregnancy Category B and is considered safe during pregnancy. It is also excreted in breast milk.
Cibinqo Counseling: I discussed with the patient the risks of Cibinqo therapy including but not limited to common cold, nausea, headache, cold sores, increased blood CPK levels, dizziness, UTIs, fatigue, acne, and vomitting. Live vaccines should be avoided.  This medication has been linked to serious infections; higher rate of mortality; malignancy and lymphoproliferative disorders; major adverse cardiovascular events; thrombosis; thrombocytopenia and lymphopenia; lipid elevations; and retinal detachment.
Carac Counseling:  I discussed with the patient the risks of Carac including but not limited to erythema, scaling, itching, weeping, crusting, and pain.
Sotyktu Pregnancy And Lactation Text: There is insufficient data to evaluate whether or not Sotyktu is safe to use during pregnancy.   It is not known if Sotyktu passes into breast milk and whether or not it is safe to use when breastfeeding.  
Bactrim Counseling:  I discussed with the patient the risks of sulfa antibiotics including but not limited to GI upset, allergic reaction, drug rash, diarrhea, dizziness, photosensitivity, and yeast infections.  Rarely, more serious reactions can occur including but not limited to aplastic anemia, agranulocytosis, methemoglobinemia, blood dyscrasias, liver or kidney failure, lung infiltrates or desquamative/blistering drug rashes.
Dapsone Pregnancy And Lactation Text: This medication is Pregnancy Category C and is not considered safe during pregnancy or breast feeding.
Hydroxyzine Counseling: Patient advised that the medication is sedating and not to drive a car after taking this medication.  Patient informed of potential adverse effects including but not limited to dry mouth, urinary retention, and blurry vision.  The patient verbalized understanding of the proper use and possible adverse effects of hydroxyzine.  All of the patient's questions and concerns were addressed.
Erivedge Counseling- I discussed with the patient the risks of Erivedge including but not limited to nausea, vomiting, diarrhea, constipation, weight loss, changes in the sense of taste, decreased appetite, muscle spasms, and hair loss.  The patient verbalized understanding of the proper use and possible adverse effects of Erivedge.  All of the patient's questions and concerns were addressed.
Niacinamide Counseling: I recommended taking niacin or niacinamide, also know as vitamin B3, twice daily. Recent evidence suggests that taking vitamin B3 (500 mg twice daily) can reduce the risk of actinic keratoses and non-melanoma skin cancers. Side effects of vitamin B3 include flushing and headache.
High Dose Vitamin A Counseling: Side effects reviewed, pt to contact office should one occur.
Opioid Counseling: I discussed with the patient the potential side effects of opioids including but not limited to addiction, altered mental status, and depression. I stressed avoiding alcohol, benzodiazepines, muscle relaxants and sleep aids unless specifically okayed by a physician. The patient verbalized understanding of the proper use and possible adverse effects of opioids. All of the patient's questions and concerns were addressed. They were instructed to flush the remaining pills down the toilet if they did not need them for pain.
Klisyri Pregnancy And Lactation Text: It is unknown if this medication can harm a developing fetus or if it is excreted in breast milk.
Infliximab Counseling:  I discussed with the patient the risks of infliximab including but not limited to myelosuppression, immunosuppression, autoimmune hepatitis, demyelinating diseases, lymphoma, and serious infections.  The patient understands that monitoring is required including a PPD at baseline and must alert us or the primary physician if symptoms of infection or other concerning signs are noted.
Elidel Counseling: Patient may experience a mild burning sensation during topical application. Elidel is not approved in children less than 2 years of age. There have been case reports of hematologic and skin malignancies in patients using topical calcineurin inhibitors although causality is questionable.
Tranexamic Acid Pregnancy And Lactation Text: It is unknown if this medication is safe during pregnancy or breast feeding.
Tazorac Counseling:  Patient advised that medication is irritating and drying.  Patient may need to apply sparingly and wash off after an hour before eventually leaving it on overnight.  The patient verbalized understanding of the proper use and possible adverse effects of tazorac.  All of the patient's questions and concerns were addressed.
Finasteride Pregnancy And Lactation Text: This medication is absolutely contraindicated during pregnancy. It is unknown if it is excreted in breast milk.
Xeljanz Counseling: I discussed with the patient the risks of Xeljanz therapy including increased risk of infection, liver issues, headache, diarrhea, or cold symptoms. Live vaccines should be avoided. They were instructed to call if they have any problems.
Ketoconazole Counseling:   Patient counseled regarding improving absorption with orange juice.  Adverse effects include but are not limited to breast enlargement, headache, diarrhea, nausea, upset stomach, liver function test abnormalities, taste disturbance, and stomach pain.  There is a rare possibility of liver failure that can occur when taking ketoconazole. The patient understands that monitoring of LFTs may be required, especially at baseline. The patient verbalized understanding of the proper use and possible adverse effects of ketoconazole.  All of the patient's questions and concerns were addressed.
Winlevi Counseling:  I discussed with the patient the risks of topical clascoterone including but not limited to erythema, scaling, itching, and stinging. Patient voiced their understanding.
Cyclophosphamide Counseling:  I discussed with the patient the risks of cyclophosphamide including but not limited to hair loss, hormonal abnormalities, decreased fertility, abdominal pain, diarrhea, nausea and vomiting, bone marrow suppression and infection. The patient understands that monitoring is required while taking this medication.
Oxybutynin Counseling:  I discussed with the patient the risks of oxybutynin including but not limited to skin rash, drowsiness, dry mouth, difficulty urinating, and blurred vision.
Cibinqo Pregnancy And Lactation Text: It is unknown if this medication will adversely affect pregnancy or breast feeding.  You should not take this medication if you are currently pregnant or planning a pregnancy or while breastfeeding.
Niacinamide Pregnancy And Lactation Text: These medications are considered safe during pregnancy.
Hydroxyzine Pregnancy And Lactation Text: This medication is not safe during pregnancy and should not be taken. It is also excreted in breast milk and breast feeding isn't recommended.
Bactrim Pregnancy And Lactation Text: This medication is Pregnancy Category D and is known to cause fetal risk.  It is also excreted in breast milk.
High Dose Vitamin A Pregnancy And Lactation Text: High dose vitamin A therapy is contraindicated during pregnancy and breast feeding.
Opioid Pregnancy And Lactation Text: These medications can lead to premature delivery and should be avoided during pregnancy. These medications are also present in breast milk in small amounts.
Stelara Counseling:  I discussed with the patient the risks of ustekinumab including but not limited to immunosuppression, malignancy, posterior leukoencephalopathy syndrome, and serious infections.  The patient understands that monitoring is required including a PPD at baseline and must alert us or the primary physician if symptoms of infection or other concerning signs are noted.
Protopic Pregnancy And Lactation Text: This medication is Pregnancy Category C. It is unknown if this medication is excreted in breast milk when applied topically.
Metronidazole Counseling:  I discussed with the patient the risks of metronidazole including but not limited to seizures, nausea/vomiting, a metallic taste in the mouth, nausea/vomiting and severe allergy.
Birth Control Pills Counseling: Birth Control Pill Counseling: I discussed with the patient the potential side effects of OCPs including but not limited to increased risk of stroke, heart attack, thrombophlebitis, deep venous thrombosis, hepatic adenomas, breast changes, GI upset, headaches, and depression.  The patient verbalized understanding of the proper use and possible adverse effects of OCPs. All of the patient's questions and concerns were addressed.
Valtrex Counseling: I discussed with the patient the risks of valacyclovir including but not limited to kidney damage, nausea, vomiting and severe allergy.  The patient understands that if the infection seems to be worsening or is not improving, they are to call.
Gabapentin Counseling: I discussed with the patient the risks of gabapentin including but not limited to dizziness, somnolence, fatigue and ataxia.
Minoxidil Counseling: Minoxidil is a topical medication which can increase blood flow where it is applied. It is uncertain how this medication increases hair growth. Side effects are uncommon and include stinging and allergic reactions.
Cyclophosphamide Pregnancy And Lactation Text: This medication is Pregnancy Category D and it isn't considered safe during pregnancy. This medication is excreted in breast milk.
Dupixent Counseling: I discussed with the patient the risks of dupilumab including but not limited to eye infection and irritation, cold sores, injection site reactions, worsening of asthma, allergic reactions and increased risk of parasitic infection.  Live vaccines should be avoided while taking dupilumab. Dupilumab will also interact with certain medications such as warfarin and cyclosporine. The patient understands that monitoring is required and they must alert us or the primary physician if symptoms of infection or other concerning signs are noted.
Tetracycline Counseling: Patient counseled regarding possible photosensitivity and increased risk for sunburn.  Patient instructed to avoid sunlight, if possible.  When exposed to sunlight, patients should wear protective clothing, sunglasses, and sunscreen.  The patient was instructed to call the office immediately if the following severe adverse effects occur:  hearing changes, easy bruising/bleeding, severe headache, or vision changes.  The patient verbalized understanding of the proper use and possible adverse effects of tetracycline.  All of the patient's questions and concerns were addressed. Patient understands to avoid pregnancy while on therapy due to potential birth defects.
Olumiant Counseling: I discussed with the patient the risks of Olumiant therapy including but not limited to upper respiratory tract infections, shingles, cold sores, and nausea. Live vaccines should be avoided.  This medication has been linked to serious infections; higher rate of mortality; malignancy and lymphoproliferative disorders; major adverse cardiovascular events; thrombosis; gastrointestinal perforations; neutropenia; lymphopenia; anemia; liver enzyme elevations; and lipid elevations.
Tazorac Pregnancy And Lactation Text: This medication is not safe during pregnancy. It is unknown if this medication is excreted in breast milk.
Metronidazole Pregnancy And Lactation Text: This medication is Pregnancy Category B and considered safe during pregnancy.  It is also excreted in breast milk.
Nsaids Counseling: NSAID Counseling: I discussed with the patient that NSAIDs should be taken with food. Prolonged use of NSAIDs can result in the development of stomach ulcers.  Patient advised to stop taking NSAIDs if abdominal pain occurs.  The patient verbalized understanding of the proper use and possible adverse effects of NSAIDs.  All of the patient's questions and concerns were addressed.
Xelflynnz Pregnancy And Lactation Text: This medication is Pregnancy Category D and is not considered safe during pregnancy.  The risk during breast feeding is also uncertain.
Qbrexza Counseling:  I discussed with the patient the risks of Qbrexza including but not limited to headache, mydriasis, blurred vision, dry eyes, nasal dryness, dry mouth, dry throat, dry skin, urinary hesitation, and constipation.  Local skin reactions including erythema, burning, stinging, and itching can also occur.
Ketoconazole Pregnancy And Lactation Text: This medication is Pregnancy Category C and it isn't know if it is safe during pregnancy. It is also excreted in breast milk and breast feeding isn't recommended.
Libtayo Counseling- I discussed with the patient the risks of Libtayo including but not limited to nausea, vomiting, diarrhea, and bone or muscle pain.  The patient verbalized understanding of the proper use and possible adverse effects of Libtayo.  All of the patient's questions and concerns were addressed.
Winlevi Pregnancy And Lactation Text: This medication is considered safe during pregnancy and breastfeeding.
Rituxan Counseling:  I discussed with the patient the risks of Rituxan infusions. Side effects can include infusion reactions, severe drug rashes including mucocutaneous reactions, reactivation of latent hepatitis and other infections and rarely progressive multifocal leukoencephalopathy.  All of the patient's questions and concerns were addressed.
Cephalexin Counseling: I counseled the patient regarding use of cephalexin as an antibiotic for prophylactic and/or therapeutic purposes. Cephalexin (commonly prescribed under brand name Keflex) is a cephalosporin antibiotic which is active against numerous classes of bacteria, including most skin bacteria. Side effects may include nausea, diarrhea, gastrointestinal upset, rash, hives, yeast infections, and in rare cases, hepatitis, kidney disease, seizures, fever, confusion, neurologic symptoms, and others. Patients with severe allergies to penicillin medications are cautioned that there is about a 10% incidence of cross-reactivity with cephalosporins. When possible, patients with penicillin allergies should use alternatives to cephalosporins for antibiotic therapy.
Birth Control Pills Pregnancy And Lactation Text: This medication should be avoided if pregnant and for the first 30 days post-partum.
Albendazole Counseling:  I discussed with the patient the risks of albendazole including but not limited to cytopenia, kidney damage, nausea/vomiting and severe allergy.  The patient understands that this medication is being used in an off-label manner.
Cyclosporine Counseling:  I discussed with the patient the risks of cyclosporine including but not limited to hypertension, gingival hyperplasia,myelosuppression, immunosuppression, liver damage, kidney damage, neurotoxicity, lymphoma, and serious infections. The patient understands that monitoring is required including baseline blood pressure, CBC, CMP, lipid panel and uric acid, and then 1-2 times monthly CMP and blood pressure.
Arava Counseling:  Patient counseled regarding adverse effects of Arava including but not limited to nausea, vomiting, abnormalities in liver function tests. Patients may develop mouth sores, rash, diarrhea, and abnormalities in blood counts. The patient understands that monitoring is required including LFTs and blood counts.  There is a rare possibility of scarring of the liver and lung problems that can occur when taking methotrexate. Persistent nausea, loss of appetite, pale stools, dark urine, cough, and shortness of breath should be reported immediately. Patient advised to discontinue Arava treatment and consult with a physician prior to attempting conception. The patient will have to undergo a treatment to eliminate Arava from the body prior to conception.
Valtrex Pregnancy And Lactation Text: this medication is Pregnancy Category B and is considered safe during pregnancy. This medication is not directly found in breast milk but it's metabolite acyclovir is present.
Dupixent Pregnancy And Lactation Text: This medication likely crosses the placenta but the risk for the fetus is uncertain. This medication is excreted in breast milk.
Eucrisa Counseling: Patient may experience a mild burning sensation during topical application. Eucrisa is not approved in children less than 2 years of age.

## 2023-02-21 ENCOUNTER — ANCILLARY PROCEDURE (OUTPATIENT)
Dept: CARDIOLOGY | Facility: MEDICAL CENTER | Age: 68
End: 2023-02-21
Attending: FAMILY MEDICINE
Payer: MEDICARE

## 2023-02-21 ENCOUNTER — PATIENT MESSAGE (OUTPATIENT)
Dept: CARDIOLOGY | Facility: MEDICAL CENTER | Age: 68
End: 2023-02-21

## 2023-02-21 DIAGNOSIS — I35.1 MODERATE AORTIC REGURGITATION: ICD-10-CM

## 2023-02-21 PROCEDURE — 93306 TTE W/DOPPLER COMPLETE: CPT | Mod: 26 | Performed by: INTERNAL MEDICINE

## 2023-02-21 PROCEDURE — 93306 TTE W/DOPPLER COMPLETE: CPT

## 2023-03-01 ENCOUNTER — APPOINTMENT (RX ONLY)
Dept: URBAN - METROPOLITAN AREA CLINIC 4 | Facility: CLINIC | Age: 68
Setting detail: DERMATOLOGY
End: 2023-03-01

## 2023-03-01 PROCEDURE — ? COUNSELING

## 2023-03-01 PROCEDURE — 99213 OFFICE O/P EST LOW 20 MIN: CPT

## 2023-03-01 PROCEDURE — ? POST-OP WOUND EVALUATION

## 2023-03-01 PROCEDURE — ? PRESCRIPTION

## 2023-03-01 PROCEDURE — ? ADDITIONAL NOTES

## 2023-03-01 PROCEDURE — ? TREATMENT REGIMEN

## 2023-03-01 PROCEDURE — ? PHOTO-DOCUMENTATION

## 2023-03-01 PROCEDURE — ? MEDICATION COUNSELING

## 2023-03-01 RX ORDER — MUPIROCIN 20 MG/G
OINTMENT TOPICAL BID
Qty: 22 | Refills: 0 | Status: ERX | COMMUNITY
Start: 2023-03-01

## 2023-03-01 RX ADMIN — MUPIROCIN THIN LAYER: 20 OINTMENT TOPICAL at 00:00

## 2023-03-02 DIAGNOSIS — L92.0 GRANULOMA ANNULARE: ICD-10-CM

## 2023-03-02 DIAGNOSIS — Z48.817 ENCOUNTER FOR SURGICAL AFTERCARE FOLLOWING SURGERY ON THE SKIN AND SUBCUTANEOUS TISSUE: ICD-10-CM

## 2023-03-02 ASSESSMENT — LOCATION ZONE DERM
LOCATION ZONE: HAND
LOCATION ZONE: LEG

## 2023-03-02 ASSESSMENT — LOCATION DETAILED DESCRIPTION DERM
LOCATION DETAILED: RIGHT PROXIMAL PRETIBIAL REGION
LOCATION DETAILED: LEFT DORSAL RING METACARPOPHALANGEAL JOINT

## 2023-03-02 ASSESSMENT — LOCATION SIMPLE DESCRIPTION DERM
LOCATION SIMPLE: LEFT HAND
LOCATION SIMPLE: RIGHT PRETIBIAL REGION

## 2023-03-02 NOTE — PROCEDURE: POST-OP WOUND EVALUATION
Detail Level: Detailed
Wound Evaluated By (Optional): Dr. Cha
Wound Diameter In Cm(Optional): 0
Wound Crusting?: clean
Wound Color?: pink

## 2023-03-02 NOTE — PROCEDURE: TREATMENT REGIMEN
Continue Regimen: Apply the Clobetasol cream that she has at home daily for two weeks then every other day
Detail Level: Detailed

## 2023-03-02 NOTE — PROCEDURE: MEDICATION COUNSELING
Spironolactone Pregnancy And Lactation Text: This medication can cause feminization of the male fetus and should be avoided during pregnancy. The active metabolite is also found in breast milk.
Cimetidine Pregnancy And Lactation Text: This medication is Pregnancy Category B and is considered safe during pregnancy. It is also excreted in breast milk and breast feeding isn't recommended.
Azelaic Acid Counseling: Patient counseled that medicine may cause skin irritation and to avoid applying near the eyes.  In the event of skin irritation, the patient was advised to reduce the amount of the drug applied or use it less frequently.   The patient verbalized understanding of the proper use and possible adverse effects of azelaic acid.  All of the patient's questions and concerns were addressed.
Clindamycin Counseling: I counseled the patient regarding use of clindamycin as an antibiotic for prophylactic and/or therapeutic purposes. Clindamycin is active against numerous classes of bacteria, including skin bacteria. Side effects may include nausea, diarrhea, gastrointestinal upset, rash, hives, yeast infections, and in rare cases, colitis.
Opzelura Counseling:  I discussed with the patient the risks of Opzelura including but not limited to nasopharngitis, bronchitis, ear infection, eosinophila, hives, diarrhea, folliculitis, tonsillitis, and rhinorrhea.  Taken orally, this medication has been linked to serious infections; higher rate of mortality; malignancy and lymphoproliferative disorders; major adverse cardiovascular events; thrombosis; thrombocytopenia, anemia, and neutropenia; and lipid elevations.
Siliq Counseling:  I discussed with the patient the risks of Siliq including but not limited to new or worsening depression, suicidal thoughts and behavior, immunosuppression, malignancy, posterior leukoencephalopathy syndrome, and serious infections.  The patient understands that monitoring is required including a PPD at baseline and must alert us or the primary physician if symptoms of infection or other concerning signs are noted. There is also a special program designed to monitor depression which is required with Siliq.
Fluconazole Counseling:  Patient counseled regarding adverse effects of fluconazole including but not limited to headache, diarrhea, nausea, upset stomach, liver function test abnormalities, taste disturbance, and stomach pain.  There is a rare possibility of liver failure that can occur when taking fluconazole.  The patient understands that monitoring of LFTs and kidney function test may be required, especially at baseline. The patient verbalized understanding of the proper use and possible adverse effects of fluconazole.  All of the patient's questions and concerns were addressed.
Topical Steroids Applications Pregnancy And Lactation Text: Most topical steroids are considered safe to use during pregnancy and lactation.  Any topical steroid applied to the breast or nipple should be washed off before breastfeeding.
Enbrel Pregnancy And Lactation Text: This medication is Pregnancy Category B and is considered safe during pregnancy. It is unknown if this medication is excreted in breast milk.
Clofazimine Pregnancy And Lactation Text: This medication is Pregnancy Category C and isn't considered safe during pregnancy. It is excreted in breast milk.
Topical Retinoid counseling:  Patient advised to apply a pea-sized amount only at bedtime and wait 30 minutes after washing their face before applying.  If too drying, patient may add a non-comedogenic moisturizer. The patient verbalized understanding of the proper use and possible adverse effects of retinoids.  All of the patient's questions and concerns were addressed.
Hydroxychloroquine Counseling:  I discussed with the patient that a baseline ophthalmologic exam is needed at the start of therapy and every year thereafter while on therapy. A CBC may also be warranted for monitoring.  The side effects of this medication were discussed with the patient, including but not limited to agranulocytosis, aplastic anemia, seizures, rashes, retinopathy, and liver toxicity. Patient instructed to call the office should any adverse effect occur.  The patient verbalized understanding of the proper use and possible adverse effects of Plaquenil.  All the patient's questions and concerns were addressed.
Include Pregnancy/Lactation Warning?: No
Cyclosporine Counseling:  I discussed with the patient the risks of cyclosporine including but not limited to hypertension, gingival hyperplasia,myelosuppression, immunosuppression, liver damage, kidney damage, neurotoxicity, lymphoma, and serious infections. The patient understands that monitoring is required including baseline blood pressure, CBC, CMP, lipid panel and uric acid, and then 1-2 times monthly CMP and blood pressure.
Sski Pregnancy And Lactation Text: This medication is Pregnancy Category D and isn't considered safe during pregnancy. It is excreted in breast milk.
Tremfya Counseling: I discussed with the patient the risks of guselkumab including but not limited to immunosuppression, serious infections, worsening of inflammatory bowel disease and drug reactions.  The patient understands that monitoring is required including a PPD at baseline and must alert us or the primary physician if symptoms of infection or other concerning signs are noted.
Adbry Counseling: I discussed with the patient the risks of tralokinumab including but not limited to eye infection and irritation, cold sores, injection site reactions, worsening of asthma, allergic reactions and increased risk of parasitic infection.  Live vaccines should be avoided while taking tralokinumab. The patient understands that monitoring is required and they must alert us or the primary physician if symptoms of infection or other concerning signs are noted.
Olumiant Pregnancy And Lactation Text: Based on animal studies, Olumiant may cause embryo-fetal harm when administered to pregnant women.  The medication should not be used in pregnancy.  Breastfeeding is not recommended during treatment.
Quinolones Counseling:  I discussed with the patient the risks of fluoroquinolones including but not limited to GI upset, allergic reaction, drug rash, diarrhea, dizziness, photosensitivity, yeast infections, liver function test abnormalities, tendonitis/tendon rupture.
Odomzo Pregnancy And Lactation Text: This medication is Pregnancy Category X and is absolutely contraindicated during pregnancy. It is unknown if it is excreted in breast milk.
Hydroquinone Pregnancy And Lactation Text: This medication has not been assigned a Pregnancy Risk Category but animal studies failed to show danger with the topical medication. It is unknown if the medication is excreted in breast milk.
Fluconazole Pregnancy And Lactation Text: This medication is Pregnancy Category C and it isn't know if it is safe during pregnancy. It is also excreted in breast milk.
Doxepin Counseling:  Patient advised that the medication is sedating and not to drive a car after taking this medication. Patient informed of potential adverse effects including but not limited to dry mouth, urinary retention, and blurry vision.  The patient verbalized understanding of the proper use and possible adverse effects of doxepin.  All of the patient's questions and concerns were addressed.
Siliq Pregnancy And Lactation Text: The risk during pregnancy and breastfeeding is uncertain with this medication.
Opzelura Pregnancy And Lactation Text: There is insufficient data to evaluate drug-associated risk for major birth defects, miscarriage, or other adverse maternal or fetal outcomes.  There is a pregnancy registry that monitors pregnancy outcomes in pregnant persons exposed to the medication during pregnancy.  It is unknown if this medication is excreted in breast milk.  Do not breastfeed during treatment and for about 4 weeks after the last dose.
Zoryve Pregnancy And Lactation Text: It is unknown if this medication can cause problems during pregnancy and breastfeeding.
Bexarotene Counseling:  I discussed with the patient the risks of bexarotene including but not limited to hair loss, dry lips/skin/eyes, liver abnormalities, hyperlipidemia, pancreatitis, depression/suicidal ideation, photosensitivity, drug rash/allergic reactions, hypothyroidism, anemia, leukopenia, infection, cataracts, and teratogenicity.  Patient understands that they will need regular blood tests to check lipid profile, liver function tests, white blood cell count, thyroid function tests and pregnancy test if applicable.
Oral Minoxidil Counseling- I discussed with the patient the risks of oral minoxidil including but not limited to shortness of breath, swelling of the feet or ankles, dizziness, lightheadedness, unwanted hair growth and allergic reaction.  The patient verbalized understanding of the proper use and possible adverse effects of oral minoxidil.  All of the patient's questions and concerns were addressed.
5-Fu Counseling: 5-Fluorouracil Counseling:  I discussed with the patient the risks of 5-fluorouracil including but not limited to erythema, scaling, itching, weeping, crusting, and pain.
Hydroxychloroquine Pregnancy And Lactation Text: This medication has been shown to cause fetal harm but it isn't assigned a Pregnancy Risk Category. There are small amounts excreted in breast milk.
Clindamycin Pregnancy And Lactation Text: This medication can be used in pregnancy if certain situations. Clindamycin is also present in breast milk.
Azelaic Acid Pregnancy And Lactation Text: This medication is considered safe during pregnancy and breast feeding.
Humira Counseling:  I discussed with the patient the risks of adalimumab including but not limited to myelosuppression, immunosuppression, autoimmune hepatitis, demyelinating diseases, lymphoma, and serious infections.  The patient understands that monitoring is required including a PPD at baseline and must alert us or the primary physician if symptoms of infection or other concerning signs are noted.
Topical Sulfur Applications Counseling: Topical Sulfur Counseling: Patient counseled that this medication may cause skin irritation or allergic reactions.  In the event of skin irritation, the patient was advised to reduce the amount of the drug applied or use it less frequently.   The patient verbalized understanding of the proper use and possible adverse effects of topical sulfur application.  All of the patient's questions and concerns were addressed.
Topical Retinoid Pregnancy And Lactation Text: This medication is Pregnancy Category C. It is unknown if this medication is excreted in breast milk.
Azathioprine Counseling:  I discussed with the patient the risks of azathioprine including but not limited to myelosuppression, immunosuppression, hepatotoxicity, lymphoma, and infections.  The patient understands that monitoring is required including baseline LFTs, Creatinine, possible TPMP genotyping and weekly CBCs for the first month and then every 2 weeks thereafter.  The patient verbalized understanding of the proper use and possible adverse effects of azathioprine.  All of the patient's questions and concerns were addressed.
Thalidomide Counseling: I discussed with the patient the risks of thalidomide including but not limited to birth defects, anxiety, weakness, chest pain, dizziness, cough and severe allergy.
Colchicine Counseling:  Patient counseled regarding adverse effects including but not limited to stomach upset (nausea, vomiting, stomach pain, or diarrhea).  Patient instructed to limit alcohol consumption while taking this medication.  Colchicine may reduce blood counts especially with prolonged use.  The patient understands that monitoring of kidney function and blood counts may be required, especially at baseline. The patient verbalized understanding of the proper use and possible adverse effects of colchicine.  All of the patient's questions and concerns were addressed.
Cyclosporine Pregnancy And Lactation Text: This medication is Pregnancy Category C and it isn't know if it is safe during pregnancy. This medication is excreted in breast milk.
Imiquimod Counseling:  I discussed with the patient the risks of imiquimod including but not limited to erythema, scaling, itching, weeping, crusting, and pain.  Patient understands that the inflammatory response to imiquimod is variable from person to person and was educated regarded proper titration schedule.  If flu-like symptoms develop, patient knows to discontinue the medication and contact us.
Adbry Pregnancy And Lactation Text: It is unknown if this medication will adversely affect pregnancy or breast feeding.
5-Fu Pregnancy And Lactation Text: This medication is Pregnancy Category X and contraindicated in pregnancy and in women who may become pregnant. It is unknown if this medication is excreted in breast milk.
Rinvoq Counseling: I discussed with the patient the risks of Rinvoq therapy including but not limited to upper respiratory tract infections, shingles, cold sores, bronchitis, nausea, cough, fever, acne, and headache. Live vaccines should be avoided.  This medication has been linked to serious infections; higher rate of mortality; malignancy and lymphoproliferative disorders; major adverse cardiovascular events; thrombosis; thrombocytopenia, anemia, and neutropenia; lipid elevations; liver enzyme elevations; and gastrointestinal perforations.
Doxepin Pregnancy And Lactation Text: This medication is Pregnancy Category C and it isn't known if it is safe during pregnancy. It is also excreted in breast milk and breast feeding isn't recommended.
Simponi Counseling:  I discussed with the patient the risks of golimumab including but not limited to myelosuppression, immunosuppression, autoimmune hepatitis, demyelinating diseases, lymphoma, and serious infections.  The patient understands that monitoring is required including a PPD at baseline and must alert us or the primary physician if symptoms of infection or other concerning signs are noted.
Zyclara Counseling:  I discussed with the patient the risks of imiquimod including but not limited to erythema, scaling, itching, weeping, crusting, and pain.  Patient understands that the inflammatory response to imiquimod is variable from person to person and was educated regarded proper titration schedule.  If flu-like symptoms develop, patient knows to discontinue the medication and contact us.
Low Dose Naltrexone Counseling- I discussed with the patient the potential risks and side effects of low dose naltrexone including but not limited to: more vivid dreams, headaches, nausea, vomiting, abdominal pain, fatigue, dizziness, and anxiety.
Dutasteride Male Counseling: Dustasteride Counseling:  I discussed with the patient the risks of use of dutasteride including but not limited to decreased libido, decreased ejaculate volume, and gynecomastia. Women who can become pregnant should not handle medication.  All of the patient's questions and concerns were addressed.
Bexarotene Pregnancy And Lactation Text: This medication is Pregnancy Category X and should not be given to women who are pregnant or may become pregnant. This medication should not be used if you are breast feeding.
Topical Sulfur Applications Pregnancy And Lactation Text: This medication is Pregnancy Category C and has an unknown safety profile during pregnancy. It is unknown if this topical medication is excreted in breast milk.
Oral Minoxidil Pregnancy And Lactation Text: This medication should only be used when clearly needed if you are pregnant, attempting to become pregnant or breast feeding.
Doxycycline Counseling:  Patient counseled regarding possible photosensitivity and increased risk for sunburn.  Patient instructed to avoid sunlight, if possible.  When exposed to sunlight, patients should wear protective clothing, sunglasses, and sunscreen.  The patient was instructed to call the office immediately if the following severe adverse effects occur:  hearing changes, easy bruising/bleeding, severe headache, or vision changes.  The patient verbalized understanding of the proper use and possible adverse effects of doxycycline.  All of the patient's questions and concerns were addressed.
Griseofulvin Counseling:  I discussed with the patient the risks of griseofulvin including but not limited to photosensitivity, cytopenia, liver damage, nausea/vomiting and severe allergy.  The patient understands that this medication is best absorbed when taken with a fatty meal (e.g., ice cream or french fries).
Benzoyl Peroxide Counseling: Patient counseled that medicine may cause skin irritation and bleach clothing.  In the event of skin irritation, the patient was advised to reduce the amount of the drug applied or use it less frequently.   The patient verbalized understanding of the proper use and possible adverse effects of benzoyl peroxide.  All of the patient's questions and concerns were addressed.
Azathioprine Pregnancy And Lactation Text: This medication is Pregnancy Category D and isn't considered safe during pregnancy. It is unknown if this medication is excreted in breast milk.
Picato Counseling:  I discussed with the patient the risks of Picato including but not limited to erythema, scaling, itching, weeping, crusting, and pain.
Tazorac Counseling:  Patient advised that medication is irritating and drying.  Patient may need to apply sparingly and wash off after an hour before eventually leaving it on overnight.  The patient verbalized understanding of the proper use and possible adverse effects of tazorac.  All of the patient's questions and concerns were addressed.
Cimzia Counseling:  I discussed with the patient the risks of Cimzia including but not limited to immunosuppression, allergic reactions and infections.  The patient understands that monitoring is required including a PPD at baseline and must alert us or the primary physician if symptoms of infection or other concerning signs are noted.
Methotrexate Counseling:  Patient counseled regarding adverse effects of methotrexate including but not limited to nausea, vomiting, abnormalities in liver function tests. Patients may develop mouth sores, rash, diarrhea, and abnormalities in blood counts. The patient understands that monitoring is required including LFT's and blood counts.  There is a rare possibility of scarring of the liver and lung problems that can occur when taking methotrexate. Persistent nausea, loss of appetite, pale stools, dark urine, cough, and shortness of breath should be reported immediately. Patient advised to discontinue methotrexate treatment at least three months before attempting to become pregnant.  I discussed the need for folate supplements while taking methotrexate.  These supplements can decrease side effects during methotrexate treatment. The patient verbalized understanding of the proper use and possible adverse effects of methotrexate.  All of the patient's questions and concerns were addressed.
Xolair Counseling:  Patient informed of potential adverse effects including but not limited to fever, muscle aches, rash and allergic reactions.  The patient verbalized understanding of the proper use and possible adverse effects of Xolair.  All of the patient's questions and concerns were addressed.
Rinvoq Pregnancy And Lactation Text: Based on animal studies, Rinvoq may cause embryo-fetal harm when administered to pregnant women.  The medication should not be used in pregnancy.  Breastfeeding is not recommended during treatment and for 6 days after the last dose.
Benzoyl Peroxide Pregnancy And Lactation Text: This medication is Pregnancy Category C. It is unknown if benzoyl peroxide is excreted in breast milk.
Otezla Counseling: The side effects of Otezla were discussed with the patient, including but not limited to worsening or new depression, weight loss, diarrhea, nausea, upper respiratory tract infection, and headache. Patient instructed to call the office should any adverse effect occur.  The patient verbalized understanding of the proper use and possible adverse effects of Otezla.  All the patient's questions and concerns were addressed.
Doxycycline Pregnancy And Lactation Text: This medication is Pregnancy Category D and not consider safe during pregnancy. It is also excreted in breast milk but is considered safe for shorter treatment courses.
Hydroxyzine Counseling: Patient advised that the medication is sedating and not to drive a car after taking this medication.  Patient informed of potential adverse effects including but not limited to dry mouth, urinary retention, and blurry vision.  The patient verbalized understanding of the proper use and possible adverse effects of hydroxyzine.  All of the patient's questions and concerns were addressed.
Rifampin Counseling: I discussed with the patient the risks of rifampin including but not limited to liver damage, kidney damage, red-orange body fluids, nausea/vomiting and severe allergy.
Drysol Counseling:  I discussed with the patient the risks of drysol/aluminum chloride including but not limited to skin rash, itching, irritation, burning.
Griseofulvin Pregnancy And Lactation Text: This medication is Pregnancy Category X and is known to cause serious birth defects. It is unknown if this medication is excreted in breast milk but breast feeding should be avoided.
Isotretinoin Counseling: Patient should get monthly blood tests, not donate blood, not drive at night if vision affected, not share medication, and not undergo elective surgery for 6 months after tx completed. Side effects reviewed, pt to contact office should one occur.
Ilumya Counseling: I discussed with the patient the risks of tildrakizumab including but not limited to immunosuppression, malignancy, posterior leukoencephalopathy syndrome, and serious infections.  The patient understands that monitoring is required including a PPD at baseline and must alert us or the primary physician if symptoms of infection or other concerning signs are noted.
Wartpeel Counseling:  I discussed with the patient the risks of Wartpeel including but not limited to erythema, scaling, itching, weeping, crusting, and pain.
Tazorac Pregnancy And Lactation Text: This medication is not safe during pregnancy. It is unknown if this medication is excreted in breast milk.
Low Dose Naltrexone Pregnancy And Lactation Text: Naltrexone is pregnancy category C.  There have been no adequate and well-controlled studies in pregnant women.  It should be used in pregnancy only if the potential benefit justifies the potential risk to the fetus.   Limited data indicates that naltrexone is minimally excreted into breastmilk.
Dapsone Counseling: I discussed with the patient the risks of dapsone including but not limited to hemolytic anemia, agranulocytosis, rashes, methemoglobinemia, kidney failure, peripheral neuropathy, headaches, GI upset, and liver toxicity.  Patients who start dapsone require monitoring including baseline LFTs and weekly CBCs for the first month, then every month thereafter.  The patient verbalized understanding of the proper use and possible adverse effects of dapsone.  All of the patient's questions and concerns were addressed.
Methotrexate Pregnancy And Lactation Text: This medication is Pregnancy Category X and is known to cause fetal harm. This medication is excreted in breast milk.
Xolair Pregnancy And Lactation Text: This medication is Pregnancy Category B and is considered safe during pregnancy. This medication is excreted in breast milk.
Dutasteride Pregnancy And Lactation Text: This medication is absolutely contraindicated in women, especially during pregnancy and breast feeding. Feminization of male fetuses is possible if taking while pregnant.
Azithromycin Counseling:  I discussed with the patient the risks of azithromycin including but not limited to GI upset, allergic reaction, drug rash, diarrhea, and yeast infections.
Cimzia Pregnancy And Lactation Text: This medication crosses the placenta but can be considered safe in certain situations. Cimzia may be excreted in breast milk.
Klisyri Counseling:  I discussed with the patient the risks of Klisyri including but not limited to erythema, scaling, itching, weeping, crusting, and pain.
Cellcept Counseling:  I discussed with the patient the risks of mycophenolate mofetil including but not limited to infection/immunosuppression, GI upset, hypokalemia, hypercholesterolemia, bone marrow suppression, lymphoproliferative disorders, malignancy, GI ulceration/bleed/perforation, colitis, interstitial lung disease, kidney failure, progressive multifocal leukoencephalopathy, and birth defects.  The patient understands that monitoring is required including a baseline creatinine and regular CBC testing. In addition, patient must alert us immediately if symptoms of infection or other concerning signs are noted.
Hydroxyzine Pregnancy And Lactation Text: This medication is not safe during pregnancy and should not be taken. It is also excreted in breast milk and breast feeding isn't recommended.
Sotyktu Counseling:  I discussed the most common side effects of Sotyktu including: common cold, sore throat, sinus infections, cold sores, canker sores, folliculitis, and acne.  I also discussed more serious side effects of Sotyktu including but not limited to: serious allergic reactions; increased risk for infections such as TB; cancers such as lymphomas; rhabdomyolysis and elevated CPK; and elevated triglycerides and liver enzymes. 
Qbrexza Counseling:  I discussed with the patient the risks of Qbrexza including but not limited to headache, mydriasis, blurred vision, dry eyes, nasal dryness, dry mouth, dry throat, dry skin, urinary hesitation, and constipation.  Local skin reactions including erythema, burning, stinging, and itching can also occur.
Otezla Pregnancy And Lactation Text: This medication is Pregnancy Category C and it isn't known if it is safe during pregnancy. It is unknown if it is excreted in breast milk.
Isotretinoin Pregnancy And Lactation Text: This medication is Pregnancy Category X and is considered extremely dangerous during pregnancy. It is unknown if it is excreted in breast milk.
Itraconazole Counseling:  I discussed with the patient the risks of itraconazole including but not limited to liver damage, nausea/vomiting, neuropathy, and severe allergy.  The patient understands that this medication is best absorbed when taken with acidic beverages such as non-diet cola or ginger ale.  The patient understands that monitoring is required including baseline LFTs and repeat LFTs at intervals.  The patient understands that they are to contact us or the primary physician if concerning signs are noted.
Rifampin Pregnancy And Lactation Text: This medication is Pregnancy Category C and it isn't know if it is safe during pregnancy. It is also excreted in breast milk and should not be used if you are breast feeding.
Carac Counseling:  I discussed with the patient the risks of Carac including but not limited to erythema, scaling, itching, weeping, crusting, and pain.
Protopic Counseling: Patient may experience a mild burning sensation during topical application. Protopic is not approved in children less than 2 years of age. There have been case reports of hematologic and skin malignancies in patients using topical calcineurin inhibitors although causality is questionable.
Skyrizi Counseling: I discussed with the patient the risks of risankizumab-rzaa including but not limited to immunosuppression, and serious infections.  The patient understands that monitoring is required including a PPD at baseline and must alert us or the primary physician if symptoms of infection or other concerning signs are noted.
Opioid Counseling: I discussed with the patient the potential side effects of opioids including but not limited to addiction, altered mental status, and depression. I stressed avoiding alcohol, benzodiazepines, muscle relaxants and sleep aids unless specifically okayed by a physician. The patient verbalized understanding of the proper use and possible adverse effects of opioids. All of the patient's questions and concerns were addressed. They were instructed to flush the remaining pills down the toilet if they did not need them for pain.
Erythromycin Counseling:  I discussed with the patient the risks of erythromycin including but not limited to GI upset, allergic reaction, drug rash, diarrhea, increase in liver enzymes, and yeast infections.
Niacinamide Counseling: I recommended taking niacin or niacinamide, also know as vitamin B3, twice daily. Recent evidence suggests that taking vitamin B3 (500 mg twice daily) can reduce the risk of actinic keratoses and non-melanoma skin cancers. Side effects of vitamin B3 include flushing and headache.
Azithromycin Pregnancy And Lactation Text: This medication is considered safe during pregnancy and is also secreted in breast milk.
Prednisone Counseling:  I discussed with the patient the risks of prolonged use of prednisone including but not limited to weight gain, insomnia, osteoporosis, mood changes, diabetes, susceptibility to infection, glaucoma and high blood pressure.  In cases where prednisone use is prolonged, patients should be monitored with blood pressure checks, serum glucose levels and an eye exam.  Additionally, the patient may need to be placed on GI prophylaxis, PCP prophylaxis, and calcium and vitamin D supplementation and/or a bisphosphonate.  The patient verbalized understanding of the proper use and the possible adverse effects of prednisone.  All of the patient's questions and concerns were addressed.
Dapsone Pregnancy And Lactation Text: This medication is Pregnancy Category C and is not considered safe during pregnancy or breast feeding.
Klisyri Pregnancy And Lactation Text: It is unknown if this medication can harm a developing fetus or if it is excreted in breast milk.
Finasteride Male Counseling: Finasteride Counseling:  I discussed with the patient the risks of use of finasteride including but not limited to decreased libido, decreased ejaculate volume, gynecomastia, and depression. Women should not handle medication.  All of the patient's questions and concerns were addressed.
Elidel Counseling: Patient may experience a mild burning sensation during topical application. Elidel is not approved in children less than 2 years of age. There have been case reports of hematologic and skin malignancies in patients using topical calcineurin inhibitors although causality is questionable.
Cosentyx Counseling:  I discussed with the patient the risks of Cosentyx including but not limited to worsening of Crohn's disease, immunosuppression, allergic reactions and infections.  The patient understands that monitoring is required including a PPD at baseline and must alert us or the primary physician if symptoms of infection or other concerning signs are noted.
Topical Clindamycin Counseling: Patient counseled that this medication may cause skin irritation or allergic reactions.  In the event of skin irritation, the patient was advised to reduce the amount of the drug applied or use it less frequently.   The patient verbalized understanding of the proper use and possible adverse effects of clindamycin.  All of the patient's questions and concerns were addressed.
Sarecycline Counseling: Patient advised regarding possible photosensitivity and discoloration of the teeth, skin, lips, tongue and gums.  Patient instructed to avoid sunlight, if possible.  When exposed to sunlight, patients should wear protective clothing, sunglasses, and sunscreen.  The patient was instructed to call the office immediately if the following severe adverse effects occur:  hearing changes, easy bruising/bleeding, severe headache, or vision changes.  The patient verbalized understanding of the proper use and possible adverse effects of sarecycline.  All of the patient's questions and concerns were addressed.
Sotyktu Pregnancy And Lactation Text: There is insufficient data to evaluate whether or not Sotyktu is safe to use during pregnancy.   It is not known if Sotyktu passes into breast milk and whether or not it is safe to use when breastfeeding.  
Qbrexza Pregnancy And Lactation Text: There is no available data on Qbrexza use in pregnant women.  There is no available data on Qbrexza use in lactation.
Erivedge Counseling- I discussed with the patient the risks of Erivedge including but not limited to nausea, vomiting, diarrhea, constipation, weight loss, changes in the sense of taste, decreased appetite, muscle spasms, and hair loss.  The patient verbalized understanding of the proper use and possible adverse effects of Erivedge.  All of the patient's questions and concerns were addressed.
Infliximab Counseling:  I discussed with the patient the risks of infliximab including but not limited to myelosuppression, immunosuppression, autoimmune hepatitis, demyelinating diseases, lymphoma, and serious infections.  The patient understands that monitoring is required including a PPD at baseline and must alert us or the primary physician if symptoms of infection or other concerning signs are noted.
Winlevi Counseling:  I discussed with the patient the risks of topical clascoterone including but not limited to erythema, scaling, itching, and stinging. Patient voiced their understanding.
High Dose Vitamin A Counseling: Side effects reviewed, pt to contact office should one occur.
Oxybutynin Counseling:  I discussed with the patient the risks of oxybutynin including but not limited to skin rash, drowsiness, dry mouth, difficulty urinating, and blurred vision.
Niacinamide Pregnancy And Lactation Text: These medications are considered safe during pregnancy.
Finasteride Pregnancy And Lactation Text: This medication is absolutely contraindicated during pregnancy. It is unknown if it is excreted in breast milk.
Bactrim Counseling:  I discussed with the patient the risks of sulfa antibiotics including but not limited to GI upset, allergic reaction, drug rash, diarrhea, dizziness, photosensitivity, and yeast infections.  Rarely, more serious reactions can occur including but not limited to aplastic anemia, agranulocytosis, methemoglobinemia, blood dyscrasias, liver or kidney failure, lung infiltrates or desquamative/blistering drug rashes.
Erythromycin Pregnancy And Lactation Text: This medication is Pregnancy Category B and is considered safe during pregnancy. It is also excreted in breast milk.
Opioid Pregnancy And Lactation Text: These medications can lead to premature delivery and should be avoided during pregnancy. These medications are also present in breast milk in small amounts.
Protopic Pregnancy And Lactation Text: This medication is Pregnancy Category C. It is unknown if this medication is excreted in breast milk when applied topically.
Topical Clindamycin Pregnancy And Lactation Text: This medication is Pregnancy Category B and is considered safe during pregnancy. It is unknown if it is excreted in breast milk.
Albendazole Counseling:  I discussed with the patient the risks of albendazole including but not limited to cytopenia, kidney damage, nausea/vomiting and severe allergy.  The patient understands that this medication is being used in an off-label manner.
Rhofade Counseling: Rhofade is a topical medication which can decrease superficial blood flow where applied. Side effects are uncommon and include stinging, redness and allergic reactions.
Minoxidil Counseling: Minoxidil is a topical medication which can increase blood flow where it is applied. It is uncertain how this medication increases hair growth. Side effects are uncommon and include stinging and allergic reactions.
Gabapentin Counseling: I discussed with the patient the risks of gabapentin including but not limited to dizziness, somnolence, fatigue and ataxia.
Tranexamic Acid Counseling:  Patient advised of the small risk of bleeding problems with tranexamic acid. They were also instructed to call if they developed any nausea, vomiting or diarrhea. All of the patient's questions and concerns were addressed.
Stelara Counseling:  I discussed with the patient the risks of ustekinumab including but not limited to immunosuppression, malignancy, posterior leukoencephalopathy syndrome, and serious infections.  The patient understands that monitoring is required including a PPD at baseline and must alert us or the primary physician if symptoms of infection or other concerning signs are noted.
Xeljanz Counseling: I discussed with the patient the risks of Xeljanz therapy including increased risk of infection, liver issues, headache, diarrhea, or cold symptoms. Live vaccines should be avoided. They were instructed to call if they have any problems.
Sarecycline Pregnancy And Lactation Text: This medication is Pregnancy Category D and not consider safe during pregnancy. It is also excreted in breast milk.
High Dose Vitamin A Pregnancy And Lactation Text: High dose vitamin A therapy is contraindicated during pregnancy and breast feeding.
Metronidazole Counseling:  I discussed with the patient the risks of metronidazole including but not limited to seizures, nausea/vomiting, a metallic taste in the mouth, nausea/vomiting and severe allergy.
Winlevi Pregnancy And Lactation Text: This medication is considered safe during pregnancy and breastfeeding.
Birth Control Pills Counseling: Birth Control Pill Counseling: I discussed with the patient the potential side effects of OCPs including but not limited to increased risk of stroke, heart attack, thrombophlebitis, deep venous thrombosis, hepatic adenomas, breast changes, GI upset, headaches, and depression.  The patient verbalized understanding of the proper use and possible adverse effects of OCPs. All of the patient's questions and concerns were addressed.
Nsaids Counseling: NSAID Counseling: I discussed with the patient that NSAIDs should be taken with food. Prolonged use of NSAIDs can result in the development of stomach ulcers.  Patient advised to stop taking NSAIDs if abdominal pain occurs.  The patient verbalized understanding of the proper use and possible adverse effects of NSAIDs.  All of the patient's questions and concerns were addressed.
Calcipotriene Counseling:  I discussed with the patient the risks of calcipotriene including but not limited to erythema, scaling, itching, and irritation.
Ketoconazole Counseling:   Patient counseled regarding improving absorption with orange juice.  Adverse effects include but are not limited to breast enlargement, headache, diarrhea, nausea, upset stomach, liver function test abnormalities, taste disturbance, and stomach pain.  There is a rare possibility of liver failure that can occur when taking ketoconazole. The patient understands that monitoring of LFTs may be required, especially at baseline. The patient verbalized understanding of the proper use and possible adverse effects of ketoconazole.  All of the patient's questions and concerns were addressed.
Albendazole Pregnancy And Lactation Text: This medication is Pregnancy Category C and it isn't known if it is safe during pregnancy. It is also excreted in breast milk.
Bactrim Pregnancy And Lactation Text: This medication is Pregnancy Category D and is known to cause fetal risk.  It is also excreted in breast milk.
Dupixent Counseling: I discussed with the patient the risks of dupilumab including but not limited to eye infection and irritation, cold sores, injection site reactions, worsening of asthma, allergic reactions and increased risk of parasitic infection.  Live vaccines should be avoided while taking dupilumab. Dupilumab will also interact with certain medications such as warfarin and cyclosporine. The patient understands that monitoring is required and they must alert us or the primary physician if symptoms of infection or other concerning signs are noted.
Topical Ketoconazole Counseling: Patient counseled that this medication may cause skin irritation or allergic reactions.  In the event of skin irritation, the patient was advised to reduce the amount of the drug applied or use it less frequently.   The patient verbalized understanding of the proper use and possible adverse effects of ketoconazole.  All of the patient's questions and concerns were addressed.
Propranolol Counseling:  I discussed with the patient the risks of propranolol including but not limited to low heart rate, low blood pressure, low blood sugar, restlessness and increased cold sensitivity. They should call the office if they experience any of these side effects.
Arava Counseling:  Patient counseled regarding adverse effects of Arava including but not limited to nausea, vomiting, abnormalities in liver function tests. Patients may develop mouth sores, rash, diarrhea, and abnormalities in blood counts. The patient understands that monitoring is required including LFTs and blood counts.  There is a rare possibility of scarring of the liver and lung problems that can occur when taking methotrexate. Persistent nausea, loss of appetite, pale stools, dark urine, cough, and shortness of breath should be reported immediately. Patient advised to discontinue Arava treatment and consult with a physician prior to attempting conception. The patient will have to undergo a treatment to eliminate Arava from the body prior to conception.
Tetracycline Counseling: Patient counseled regarding possible photosensitivity and increased risk for sunburn.  Patient instructed to avoid sunlight, if possible.  When exposed to sunlight, patients should wear protective clothing, sunglasses, and sunscreen.  The patient was instructed to call the office immediately if the following severe adverse effects occur:  hearing changes, easy bruising/bleeding, severe headache, or vision changes.  The patient verbalized understanding of the proper use and possible adverse effects of tetracycline.  All of the patient's questions and concerns were addressed. Patient understands to avoid pregnancy while on therapy due to potential birth defects.
Xelflynnz Pregnancy And Lactation Text: This medication is Pregnancy Category D and is not considered safe during pregnancy.  The risk during breast feeding is also uncertain.
Eucrisa Counseling: Patient may experience a mild burning sensation during topical application. Eucrisa is not approved in children less than 2 years of age.
Tranexamic Acid Pregnancy And Lactation Text: It is unknown if this medication is safe during pregnancy or breast feeding.
Rhofade Pregnancy And Lactation Text: This medication has not been assigned a Pregnancy Risk Category. It is unknown if the medication is excreted in breast milk.
Metronidazole Pregnancy And Lactation Text: This medication is Pregnancy Category B and considered safe during pregnancy.  It is also excreted in breast milk.
Calcipotriene Pregnancy And Lactation Text: This medication has not been proven safe during pregnancy. It is unknown if this medication is excreted in breast milk.
Cibinqo Counseling: I discussed with the patient the risks of Cibinqo therapy including but not limited to common cold, nausea, headache, cold sores, increased blood CPK levels, dizziness, UTIs, fatigue, acne, and vomitting. Live vaccines should be avoided.  This medication has been linked to serious infections; higher rate of mortality; malignancy and lymphoproliferative disorders; major adverse cardiovascular events; thrombosis; thrombocytopenia and lymphopenia; lipid elevations; and retinal detachment.
Ketoconazole Pregnancy And Lactation Text: This medication is Pregnancy Category C and it isn't know if it is safe during pregnancy. It is also excreted in breast milk and breast feeding isn't recommended.
VTAMA Counseling: I discussed with the patient that VTAMA is not for use in the eyes, mouth or mouth. They should call the office if they develop any signs of allergic reactions to VTAMA. The patient verbalized understanding of the proper use and possible adverse effects of VTAMA.  All of the patient's questions and concerns were addressed.
Rituxan Counseling:  I discussed with the patient the risks of Rituxan infusions. Side effects can include infusion reactions, severe drug rashes including mucocutaneous reactions, reactivation of latent hepatitis and other infections and rarely progressive multifocal leukoencephalopathy.  All of the patient's questions and concerns were addressed.
Libtayo Counseling- I discussed with the patient the risks of Libtayo including but not limited to nausea, vomiting, diarrhea, and bone or muscle pain.  The patient verbalized understanding of the proper use and possible adverse effects of Libtayo.  All of the patient's questions and concerns were addressed.
Ivermectin Counseling:  Patient instructed to take medication on an empty stomach with a full glass of water.  Patient informed of potential adverse effects including but not limited to nausea, diarrhea, dizziness, itching, and swelling of the extremities or lymph nodes.  The patient verbalized understanding of the proper use and possible adverse effects of ivermectin.  All of the patient's questions and concerns were addressed.
Nsaids Pregnancy And Lactation Text: These medications are considered safe up to 30 weeks gestation. It is excreted in breast milk.
Birth Control Pills Pregnancy And Lactation Text: This medication should be avoided if pregnant and for the first 30 days post-partum.
Glycopyrrolate Counseling:  I discussed with the patient the risks of glycopyrrolate including but not limited to skin rash, drowsiness, dry mouth, difficulty urinating, and blurred vision.
Dupixent Pregnancy And Lactation Text: This medication likely crosses the placenta but the risk for the fetus is uncertain. This medication is excreted in breast milk.
Aklief counseling:  Patient advised to apply a pea-sized amount only at bedtime and wait 30 minutes after washing their face before applying.  If too drying, patient may add a non-comedogenic moisturizer.  The most commonly reported side effects including irritation, redness, scaling, dryness, stinging, burning, itching, and increased risk of sunburn.  The patient verbalized understanding of the proper use and possible adverse effects of retinoids.  All of the patient's questions and concerns were addressed.
Cephalexin Counseling: I counseled the patient regarding use of cephalexin as an antibiotic for prophylactic and/or therapeutic purposes. Cephalexin (commonly prescribed under brand name Keflex) is a cephalosporin antibiotic which is active against numerous classes of bacteria, including most skin bacteria. Side effects may include nausea, diarrhea, gastrointestinal upset, rash, hives, yeast infections, and in rare cases, hepatitis, kidney disease, seizures, fever, confusion, neurologic symptoms, and others. Patients with severe allergies to penicillin medications are cautioned that there is about a 10% incidence of cross-reactivity with cephalosporins. When possible, patients with penicillin allergies should use alternatives to cephalosporins for antibiotic therapy.
Mirvaso Counseling: Mirvaso is a topical medication which can decrease superficial blood flow where applied. Side effects are uncommon and include stinging, redness and allergic reactions.
Terbinafine Counseling: Patient counseling regarding adverse effects of terbinafine including but not limited to headache, diarrhea, rash, upset stomach, liver function test abnormalities, itching, taste/smell disturbance, nausea, abdominal pain, and flatulence.  There is a rare possibility of liver failure that can occur when taking terbinafine.  The patient understands that a baseline LFT and kidney function test may be required. The patient verbalized understanding of the proper use and possible adverse effects of terbinafine.  All of the patient's questions and concerns were addressed.
Solaraze Counseling:  I discussed with the patient the risks of Solaraze including but not limited to erythema, scaling, itching, weeping, crusting, and pain.
Propranolol Pregnancy And Lactation Text: This medication is Pregnancy Category C and it isn't known if it is safe during pregnancy. It is excreted in breast milk.
Cyclophosphamide Counseling:  I discussed with the patient the risks of cyclophosphamide including but not limited to hair loss, hormonal abnormalities, decreased fertility, abdominal pain, diarrhea, nausea and vomiting, bone marrow suppression and infection. The patient understands that monitoring is required while taking this medication.
Taltz Counseling: I discussed with the patient the risks of ixekizumab including but not limited to immunosuppression, serious infections, worsening of inflammatory bowel disease and drug reactions.  The patient understands that monitoring is required including a PPD at baseline and must alert us or the primary physician if symptoms of infection or other concerning signs are noted.
Spironolactone Counseling: Patient advised regarding risks of diarrhea, abdominal pain, hyperkalemia, birth defects (for female patients), liver toxicity and renal toxicity. The patient may need blood work to monitor liver and kidney function and potassium levels while on therapy. The patient verbalized understanding of the proper use and possible adverse effects of spironolactone.  All of the patient's questions and concerns were addressed.
Olanzapine Counseling- I discussed with the patient the common side effects of olanzapine including but are not limited to: lack of energy, dry mouth, increased appetite, sleepiness, tremor, constipation, dizziness, changes in behavior, or restlessness.  Explained that teenagers are more likely to experience headaches, abdominal pain, pain in the arms or legs, tiredness, and sleepiness.  Serious side effects include but are not limited: increased risk of death in elderly patients who are confused, have memory loss, or dementia-related psychosis; hyperglycemia; increased cholesterol and triglycerides; and weight gain.
Cibinqo Pregnancy And Lactation Text: It is unknown if this medication will adversely affect pregnancy or breast feeding.  You should not take this medication if you are currently pregnant or planning a pregnancy or while breastfeeding.
Minocycline Counseling: Patient advised regarding possible photosensitivity and discoloration of the teeth, skin, lips, tongue and gums.  Patient instructed to avoid sunlight, if possible.  When exposed to sunlight, patients should wear protective clothing, sunglasses, and sunscreen.  The patient was instructed to call the office immediately if the following severe adverse effects occur:  hearing changes, easy bruising/bleeding, severe headache, or vision changes.  The patient verbalized understanding of the proper use and possible adverse effects of minocycline.  All of the patient's questions and concerns were addressed.
Cimetidine Counseling:  I discussed with the patient the risks of Cimetidine including but not limited to gynecomastia, headache, diarrhea, nausea, drowsiness, arrhythmias, pancreatitis, skin rashes, psychosis, bone marrow suppression and kidney toxicity.
Valtrex Counseling: I discussed with the patient the risks of valacyclovir including but not limited to kidney damage, nausea, vomiting and severe allergy.  The patient understands that if the infection seems to be worsening or is not improving, they are to call.
Rituxan Pregnancy And Lactation Text: This medication is Pregnancy Category C and it isn't know if it is safe during pregnancy. It is unknown if this medication is excreted in breast milk but similar antibodies are known to be excreted.
Acitretin Counseling:  I discussed with the patient the risks of acitretin including but not limited to hair loss, dry lips/skin/eyes, liver damage, hyperlipidemia, depression/suicidal ideation, photosensitivity.  Serious rare side effects can include but are not limited to pancreatitis, pseudotumor cerebri, bony changes, clot formation/stroke/heart attack.  Patient understands that alcohol is contraindicated since it can result in liver toxicity and significantly prolong the elimination of the drug by many years.
Libtayo Pregnancy And Lactation Text: This medication is contraindicated in pregnancy and when breast feeding.
Topical Steroids Counseling: I discussed with the patient that prolonged use of topical steroids can result in the increased appearance of superficial blood vessels (telangiectasias), lightening (hypopigmentation) and thinning of the skin (atrophy).  Patient understands to avoid using high potency steroids in skin folds, the groin or the face.  The patient verbalized understanding of the proper use and possible adverse effects of topical steroids.  All of the patient's questions and concerns were addressed.
Cephalexin Pregnancy And Lactation Text: This medication is Pregnancy Category B and considered safe during pregnancy.  It is also excreted in breast milk but can be used safely for shorter doses.
Glycopyrrolate Pregnancy And Lactation Text: This medication is Pregnancy Category B and is considered safe during pregnancy. It is unknown if it is excreted breast milk.
Aklief Pregnancy And Lactation Text: It is unknown if this medication is safe to use during pregnancy.  It is unknown if this medication is excreted in breast milk.  Breastfeeding women should use the topical cream on the smallest area of the skin for the shortest time needed while breastfeeding.  Do not apply to nipple and areola.
Clofazimine Counseling:  I discussed with the patient the risks of clofazimine including but not limited to skin and eye pigmentation, liver damage, nausea/vomiting, gastrointestinal bleeding and allergy.
Enbrel Counseling:  I discussed with the patient the risks of etanercept including but not limited to myelosuppression, immunosuppression, autoimmune hepatitis, demyelinating diseases, lymphoma, and infections.  The patient understands that monitoring is required including a PPD at baseline and must alert us or the primary physician if symptoms of infection or other concerning signs are noted.
Hydroquinone Counseling:  Patient advised that medication may result in skin irritation, lightening (hypopigmentation), dryness, and burning.  In the event of skin irritation, the patient was advised to reduce the amount of the drug applied or use it less frequently.  Rarely, spots that are treated with hydroquinone can become darker (pseudoochronosis).  Should this occur, patient instructed to stop medication and call the office. The patient verbalized understanding of the proper use and possible adverse effects of hydroquinone.  All of the patient's questions and concerns were addressed.
Detail Level: Detailed
Solaraze Pregnancy And Lactation Text: This medication is Pregnancy Category B and is considered safe. There is some data to suggest avoiding during the third trimester. It is unknown if this medication is excreted in breast milk.
Olumiant Counseling: I discussed with the patient the risks of Olumiant therapy including but not limited to upper respiratory tract infections, shingles, cold sores, and nausea. Live vaccines should be avoided.  This medication has been linked to serious infections; higher rate of mortality; malignancy and lymphoproliferative disorders; major adverse cardiovascular events; thrombosis; gastrointestinal perforations; neutropenia; lymphopenia; anemia; liver enzyme elevations; and lipid elevations.
Odomzo Counseling- I discussed with the patient the risks of Odomzo including but not limited to nausea, vomiting, diarrhea, constipation, weight loss, changes in the sense of taste, decreased appetite, muscle spasms, and hair loss.  The patient verbalized understanding of the proper use and possible adverse effects of Odomzo.  All of the patient's questions and concerns were addressed.
Olanzapine Pregnancy And Lactation Text: This medication is pregnancy category C.   There are no adequate and well controlled trials with olanzapine in pregnant females.  Olanzapine should be used during pregnancy only if the potential benefit justifies the potential risk to the fetus.   In a study in lactating healthy women, olanzapine was excreted in breast milk.  It is recommended that women taking olanzapine should not breast feed.
Acitretin Pregnancy And Lactation Text: This medication is Pregnancy Category X and should not be given to women who are pregnant or may become pregnant in the future. This medication is excreted in breast milk.
Zoryve Counseling:  I discussed with the patient that Zoryve is not for use in the eyes, mouth or vagina. The most commonly reported side effects include diarrhea, headache, insomnia, application site pain, upper respiratory tract infections, and urinary tract infections.  All of the patient's questions and concerns were addressed.
SSKI Counseling:  I discussed with the patient the risks of SSKI including but not limited to thyroid abnormalities, metallic taste, GI upset, fever, headache, acne, arthralgias, paraesthesias, lymphadenopathy, easy bleeding, arrhythmias, and allergic reaction.
Cantharidin Pregnancy And Lactation Text: The use of this medication during pregnancy or lactation is not recommended as there is insufficient data.
Cyclophosphamide Pregnancy And Lactation Text: This medication is Pregnancy Category D and it isn't considered safe during pregnancy. This medication is excreted in breast milk.
Valtrex Pregnancy And Lactation Text: this medication is Pregnancy Category B and is considered safe during pregnancy. This medication is not directly found in breast milk but it's metabolite acyclovir is present.

## 2023-03-02 NOTE — PROCEDURE: ADDITIONAL NOTES
Additional Notes: Consider biopsy if unresponsive to therapy.
Render Risk Assessment In Note?: no
Detail Level: Simple

## 2023-03-03 ENCOUNTER — HOSPITAL ENCOUNTER (OUTPATIENT)
Dept: LAB | Facility: MEDICAL CENTER | Age: 68
End: 2023-03-03
Attending: FAMILY MEDICINE
Payer: MEDICARE

## 2023-03-03 DIAGNOSIS — E03.9 ACQUIRED HYPOTHYROIDISM: ICD-10-CM

## 2023-03-03 LAB
LV EJECT FRACT  99904: 63
LV EJECT FRACT MOD 2C 99903: 60.41
LV EJECT FRACT MOD 4C 99902: 67.79
LV EJECT FRACT MOD BP 99901: 62.99
T3FREE SERPL-MCNC: 2.35 PG/ML (ref 2–4.4)
T4 FREE SERPL-MCNC: 1.67 NG/DL (ref 0.93–1.7)
TSH SERPL DL<=0.005 MIU/L-ACNC: 0.12 UIU/ML (ref 0.38–5.33)

## 2023-03-03 PROCEDURE — 84443 ASSAY THYROID STIM HORMONE: CPT

## 2023-03-03 PROCEDURE — 84439 ASSAY OF FREE THYROXINE: CPT

## 2023-03-03 PROCEDURE — 36415 COLL VENOUS BLD VENIPUNCTURE: CPT

## 2023-03-03 PROCEDURE — 84481 FREE ASSAY (FT-3): CPT

## 2023-03-07 ENCOUNTER — PATIENT MESSAGE (OUTPATIENT)
Dept: MEDICAL GROUP | Age: 68
End: 2023-03-07
Payer: MEDICARE

## 2023-03-07 DIAGNOSIS — I35.1 MODERATE AORTIC REGURGITATION: ICD-10-CM

## 2023-03-08 ENCOUNTER — TELEPHONE (OUTPATIENT)
Dept: HEALTH INFORMATION MANAGEMENT | Facility: OTHER | Age: 68
End: 2023-03-08
Payer: MEDICARE

## 2023-04-02 DIAGNOSIS — R05.9 COUGH: ICD-10-CM

## 2023-04-04 RX ORDER — OMEPRAZOLE 20 MG/1
20 CAPSULE, DELAYED RELEASE ORAL DAILY
Qty: 90 CAPSULE | Refills: 3 | Status: SHIPPED | OUTPATIENT
Start: 2023-04-04 | End: 2024-03-21

## 2023-04-26 ENCOUNTER — APPOINTMENT (RX ONLY)
Dept: URBAN - METROPOLITAN AREA CLINIC 4 | Facility: CLINIC | Age: 68
Setting detail: DERMATOLOGY
End: 2023-04-26

## 2023-04-26 DIAGNOSIS — L90.5 SCAR CONDITIONS AND FIBROSIS OF SKIN: ICD-10-CM

## 2023-04-26 DIAGNOSIS — L82.1 OTHER SEBORRHEIC KERATOSIS: ICD-10-CM

## 2023-04-26 DIAGNOSIS — L81.4 OTHER MELANIN HYPERPIGMENTATION: ICD-10-CM

## 2023-04-26 DIAGNOSIS — D22 MELANOCYTIC NEVI: ICD-10-CM | Status: STABLE

## 2023-04-26 DIAGNOSIS — Z87.2 PERSONAL HISTORY OF DISEASES OF THE SKIN AND SUBCUTANEOUS TISSUE: ICD-10-CM | Status: STABLE

## 2023-04-26 DIAGNOSIS — L57.0 ACTINIC KERATOSIS: ICD-10-CM

## 2023-04-26 DIAGNOSIS — Z85.828 PERSONAL HISTORY OF OTHER MALIGNANT NEOPLASM OF SKIN: ICD-10-CM | Status: IMPROVED

## 2023-04-26 PROBLEM — D22.5 MELANOCYTIC NEVI OF TRUNK: Status: ACTIVE | Noted: 2023-04-26

## 2023-04-26 PROBLEM — D22.62 MELANOCYTIC NEVI OF LEFT UPPER LIMB, INCLUDING SHOULDER: Status: ACTIVE | Noted: 2023-04-26

## 2023-04-26 PROCEDURE — ? ADDITIONAL NOTES

## 2023-04-26 PROCEDURE — ? COUNSELING

## 2023-04-26 PROCEDURE — ? DIAGNOSIS COMMENT

## 2023-04-26 PROCEDURE — 99213 OFFICE O/P EST LOW 20 MIN: CPT | Mod: 25

## 2023-04-26 PROCEDURE — 17000 DESTRUCT PREMALG LESION: CPT

## 2023-04-26 PROCEDURE — 17003 DESTRUCT PREMALG LES 2-14: CPT

## 2023-04-26 PROCEDURE — ? LIQUID NITROGEN

## 2023-04-26 ASSESSMENT — LOCATION ZONE DERM
LOCATION ZONE: ARM
LOCATION ZONE: LEG
LOCATION ZONE: NECK
LOCATION ZONE: FACE
LOCATION ZONE: TRUNK

## 2023-04-26 ASSESSMENT — LOCATION DETAILED DESCRIPTION DERM
LOCATION DETAILED: LEFT ANTERIOR DISTAL THIGH
LOCATION DETAILED: LEFT INFERIOR ANTERIOR NECK
LOCATION DETAILED: LEFT ANTERIOR PROXIMAL THIGH
LOCATION DETAILED: LEFT BUTTOCK
LOCATION DETAILED: LEFT ANTERIOR PROXIMAL UPPER ARM
LOCATION DETAILED: RIGHT INFERIOR MEDIAL FOREHEAD
LOCATION DETAILED: RIGHT PROXIMAL PRETIBIAL REGION
LOCATION DETAILED: LEFT SUPERIOR LATERAL UPPER BACK
LOCATION DETAILED: RIGHT MEDIAL UPPER BACK
LOCATION DETAILED: LEFT DISTAL PRETIBIAL REGION

## 2023-04-26 ASSESSMENT — LOCATION SIMPLE DESCRIPTION DERM
LOCATION SIMPLE: LEFT BUTTOCK
LOCATION SIMPLE: RIGHT FOREHEAD
LOCATION SIMPLE: LEFT UPPER ARM
LOCATION SIMPLE: LEFT THIGH
LOCATION SIMPLE: LEFT PRETIBIAL REGION
LOCATION SIMPLE: LEFT UPPER BACK
LOCATION SIMPLE: RIGHT PRETIBIAL REGION
LOCATION SIMPLE: LEFT ANTERIOR NECK
LOCATION SIMPLE: RIGHT UPPER BACK

## 2023-04-26 NOTE — PROCEDURE: DIAGNOSIS COMMENT
Detail Level: Detailed
Render Risk Assessment In Note?: no
Comment: [id number], hx of Lentigious Junctional Proliferation, (evolving lentiginous MM IS) see [facility] Pathology report. Treated by Dr. Dsouza in 2009.
Comment: Biopsy scar performed by PCP from rash patient had on left distal anterior thigh, see photo
Comment: Slightly atypical, see photos
Comment: See body map

## 2023-06-20 ENCOUNTER — HOSPITAL ENCOUNTER (OUTPATIENT)
Dept: LAB | Facility: MEDICAL CENTER | Age: 68
End: 2023-06-20
Attending: STUDENT IN AN ORGANIZED HEALTH CARE EDUCATION/TRAINING PROGRAM
Payer: MEDICARE

## 2023-06-20 LAB
T4 FREE SERPL-MCNC: 1.81 NG/DL (ref 0.93–1.7)
TSH SERPL DL<=0.005 MIU/L-ACNC: 0.08 UIU/ML (ref 0.38–5.33)

## 2023-06-20 PROCEDURE — 84443 ASSAY THYROID STIM HORMONE: CPT

## 2023-06-20 PROCEDURE — 36415 COLL VENOUS BLD VENIPUNCTURE: CPT

## 2023-06-20 PROCEDURE — 84439 ASSAY OF FREE THYROXINE: CPT

## 2023-07-17 ENCOUNTER — PATIENT MESSAGE (OUTPATIENT)
Dept: CARDIOLOGY | Facility: MEDICAL CENTER | Age: 68
End: 2023-07-17
Payer: MEDICARE

## 2023-07-17 DIAGNOSIS — Z79.899 ENCOUNTER FOR MONITORING DIURETIC THERAPY: ICD-10-CM

## 2023-07-17 DIAGNOSIS — Z51.81 ENCOUNTER FOR MONITORING DIURETIC THERAPY: ICD-10-CM

## 2023-07-17 DIAGNOSIS — I10 ESSENTIAL HYPERTENSION, BENIGN: ICD-10-CM

## 2023-07-18 RX ORDER — HYDROCHLOROTHIAZIDE 12.5 MG/1
12.5 CAPSULE, GELATIN COATED ORAL DAILY
Qty: 90 CAPSULE | Refills: 0 | Status: SHIPPED | OUTPATIENT
Start: 2023-07-18 | End: 2023-09-28

## 2023-07-18 RX ORDER — POTASSIUM CHLORIDE 750 MG/1
10 TABLET, FILM COATED, EXTENDED RELEASE ORAL DAILY
Qty: 90 TABLET | Refills: 0 | Status: SHIPPED | OUTPATIENT
Start: 2023-07-18 | End: 2023-09-28

## 2023-07-18 NOTE — PATIENT COMMUNICATION
Last OV 2/22/23, Pt notified of lab work to be completed and need to schedule follow up visit.    90 day supply given.

## 2023-07-28 ENCOUNTER — PATIENT MESSAGE (OUTPATIENT)
Dept: CARDIOLOGY | Facility: MEDICAL CENTER | Age: 68
End: 2023-07-28
Payer: MEDICARE

## 2023-08-02 ENCOUNTER — APPOINTMENT (RX ONLY)
Dept: URBAN - METROPOLITAN AREA CLINIC 4 | Facility: CLINIC | Age: 68
Setting detail: DERMATOLOGY
End: 2023-08-02

## 2023-08-02 DIAGNOSIS — L82.1 OTHER SEBORRHEIC KERATOSIS: ICD-10-CM

## 2023-08-02 PROBLEM — D48.5 NEOPLASM OF UNCERTAIN BEHAVIOR OF SKIN: Status: ACTIVE | Noted: 2023-08-02

## 2023-08-02 PROCEDURE — 99212 OFFICE O/P EST SF 10 MIN: CPT | Mod: 25

## 2023-08-02 PROCEDURE — 11103 TANGNTL BX SKIN EA SEP/ADDL: CPT

## 2023-08-02 PROCEDURE — 11102 TANGNTL BX SKIN SINGLE LES: CPT

## 2023-08-02 PROCEDURE — ? BIOPSY BY SHAVE METHOD

## 2023-08-02 PROCEDURE — ? COUNSELING

## 2023-08-02 ASSESSMENT — LOCATION SIMPLE DESCRIPTION DERM: LOCATION SIMPLE: LEFT LOWER LEG

## 2023-08-02 ASSESSMENT — LOCATION ZONE DERM: LOCATION ZONE: LEG

## 2023-08-02 ASSESSMENT — LOCATION DETAILED DESCRIPTION DERM: LOCATION DETAILED: LEFT LATERAL ANKLE

## 2023-08-02 NOTE — PROCEDURE: BIOPSY BY SHAVE METHOD
Detail Level: Detailed
Depth Of Biopsy: dermis
Was A Bandage Applied: Yes
Size Of Lesion In Cm: 1.4
X Size Of Lesion In Cm: 0
Biopsy Type: H and E
Biopsy Method: Personna blade
Anesthesia Type: 1% lidocaine without epinephrine
Anesthesia Volume In Cc: 1
Hemostasis: Aluminum Chloride
Wound Care: Petrolatum
Dressing: bandage
Destruction After The Procedure: No
Type Of Destruction Used: Cryotherapy
Curettage Text: The wound bed was treated with curettage after the biopsy was performed.
Cryotherapy Text: The wound bed was treated with cryotherapy after the biopsy was performed.
Electrodesiccation Text: The wound bed was treated with electrodesiccation after the biopsy was performed.
Electrodesiccation And Curettage Text: The wound bed was treated with electrodesiccation and curettage after the biopsy was performed.
Silver Nitrate Text: The wound bed was treated with silver nitrate after the biopsy was performed.
Lab: [id number]
Lab Facility: 
Consent: Written consent was obtained and risks were reviewed including but not limited to scarring, infection, bleeding, scabbing, incomplete removal, nerve damage and allergy to anesthesia.
Post-Care Instructions: I reviewed with the patient in detail post-care instructions. Patient is to keep the biopsy site dry overnight, and then apply bacitracin/petroleum twice daily until healed.
Notification Instructions: Patient will be notified of biopsy results. However, patient instructed to call the office if not contacted within 2 weeks.
Billing Type: Third-Party Bill
Information: Selecting Yes will display possible errors in your note based on the variables you have selected. This validation is only offered as a suggestion for you. PLEASE NOTE THAT THE VALIDATION TEXT WILL BE REMOVED WHEN YOU FINALIZE YOUR NOTE. IF YOU WANT TO FAX A PRELIMINARY NOTE YOU WILL NEED TO TOGGLE THIS TO 'NO' IF YOU DO NOT WANT IT IN YOUR FAXED NOTE.
Size Of Lesion In Cm: 1.6

## 2023-08-03 ENCOUNTER — HOSPITAL ENCOUNTER (OUTPATIENT)
Dept: LAB | Facility: MEDICAL CENTER | Age: 68
End: 2023-08-03
Attending: STUDENT IN AN ORGANIZED HEALTH CARE EDUCATION/TRAINING PROGRAM
Payer: MEDICARE

## 2023-08-03 DIAGNOSIS — Z79.899 ENCOUNTER FOR MONITORING DIURETIC THERAPY: ICD-10-CM

## 2023-08-03 DIAGNOSIS — Z51.81 ENCOUNTER FOR MONITORING DIURETIC THERAPY: ICD-10-CM

## 2023-08-03 LAB
ALBUMIN SERPL BCP-MCNC: 4.5 G/DL (ref 3.2–4.9)
ALBUMIN/GLOB SERPL: 1.8 G/DL
ALP SERPL-CCNC: 64 U/L (ref 30–99)
ALT SERPL-CCNC: 16 U/L (ref 2–50)
ANION GAP SERPL CALC-SCNC: 7 MMOL/L (ref 7–16)
ANION GAP SERPL CALC-SCNC: 8 MMOL/L (ref 7–16)
AST SERPL-CCNC: 14 U/L (ref 12–45)
BASOPHILS # BLD AUTO: 0.4 % (ref 0–1.8)
BASOPHILS # BLD: 0.02 K/UL (ref 0–0.12)
BILIRUB SERPL-MCNC: 0.4 MG/DL (ref 0.1–1.5)
BUN SERPL-MCNC: 15 MG/DL (ref 8–22)
BUN SERPL-MCNC: 16 MG/DL (ref 8–22)
CALCIUM ALBUM COR SERPL-MCNC: 9.2 MG/DL (ref 8.5–10.5)
CALCIUM SERPL-MCNC: 9.5 MG/DL (ref 8.5–10.5)
CALCIUM SERPL-MCNC: 9.6 MG/DL (ref 8.5–10.5)
CHLORIDE SERPL-SCNC: 105 MMOL/L (ref 96–112)
CHLORIDE SERPL-SCNC: 105 MMOL/L (ref 96–112)
CHOLEST SERPL-MCNC: 180 MG/DL (ref 100–199)
CO2 SERPL-SCNC: 27 MMOL/L (ref 20–33)
CO2 SERPL-SCNC: 28 MMOL/L (ref 20–33)
CREAT SERPL-MCNC: 0.81 MG/DL (ref 0.5–1.4)
CREAT SERPL-MCNC: 0.88 MG/DL (ref 0.5–1.4)
EOSINOPHIL # BLD AUTO: 0.08 K/UL (ref 0–0.51)
EOSINOPHIL NFR BLD: 1.8 % (ref 0–6.9)
ERYTHROCYTE [DISTWIDTH] IN BLOOD BY AUTOMATED COUNT: 44.6 FL (ref 35.9–50)
GFR SERPLBLD CREATININE-BSD FMLA CKD-EPI: 71 ML/MIN/1.73 M 2
GFR SERPLBLD CREATININE-BSD FMLA CKD-EPI: 79 ML/MIN/1.73 M 2
GLOBULIN SER CALC-MCNC: 2.5 G/DL (ref 1.9–3.5)
GLUCOSE SERPL-MCNC: 102 MG/DL (ref 65–99)
GLUCOSE SERPL-MCNC: 103 MG/DL (ref 65–99)
HCT VFR BLD AUTO: 40.9 % (ref 37–47)
HDLC SERPL-MCNC: 47 MG/DL
HGB BLD-MCNC: 13.5 G/DL (ref 12–16)
IMM GRANULOCYTES # BLD AUTO: 0 K/UL (ref 0–0.11)
IMM GRANULOCYTES NFR BLD AUTO: 0 % (ref 0–0.9)
LDLC SERPL CALC-MCNC: 110 MG/DL
LYMPHOCYTES # BLD AUTO: 1.31 K/UL (ref 1–4.8)
LYMPHOCYTES NFR BLD: 28.8 % (ref 22–41)
MAGNESIUM SERPL-MCNC: 2.3 MG/DL (ref 1.5–2.5)
MCH RBC QN AUTO: 32.4 PG (ref 27–33)
MCHC RBC AUTO-ENTMCNC: 33 G/DL (ref 32.2–35.5)
MCV RBC AUTO: 98.1 FL (ref 81.4–97.8)
MONOCYTES # BLD AUTO: 0.43 K/UL (ref 0–0.85)
MONOCYTES NFR BLD AUTO: 9.5 % (ref 0–13.4)
NEUTROPHILS # BLD AUTO: 2.71 K/UL (ref 1.82–7.42)
NEUTROPHILS NFR BLD: 59.5 % (ref 44–72)
NRBC # BLD AUTO: 0 K/UL
NRBC BLD-RTO: 0 /100 WBC (ref 0–0.2)
PLATELET # BLD AUTO: 256 K/UL (ref 164–446)
PMV BLD AUTO: 10.7 FL (ref 9–12.9)
POTASSIUM SERPL-SCNC: 4.5 MMOL/L (ref 3.6–5.5)
POTASSIUM SERPL-SCNC: 4.5 MMOL/L (ref 3.6–5.5)
PROT SERPL-MCNC: 7 G/DL (ref 6–8.2)
RBC # BLD AUTO: 4.17 M/UL (ref 4.2–5.4)
SODIUM SERPL-SCNC: 140 MMOL/L (ref 135–145)
SODIUM SERPL-SCNC: 140 MMOL/L (ref 135–145)
TRIGL SERPL-MCNC: 113 MG/DL (ref 0–149)
WBC # BLD AUTO: 4.6 K/UL (ref 4.8–10.8)

## 2023-08-03 PROCEDURE — 85025 COMPLETE CBC W/AUTO DIFF WBC: CPT

## 2023-08-03 PROCEDURE — 80053 COMPREHEN METABOLIC PANEL: CPT

## 2023-08-03 PROCEDURE — 80048 BASIC METABOLIC PNL TOTAL CA: CPT

## 2023-08-03 PROCEDURE — 36415 COLL VENOUS BLD VENIPUNCTURE: CPT

## 2023-08-03 PROCEDURE — 83735 ASSAY OF MAGNESIUM: CPT

## 2023-08-03 PROCEDURE — 80061 LIPID PANEL: CPT

## 2023-08-09 ENCOUNTER — PATIENT MESSAGE (OUTPATIENT)
Dept: CARDIOLOGY | Facility: MEDICAL CENTER | Age: 68
End: 2023-08-09
Payer: MEDICARE

## 2023-08-10 ENCOUNTER — HOSPITAL ENCOUNTER (OUTPATIENT)
Dept: LAB | Facility: MEDICAL CENTER | Age: 68
End: 2023-08-10
Attending: STUDENT IN AN ORGANIZED HEALTH CARE EDUCATION/TRAINING PROGRAM
Payer: MEDICARE

## 2023-08-10 LAB
T4 FREE SERPL-MCNC: 1.45 NG/DL (ref 0.93–1.7)
TSH SERPL DL<=0.005 MIU/L-ACNC: 0.54 UIU/ML (ref 0.38–5.33)

## 2023-08-10 PROCEDURE — 84443 ASSAY THYROID STIM HORMONE: CPT

## 2023-08-10 PROCEDURE — 84439 ASSAY OF FREE THYROXINE: CPT

## 2023-08-10 PROCEDURE — 36415 COLL VENOUS BLD VENIPUNCTURE: CPT

## 2023-08-18 ENCOUNTER — HOSPITAL ENCOUNTER (OUTPATIENT)
Dept: LAB | Facility: MEDICAL CENTER | Age: 68
End: 2023-08-18
Attending: STUDENT IN AN ORGANIZED HEALTH CARE EDUCATION/TRAINING PROGRAM
Payer: MEDICARE

## 2023-08-18 LAB — 25(OH)D3 SERPL-MCNC: 60 NG/ML (ref 30–100)

## 2023-08-18 PROCEDURE — 36415 COLL VENOUS BLD VENIPUNCTURE: CPT

## 2023-08-18 PROCEDURE — 82306 VITAMIN D 25 HYDROXY: CPT

## 2023-09-08 NOTE — PROCEDURE: PHOTO-DOCUMENTATION
Detail Level: Detailed
Photo Preface (Leave Blank If You Do Not Want): Photographs were obtained today
Awake/Alert/Cooperative

## 2023-09-14 ENCOUNTER — APPOINTMENT (RX ONLY)
Dept: URBAN - METROPOLITAN AREA CLINIC 36 | Facility: CLINIC | Age: 68
Setting detail: DERMATOLOGY
End: 2023-09-14

## 2023-09-14 PROBLEM — C44.729 SQUAMOUS CELL CARCINOMA OF SKIN OF LEFT LOWER LIMB, INCLUDING HIP: Status: ACTIVE | Noted: 2023-09-14

## 2023-09-14 PROCEDURE — 17313 MOHS 1 STAGE T/A/L: CPT

## 2023-09-14 PROCEDURE — ? MOHS SURGERY

## 2023-09-14 PROCEDURE — 17314 MOHS ADDL STAGE T/A/L: CPT

## 2023-09-14 PROCEDURE — 17313 MOHS 1 STAGE T/A/L: CPT | Mod: 76

## 2023-09-14 NOTE — PROCEDURE: MOHS SURGERY
Mohs Case Number: [id number]
Previous Accession (Optional): [id number]
Biopsy Photograph Reviewed: Yes
Referring Physician (Optional): [email or web address]
Consent Type: Consent 1 (Standard)
Eye Shield Used: No
Surgeon Performing Repair (Optional): Itz
Initial Size Of Lesion: 0.6
Number Of Stages: 3
Primary Defect Length In Cm (Final Defect Size - Required For Flaps/Grafts): 1.5
Primary Defect Width In Cm (Final Defect Size - Required For Flaps/Grafts): 1.4
Repair Type: Repair deferred to allow adequate granulation to occur
Oculoplastic Surgeon (A): Apollo
Oculoplastic Surgeon Procedure Text (A): After obtaining clear surgical margins the patient was sent to oculoplastics for surgical repair.  The patient understands they will receive post-surgical care and follow-up from the referring physician's office.
Otolaryngologist Procedure Text (A): After obtaining clear surgical margins the patient was sent to otolaryngology for surgical repair.  The patient understands they will receive post-surgical care and follow-up from the referring physician's office.
Plastic Surgeon Procedure Text (A): After obtaining clear surgical margins the patient was sent to plastics for surgical repair.  The patient understands they will receive post-surgical care and follow-up from the referring physician's office.
Mid-Level (A): did
Mid-Level Procedure Text (A): After obtaining clear surgical margins the patient was sent to a mid-level provider for surgical repair.  The patient understands they will receive post-surgical care and follow-up from the mid-level provider.
Provider Procedure Text (A): After obtaining clear surgical margins the defect was repaired by another provider.
Asc Procedure Text (A): After obtaining clear surgical margins the patient was sent to an ASC for surgical repair.  The patient understands they will receive post-surgical care and follow-up from the ASC physician.
Simple / Intermediate / Complex Repair - Final Wound Length In Cm: 0
Suturegard Retention Suture: 2-0 Nylon
Retention Suture Bite Size: 3 mm
Length To Time In Minutes Device Was In Place: 10
Number Of Hemigard Strips Per Side: 1
Undermining Type: Entire Wound
Debridement Text: The wound edges were debrided prior to proceeding with the closure to facilitate wound healing.
Helical Rim Text: The closure involved the helical rim.
Vermilion Border Text: The closure involved the vermilion border.
Nostril Rim Text: The closure involved the nostril rim.
Retention Suture Text: Retention sutures were placed to support the closure and prevent dehiscence.
Area H Indication Text: Tumors in this location are included in Area H (eyelids, eyebrows, nose, lips, chin, ear, pre-auricular, post-auricular, temple, genitalia, hands, feet, ankles and areola).  Tissue conservation is critical in these anatomic locations.
Area M Indication Text: Tumors in this location are included in Area M (cheek, forehead, scalp, neck, jawline and pretibial skin).  Mohs surgery is indicated for tumors in these anatomic locations.
Area L Indication Text: Tumors in this location are included in Area L (trunk and extremities).  Mohs surgery is indicated for larger tumors, or tumors with aggressive histologic features, in these anatomic locations.
Depth Of Tumor Invasion (For Histology): dermis
Perineural Invasion (For Histology - Be Specific If Possible): absent
Presence Of Scar Tissue (For Histology): present
Surgical Defect Length In Cm (Optional): 1.1
Special Stains Stage 1 - Results: Base On Clearance Noted Above
Stage 1 Add-On Histology Text: A background of venous stasis dermatitis is also present.
Stage 2: Additional Anesthesia Type: 1% lidocaine with 1:100,000 epinephrine and 408mcg clindamycin/ml and a 1:10 solution of 8.4% sodium bicarbonate
Surgical Defect Length In Cm (Optional): 1.3
Surgical Defect Width In Cm (Optional): 1.2
Stage 4: Additional Anesthesia Type: 1% lidocaine with epinephrine
Staging Info: By selecting yes to the question above you will include information on AJCC 8 tumor staging in your Mohs note. Information on tumor staging will be automatically added for SCCs on the head and neck. AJCC 8 includes tumor size, tumor depth, perineural involvement and bone invasion.
Tumor Depth: Less than 6mm from granular layer and no invasion beyond the subcutaneous fat
Was The Patient On Physician Recommended Anticoagulation Therapy?: Please Select the Appropriate Response
Medical Necessity Statement: Based on my medical judgement, Mohs surgery is the most appropriate treatment for this cancer compared to other treatments.
Alternatives Discussed Intro (Do Not Add Period): I discussed alternative treatments to Mohs surgery and specifically discussed the risks and benefits of
Consent 1/Introductory Paragraph: The rationale for Mohs was explained to the patient and consent was obtained. The risks, benefits and alternatives to therapy were discussed in detail. Specifically, the risks of infection, scarring, bleeding, prolonged wound healing, incomplete removal, allergy to anesthesia, nerve injury and recurrence were addressed. Prior to the procedure, the treatment site was clearly identified and confirmed by the patient. All components of Universal Protocol/PAUSE Rule completed.
Consent 2/Introductory Paragraph: Mohs surgery was explained to the patient and consent was obtained. The risks, benefits and alternatives to therapy were discussed in detail. Specifically, the risks of infection, scarring, bleeding, prolonged wound healing, incomplete removal, allergy to anesthesia, nerve injury and recurrence were addressed. Prior to the procedure, the treatment site was clearly identified and confirmed by the patient. All components of Universal Protocol/PAUSE Rule completed.
Consent 3/Introductory Paragraph: I gave the patient a chance to ask questions they had about the procedure.  Following this I explained the Mohs procedure and consent was obtained. The risks, benefits and alternatives to therapy were discussed in detail. Specifically, the risks of infection, scarring, bleeding, prolonged wound healing, incomplete removal, allergy to anesthesia, nerve injury and recurrence were addressed. Prior to the procedure, the treatment site was clearly identified and confirmed by the patient. All components of Universal Protocol/PAUSE Rule completed.
Consent (Temporal Branch)/Introductory Paragraph: The rationale for Mohs was explained to the patient and consent was obtained. The risks, benefits and alternatives to therapy were discussed in detail. Specifically, the risks of damage to the temporal branch of the facial nerve, infection, scarring, bleeding, prolonged wound healing, incomplete removal, allergy to anesthesia, and recurrence were addressed. Prior to the procedure, the treatment site was clearly identified and confirmed by the patient. All components of Universal Protocol/PAUSE Rule completed.
Consent (Marginal Mandibular)/Introductory Paragraph: The rationale for Mohs was explained to the patient and consent was obtained. The risks, benefits and alternatives to therapy were discussed in detail. Specifically, the risks of damage to the marginal mandibular branch of the facial nerve, infection, scarring, bleeding, prolonged wound healing, incomplete removal, allergy to anesthesia, and recurrence were addressed. Prior to the procedure, the treatment site was clearly identified and confirmed by the patient. All components of Universal Protocol/PAUSE Rule completed.
Consent (Spinal Accessory)/Introductory Paragraph: The rationale for Mohs was explained to the patient and consent was obtained. The risks, benefits and alternatives to therapy were discussed in detail. Specifically, the risks of damage to the spinal accessory nerve, infection, scarring, bleeding, prolonged wound healing, incomplete removal, allergy to anesthesia, and recurrence were addressed. Prior to the procedure, the treatment site was clearly identified and confirmed by the patient. All components of Universal Protocol/PAUSE Rule completed.
Consent (Near Eyelid Margin)/Introductory Paragraph: The rationale for Mohs was explained to the patient and consent was obtained. The risks, benefits and alternatives to therapy were discussed in detail. Specifically, the risks of ectropion or eyelid deformity, infection, scarring, bleeding, prolonged wound healing, incomplete removal, allergy to anesthesia, nerve injury and recurrence were addressed. Prior to the procedure, the treatment site was clearly identified and confirmed by the patient. All components of Universal Protocol/PAUSE Rule completed.
Consent (Ear)/Introductory Paragraph: The rationale for Mohs was explained to the patient and consent was obtained. The risks, benefits and alternatives to therapy were discussed in detail. Specifically, the risks of ear deformity, infection, scarring, bleeding, prolonged wound healing, incomplete removal, allergy to anesthesia, nerve injury and recurrence were addressed. Prior to the procedure, the treatment site was clearly identified and confirmed by the patient. All components of Universal Protocol/PAUSE Rule completed.
Consent (Nose)/Introductory Paragraph: The rationale for Mohs was explained to the patient and consent was obtained. The risks, benefits and alternatives to therapy were discussed in detail. Specifically, the risks of nasal deformity, changes in the flow of air through the nose, infection, scarring, bleeding, prolonged wound healing, incomplete removal, allergy to anesthesia, nerve injury and recurrence were addressed. Prior to the procedure, the treatment site was clearly identified and confirmed by the patient. All components of Universal Protocol/PAUSE Rule completed.
Consent (Lip)/Introductory Paragraph: The rationale for Mohs was explained to the patient and consent was obtained. The risks, benefits and alternatives to therapy were discussed in detail. Specifically, the risks of lip deformity, changes in the oral aperture, infection, scarring, bleeding, prolonged wound healing, incomplete removal, allergy to anesthesia, nerve injury and recurrence were addressed. Prior to the procedure, the treatment site was clearly identified and confirmed by the patient. All components of Universal Protocol/PAUSE Rule completed.
Consent (Scalp)/Introductory Paragraph: The rationale for Mohs was explained to the patient and consent was obtained. The risks, benefits and alternatives to therapy were discussed in detail. Specifically, the risks of changes in hair growth pattern secondary to repair, infection, scarring, bleeding, prolonged wound healing, incomplete removal, allergy to anesthesia, nerve injury and recurrence were addressed. Prior to the procedure, the treatment site was clearly identified and confirmed by the patient. All components of Universal Protocol/PAUSE Rule completed.
Detail Level: Detailed
Postop Diagnosis: same
Anesthesia Type: 1% lidocaine with 1:100,000 epinephrine and a 1:10 solution of 8.4% sodium bicarbonate
Anesthesia Volume In Cc: 6
Hemostasis: Electrocautery
Estimated Blood Loss (Cc): less than 5 cc
Repair Anesthesia Method: local infiltration
Brow Lift Text: A midfrontal incision was made medially to the defect to allow access to the tissues just superior to the left eyebrow. Following careful dissection inferiorly in a supraperiosteal plane to the level of the left eyebrow, several 3-0 monocryl sutures were used to resuspend the eyebrow orbicularis oculi muscular unit to the superior frontal bone periosteum. This resulted in an appropriate reapproximation of static eyebrow symmetry and correction of the left brow ptosis.
Deep Sutures: 5-0 Polysorb
Epidermal Sutures: 5-0 Prolene
Epidermal Closure: running cuticular
Suturegard Intro: Intraoperative tissue expansion was performed, utilizing the SUTUREGARD device, in order to reduce wound tension.
Suturegard Body: The suture ends were repeatedly re-tightened and re-clamped to achieve the desired tissue expansion.
Hemigard Intro: Due to skin fragility and wound tension, it was decided to use HEMIGARD adhesive retention suture devices to permit a linear closure. The skin was cleaned and dried for a 6cm distance away from the wound. Excessive hair, if present, was removed to allow for adhesion.
Hemigard Postcare Instructions: The HEMIGARD strips are to remain completely dry for at least 5-7 days.
Donor Site Anesthesia Type: same as repair anesthesia
Graft Basting Suture (Optional): 5-0 Fast Absorbing Gut
Graft Donor Site Epidermal Sutures (Optional): 5-0 Ethibond
Epidermal Closure Graft Donor Site (Optional): simple interrupted
Graft Donor Site Bandage (Optional-Leave Blank If You Don't Want In Note): Aquaphor and telefa placed on wound. Pressure dressing applied to donor site
Closure 2 Information: This tab is for additional flaps and grafts, including complex repair and grafts and complex repair and flaps. You can also specify a different location for the additional defect, if the location is the same you do not need to select a new one. We will insert the automated text for the repair you select below just as we do for solitary flaps and grafts. Please note that at this time if you select a location with a different insurance zone you will need to override the ICD10 and CPT if appropriate.
Closure 3 Information: This tab is for additional flaps and grafts above and beyond our usual structured repairs.  Please note if you enter information here it will not currently bill and you will need to add the billing information manually.
Wound Care: Aquaphor
Dressing: dry sterile dressing
Wound Care (No Sutures): Petrolatum
Suture Removal: 7 days
Unna Boot Text: An Unna boot was placed to help immobilize the limb and facilitate more rapid healing.
Home Suture Removal Text: Patient was provided instructions on removing sutures and will remove their sutures at home.  If they have any questions or difficulties they will call the office.
Post-Care Instructions: I reviewed with the patient in detail post-care instructions. Patient is not to engage in any heavy lifting, exercise, or swimming for the next 14 days. Should the patient develop any fevers, chills, bleeding, severe pain patient will contact the office immediately.
Pain Refusal Text: I offered to prescribe pain medication but the patient refused to take this medication.
Mauc Instructions: By selecting yes to the question below the MAUC number will be added into the note.  This will be calculated automatically based on the diagnosis chosen, the size entered, the body zone selected (H,M,L) and the specific indications you chose. You will also have the option to override the Mohs AUC if you disagree with the automatically calculated number and this option is found in the Case Summary tab.
Where Do You Want The Question To Include Opioid Counseling Located?: Case Summary Tab
Eye Protection Verbiage: Before proceeding with the stage, a plastic scleral shield was inserted. The globe was anesthetized with a few drops of 1% lidocaine with 1:100,000 epinephrine. Then, an appropriate sized scleral shield was chosen and coated with lacrilube ointment. The shield was gently inserted and left in place for the duration of each stage. After the stage was completed, the shield was gently removed.
Mohs Method Verbiage: An incision at a 45 degree angle following the standard Mohs approach was done and the specimen was harvested as a microscopic controlled layer.
Surgeon/Pathologist Verbiage (Will Incorporate Name Of Surgeon From Intro If Not Blank): operated in two distinct and integrated capacities as the surgeon and pathologist.
Mohs Histo Method Verbiage: Each section was then chromacoded and processed in the Mohs lab using the Mohs protocol and submitted for frozen section.
Subsequent Stages Histo Method Verbiage: Using a similar technique to that described above, a thin layer of tissue was removed from all areas where tumor was visible on the previous stage.  The tissue was again oriented, mapped, dyed, and processed as above.
Mohs Rapid Report Verbiage: The area of clinically evident tumor was marked with skin marking ink and appropriately hatched.  The initial incision was made following the Mohs approach through the skin.  The specimen was taken to the lab, divided into the necessary number of pieces, chromacoded and processed according to the Mohs protocol.  This was repeated in successive stages until a tumor free defect was achieved.
Complex Repair Preamble Text (Leave Blank If You Do Not Want): Extensive wide undermining was performed at least 2 cm in all directions.
Intermediate Repair Preamble Text (Leave Blank If You Do Not Want): Undermining was performed with blunt dissection.
M-Plasty Complex Repair Preamble Text (Leave Blank If You Do Not Want): Extensive wide undermining was performed.
Non-Graft Cartilage Fenestration Text: The cartilage was fenestrated with a 2mm punch biopsy to help facilitate healing.
Graft Cartilage Fenestration Text: The cartilage was fenestrated with a 2mm punch biopsy to help facilitate graft survival and healing.
Secondary Intention Text (Leave Blank If You Do Not Want): The defect will heal with secondary intention.
No Repair - Repaired With Adjacent Surgical Defect Text (Leave Blank If You Do Not Want): After obtaining clear surgical margins the defect was repaired concurrently with another surgical defect which was in close approximation.
Unique Flap 1 Name: Myocutaneous Island pedicle Flap
Unique Flap 2 Name: Peng Flap
Unique Flap 3 Name: Mercedes Flap
Unique Flap 4 Name: Banner Flap
Unique Flap 5 Name: tunneled myocutaneous flap
Unique Flap 6 Name: Nimo-B?ch flap
Unique Flap 7 Name: Mustarde flap
Unique Flap 8 Name: East to West Flap
Unique Flap 1 Text: A decision was made to reconstruct the defect utilizing a myocutaneous Island pedicle Flap based on the levator labii superioris muscle.  A telfa template was made of the defect.  This telfa template was then used to outline the myocutaneous flap, based along the meilolabial fold.  The donor area for the pedicle flap was then injected with anesthesia.  The flap was excised through the skin and subcutaneous tissue down to the layer of the underlying musculature.  The myocutaneous flap was carefully excised within this deep plane to maintain its blood supply. Based on the muscle. The edges of the donor site were undermined.   The donor site was closed in a primary fashion to the point of transposition.  The pedicle was then transposed into position and sutured.  Once the flap was sutured into place, adequate blood supply was confirmed with blanching and refill.
Unique Flap 2 Text: A decision was made to reconstruct the defect utilizing a Peng Flap (Bilateral Advancement Rotation Flap). Given the location of the defect and the proximity to free margins, this flap was deemed most appropriate.  Using a sterile surgical marker, the appropriate rotation flaps were drawn incorporating the defect and placing the expected incisions within the relaxed skin tension lines where possible.    The area thus outlined was incised deep to adipose tissue with a #15 scalpel blade.  The skin margins were undermined to an appropriate distance in all directions utilizing iris scissors.
Unique Flap 3 Text: The defect edges were debeveled with a #15 scalpel blade.  Given the location of the defect, shape of the defect and the proximity to free margins a Mercedes (double advancement flap) was deemed most appropriate.  Using a sterile surgical marker, the appropriate transposition flaps were drawn incorporating the defect and placing the expected incisions within the relaxed skin tension lines where possible.    The area thus outlined was incised deep to adipose tissue with a #15 scalpel blade.  The skin margins were undermined to an appropriate distance in all directions utilizing iris scissors.  Hemostasis was achieved with electrocautery.  The flaps were then advanced into the defect and anchored with interrupted buried subcutaneous sutures.
Unique Flap 4 Text: The defect edges were debeveled with a #15 scalpel blade.  Given the location of the defect and the proximity to free margins a Banner transposition flap was deemed most appropriate.  Using a sterile surgical marker, an appropriate Banner transposition flap was drawn incorporating the defect.    The area thus outlined was incised deep to adipose tissue with a #15 scalpel blade.  The skin margins were undermined to an appropriate distance in all directions utilizing iris scissors.
Unique Flap 5 Text: A decision was made to reconstruct the defect utilizing a tunneled myocutaneous Island pedicle Flap based on the anterior auricularis muscle.  A telfa template was made of the defect.  This telfa template was then used to outline the myocutaneous flap, based along the preauricular fold.  The donor area for the pedicle flap was then injected with anesthesia.  The flap was excised through the skin and subcutaneous tissue down to the layer of the underlying musculature.  The myocutaneous flap was carefully excised within this deep plane to maintain its blood supply based on the muscle. The edges of the donor site were undermined.   The donor site was closed in a primary fashion to the point of transposition.  The pedicle was then transposed through a tunnel into position and sutured.  Once the flap was sutured into place, adequate blood supply was confirmed with blanching and refill.
Unique Flap 6 Text: A decision was made to reconstruct the defect utilizing an Anti-aging-B?ch Flap (Bilateral helical Advancement Rotation Flap). Given the location of the defect and the proximity to free margins, this flap was deemed most appropriate.  Using a sterile surgical marker, the appropriate flaps were drawn incorporating the defect and placing the expected incisions within the relaxed skin tension lines where possible.  The area thus outlined was incised deep to adipose tissue with a #15 scalpel blade.  The skin margins were undermined to an appropriate distance in all directions utilizing iris scissors. Cartilage was incorporated into the flap arms to maintain helical anatomy.
Unique Flap 7 Text: A decision was made to reconstruct the defect utilizing a Mustarde Flap (Advancement Rotation Flap). Given the location of the defect and the proximity to free margins, this flap was deemed most appropriate.  Using a sterile surgical marker, the appropriate rotation flap was drawn incorporating the defect and placing the expected incisions within the relaxed skin tension lines where possible.    The area thus outlined was incised deep to adipose tissue with a #15 scalpel blade.  The skin margins were undermined to an appropriate distance in all directions utilizing iris scissors. The flap was advanced and rotated under the eyelid with a sling created laterally to keep ectropion minimal.
Unique Flap 8 Text: A decision was made to reconstruct the defect utilizing an East to West Flap (Modified Burows Advancement Flap). Given the location of the defect and the proximity to free margins, this flap was deemed most appropriate.  Using a sterile surgical marker, the appropriate advancement flaps were drawn incorporating the defect and placing the expected incisions within the relaxed skin tension lines where possible.    The area thus outlined was incised deep to adipose tissue with a #15 scalpel blade.  The skin margins were undermined to an appropriate distance in all directions utilizing iris scissors. Minimal alar distortion was created with flap approximation.
Adjacent Tissue Transfer Text: The defect edges were debeveled with a #15 scalpel blade.  Given the location of the defect and the proximity to free margins an adjacent tissue transfer was deemed most appropriate.  Using a sterile surgical marker, an appropriate flap was drawn incorporating the defect and placing the expected incisions within the relaxed skin tension lines where possible.    The area thus outlined was incised deep to adipose tissue with a #15 scalpel blade.  The skin margins were undermined to an appropriate distance in all directions utilizing iris scissors.
Advancement Flap (Single) Text: The defect edges were debeveled with a #15 scalpel blade.  Given the location of the defect and the proximity to free margins a single advancement flap was deemed most appropriate.  Using a sterile surgical marker, an appropriate advancement flap was drawn incorporating the defect and placing the expected incisions within the relaxed skin tension lines where possible.    The area thus outlined was incised deep to adipose tissue with a #15 scalpel blade.  The skin margins were undermined to an appropriate distance in all directions utilizing iris scissors.
Advancement Flap (Double) Text: The defect edges were debeveled with a #15 scalpel blade.  Given the location of the defect and the proximity to free margins a double advancement flap was deemed most appropriate.  Using a sterile surgical marker, the appropriate advancement flaps were drawn incorporating the defect and placing the expected incisions within the relaxed skin tension lines where possible.    The area thus outlined was incised deep to adipose tissue with a #15 scalpel blade.  The skin margins were undermined to an appropriate distance in all directions utilizing iris scissors.
Burow's Advancement Flap Text: The defect edges were debeveled with a #15 scalpel blade.  Given the location of the defect and the proximity to free margins a Burow's advancement flap was deemed most appropriate.  Using a sterile surgical marker, the appropriate advancement flap was drawn incorporating the defect and placing the expected incisions within the relaxed skin tension lines where possible.    The area thus outlined was incised deep to adipose tissue with a #15 scalpel blade.  The skin margins were undermined to an appropriate distance in all directions utilizing iris scissors.
Chonodrocutaneous Helical Advancement Flap Text: The defect edges were debeveled with a #15 scalpel blade.  Given the location of the defect and the proximity to free margins a chondrocutaneous helical advancement flap was deemed most appropriate.  Using a sterile surgical marker, the appropriate advancement flap was drawn incorporating the defect and placing the expected incisions within the relaxed skin tension lines where possible.    The area thus outlined was incised deep to adipose tissue with a #15 scalpel blade.  The skin margins were undermined to an appropriate distance in all directions utilizing iris scissors.
Crescentic Advancement Flap Text: The defect edges were debeveled with a #15 scalpel blade.  Given the location of the defect and the proximity to free margins a crescentic advancement flap was deemed most appropriate.  Using a sterile surgical marker, the appropriate advancement flap was drawn incorporating the defect and placing the expected incisions within the relaxed skin tension lines where possible.    The area thus outlined was incised deep to adipose tissue with a #15 scalpel blade.  The skin margins were undermined to an appropriate distance in all directions utilizing iris scissors.
A-T Advancement Flap Text: The defect edges were debeveled with a #15 scalpel blade.  Given the location of the defect, shape of the defect and the proximity to free margins an A-T advancement flap was deemed most appropriate.  Using a sterile surgical marker, an appropriate advancement flap was drawn incorporating the defect and placing the expected incisions within the relaxed skin tension lines where possible.    The area thus outlined was incised deep to adipose tissue with a #15 scalpel blade.  The skin margins were undermined to an appropriate distance in all directions utilizing iris scissors.
O-T Advancement Flap Text: The defect edges were debeveled with a #15 scalpel blade.  Given the location of the defect, shape of the defect and the proximity to free margins an O-T advancement flap was deemed most appropriate.  Using a sterile surgical marker, an appropriate advancement flap was drawn incorporating the defect and placing the expected incisions within the relaxed skin tension lines where possible.    The area thus outlined was incised deep to adipose tissue with a #15 scalpel blade.  The skin margins were undermined to an appropriate distance in all directions utilizing iris scissors.
O-L Flap Text: The defect edges were debeveled with a #15 scalpel blade.  Given the location of the defect, shape of the defect and the proximity to free margins an O-L flap was deemed most appropriate.  Using a sterile surgical marker, an appropriate advancement flap was drawn incorporating the defect and placing the expected incisions within the relaxed skin tension lines where possible.    The area thus outlined was incised deep to adipose tissue with a #15 scalpel blade.  The skin margins were undermined to an appropriate distance in all directions utilizing iris scissors.
O-Z Flap Text: The defect edges were debeveled with a #15 scalpel blade.  Given the location of the defect, shape of the defect and the proximity to free margins an O-Z flap was deemed most appropriate.  Using a sterile surgical marker, an appropriate transposition flap was drawn incorporating the defect and placing the expected incisions within the relaxed skin tension lines where possible. The area thus outlined was incised deep to adipose tissue with a #15 scalpel blade.  The skin margins were undermined to an appropriate distance in all directions utilizing iris scissors.
Double O-Z Flap Text: The defect edges were debeveled with a #15 scalpel blade.  Given the location of the defect, shape of the defect and the proximity to free margins a Double O-Z flap was deemed most appropriate.  Using a sterile surgical marker, an appropriate transposition flap was drawn incorporating the defect and placing the expected incisions within the relaxed skin tension lines where possible. The area thus outlined was incised deep to adipose tissue with a #15 scalpel blade.  The skin margins were undermined to an appropriate distance in all directions utilizing iris scissors.
V-Y Flap Text: The defect edges were debeveled with a #15 scalpel blade.  Given the location of the defect, shape of the defect and the proximity to free margins a V-Y flap was deemed most appropriate.  Using a sterile surgical marker, an appropriate advancement flap was drawn incorporating the defect and placing the expected incisions within the relaxed skin tension lines where possible.    The area thus outlined was incised deep to adipose tissue with a #15 scalpel blade.  The skin margins were undermined to an appropriate distance in all directions utilizing iris scissors.
Advancement-Rotation Flap Text: The defect edges were debeveled with a #15 scalpel blade.  Given the location of the defect, shape of the defect and the proximity to free margins an advancement-rotation flap was deemed most appropriate.  Using a sterile surgical marker, an appropriate flap was drawn incorporating the defect and placing the expected incisions within the relaxed skin tension lines where possible. The area thus outlined was incised deep to adipose tissue with a #15 scalpel blade.  The skin margins were undermined to an appropriate distance in all directions utilizing iris scissors.
Mercedes Flap Text: The defect edges were debeveled with a #15 scalpel blade.  Given the location of the defect, shape of the defect and the proximity to free margins a Mercedes flap was deemed most appropriate.  Using a sterile surgical marker, an appropriate advancement flap was drawn incorporating the defect and placing the expected incisions within the relaxed skin tension lines where possible. The area thus outlined was incised deep to adipose tissue with a #15 scalpel blade.  The skin margins were undermined to an appropriate distance in all directions utilizing iris scissors.
Modified Advancement Flap Text: The defect edges were debeveled with a #15 scalpel blade.  Given the location of the defect, shape of the defect and the proximity to free margins a modified advancement flap was deemed most appropriate.  Using a sterile surgical marker, an appropriate advancement flap was drawn incorporating the defect and placing the expected incisions within the relaxed skin tension lines where possible.    The area thus outlined was incised deep to adipose tissue with a #15 scalpel blade.  The skin margins were undermined to an appropriate distance in all directions utilizing iris scissors.
Mucosal Advancement Flap Text: Given the location of the defect, shape of the defect and the proximity to free margins a mucosal advancement flap was deemed most appropriate. Incisions were made with a 15 blade scalpel in the appropriate fashion along the cutaneous vermilion border and the mucosal lip. The remaining actinically damaged mucosal tissue was excised.  The mucosal advancement flap was then elevated to the gingival sulcus with care taken to preserve the neurovascular structures and advanced into the primary defect. Care was taken to ensure that precise realignment of the vermilion border was achieved.
Peng Advancement Flap Text: The defect edges were debeveled with a #15 scalpel blade.  Given the location of the defect, shape of the defect and the proximity to free margins a Peng advancement flap was deemed most appropriate.  Using a sterile surgical marker, an appropriate advancement flap was drawn incorporating the defect and placing the expected incisions within the relaxed skin tension lines where possible. The area thus outlined was incised deep to adipose tissue with a #15 scalpel blade.  The skin margins were undermined to an appropriate distance in all directions utilizing iris scissors.
Hatchet Flap Text: The defect edges were debeveled with a #15 scalpel blade.  Given the location of the defect, shape of the defect and the proximity to free margins a hatchet flap based from the glabella was deemed most appropriate.  Using a sterile surgical marker, an appropriate glabellar hatchet flap was drawn incorporating the defect and placing the expected incisions within the relaxed skin tension lines where possible.    The area thus outlined was incised deep to adipose tissue with a #15 scalpel blade.  The skin margins were undermined to an appropriate distance in all directions utilizing iris scissors.
Rotation Flap Text: The defect edges were debeveled with a #15 scalpel blade.  Given the location of the defect, shape of the defect and the proximity to free margins a rotation flap was deemed most appropriate.  Using a sterile surgical marker, an appropriate rotation flap was drawn incorporating the defect and placing the expected incisions within the relaxed skin tension lines where possible.    The area thus outlined was incised deep to adipose tissue with a #15 scalpel blade.  The skin margins were undermined to an appropriate distance in all directions utilizing iris scissors.
Bilateral Rotation Flap Text: The defect edges were debeveled with a #15 scalpel blade. Given the location of the defect, shape of the defect and the proximity to free margins a bilateral rotation flap was deemed most appropriate. Using a sterile surgical marker, an appropriate rotation flap was drawn incorporating the defect and placing the expected incisions within the relaxed skin tension lines where possible. The area thus outlined was incised deep to adipose tissue with a #15 scalpel blade. The skin margins were undermined to an appropriate distance in all directions utilizing iris scissors. Following this, the designed flap was carried over into the primary defect and sutured into place.
Spiral Flap Text: The defect edges were debeveled with a #15 scalpel blade.  Given the location of the defect, shape of the defect and the proximity to free margins a spiral flap was deemed most appropriate.  Using a sterile surgical marker, an appropriate rotation flap was drawn incorporating the defect and placing the expected incisions within the relaxed skin tension lines where possible. The area thus outlined was incised deep to adipose tissue with a #15 scalpel blade.  The skin margins were undermined to an appropriate distance in all directions utilizing iris scissors.
Staged Advancement Flap Text: The defect edges were debeveled with a #15 scalpel blade.  Given the location of the defect, shape of the defect and the proximity to free margins a staged advancement flap was deemed most appropriate.  Using a sterile surgical marker, an appropriate advancement flap was drawn incorporating the defect and placing the expected incisions within the relaxed skin tension lines where possible. The area thus outlined was incised deep to adipose tissue with a #15 scalpel blade.  The skin margins were undermined to an appropriate distance in all directions utilizing iris scissors.
Star Wedge Flap Text: The defect edges were debeveled with a #15 scalpel blade.  Given the location of the defect, shape of the defect and the proximity to free margins a star wedge flap was deemed most appropriate.  Using a sterile surgical marker, an appropriate rotation flap was drawn incorporating the defect and placing the expected incisions within the relaxed skin tension lines where possible. The area thus outlined was incised deep to adipose tissue with a #15 scalpel blade.  The skin margins were undermined to an appropriate distance in all directions utilizing iris scissors.
Transposition Flap Text: The defect edges were debeveled with a #15 scalpel blade.  Given the location of the defect and the proximity to free margins a transposition flap was deemed most appropriate.  Using a sterile surgical marker, an appropriate transposition flap was drawn incorporating the defect.    The area thus outlined was incised deep to adipose tissue with a #15 scalpel blade.  The skin margins were undermined to an appropriate distance in all directions utilizing iris scissors.
Muscle Hinge Flap Text: The defect edges were debeveled with a #15 scalpel blade.  Given the size, depth and location of the defect and the proximity to free margins a muscle hinge flap was deemed most appropriate.  Using a sterile surgical marker, an appropriate hinge flap was drawn incorporating the defect. The area thus outlined was incised with a #15 scalpel blade.  The skin margins were undermined to an appropriate distance in all directions utilizing iris scissors.
Mustarde Flap Text: The defect edges were debeveled with a #15 scalpel blade.  Given the size, depth and location of the defect and the proximity to free margins a Mustarde flap was deemed most appropriate.  Using a sterile surgical marker, an appropriate flap was drawn incorporating the defect. The area thus outlined was incised with a #15 scalpel blade.  The skin margins were undermined to an appropriate distance in all directions utilizing iris scissors.
Nasal Turnover Hinge Flap Text: The defect edges were debeveled with a #15 scalpel blade.  Given the size, depth, location of the defect and the defect being full thickness a nasal turnover hinge flap was deemed most appropriate.  Using a sterile surgical marker, an appropriate hinge flap was drawn incorporating the defect. The area thus outlined was incised with a #15 scalpel blade. The flap was designed to recreate the nasal mucosal lining and the alar rim. The skin margins were undermined to an appropriate distance in all directions utilizing iris scissors.
Nasalis-Muscle-Based Myocutaneous Island Pedicle Flap Text: Using a #15 blade, an incision was made around the donor flap to the level of the nasalis muscle. Wide lateral undermining was then performed in both the subcutaneous plane above the nasalis muscle, and in a submuscular plane just above periosteum. This allowed the formation of a free nasalis muscle axial pedicle (based on the angular artery) which was still attached to the actual cutaneous flap, increasing its mobility and vascular viability. Hemostasis was obtained with pinpoint electrocoagulation. The flap was mobilized into position and the pivotal anchor points positioned and stabilized with buried interrupted sutures. Subcutaneous and dermal tissues were closed in a multilayered fashion with sutures. Tissue redundancies were excised, and the epidermal edges were apposed without significant tension and sutured with sutures.
Orbicularis Oris Muscle Flap Text: The defect edges were debeveled with a #15 scalpel blade.  Given that the defect affected the competency of the oral sphincter an orbicularis oris muscle flap was deemed most appropriate to restore this competency and normal muscle function.  Using a sterile surgical marker, an appropriate flap was drawn incorporating the defect. The area thus outlined was incised with a #15 scalpel blade.
Melolabial Transposition Flap Text: The defect edges were debeveled with a #15 scalpel blade.  Given the location of the defect and the proximity to free margins a melolabial flap was deemed most appropriate.  Using a sterile surgical marker, an appropriate melolabial transposition flap was drawn incorporating the defect.    The area thus outlined was incised deep to adipose tissue with a #15 scalpel blade.  The skin margins were undermined to an appropriate distance in all directions utilizing iris scissors.
Rhombic Flap Text: The defect edges were debeveled with a #15 scalpel blade.  Given the location of the defect and the proximity to free margins a rhombic flap was deemed most appropriate.  Using a sterile surgical marker, an appropriate rhombic flap was drawn incorporating the defect.    The area thus outlined was incised deep to adipose tissue with a #15 scalpel blade.  The skin margins were undermined to an appropriate distance in all directions utilizing iris scissors.
Rhomboid Transposition Flap Text: The defect edges were debeveled with a #15 scalpel blade.  Given the location of the defect and the proximity to free margins a rhomboid transposition flap was deemed most appropriate.  Using a sterile surgical marker, an appropriate rhomboid flap was drawn incorporating the defect.    The area thus outlined was incised deep to adipose tissue with a #15 scalpel blade.  The skin margins were undermined to an appropriate distance in all directions utilizing iris scissors.
Bi-Rhombic Flap Text: The defect edges were debeveled with a #15 scalpel blade.  Given the location of the defect and the proximity to free margins a bi-rhombic flap was deemed most appropriate.  Using a sterile surgical marker, an appropriate rhombic flap was drawn incorporating the defect. The area thus outlined was incised deep to adipose tissue with a #15 scalpel blade.  The skin margins were undermined to an appropriate distance in all directions utilizing iris scissors.
Helical Rim Advancement Flap Text: The defect edges were debeveled with a #15 blade scalpel.  Given the location of the defect and the proximity to free margins (helical rim) a double helical rim advancement flap was deemed most appropriate.  Using a sterile surgical marker, the appropriate advancement flaps were drawn incorporating the defect and placing the expected incisions between the helical rim and antihelix where possible.  The area thus outlined was incised through and through with a #15 scalpel blade.  With a skin hook and iris scissors, the flaps were gently and sharply undermined and freed up.
Bilateral Helical Rim Advancement Flap Text: The defect edges were debeveled with a #15 blade scalpel.  Given the location of the defect and the proximity to free margins (helical rim) a bilateral helical rim advancement flap was deemed most appropriate.  Using a sterile surgical marker, the appropriate advancement flaps were drawn incorporating the defect and placing the expected incisions between the helical rim and antihelix where possible.  The area thus outlined was incised through and through with a #15 scalpel blade.  With a skin hook and iris scissors, the flaps were gently and sharply undermined and freed up.
Ear Star Wedge Flap Text: The defect edges were debeveled with a #15 blade scalpel.  Given the location of the defect and the proximity to free margins (helical rim) an ear star wedge flap was deemed most appropriate.  Using a sterile surgical marker, the appropriate flap was drawn incorporating the defect and placing the expected incisions between the helical rim and antihelix where possible.  The area thus outlined was incised through and through with a #15 scalpel blade.
Banner Transposition Flap Text: The defect edges were debeveled with a #15 scalpel blade.  Given the location of the defect and the proximity to free margins a Banner transposition flap was deemed most appropriate.  Using a sterile surgical marker, an appropriate flap drawn around the defect. The area thus outlined was incised deep to adipose tissue with a #15 scalpel blade.  The skin margins were undermined to an appropriate distance in all directions utilizing iris scissors.
Bilobed Flap Text: The defect edges were debeveled with a #15 scalpel blade.  Given the location of the defect and the proximity to free margins a bilobe flap was deemed most appropriate.  Using a sterile surgical marker, an appropriate bilobe flap drawn around the defect.    The area thus outlined was incised deep to adipose tissue with a #15 scalpel blade.  The skin margins were undermined to an appropriate distance in all directions utilizing iris scissors.
Bilobed Transposition Flap Text: The defect edges were debeveled with a #15 scalpel blade.  Given the location of the defect and the proximity to free margins a bilobed transposition flap was deemed most appropriate.  Using a sterile surgical marker, an appropriate bilobe flap drawn around the defect.    The area thus outlined was incised deep to adipose tissue with a #15 scalpel blade.  The skin margins were undermined to an appropriate distance in all directions utilizing iris scissors.
Trilobed Flap Text: The defect edges were debeveled with a #15 scalpel blade.  Given the location of the defect and the proximity to free margins a trilobed flap was deemed most appropriate.  Using a sterile surgical marker, an appropriate trilobed flap drawn around the defect.    The area thus outlined was incised deep to adipose tissue with a #15 scalpel blade.  The skin margins were undermined to an appropriate distance in all directions utilizing iris scissors.
Dorsal Nasal Flap Text: The defect edges were debeveled with a #15 scalpel blade.  Given the location of the defect and the proximity to free margins a dorsal nasal flap,based upon the glabellar folds, was deemed most appropriate.  Using a sterile surgical marker, an appropriate dorsal nasal flap was drawn around the defect.    The area thus outlined was incised deep to adipose tissue with a #15 scalpel blade.  The skin margins were undermined to an appropriate distance in all directions utilizing iris scissors.
Island Pedicle Flap Text: The defect edges were debeveled with a #15 scalpel blade.  Given the location of the defect, shape of the defect and the proximity to free margins an island pedicle advancement flap was deemed most appropriate.  Using a sterile surgical marker, an appropriate advancement flap was drawn incorporating the defect, outlining the appropriate donor tissue and placing the expected incisions within the relaxed skin tension lines where possible.    The area thus outlined was incised deep to adipose tissue with a #15 scalpel blade.  The skin margins were undermined to an appropriate distance in all directions around the primary defect and laterally outward around the island pedicle utilizing iris scissors.  There was minimal undermining beneath the pedicle flap.
Island Pedicle Flap With Canthal Suspension Text: The defect edges were debeveled with a #15 scalpel blade.  Given the location of the defect, shape of the defect and the proximity to free margins an island pedicle advancement flap was deemed most appropriate.  Using a sterile surgical marker, an appropriate advancement flap was drawn incorporating the defect, outlining the appropriate donor tissue and placing the expected incisions within the relaxed skin tension lines where possible. The area thus outlined was incised deep to adipose tissue with a #15 scalpel blade.  The skin margins were undermined to an appropriate distance in all directions around the primary defect and laterally outward around the island pedicle utilizing iris scissors.  There was minimal undermining beneath the pedicle flap. A suspension suture was placed in the canthal tendon to prevent tension and prevent ectropion.
Alar Island Pedicle Flap Text: The defect edges were debeveled with a #15 scalpel blade.  Given the location of the defect, shape of the defect and the proximity to the alar rim an island pedicle advancement flap was deemed most appropriate.  Using a sterile surgical marker, an appropriate advancement flap was drawn incorporating the defect, outlining the appropriate donor tissue and placing the expected incisions within the nasal ala running parallel to the alar rim. The area thus outlined was incised with a #15 scalpel blade.  The skin margins were undermined minimally to an appropriate distance in all directions around the primary defect and laterally outward around the island pedicle utilizing iris scissors.  There was minimal undermining beneath the pedicle flap.
Double Island Pedicle Flap Text: The defect edges were debeveled with a #15 scalpel blade.  Given the location of the defect, shape of the defect and the proximity to free margins a double island pedicle advancement flap was deemed most appropriate.  Using a sterile surgical marker, an appropriate advancement flap was drawn incorporating the defect, outlining the appropriate donor tissue and placing the expected incisions within the relaxed skin tension lines where possible.    The area thus outlined was incised deep to adipose tissue with a #15 scalpel blade.  The skin margins were undermined to an appropriate distance in all directions around the primary defect and laterally outward around the island pedicle utilizing iris scissors.  There was minimal undermining beneath the pedicle flap.
Island Pedicle Flap-Requiring Vessel Identification Text: The defect edges were debeveled with a #15 scalpel blade.  Given the location of the defect, shape of the defect and the proximity to free margins an island pedicle advancement flap was deemed most appropriate.  Using a sterile surgical marker, an appropriate advancement flap was drawn, based on the axial vessel mentioned above, incorporating the defect, outlining the appropriate donor tissue and placing the expected incisions within the relaxed skin tension lines where possible.    The area thus outlined was incised deep to adipose tissue with a #15 scalpel blade.  The skin margins were undermined to an appropriate distance in all directions around the primary defect and laterally outward around the island pedicle utilizing iris scissors.  There was minimal undermining beneath the pedicle flap.
Keystone Flap Text: The defect edges were debeveled with a #15 scalpel blade.  Given the location of the defect, shape of the defect a keystone flap was deemed most appropriate.  Using a sterile surgical marker, an appropriate keystone flap was drawn incorporating the defect, outlining the appropriate donor tissue and placing the expected incisions within the relaxed skin tension lines where possible. The area thus outlined was incised deep to adipose tissue with a #15 scalpel blade.  The skin margins were undermined to an appropriate distance in all directions around the primary defect and laterally outward around the flap utilizing iris scissors.
O-T Plasty Text: The defect edges were debeveled with a #15 scalpel blade.  Given the location of the defect, shape of the defect and the proximity to free margins an O-T plasty was deemed most appropriate.  Using a sterile surgical marker, an appropriate O-T plasty was drawn incorporating the defect and placing the expected incisions within the relaxed skin tension lines where possible.    The area thus outlined was incised deep to adipose tissue with a #15 scalpel blade.  The skin margins were undermined to an appropriate distance in all directions utilizing iris scissors.
O-Z Plasty Text: The defect edges were debeveled with a #15 scalpel blade.  Given the location of the defect, shape of the defect and the proximity to free margins an O-Z plasty (double transposition flap) was deemed most appropriate.  Using a sterile surgical marker, the appropriate transposition flaps were drawn incorporating the defect and placing the expected incisions within the relaxed skin tension lines where possible.    The area thus outlined was incised deep to adipose tissue with a #15 scalpel blade.  The skin margins were undermined to an appropriate distance in all directions utilizing iris scissors.  Hemostasis was achieved with electrocautery.  The flaps were then transposed into place, one clockwise and the other counterclockwise, and anchored with interrupted buried subcutaneous sutures.
Double O-Z Plasty Text: The defect edges were debeveled with a #15 scalpel blade.  Given the location of the defect, shape of the defect and the proximity to free margins a Double O-Z plasty (double transposition flap) was deemed most appropriate.  Using a sterile surgical marker, the appropriate transposition flaps were drawn incorporating the defect and placing the expected incisions within the relaxed skin tension lines where possible. The area thus outlined was incised deep to adipose tissue with a #15 scalpel blade.  The skin margins were undermined to an appropriate distance in all directions utilizing iris scissors.  Hemostasis was achieved with electrocautery.  The flaps were then transposed into place, one clockwise and the other counterclockwise, and anchored with interrupted buried subcutaneous sutures.
V-Y Plasty Text: The defect edges were debeveled with a #15 scalpel blade.  Given the location of the defect, shape of the defect and the proximity to free margins an V-Y advancement flap was deemed most appropriate.  Using a sterile surgical marker, an appropriate advancement flap was drawn incorporating the defect and placing the expected incisions within the relaxed skin tension lines where possible.    The area thus outlined was incised deep to adipose tissue with a #15 scalpel blade.  The skin margins were undermined to an appropriate distance in all directions utilizing iris scissors.
H Plasty Text: Given the location of the defect, shape of the defect and the proximity to free margins a H-plasty was deemed most appropriate for repair.  Using a sterile surgical marker, the appropriate advancement arms of the H-plasty were drawn incorporating the defect and placing the expected incisions within the relaxed skin tension lines where possible. The area thus outlined was incised deep to adipose tissue with a #15 scalpel blade. The skin margins were undermined to an appropriate distance in all directions utilizing iris scissors.  The opposing advancement arms were then advanced into place in opposite direction and anchored with interrupted buried subcutaneous sutures.
W Plasty Text: The lesion was extirpated to the level of the fat with a #15 scalpel blade.  Given the location of the defect, shape of the defect and the proximity to free margins a W-plasty was deemed most appropriate for repair.  Using a sterile surgical marker, the appropriate transposition arms of the W-plasty were drawn incorporating the defect and placing the expected incisions within the relaxed skin tension lines where possible.    The area thus outlined was incised deep to adipose tissue with a #15 scalpel blade.  The skin margins were undermined to an appropriate distance in all directions utilizing iris scissors.  The opposing transposition arms were then transposed into place in opposite direction and anchored with interrupted buried subcutaneous sutures.
Z Plasty Text: The lesion was extirpated to the level of the fat with a #15 scalpel blade.  Given the location of the defect, shape of the defect and the proximity to free margins a Z-plasty was deemed most appropriate for repair.  Using a sterile surgical marker, the appropriate transposition arms of the Z-plasty were drawn incorporating the defect and placing the expected incisions within the relaxed skin tension lines where possible.    The area thus outlined was incised deep to adipose tissue with a #15 scalpel blade.  The skin margins were undermined to an appropriate distance in all directions utilizing iris scissors.  The opposing transposition arms were then transposed into place in opposite direction and anchored with interrupted buried subcutaneous sutures.
Double Z Plasty Text: The lesion was extirpated to the level of the fat with a #15 scalpel blade. Given the location of the defect, shape of the defect and the proximity to free margins a double Z-plasty was deemed most appropriate for repair. Using a sterile surgical marker, the appropriate transposition arms of the double Z-plasty were drawn incorporating the defect and placing the expected incisions within the relaxed skin tension lines where possible. The area thus outlined was incised deep to adipose tissue with a #15 scalpel blade. The skin margins were undermined to an appropriate distance in all directions utilizing iris scissors. The opposing transposition arms were then transposed and carried over into place in opposite direction and anchored with interrupted buried subcutaneous sutures.
Zygomaticofacial Flap Text: Given the location of the defect, shape of the defect and the proximity to free margins a zygomaticofacial flap was deemed most appropriate for repair.  Using a sterile surgical marker, the appropriate flap was drawn incorporating the defect and placing the expected incisions within the relaxed skin tension lines where possible. The area thus outlined was incised deep to adipose tissue with a #15 scalpel blade with preservation of a vascular pedicle.  The skin margins were undermined to an appropriate distance in all directions utilizing iris scissors.  The flap was then placed into the defect and anchored with interrupted buried subcutaneous sutures.
Cheek Interpolation Flap Text: A decision was made to reconstruct the defect utilizing an interpolation axial flap and a staged reconstruction.  A telfa template was made of the defect.  This telfa template was then used to outline the Cheek Interpolation flap.  The donor area for the pedicle flap was then injected with anesthesia.  The flap was excised through the skin and subcutaneous tissue down to the layer of the underlying musculature.  The interpolation flap was carefully excised within this deep plane to maintain its blood supply.  The edges of the donor site were undermined.   The donor site was closed in a primary fashion.  The pedicle was then rotated into position and sutured.  Once the tube was sutured into place, adequate blood supply was confirmed with blanching and refill.  The pedicle was then wrapped with xeroform gauze and dressed appropriately with a telfa and gauze bandage to ensure continued blood supply and protect the attached pedicle.
Cheek-To-Nose Interpolation Flap Text: A decision was made to reconstruct the defect utilizing an interpolation axial flap and a staged reconstruction.  A telfa template was made of the defect.  This telfa template was then used to outline the Cheek-To-Nose Interpolation flap.  The donor area for the pedicle flap was then injected with anesthesia.  The flap was excised through the skin and subcutaneous tissue down to the layer of the underlying musculature.  The interpolation flap was carefully excised within this deep plane to maintain its blood supply.  The edges of the donor site were undermined.   The donor site was closed in a primary fashion.  The pedicle was then rotated into position and sutured.  Once the tube was sutured into place, adequate blood supply was confirmed with blanching and refill.  The pedicle was then wrapped with xeroform gauze and dressed appropriately with a telfa and gauze bandage to ensure continued blood supply and protect the attached pedicle.
Interpolation Flap Text: A decision was made to reconstruct the defect utilizing an interpolation axial flap and a staged reconstruction.  A telfa template was made of the defect.  This telfa template was then used to outline the interpolation flap.  The donor area for the pedicle flap was then injected with anesthesia.  The flap was excised through the skin and subcutaneous tissue down to the layer of the underlying musculature.  The interpolation flap was carefully excised within this deep plane to maintain its blood supply.  The edges of the donor site were undermined.   The donor site was closed in a primary fashion.  The pedicle was then rotated into position and sutured.  Once the tube was sutured into place, adequate blood supply was confirmed with blanching and refill.  The pedicle was then wrapped with xeroform gauze and dressed appropriately with a telfa and gauze bandage to ensure continued blood supply and protect the attached pedicle.
Melolabial Interpolation Flap Text: A decision was made to reconstruct the defect utilizing an interpolation axial flap and a staged reconstruction.  A telfa template was made of the defect.  This telfa template was then used to outline the melolabial interpolation flap.  The donor area for the pedicle flap was then injected with anesthesia.  The flap was excised through the skin and subcutaneous tissue down to the layer of the underlying musculature.  The pedicle flap was carefully excised within this deep plane to maintain its blood supply.  The edges of the donor site were undermined.   The donor site was closed in a primary fashion.  The pedicle was then rotated into position and sutured.  Once the tube was sutured into place, adequate blood supply was confirmed with blanching and refill.  The pedicle was then wrapped with xeroform gauze and dressed appropriately with a telfa and gauze bandage to ensure continued blood supply and protect the attached pedicle.
Mastoid Interpolation Flap Text: A decision was made to reconstruct the defect utilizing an interpolation axial flap and a staged reconstruction.  A telfa template was made of the defect.  This telfa template was then used to outline the mastoid interpolation flap.  The donor area for the pedicle flap was then injected with anesthesia.  The flap was excised through the skin and subcutaneous tissue down to the layer of the underlying musculature.  The pedicle flap was carefully excised within this deep plane to maintain its blood supply.  The edges of the donor site were undermined.   The donor site was closed in a primary fashion.  The pedicle was then rotated into position and sutured.  Once the tube was sutured into place, adequate blood supply was confirmed with blanching and refill.  The pedicle was then wrapped with xeroform gauze and dressed appropriately with a telfa and gauze bandage to ensure continued blood supply and protect the attached pedicle.
Posterior Auricular Interpolation Flap Text: A decision was made to reconstruct the defect utilizing an interpolation axial flap and a staged reconstruction.  A telfa template was made of the defect.  This telfa template was then used to outline the posterior auricular interpolation flap.  The donor area for the pedicle flap was then injected with anesthesia.  The flap was excised through the skin and subcutaneous tissue down to the layer of the underlying musculature.  The pedicle flap was carefully excised within this deep plane to maintain its blood supply.  The edges of the donor site were undermined.   The donor site was closed in a primary fashion.  The pedicle was then rotated into position and sutured.  Once the tube was sutured into place, adequate blood supply was confirmed with blanching and refill.  The pedicle was then wrapped with xeroform gauze and dressed appropriately with a telfa and gauze bandage to ensure continued blood supply and protect the attached pedicle.
Paramedian Forehead Flap Text: A decision was made to reconstruct the defect utilizing an interpolation axial flap and a staged reconstruction.  A telfa template was made of the defect.  This telfa template was then used to outline the paramedian forehead pedicle flap.  The donor area for the pedicle flap was then injected with anesthesia.  The flap was excised through the skin and subcutaneous tissue down to the layer of the underlying musculature.  The pedicle flap was carefully excised within this deep plane to maintain its blood supply.  The edges of the donor site were undermined.   The donor site was closed in a primary fashion.  The pedicle was then rotated into position and sutured.  Once the tube was sutured into place, adequate blood supply was confirmed with blanching and refill.  The pedicle was then wrapped with xeroform gauze and dressed appropriately with a telfa and gauze bandage to ensure continued blood supply and protect the attached pedicle.
Abbe Flap (Upper To Lower Lip) Text: The defect of the lower lip was assessed and measured.  Given the location and size of the defect, an Abbe flap was deemed most appropriate.  Using a sterile surgical marker, an appropriate Abbe flap was measured and drawn on the upper lip. Local anesthesia was then infiltrated.  A scalpel was then used to incise the upper lip through and through the skin, vermilion, muscle and mucosa, leaving the flap pedicled on the opposite side.  The flap was then rotated and transferred to the lower lip defect.  The flap was then sutured into place with a three layer technique, closing the orbicularis oris muscle layer with subcutaneous buried sutures, followed by a mucosal layer and an epidermal layer.
Abbe Flap (Lower To Upper Lip) Text: The defect of the upper lip was assessed and measured.  Given the location and size of the defect, an Abbe flap was deemed most appropriate.  Using a sterile surgical marker, an appropriate Abbe flap was measured and drawn on the lower lip. Local anesthesia was then infiltrated. A scalpel was then used to incise the upper lip through and through the skin, vermilion, muscle and mucosa, leaving the flap pedicled on the opposite side.  The flap was then rotated and transferred to the lower lip defect.  The flap was then sutured into place with a three layer technique, closing the orbicularis oris muscle layer with subcutaneous buried sutures, followed by a mucosal layer and an epidermal layer.
Estlander Flap (Upper To Lower Lip) Text: The defect of the lower lip was assessed and measured.  Given the location and size of the defect, an Estlander flap was deemed most appropriate.  Using a sterile surgical marker, an appropriate Estlander flap was measured and drawn on the upper lip. Local anesthesia was then infiltrated. A scalpel was then used to incise the lateral aspect of the flap, through skin, muscle and mucosa, leaving the flap pedicled medially.  The flap was then rotated and positioned to fill the lower lip defect.  The flap was then sutured into place with a three layer technique, closing the orbicularis oris muscle layer with subcutaneous buried sutures, followed by a mucosal layer and an epidermal layer.
Cheiloplasty (Less Than 50%) Text: A decision was made to reconstruct the defect with a  cheiloplasty.  The defect was undermined extensively.  Additional obicularis oris muscle was excised with a 15 blade scalpel.  The defect was converted into a full thickness wedge, of less than 50% of the vertical height of the lip, to facilite a better cosmetic result.  Small vessels were then tied off with 5-0 monocyrl. The obicularis oris, superficial fascia, adipose and dermis were then reapproximated.  After the deeper layers were approximated the epidermis was reapproximated with particular care given to realign the vermilion border.
Cheiloplasty (Complex) Text: A decision was made to reconstruct the defect with a  cheiloplasty.  The defect was undermined extensively.  Additional obicularis oris muscle was excised with a 15 blade scalpel.  The defect was converted into a full thickness wedge to facilite a better cosmetic result.  Small vessels were then tied off with 5-0 monocyrl. The obicularis oris, superficial fascia, adipose and dermis were then reapproximated.  After the deeper layers were approximated the epidermis was reapproximated with particular care given to realign the vermilion border.
Ear Wedge Repair Text: A wedge excision was completed by carrying down an excision through the full thickness of the ear and cartilage with an inward facing Burow's triangle. The wound was then closed in a layered fashion.
Full Thickness Lip Wedge Repair (Flap) Text: Given the location of the defect and the proximity to free margins a full thickness wedge repair was deemed most appropriate.  Using a sterile surgical marker, the appropriate repair was drawn incorporating the defect and placing the expected incisions perpendicular to the vermilion border.  The vermilion border was also meticulously outlined to ensure appropriate reapproximation during the repair.  The area thus outlined was incised through and through with a #15 scalpel blade.  The muscularis and dermis were reaproximated with deep sutures following hemostasis. Care was taken to realign the vermilion border before proceeding with the superficial closure.  Once the vermilion was realigned the superfical and mucosal closure was finished.
Ftsg Text: The defect edges were debeveled with a #15 scalpel blade.  Given the location of the defect, shape of the defect and the proximity to free margins a full thickness skin graft was deemed most appropriate.  Using a sterile surgical marker, the primary defect shape was transferred to the donor site. The area thus outlined was incised deep to adipose tissue with a #15 scalpel blade.  The harvested graft was then trimmed of adipose tissue until only dermis and epidermis was left.  The skin margins of the secondary defect were undermined to an appropriate distance in all directions utilizing iris scissors.  The secondary defect was closed with interrupted buried subcutaneous sutures.  The skin edges were then re-apposed with running  sutures.  The skin graft was then placed in the primary defect and oriented appropriately.
Split-Thickness Skin Graft Text: The defect edges were debeveled with a #15 scalpel blade.  Given the location of the defect, shape of the defect and the proximity to free margins a split thickness skin graft was deemed most appropriate.  Using a sterile surgical marker, the primary defect shape was transferred to the donor site. The split thickness graft was then harvested.  The skin graft was then placed in the primary defect and oriented appropriately.
Pinch Graft Text: The defect edges were debeveled with a #15 scalpel blade. Given the location of the defect, shape of the defect and the proximity to free margins a pinch graft was deemed most appropriate. Using a sterile surgical marker, the primary defect shape was transferred to the donor site. The area thus outlined was incised deep to adipose tissue with a #15 scalpel blade.  The harvested graft was then trimmed of adipose tissue until only dermis and epidermis was left. The skin margins of the secondary defect were undermined to an appropriate distance in all directions utilizing iris scissors.  The secondary defect was closed with interrupted buried subcutaneous sutures.  The skin edges were then re-apposed with running  sutures.  The skin graft was then placed in the primary defect and oriented appropriately.
Burow's Graft Text: The defect edges were debeveled with a #15 scalpel blade.  Given the location of the defect, shape of the defect, the proximity to free margins and the presence of a standing cone deformity a Burow's skin graft was deemed most appropriate. The standing cone was removed and this tissue was then trimmed to the shape of the primary defect. The adipose tissue was also removed until only dermis and epidermis were left.  The skin margins of the secondary defect were undermined to an appropriate distance in all directions utilizing iris scissors.  The secondary defect was closed with interrupted buried subcutaneous sutures.  The skin edges were then re-apposed with running  sutures.  The skin graft was then placed in the primary defect and oriented appropriately.
Cartilage Graft Text: The defect edges were debeveled with a #15 scalpel blade.  Given the location of the defect, shape of the defect, the fact the defect involved a full thickness cartilage defect a cartilage graft was deemed most appropriate.  An appropriate donor site was identified, cleansed, and anesthetized. The cartilage graft was then harvested and transferred to the recipient site, oriented appropriately and then sutured into place.  The secondary defect was then repaired using a primary closure.
Composite Graft Text: The defect edges were debeveled with a #15 scalpel blade.  Given the location of the defect, shape of the defect, the proximity to free margins and the fact the defect was full thickness a composite graft was deemed most appropriate.  The defect was outline and then transferred to the donor site.  A full thickness graft was then excised from the donor site. The graft was then placed in the primary defect, oriented appropriately and then sutured into place.  The secondary defect was then repaired using a primary closure.
Epidermal Autograft Text: The defect edges were debeveled with a #15 scalpel blade.  Given the location of the defect, shape of the defect and the proximity to free margins an epidermal autograft was deemed most appropriate.  Using a sterile surgical marker, the primary defect shape was transferred to the donor site. The epidermal graft was then harvested.  The skin graft was then placed in the primary defect and oriented appropriately.
Dermal Autograft Text: The defect edges were debeveled with a #15 scalpel blade.  Given the location of the defect, shape of the defect and the proximity to free margins a dermal autograft was deemed most appropriate.  Using a sterile surgical marker, the primary defect shape was transferred to the donor site. The area thus outlined was incised deep to adipose tissue with a #15 scalpel blade.  The harvested graft was then trimmed of adipose and epidermal tissue until only dermis was left.  The skin graft was then placed in the primary defect and oriented appropriately.
Skin Substitute Text: The defect edges were debeveled with a #15 scalpel blade.  Given the location of the defect, shape of the defect and the proximity to free margins a skin substitute graft was deemed most appropriate.  The graft material was trimmed to fit the size of the defect. The graft was then placed in the primary defect and oriented appropriately.
Tissue Cultured Epidermal Autograft Text: The defect edges were debeveled with a #15 scalpel blade.  Given the location of the defect, shape of the defect and the proximity to free margins a tissue cultured epidermal autograft was deemed most appropriate.  The graft was then trimmed to fit the size of the defect.  The graft was then placed in the primary defect and oriented appropriately.
Xenograft Text: The defect edges were debeveled with a #15 scalpel blade.  Given the location of the defect, shape of the defect and the proximity to free margins a xenograft was deemed most appropriate.  The graft was then trimmed to fit the size of the defect.  The graft was then placed in the primary defect and oriented appropriately.
Purse String (Simple) Text: Given the location of the defect and the characteristics of the surrounding skin a purse string closure was deemed most appropriate.  Undermining was performed circumfirentially around the surgical defect.  A purse string suture was then placed and tightened.
Purse String (Intermediate) Text: Given the location of the defect and the characteristics of the surrounding skin a purse string intermediate closure was deemed most appropriate.  Undermining was performed circumfirentially around the surgical defect.  A purse string suture was then placed and tightened.
Partial Purse String (Simple) Text: Given the location of the defect and the characteristics of the surrounding skin a simple purse string closure was deemed most appropriate.  Undermining was performed circumfirentially around the surgical defect.  A purse string suture was then placed and tightened. Wound tension only allowed a partial closure of the circular defect.
Partial Purse String (Intermediate) Text: Given the location of the defect and the characteristics of the surrounding skin an intermediate purse string closure was deemed most appropriate.  Undermining was performed circumfirentially around the surgical defect.  A purse string suture was then placed and tightened. Wound tension only allowed a partial closure of the circular defect.
Localized Dermabrasion With Wire Brush Text: The patient was draped in routine manner.  Localized dermabrasion using 3 x 17 mm wire brush was performed in routine manner to papillary dermis. This spot dermabrasion is being performed to complete skin cancer reconstruction. It also will eliminate the other sun damaged precancerous cells that are known to be part of the regional effect of a lifetime's worth of sun exposure. This localized dermabrasion is therapeutic and should not be considered cosmetic in any regard.
Tarsorrhaphy Text: A tarsorrhaphy was performed using Frost sutures.
Intermediate Repair And Flap Additional Text (Will Appearing After The Standard Complex Repair Text): The intermediate repair was not sufficient to completely close the primary defect. The remaining additional defect was repaired with the flap mentioned below.
Intermediate Repair And Graft Additional Text (Will Appearing After The Standard Complex Repair Text): The intermediate repair was not sufficient to completely close the primary defect. The remaining additional defect was repaired with the graft mentioned below.
Complex Repair And Flap Additional Text (Will Appearing After The Standard Complex Repair Text): The complex repair was not sufficient to completely close the primary defect. The remaining additional defect was repaired with the flap mentioned below.
Complex Repair And Graft Additional Text (Will Appearing After The Standard Complex Repair Text): The complex repair was not sufficient to completely close the primary defect. The remaining additional defect was repaired with the graft mentioned below.
Manual Repair Warning Statement: We plan on removing the manually selected variable below in favor of our much easier automatic structured text blocks found in the previous tab. We decided to do this to help make the flow better and give you the full power of structured data. Manual selection is never going to be ideal in our platform and I would encourage you to avoid using manual selection from this point on, especially since I will be sunsetting this feature. It is important that you do one of two things with the customized text below. First, you can save all of the text in a word file so you can have it for future reference. Second, transfer the text to the appropriate area in the Library tab. Lastly, if there is a flap or graft type which we do not have you need to let us know right away so I can add it in before the variable is hidden. No need to panic, we plan to give you roughly 6 months to make the change.
Same Histology In Subsequent Stages Text: The pattern and morphology of the tumor is as described in the first stage.
No Residual Tumor Seen Histology Text: There were no malignant cells seen in the sections examined.
Inflammation Suggestive Of Cancer Camouflage Histology Text: There was a dense lymphocytic infiltrate which prevented adequate histologic evaluation of adjacent structures.
Bcc Histology Text: There were numerous aggregates of basaloid cells.
Bcc Infiltrative Histology Text: There were numerous aggregates of basaloid cells demonstrating an infiltrative pattern.
Mart-1 - Positive Histology Text: MART-1 staining demonstrates areas of higher density and clustering of melanocytes with Pagetoid spread upwards within the epidermis. The surgical margins are positive for tumor cells.
Mart-1 - Negative Histology Text: MART-1 staining demonstrates a normal density and pattern of melanocytes along the dermal-epidermal junction. The surgical margins are negative for tumor cells.
Information: Selecting Yes will display possible errors in your note based on the variables you have selected. This validation is only offered as a suggestion for you. PLEASE NOTE THAT THE VALIDATION TEXT WILL BE REMOVED WHEN YOU FINALIZE YOUR NOTE. IF YOU WANT TO FAX A PRELIMINARY NOTE YOU WILL NEED TO TOGGLE THIS TO 'NO' IF YOU DO NOT WANT IT IN YOUR FAXED NOTE.
Mohs Case Number: [id number]
Previous Accession (Optional): [id number]

## 2023-09-20 ENCOUNTER — APPOINTMENT (RX ONLY)
Dept: URBAN - METROPOLITAN AREA CLINIC 20 | Facility: CLINIC | Age: 68
Setting detail: DERMATOLOGY
End: 2023-09-20

## 2023-09-20 DIAGNOSIS — S0182XA OPEN WOUND OF OTHER AND MULTIPLE SITES OF FACE, COMPLICATED: ICD-10-CM | Status: INADEQUATELY CONTROLLED

## 2023-09-20 PROBLEM — S81.802A UNSPECIFIED OPEN WOUND, LEFT LOWER LEG, INITIAL ENCOUNTER: Status: ACTIVE | Noted: 2023-09-20

## 2023-09-20 PROCEDURE — ? ABLATIVE LASER TREATMENT

## 2023-09-20 PROCEDURE — 97597 DBRDMT OPN WND 1ST 20 CM/<: CPT

## 2023-09-20 PROCEDURE — ? CO2RE

## 2023-09-20 PROCEDURE — 17999 UNLISTD PX SKN MUC MEMB SUBQ: CPT

## 2023-09-20 PROCEDURE — ? ADDITIONAL NOTES

## 2023-09-20 PROCEDURE — ? DEBRIDEMENT OF OPEN WOUND

## 2023-09-20 ASSESSMENT — LOCATION ZONE DERM: LOCATION ZONE: LEG

## 2023-09-20 ASSESSMENT — LOCATION DETAILED DESCRIPTION DERM: LOCATION DETAILED: LEFT DISTAL PRETIBIAL REGION

## 2023-09-20 ASSESSMENT — LOCATION SIMPLE DESCRIPTION DERM: LOCATION SIMPLE: LEFT PRETIBIAL REGION

## 2023-09-20 NOTE — PROCEDURE: CO2RE
Post-Care Instructions: I reviewed with the patient in detail post-care instructions.
Treatment Number: 1
Eye Protection Text: A corneal shield was inserted after appropriate application of topical anesthesia.
Consent: Written consent obtained, risks reviewed including but not limited to crusting, scabbing, blistering, scarring, darker or lighter pigmentary change, incomplete improvement of dyschromia, wrinkles, prolonged erythema and facial swelling, infection and bleeding.
Length Of Topical Anesthesia Application (Optional): 15 minutes
Laser Mode: Light
Was Eye Protection Used?: No
External Cooling Fan Speed: 5
Laser Mode: Deep
Detail Level: Simple
Core Energy (Mj): 80
Topical Anesthesia?: 30% lidocaine, 7% tetracaine

## 2023-09-20 NOTE — PROCEDURE: ADDITIONAL NOTES
Detail Level: Simple
Render Risk Assessment In Note?: no
Additional Notes: Repair Note: \\n\\nLocation: left distal pretibial region\\nWound Dimensions: 1.3cm x 1.0cm\\nSkin Substitute: Puracol Plus (2 x 2.25 inches)\\nEstimated Blood Loss: minimal\\nComplications: none\\n\\nThe rationale for Repairs was explained to the patient and consent was obtained. The risks, benefits and alternatives to therapy were discussed in detail. Specifically, the risks of infection, scarring, bleeding, prolonged wound healing, incomplete removal, allergy to anesthesia, nerve injury and recurrence were addressed. The area was prepped with Saline. Given the location of the defect, shape of the defect and the proximity to free margins a skin substitute graft was deemed most appropriate.  The Puracol was trimmed to fit the size of the defect. The graft was then placed in the primary defect and oriented appropriately. I reviewed with the patient in detail post-care instructions\\n\\n Repair Note: \\n\\nLocation: left lateral distal pretibial region\\nWound dimensions: 1.7cm x 1.5cm\\nSkin Substitute: Puracol Plus (2 x 2.25 inches)\\nEstimated Blood Loss: minimal\\nComplications: none\\n\\nThe rationale for Repairs was explained to the patient and consent was obtained. The risks, benefits and alternatives to therapy were discussed in detail. Specifically, the risks of infection, scarring, bleeding, prolonged wound healing, incomplete removal, allergy to anesthesia, nerve injury and recurrence were addressed. The area was prepped with Saline. Given the location of the defect, shape of the defect and the proximity to free margins a skin substitute graft was deemed most appropriate.  The puracol was trimmed to fit the size of the defect. The graft was then placed in the primary defect and oriented appropriately. I reviewed with the patient in detail post-care instructions

## 2023-09-20 NOTE — PROCEDURE: ABLATIVE LASER TREATMENT
Length (Y Dimension): 1.5
Detail Level: Detailed
Width (X Dimension): 1.7
Detail Level: Simple
Width (X Dimension): 0

## 2023-09-25 ENCOUNTER — APPOINTMENT (RX ONLY)
Dept: URBAN - METROPOLITAN AREA CLINIC 36 | Facility: CLINIC | Age: 68
Setting detail: DERMATOLOGY
End: 2023-09-25

## 2023-09-25 ENCOUNTER — RX ONLY (OUTPATIENT)
Age: 68
Setting detail: RX ONLY
End: 2023-09-25

## 2023-09-25 DIAGNOSIS — Z48.817 ENCOUNTER FOR SURGICAL AFTERCARE FOLLOWING SURGERY ON THE SKIN AND SUBCUTANEOUS TISSUE: ICD-10-CM

## 2023-09-25 PROCEDURE — ? DRESSING CHANGE (GLOBAL PERIOD)

## 2023-09-25 PROCEDURE — 99024 POSTOP FOLLOW-UP VISIT: CPT

## 2023-09-25 RX ORDER — CLINDAMYCIN HYDROCHLORIDE 300 MG/1
1 TABLET CAPSULE ORAL EVERY 12 HOURS
Qty: 20 | Refills: 0 | Status: ERX | COMMUNITY
Start: 2023-09-24

## 2023-09-25 RX ORDER — HYDROCODONE BITARTRATE AND ACETAMINOPHEN 5; 300 MG/1; MG/1
TABLET ORAL
Qty: 20 | Refills: 0 | Status: ERX | COMMUNITY
Start: 2023-09-24

## 2023-09-25 ASSESSMENT — LOCATION DETAILED DESCRIPTION DERM: LOCATION DETAILED: LEFT DISTAL PRETIBIAL REGION

## 2023-09-25 ASSESSMENT — LOCATION SIMPLE DESCRIPTION DERM: LOCATION SIMPLE: LEFT PRETIBIAL REGION

## 2023-09-25 ASSESSMENT — LOCATION ZONE DERM: LOCATION ZONE: LEG

## 2023-09-25 NOTE — PROCEDURE: DRESSING CHANGE (GLOBAL PERIOD)
Detail Level: Detailed
Add 66355 Cpt? (Important Note: In 2017 The Use Of 31560 Is Being Tracked By Cms To Determine Future Global Period Reimbursement For Global Periods): yes

## 2023-09-27 ENCOUNTER — HOSPITAL ENCOUNTER (OUTPATIENT)
Facility: MEDICAL CENTER | Age: 68
End: 2023-09-27
Attending: DERMATOLOGY
Payer: MEDICARE

## 2023-09-27 ENCOUNTER — APPOINTMENT (RX ONLY)
Dept: URBAN - METROPOLITAN AREA CLINIC 36 | Facility: CLINIC | Age: 68
Setting detail: DERMATOLOGY
End: 2023-09-27

## 2023-09-27 DIAGNOSIS — Z48.817 ENCOUNTER FOR SURGICAL AFTERCARE FOLLOWING SURGERY ON THE SKIN AND SUBCUTANEOUS TISSUE: ICD-10-CM

## 2023-09-27 LAB — AMBIGUOUS DTTM AMBI4: NORMAL

## 2023-09-27 PROCEDURE — 87077 CULTURE AEROBIC IDENTIFY: CPT

## 2023-09-27 PROCEDURE — ? ORDER TESTS

## 2023-09-27 PROCEDURE — 99024 POSTOP FOLLOW-UP VISIT: CPT

## 2023-09-27 PROCEDURE — 87070 CULTURE OTHR SPECIMN AEROBIC: CPT

## 2023-09-27 PROCEDURE — 87205 SMEAR GRAM STAIN: CPT

## 2023-09-27 PROCEDURE — 87186 SC STD MICRODIL/AGAR DIL: CPT | Mod: 91

## 2023-09-27 PROCEDURE — ? DRESSING CHANGE (GLOBAL PERIOD)

## 2023-09-27 ASSESSMENT — LOCATION DETAILED DESCRIPTION DERM: LOCATION DETAILED: LEFT DISTAL PRETIBIAL REGION

## 2023-09-27 ASSESSMENT — LOCATION ZONE DERM: LOCATION ZONE: LEG

## 2023-09-27 ASSESSMENT — LOCATION SIMPLE DESCRIPTION DERM: LOCATION SIMPLE: LEFT PRETIBIAL REGION

## 2023-09-27 NOTE — PROCEDURE: DRESSING CHANGE (GLOBAL PERIOD)
Detail Level: Detailed
Add 66792 Cpt? (Important Note: In 2017 The Use Of 17053 Is Being Tracked By Cms To Determine Future Global Period Reimbursement For Global Periods): yes

## 2023-09-27 NOTE — PROCEDURE: ORDER TESTS
Bill For Surgical Tray: no
Performing Laboratory: 0
Billing Type: Third-Party Bill
Expected Date Of Service: 09/27/2023

## 2023-09-28 ENCOUNTER — OFFICE VISIT (OUTPATIENT)
Dept: CARDIOLOGY | Facility: MEDICAL CENTER | Age: 68
End: 2023-09-28
Attending: INTERNAL MEDICINE
Payer: MEDICARE

## 2023-09-28 VITALS
RESPIRATION RATE: 16 BRPM | HEART RATE: 77 BPM | WEIGHT: 140 LBS | HEIGHT: 64 IN | DIASTOLIC BLOOD PRESSURE: 80 MMHG | OXYGEN SATURATION: 98 % | BODY MASS INDEX: 23.9 KG/M2 | SYSTOLIC BLOOD PRESSURE: 142 MMHG

## 2023-09-28 DIAGNOSIS — I10 ESSENTIAL HYPERTENSION, BENIGN: ICD-10-CM

## 2023-09-28 DIAGNOSIS — I35.1 MODERATE AORTIC REGURGITATION: ICD-10-CM

## 2023-09-28 DIAGNOSIS — E78.00 PURE HYPERCHOLESTEROLEMIA: ICD-10-CM

## 2023-09-28 LAB
GRAM STN SPEC: NORMAL
SIGNIFICANT IND 70042: NORMAL
SITE SITE: NORMAL
SOURCE SOURCE: NORMAL

## 2023-09-28 PROCEDURE — 3077F SYST BP >= 140 MM HG: CPT | Performed by: INTERNAL MEDICINE

## 2023-09-28 PROCEDURE — 99214 OFFICE O/P EST MOD 30 MIN: CPT | Performed by: INTERNAL MEDICINE

## 2023-09-28 PROCEDURE — 3079F DIAST BP 80-89 MM HG: CPT | Performed by: INTERNAL MEDICINE

## 2023-09-28 PROCEDURE — 99213 OFFICE O/P EST LOW 20 MIN: CPT | Performed by: INTERNAL MEDICINE

## 2023-09-28 RX ORDER — RIBOFLAVIN (VITAMIN B2) 100 MG
200 TABLET ORAL 2 TIMES DAILY
COMMUNITY
Start: 2023-08-14

## 2023-09-28 RX ORDER — CLINDAMYCIN HYDROCHLORIDE 300 MG/1
CAPSULE ORAL
COMMUNITY
Start: 2023-09-24 | End: 2024-03-21

## 2023-09-28 RX ORDER — HYDROCHLOROTHIAZIDE 12.5 MG/1
12.5 CAPSULE, GELATIN COATED ORAL DAILY
Qty: 90 CAPSULE | Refills: 3 | Status: SHIPPED | OUTPATIENT
Start: 2023-09-28 | End: 2024-03-21 | Stop reason: SDUPTHER

## 2023-09-28 RX ORDER — FAMOTIDINE 40 MG/1
40 TABLET, FILM COATED ORAL DAILY
COMMUNITY

## 2023-09-28 RX ORDER — SIMVASTATIN 10 MG
TABLET ORAL
COMMUNITY
Start: 2023-08-14

## 2023-09-28 RX ORDER — POTASSIUM CHLORIDE 750 MG/1
10 TABLET, FILM COATED, EXTENDED RELEASE ORAL DAILY
Qty: 90 TABLET | Refills: 3 | Status: SHIPPED | OUTPATIENT
Start: 2023-09-28 | End: 2024-03-21 | Stop reason: SDUPTHER

## 2023-09-28 RX ORDER — NORTRIPTYLINE HYDROCHLORIDE 10 MG/1
CAPSULE ORAL
COMMUNITY
Start: 2023-08-11

## 2023-09-28 ASSESSMENT — FIBROSIS 4 INDEX: FIB4 SCORE: 0.94

## 2023-09-28 NOTE — Clinical Note
Good afternoon! Can we call or message Shahrzad in a week and get new BP readings? If below 140/90 please enter into flowsheet. If not please let me know and we can adjust their meds, thanks!

## 2023-09-28 NOTE — PROGRESS NOTES
Cardiology Follow-up Consultation Note    Date of note: 9/28/2023  Primary Care Provider: Chloe Donnelly M.D.  Referring Provider: Sharath Macias M.D.     Patient Name: Shahrzad Scott   YOB: 1955  MRN:              [id number]    Chief Complaint: palpitations    History of Present Illness: Shahrzad Scott is a 68 y.o. female whose current medical problems include dyslipidemia, hypertension, and aortic regurgitation secondary to phen phen who is here for follow-up.    At our visit, 5/30/2018:  She does have occasional mild severity palpitations, sometimes not for a couple months, sometimes every day, and at most 30 minutes. Sometimes these do stop with coughing.  She does not know if her heart rate was irregular.     In terms of hypertension, usually 120s/80s. Did have hypotension with losartan 12.5mg PO Daily, now taking it every other day.     Does not exercise.    At our visit,  11/13/2018:  In terms of hypertension, Had severe dizziness even on taking losartan 12.5mg PO every other day. Currently without medication, BP 110s-120s at home.     HR in the 60s-70s.    She gets dizzy 2-3 times a week, can be severe and feels like she's off balance.  Happens in the shower, when driving in a specific spot on [place].  No syncope. This started about a month ago.  It is much worse when she moves her head back.     Continues to have occasional palpitations.     At our visit, 5/23/2019:  Has worsening thyroid nodules, awaiting thyroidectomy.     At our visit, 7/22/2020:  Hypertension well controlled at home.     Continues to have mild to moderate severity dizziness at times, typically once a day which lasts from 2-30 minutes. Gets dizzy everytime she steps into the shower. Sometimes associated with abnormal smells. Has seen ENT and Dr. Dailey. Dizziness is not related to standing up or orthostasis or palpitations. Meclizine does improve this.     At our visit, 2/15/2022:  BP ranges from 120-140.  Worried because her mother ignored having high blood pressure and passed away from a stroke because of it.     Still with rare mild palpitations.     SOB improved since she retired.     Interim Events:   Thyroidectomy March of 2022.     ER visit throught to be BPPV 4/2022.     Saw Nick last month for dizziness (ENT/Neuro), Dr. Hector Echols. Recommended NSAIDS and sertraline or nortriptylene. Started nortriptylene and does feel like vestibular migraines are improved a big.     Did start working out on treadmill 6 days a week until she got an infection in her leg from a SCC resection.     BP at home well controlled.     Still with occasional mild SOB or palpitations. Brief and stable to improved.     ROS  No fevers, CVA symptoms, bleeding.       Past Medical History:   Diagnosis Date    Anesthesia     n/v, may req scopalamine patch    Aortic regurgitation 1996    Arthritis     hands    Breath shortness     Cataract     bilat IOL    Disorder of thyroid     nodules    GERD (gastroesophageal reflux disease)     Hemorrhagic disorder (HCC)     nose bleeds    High cholesterol     History of aortic insufficiency 1996    seeing Dr. Higuera    HLD (hyperlipidemia)     Hypertension     Mitral regurgitation     MVP (mitral valve prolapse)     Palpitations     PONV (postoperative nausea and vomiting)     Urinary incontinence          Past Surgical History:   Procedure Laterality Date    THYROIDECTOMY Left 3/24/2022    Procedure: THYROIDECTOMY - REMOVAL REMAINING TISSUE, FOLLOWING PREVIOUS PARTIAL REMOVAL, PORTION THYROID using NIMS;  Surgeon: Dirk Hilliard M.D.;  Location: SURGERY SAME DAY [facility];  Service: Ent    THYROID LOBECTOMY Right 7/18/2019    Procedure: LOBECTOMY, THYROID WITH FROZEN SECTION;  Surgeon: Dirk Hilliard M.D.;  Location: SURGERY SAME DAY [facility];  Service: Ent    MYRINGOTOMY Bilateral 7/18/2019    Procedure: MYRINGOTOMY WITH TUBES;  Surgeon: Dirk Hilliard M.D.;  Location: SURGERY SAME DAY  [facility];  Service: Ent    HAND SURGERY Bilateral 2005    thumb fusions     HYSTERECTOMY, VAGINAL  1992    BLEPHAROPLASTY, ELAINE (UPPER OR LOWER)  1990    APPENDECTOMY      BREAST RECONSTRUCTION Bilateral     CATARACT EXTRACTION WITH IOL Bilateral     Dr. Wilder    OTHER ORTHOPEDIC SURGERY  [id number]    multiplre knee surgeries.     RECONSTRUCTION, KNEE, ACL, ARTHROSCOPIC, USING TIBIALIS TENDON AUTOGRAFT           Current Outpatient Medications   Medication Sig Dispense Refill    Riboflavin (VITAMIN B-2) 100 MG Tab 200 mg 2 times a day.      famotidine (PEPCID) 40 MG Tab Take 40 mg by mouth every day.      nortriptyline (PAMELOR) 10 MG Cap Take 10mg at bedtime for 7 days, then increase every 7 days by 10 mg to maximum dose of 40 mg.      simvastatin (ZOCOR) 10 MG Tab       levothyroxine (SYNTHROID) 100 MCG Tab Take 1 Tablet by mouth every morning on an empty stomach. 90 Tablet 0    fexofenadine (ALLEGRA) 180 MG tablet Take 1 Tablet by mouth every day.      CALCIUM PO Take 750 mg by mouth 2 times a day.      COLLAGEN PO Take 1 Dose by mouth every day.      MAGNESIUM PO Take 1 Tablet by mouth 2 times a day.      ascorbic acid (ASCORBIC ACID) 500 MG Tab Take 1 Tablet by mouth every day.      niacinamide 500 MG tablet Take 1 Tablet by mouth 2 times a day. 60 Tab 3    TURMERIC PO Take 1 Tablet by mouth 2 times a day.      COENZYME Q10 PO Take 300 mg by mouth every day.      MINIVELLE 0.025 MG/24HR PATCH BIWEEKLY Place 1 Patch on the skin two times a week. Apply on Wednesday and Saturday  Indications: Moderate to Severe Vasomotor Symptoms      Multiple Vitamins-Minerals (MULTIVITAMIN ADULTS 50+ PO) Take 1 Tablet by mouth every day.      Probiotic Product (PROBIOTIC DAILY PO) Take 1 Tablet by mouth every day.      Alpha Lipoic Acid 200 MG Cap Take 200 mg by mouth every day.      vitamin D (CHOLECALCIFEROL) 1000 UNIT TABS Take 5,000 Units by mouth every day. 100 mcg of vitamin K      hydrochlorothiazide (MICROZIDE) 12.5  MG capsule TAKE 1 CAPSULE DAILY 90 Capsule 3    potassium chloride ER (KLOR-CON) 10 MEQ tablet TAKE 1 TABLET DAILY 90 Tablet 3    clindamycin (CLEOCIN) 300 MG Cap       omeprazole (PRILOSEC) 20 MG delayed-release capsule TAKE 1 CAPSULE BY MOUTH EVERY DAY 90 Capsule 3    mupirocin (BACTROBAN) 2 % Ointment       levothyroxine (SYNTHROID) 100 MCG Tab TAKE 1 TABLET BY MOUTH EVERY MORNING ON AN EMPTY STOMACH 90 Tablet 0     No current facility-administered medications for this visit.         Allergies   Allergen Reactions    Septra [Bactrim] Hives    Sulfa Drugs Hives         Family History   Problem Relation Age of Onset    Stroke Mother     Hypertension Mother     Cancer Father         prostate cancer    Lung Disease Sister         asthma    No Known Problems Brother     No Known Problems Maternal Grandmother     No Known Problems Maternal Grandfather     Other Brother         cerabral palsy         Social History     Socioeconomic History    Marital status:      Spouse name: Not on file    Number of children: Not on file    Years of education: Not on file    Highest education level: Some college, no degree   Occupational History    Not on file   Tobacco Use    Smoking status: Never    Smokeless tobacco: Never   Vaping Use    Vaping Use: Never used   Substance and Sexual Activity    Alcohol use: Not Currently    Drug use: Not Currently    Sexual activity: Never   Other Topics Concern    Not on file   Social History Narrative    Not on file     Social Determinants of Health     Financial Resource Strain: Low Risk  (8/9/2022)    Overall Financial Resource Strain (CARDIA)     Difficulty of Paying Living Expenses: Not hard at all   Food Insecurity: No Food Insecurity (8/9/2022)    Hunger Vital Sign     Worried About Running Out of Food in the Last Year: Never true     Ran Out of Food in the Last Year: Never true   Transportation Needs: No Transportation Needs (8/9/2022)    PRAPARE - Transportation     Lack of  "Transportation (Medical): No     Lack of Transportation (Non-Medical): No   Physical Activity: Insufficiently Active (8/9/2022)    Exercise Vital Sign     Days of Exercise per Week: 3 days     Minutes of Exercise per Session: 30 min   Stress: No Stress Concern Present (8/9/2022)    [employer] - Occupational Stress Questionnaire     Feeling of Stress : Not at all   Social Connections: Socially Isolated (8/9/2022)    Social Connection and Isolation Panel [NHANES]     Frequency of Communication with Friends and Family: More than three times a week     Frequency of Social Gatherings with Friends and Family: More than three times a week     Attends Holiness Services: Never     Active Member of Clubs or Organizations: No     Attends Club or Organization Meetings: Never     Marital Status:    Intimate Partner Violence: Not on file   Housing Stability: Low Risk  (8/9/2022)    Housing Stability Vital Sign     Unable to Pay for Housing in the Last Year: No     Number of Places Lived in the Last Year: 1     Unstable Housing in the Last Year: No         Physical Exam:  Ambulatory Vitals  BP (!) 142/80 (BP Location: Left arm, Patient Position: Sitting, BP Cuff Size: Adult)   Pulse 77   Resp 16   Ht 1.626 m (5' 4\")   Wt 63.5 kg (140 lb)   SpO2 98%    Oxygen Therapy:     BP Readings from Last 4 Encounters:   10/06/23 134/72   09/28/23 (!) 142/80   11/17/22 122/78   08/12/22 114/66       Weight/BMI: Body mass index is 24.03 kg/m².  Wt Readings from Last 4 Encounters:   09/28/23 63.5 kg (140 lb)   03/14/23 63 kg (139 lb)   11/17/22 66.8 kg (147 lb 3.2 oz)   08/12/22 65.5 kg (144 lb 6.4 oz)       General: No apparent distress  Eyes: nl conjunctiva  Neck: JVP <8 cm H20  Lungs: normal respiratory effort, CTAB  Heart: RRR, 1/6 systolic murmur, no rubs or gallops, no edema bilateral lower extremities. No LV/RV heave on cardiac palpatation. 2+ bilateral radial pulses.   Abdomen: soft, non tender, " "non distended, no masses, normal bowel sounds.  No HSM.  Extremities/MSK: no clubbing, no cyanosis  Neurological: No focal sensory deficits  Psychiatric: Appropriate affect, A/O x 3, intact judgement and insight  Skin: Warm extremities    Exam repeated in full and unchanged except for as noted above.      Lab Data Review:  Lab Results   Component Value Date/Time    CHOLSTRLTOT 180 08/03/2023 10:16 AM     (H) 08/03/2023 10:16 AM    HDL 47 08/03/2023 10:16 AM    TRIGLYCERIDE 113 08/03/2023 10:16 AM       Lab Results   Component Value Date/Time    SODIUM 140 08/03/2023 10:16 AM    POTASSIUM 4.5 08/03/2023 10:16 AM    CHLORIDE 105 08/03/2023 10:16 AM    CO2 27 08/03/2023 10:16 AM    GLUCOSE 103 (H) 08/03/2023 10:16 AM    BUN 15 08/03/2023 10:16 AM    CREATININE 0.81 08/03/2023 10:16 AM    BUNCREATRAT 19 05/11/2020 07:31 AM     Lab Results   Component Value Date/Time    ALKPHOSPHAT 64 08/03/2023 10:16 AM    ASTSGOT 14 08/03/2023 10:16 AM    ALTSGPT 16 08/03/2023 10:16 AM    TBILIRUBIN 0.4 08/03/2023 10:16 AM      Lab Results   Component Value Date/Time    WBC 4.6 (L) 08/03/2023 10:16 AM    HEMOGLOBIN 13.5 08/03/2023 10:16 AM     No components found for: \"HBGA1C\"  No components found for: \"TROPONIN\"  No results found for: \"BNP\"      Cardiac Imaging and Procedures Review:    EKG dated 5/30/2018 : My personal interpretation is NSR, LAE    Echo dated 3/2023:  CONCLUSIONS  Compared to the images of the prior study 11.02.2021, there has been   mild progression of aortic insufficiency.  Normal LV size, thickness and systolic function with estimated LVEF   63%.  Normal RV size and systolic function  Moderate aortic insufficiency  Mild mitral regurgitation  Moderate tricuspid regurgitation  No pericardial effusion  Normal IVC size  Estimated RVSP 33mmHg      biotel 8/2020:  CONCLUSIONS:   * 14 day biotel reviewed   * 20 episodes of SVT, all brief and asymptomatic.   * Artifact without clear arrhythmia during triggered " events/symptoms   * No atrial fibrillation   * Rare ectopy.     CCS 2018:  Coronary calcification:  LMA - 0.0  LCX - 0.0  LAD - 0.0  RCA - 0.0  PDA - 0.0    Calcium score:  0    Carotid US 2018:  Vascular Laboratory   CONCLUSIONS   1.  Minimal plaque in the right carotid bulb without stenosis.    2.  Normal left carotid arterial exam.    3.  Normal bilateral subclavian and vertebral arterial exam.      Medical Decision Makin. MVP (mitral valve prolapse)  Not seen on recent echos. No need for monitoring.     2. Moderate aortic regurgitation  Repeat echo Q 1 years Next 3/2024.     3. Essential hypertension  Well controlled  -continue hctz, potassium.     4. Primary prevention  CCS 0. No indication for statin or aspirin.  Recheck as it's been 5 years.     5. Palpitations  S/p zio in .     6. Dizziness - likely BPPV, will discuss with PCP. negative carotid US. Has had extremely extensive work-up with ENT and neurology including ambulatory EEG it sounds like and CTA of the head.   -will try hctz.   -continue nortriptylene, appreciate f/u with Nick.     Return in about 1 year (around 2024).      Hans Higuera MD, [facility]  [facility], [address]  [address] [place] [address], [address] [address]  Phone: [phone]  Fax: [phone]

## 2023-09-29 ENCOUNTER — APPOINTMENT (RX ONLY)
Dept: URBAN - METROPOLITAN AREA CLINIC 36 | Facility: CLINIC | Age: 68
Setting detail: DERMATOLOGY
End: 2023-09-29

## 2023-09-29 DIAGNOSIS — Z48.817 ENCOUNTER FOR SURGICAL AFTERCARE FOLLOWING SURGERY ON THE SKIN AND SUBCUTANEOUS TISSUE: ICD-10-CM

## 2023-09-29 LAB
BACTERIA WND AEROBE CULT: ABNORMAL
GRAM STN SPEC: ABNORMAL
SIGNIFICANT IND 70042: ABNORMAL
SITE SITE: ABNORMAL
SOURCE SOURCE: ABNORMAL

## 2023-09-29 PROCEDURE — ? POST-OP WOUND CHECK

## 2023-09-29 PROCEDURE — 99024 POSTOP FOLLOW-UP VISIT: CPT

## 2023-09-29 PROCEDURE — ? PRESCRIPTION

## 2023-09-29 RX ORDER — AMOXICILLIN AND CLAVULANATE POTASSIUM 500; 125 MG/1; 1/1
TABLET, FILM COATED ORAL
Qty: 20 | Refills: 0 | Status: ERX | COMMUNITY
Start: 2023-09-29

## 2023-09-29 RX ADMIN — AMOXICILLIN AND CLAVULANATE POTASSIUM 1: 500; 125 TABLET, FILM COATED ORAL at 00:00

## 2023-09-29 ASSESSMENT — LOCATION DETAILED DESCRIPTION DERM
LOCATION DETAILED: LEFT PROXIMAL PRETIBIAL REGION
LOCATION DETAILED: LEFT DISTAL PRETIBIAL REGION

## 2023-09-29 ASSESSMENT — LOCATION SIMPLE DESCRIPTION DERM: LOCATION SIMPLE: LEFT PRETIBIAL REGION

## 2023-09-29 ASSESSMENT — LOCATION ZONE DERM: LOCATION ZONE: LEG

## 2023-09-29 NOTE — PROCEDURE: POST-OP WOUND CHECK
Detail Level: Generalized
Add 30877 Cpt? (Important Note: In 2017 The Use Of 76506 Is Being Tracked By Cms To Determine Future Global Period Reimbursement For Global Periods): yes
Wound Dressing Override (Optional): Xerofoam, Telfa, Coband
Wound Evaluated By: Kade Mobley MD
Additional Comments: Patient to keep area covered and dry until next office visit.

## 2023-10-02 ENCOUNTER — APPOINTMENT (RX ONLY)
Dept: URBAN - METROPOLITAN AREA CLINIC 36 | Facility: CLINIC | Age: 68
Setting detail: DERMATOLOGY
End: 2023-10-02

## 2023-10-02 DIAGNOSIS — Z48.817 ENCOUNTER FOR SURGICAL AFTERCARE FOLLOWING SURGERY ON THE SKIN AND SUBCUTANEOUS TISSUE: ICD-10-CM

## 2023-10-02 PROCEDURE — ? DRESSING CHANGE (GLOBAL PERIOD)

## 2023-10-02 PROCEDURE — 99024 POSTOP FOLLOW-UP VISIT: CPT

## 2023-10-02 ASSESSMENT — LOCATION SIMPLE DESCRIPTION DERM: LOCATION SIMPLE: LEFT PRETIBIAL REGION

## 2023-10-02 ASSESSMENT — LOCATION DETAILED DESCRIPTION DERM: LOCATION DETAILED: LEFT DISTAL PRETIBIAL REGION

## 2023-10-02 ASSESSMENT — LOCATION ZONE DERM: LOCATION ZONE: LEG

## 2023-10-02 NOTE — PROCEDURE: DRESSING CHANGE (GLOBAL PERIOD)
Detail Level: Detailed
Add 59905 Cpt? (Important Note: In 2017 The Use Of 97673 Is Being Tracked By Cms To Determine Future Global Period Reimbursement For Global Periods): yes

## 2023-10-05 ENCOUNTER — PATIENT MESSAGE (OUTPATIENT)
Dept: CARDIOLOGY | Facility: MEDICAL CENTER | Age: 68
End: 2023-10-05
Payer: MEDICARE

## 2023-10-06 ENCOUNTER — TELEPHONE (OUTPATIENT)
Dept: CARDIOLOGY | Facility: MEDICAL CENTER | Age: 68
End: 2023-10-06

## 2023-10-06 ENCOUNTER — TELEPHONE (OUTPATIENT)
Dept: CARDIOLOGY | Facility: MEDICAL CENTER | Age: 68
End: 2023-10-06
Payer: MEDICARE

## 2023-10-06 ENCOUNTER — PATIENT MESSAGE (OUTPATIENT)
Dept: CARDIOLOGY | Facility: MEDICAL CENTER | Age: 68
End: 2023-10-06
Payer: MEDICARE

## 2023-10-06 ENCOUNTER — NON-PROVIDER VISIT (OUTPATIENT)
Dept: CARDIOLOGY | Facility: MEDICAL CENTER | Age: 68
End: 2023-10-06
Attending: INTERNAL MEDICINE
Payer: MEDICARE

## 2023-10-06 VITALS — HEART RATE: 101 BPM | SYSTOLIC BLOOD PRESSURE: 134 MMHG | OXYGEN SATURATION: 96 % | DIASTOLIC BLOOD PRESSURE: 72 MMHG

## 2023-10-06 DIAGNOSIS — R00.2 PALPITATIONS: ICD-10-CM

## 2023-10-06 DIAGNOSIS — I35.1 MODERATE AORTIC REGURGITATION: ICD-10-CM

## 2023-10-06 PROCEDURE — 93005 ELECTROCARDIOGRAM TRACING: CPT

## 2023-10-06 NOTE — PROGRESS NOTES
Patient here for non provider visit. Spoke to patient who states she is feeling well this am. VS documented in chart. Advised GAMALIEL Cullen to call and follow up with any recommendations from MD/CYNTHIA. Discussed taking BP daily and keeping a log as patient states her BP has been elevated.     GAMALIEL Cullen: For follow up. Thank you.

## 2023-10-06 NOTE — TELEPHONE ENCOUNTER
SIMRAN     Caller: Shahrzad Scott     Topic/issue: She thinks she is in AFIB, her BP is high her heart is racing, her pulse is 94.  She feels like she needs to talk to someone to advise her what to do.     Callback Number: [phone]    Thank You   Vesta ORDAZ

## 2023-10-06 NOTE — TELEPHONE ENCOUNTER
Phone number called: [phone]    Call outcome: Spoke to patient and she explained the episode of fast heart rate and dizziness she had last night, and that her heart rate is still much higher than her normal. She did have an episode of shortness of breath this morning but is back to her baseline now. Patient scheduled for EKG in office today to review cardiac rhythm and if there are any changes from prior. ER precautions given for changing or worsening symptoms and patient agreeable. Patient will call with any changes.

## 2023-10-06 NOTE — PROGRESS NOTES
Patient was here today for EKG per SIMRAN. EKG performed and transferred into patients chart. Paper copy of EKG given to Tatyana ALSTON. Patient has been informed that Tatyana ALSTON, will be in shortly to speak about EKG. Patient's concerns are on a My chart message. Patient stated not having symptoms in office but was at home.

## 2023-10-06 NOTE — TELEPHONE ENCOUNTER
To SIMRAN: Patient called with concerns because she had an episode with tachycardia (120's) and lightheadedness last night and she wasn't sure if it might be related to her new medication, nortriptylene. She stated since the episode her HR has been higher than baseline, so an EKG was completed for review. Advised patient to reach out to the prescribing physician as well and ER precautions were given for repeat episodes/worsening symptoms. Patient to call with any changes. Please advise once back in office if any additional recommendations, thank you!

## 2023-10-07 LAB — EKG IMPRESSION: NORMAL

## 2023-10-07 PROCEDURE — 93010 ELECTROCARDIOGRAM REPORT: CPT | Performed by: INTERNAL MEDICINE

## 2023-10-09 ENCOUNTER — HOSPITAL ENCOUNTER (OUTPATIENT)
Dept: RADIOLOGY | Facility: MEDICAL CENTER | Age: 68
End: 2023-10-09
Attending: INTERNAL MEDICINE
Payer: COMMERCIAL

## 2023-10-09 ENCOUNTER — PATIENT MESSAGE (OUTPATIENT)
Dept: CARDIOLOGY | Facility: MEDICAL CENTER | Age: 68
End: 2023-10-09
Payer: MEDICARE

## 2023-10-09 DIAGNOSIS — E78.00 PURE HYPERCHOLESTEROLEMIA: ICD-10-CM

## 2023-10-09 PROCEDURE — 4410556 CT-CARDIAC SCORING (SELF PAY ONLY)

## 2023-10-09 NOTE — TELEPHONE ENCOUNTER
Phone Number Called: [phone]    Call outcome: Spoke to patient regarding message below.    Message: Called to advise of JM recommendations. Also advised patient to sent snippet from apple watch during next episode. Pt concerned that she may be having A fib as her mother had  a stroke from it. Pt said she will be sure to sent us the findings of her next episode so we can advise further. Pt will take the metoprolol PRN and continue Nortriptyline for now. Pt appreciative of phone call.

## 2023-10-09 NOTE — TELEPHONE ENCOUNTER
Ekg looks completely fine! Let's have her start metoprolol tartrate 12.5mg PO bid prn palpitations for now. If symptoms continue we can check a heart rhythm monitor or she can trial a different medicine than nortriptylene, but let's try and see if she can tolerate it!  Thank you!

## 2023-10-10 ENCOUNTER — APPOINTMENT (RX ONLY)
Dept: URBAN - METROPOLITAN AREA CLINIC 36 | Facility: CLINIC | Age: 68
Setting detail: DERMATOLOGY
End: 2023-10-10

## 2023-10-10 DIAGNOSIS — T81.89 OTHER COMPLICATIONS OF PROCEDURES, NOT ELSEWHERE CLASSIFIED: ICD-10-CM

## 2023-10-10 PROBLEM — T81.89XA OTHER COMPLICATIONS OF PROCEDURES, NOT ELSEWHERE CLASSIFIED, INITIAL ENCOUNTER: Status: ACTIVE | Noted: 2023-10-10

## 2023-10-10 PROCEDURE — ? ADDITIONAL NOTES

## 2023-10-10 PROCEDURE — 99024 POSTOP FOLLOW-UP VISIT: CPT

## 2023-10-10 ASSESSMENT — LOCATION ZONE DERM: LOCATION ZONE: LEG

## 2023-10-10 ASSESSMENT — LOCATION DETAILED DESCRIPTION DERM: LOCATION DETAILED: LEFT DISTAL PRETIBIAL REGION

## 2023-10-10 ASSESSMENT — LOCATION SIMPLE DESCRIPTION DERM: LOCATION SIMPLE: LEFT PRETIBIAL REGION

## 2023-10-16 ENCOUNTER — APPOINTMENT (RX ONLY)
Dept: URBAN - METROPOLITAN AREA CLINIC 36 | Facility: CLINIC | Age: 68
Setting detail: DERMATOLOGY
End: 2023-10-16

## 2023-10-16 DIAGNOSIS — Z48.817 ENCOUNTER FOR SURGICAL AFTERCARE FOLLOWING SURGERY ON THE SKIN AND SUBCUTANEOUS TISSUE: ICD-10-CM

## 2023-10-16 PROCEDURE — 99024 POSTOP FOLLOW-UP VISIT: CPT

## 2023-10-16 PROCEDURE — ? DRESSING CHANGE (GLOBAL PERIOD)

## 2023-10-16 ASSESSMENT — LOCATION ZONE DERM: LOCATION ZONE: LEG

## 2023-10-16 ASSESSMENT — LOCATION SIMPLE DESCRIPTION DERM: LOCATION SIMPLE: LEFT PRETIBIAL REGION

## 2023-10-16 ASSESSMENT — LOCATION DETAILED DESCRIPTION DERM: LOCATION DETAILED: LEFT DISTAL PRETIBIAL REGION

## 2023-10-16 NOTE — PROCEDURE: MIPS QUALITY
Quality 226: Preventive Care And Screening: Tobacco Use: Screening And Cessation Intervention: Patient screened for tobacco use and is an ex/non-smoker
Detail Level: Detailed
Quality 130: Documentation Of Current Medications In The Medical Record: Current Medications Documented
(2) assistive person

## 2023-10-16 NOTE — PROCEDURE: DRESSING CHANGE (GLOBAL PERIOD)
Detail Level: Detailed
Add 99108 Cpt? (Important Note: In 2017 The Use Of 19344 Is Being Tracked By Cms To Determine Future Global Period Reimbursement For Global Periods): yes

## 2023-10-21 ENCOUNTER — PATIENT MESSAGE (OUTPATIENT)
Dept: CARDIOLOGY | Facility: MEDICAL CENTER | Age: 68
End: 2023-10-21
Payer: MEDICARE

## 2023-10-25 ENCOUNTER — APPOINTMENT (RX ONLY)
Dept: URBAN - METROPOLITAN AREA CLINIC 4 | Facility: CLINIC | Age: 68
Setting detail: DERMATOLOGY
End: 2023-10-25

## 2023-10-25 DIAGNOSIS — L82.1 OTHER SEBORRHEIC KERATOSIS: ICD-10-CM

## 2023-10-25 DIAGNOSIS — L81.4 OTHER MELANIN HYPERPIGMENTATION: ICD-10-CM | Status: STABLE

## 2023-10-25 DIAGNOSIS — D22 MELANOCYTIC NEVI: ICD-10-CM | Status: STABLE

## 2023-10-25 DIAGNOSIS — Z85.828 PERSONAL HISTORY OF OTHER MALIGNANT NEOPLASM OF SKIN: ICD-10-CM

## 2023-10-25 DIAGNOSIS — Z87.2 PERSONAL HISTORY OF DISEASES OF THE SKIN AND SUBCUTANEOUS TISSUE: ICD-10-CM

## 2023-10-25 PROBLEM — D22.5 MELANOCYTIC NEVI OF TRUNK: Status: ACTIVE | Noted: 2023-10-25

## 2023-10-25 PROBLEM — D22.62 MELANOCYTIC NEVI OF LEFT UPPER LIMB, INCLUDING SHOULDER: Status: ACTIVE | Noted: 2023-10-25

## 2023-10-25 PROCEDURE — ? ADDITIONAL NOTES

## 2023-10-25 PROCEDURE — ? DIAGNOSIS COMMENT

## 2023-10-25 PROCEDURE — 99213 OFFICE O/P EST LOW 20 MIN: CPT

## 2023-10-25 PROCEDURE — ? PHOTO-DOCUMENTATION

## 2023-10-25 PROCEDURE — ? COUNSELING

## 2023-10-25 ASSESSMENT — LOCATION SIMPLE DESCRIPTION DERM
LOCATION SIMPLE: LEFT UPPER BACK
LOCATION SIMPLE: RIGHT PRETIBIAL REGION
LOCATION SIMPLE: LEFT THIGH
LOCATION SIMPLE: LEFT PRETIBIAL REGION
LOCATION SIMPLE: LEFT UPPER ARM
LOCATION SIMPLE: LEFT BUTTOCK
LOCATION SIMPLE: RIGHT UPPER BACK
LOCATION SIMPLE: RIGHT FOREHEAD

## 2023-10-25 ASSESSMENT — LOCATION ZONE DERM
LOCATION ZONE: LEG
LOCATION ZONE: TRUNK
LOCATION ZONE: ARM
LOCATION ZONE: FACE

## 2023-10-25 ASSESSMENT — LOCATION DETAILED DESCRIPTION DERM
LOCATION DETAILED: LEFT SUPERIOR LATERAL UPPER BACK
LOCATION DETAILED: RIGHT PROXIMAL PRETIBIAL REGION
LOCATION DETAILED: LEFT ANTERIOR PROXIMAL UPPER ARM
LOCATION DETAILED: RIGHT INFERIOR MEDIAL FOREHEAD
LOCATION DETAILED: LEFT BUTTOCK
LOCATION DETAILED: LEFT PROXIMAL PRETIBIAL REGION
LOCATION DETAILED: LEFT ANTERIOR PROXIMAL THIGH
LOCATION DETAILED: LEFT DISTAL PRETIBIAL REGION
LOCATION DETAILED: RIGHT MEDIAL UPPER BACK
LOCATION DETAILED: LEFT ANTERIOR DISTAL THIGH

## 2023-10-25 NOTE — PROCEDURE: ADDITIONAL NOTES
Additional Notes: [id number], SCC, well differentiated
Render Risk Assessment In Note?: no
Detail Level: Simple
Additional Notes: See photos
Detail Level: Detailed
Additional Notes: The patient is continuing to follow up with Dr. Mobley for Mohs wound care. She is instructed to continue to do wound care per his instructions.

## 2023-10-25 NOTE — PROCEDURE: DIAGNOSIS COMMENT
Detail Level: Detailed
Comment: Mohs scars, hx of SCC well differentiated.\\[id number] and [id number]\\nBoth treated by Dr. Mobley at same visit on 9/14/23
Render Risk Assessment In Note?: no
Comment: [id number], hx of Lentigious Junctional Proliferation, (evolving lentiginous MM IS) see [facility] Pathology report. Treated by Dr. Dsouza in 2009.
Comment: See body map
Comment: Slightly atypical, see photos

## 2023-10-31 ENCOUNTER — APPOINTMENT (RX ONLY)
Dept: URBAN - METROPOLITAN AREA CLINIC 36 | Facility: CLINIC | Age: 68
Setting detail: DERMATOLOGY
End: 2023-10-31

## 2023-10-31 DIAGNOSIS — Z48.817 ENCOUNTER FOR SURGICAL AFTERCARE FOLLOWING SURGERY ON THE SKIN AND SUBCUTANEOUS TISSUE: ICD-10-CM

## 2023-10-31 PROCEDURE — ? DRESSING CHANGE (GLOBAL PERIOD)

## 2023-10-31 PROCEDURE — 99024 POSTOP FOLLOW-UP VISIT: CPT

## 2023-10-31 ASSESSMENT — LOCATION DETAILED DESCRIPTION DERM: LOCATION DETAILED: LEFT DISTAL PRETIBIAL REGION

## 2023-10-31 ASSESSMENT — LOCATION SIMPLE DESCRIPTION DERM: LOCATION SIMPLE: LEFT PRETIBIAL REGION

## 2023-10-31 ASSESSMENT — LOCATION ZONE DERM: LOCATION ZONE: LEG

## 2023-10-31 NOTE — PROCEDURE: DRESSING CHANGE (GLOBAL PERIOD)
Detail Level: Detailed
Add 31186 Cpt? (Important Note: In 2017 The Use Of 81843 Is Being Tracked By Cms To Determine Future Global Period Reimbursement For Global Periods): yes

## 2023-11-20 ENCOUNTER — PATIENT MESSAGE (OUTPATIENT)
Dept: CARDIOLOGY | Facility: MEDICAL CENTER | Age: 68
End: 2023-11-20
Payer: MEDICARE

## 2023-11-21 ENCOUNTER — APPOINTMENT (RX ONLY)
Dept: URBAN - METROPOLITAN AREA CLINIC 36 | Facility: CLINIC | Age: 68
Setting detail: DERMATOLOGY
End: 2023-11-21

## 2023-11-21 ENCOUNTER — PATIENT MESSAGE (OUTPATIENT)
Dept: CARDIOLOGY | Facility: MEDICAL CENTER | Age: 68
End: 2023-11-21
Payer: MEDICARE

## 2023-11-21 DIAGNOSIS — I10 ESSENTIAL HYPERTENSION, BENIGN: ICD-10-CM

## 2023-11-21 DIAGNOSIS — Z48.817 ENCOUNTER FOR SURGICAL AFTERCARE FOLLOWING SURGERY ON THE SKIN AND SUBCUTANEOUS TISSUE: ICD-10-CM

## 2023-11-21 PROCEDURE — 99024 POSTOP FOLLOW-UP VISIT: CPT

## 2023-11-21 PROCEDURE — ? DRESSING CHANGE (GLOBAL PERIOD)

## 2023-11-21 ASSESSMENT — LOCATION DETAILED DESCRIPTION DERM: LOCATION DETAILED: LEFT DISTAL PRETIBIAL REGION

## 2023-11-21 ASSESSMENT — LOCATION SIMPLE DESCRIPTION DERM: LOCATION SIMPLE: LEFT PRETIBIAL REGION

## 2023-11-21 ASSESSMENT — LOCATION ZONE DERM: LOCATION ZONE: LEG

## 2023-11-21 NOTE — PATIENT COMMUNICATION
JM: Please advise on MyChart & patient BP log.   Most readings average below 131 SBP and below 82 DBP. Pt last reading is the highest which she correlates to the blood vessel burst in her eye.

## 2023-11-21 NOTE — PROGRESS NOTES
Sent [employer] message:  Dixon Jeronimoie!     Thanks for those blood pressure readings, they are slightly higher than I'd like (I'd like you mostly in the 110s-120s ideally) but you are close! Sorry the blood vessel in your eye burst, usually blood pressures in the 160s or above start putting you at risk for that, but your body might be more sensitive even in the 140s.      IF you'd like we can start you on another blood pressure medicine called losartan at just half a pill of 12.5mg once a day. This BP medication is very well tolerated with very little side effects. It can raise your potassium and affect your kidneys so we just check a blood test 1-2 weeks after starting it. I think given your readings this would be my next recommendation. We could also go up on your hydrochlorothiazide, however this could start to cause dehydration and dry mouth, etc, so I think a low dose second agent is probably better. Happy to go either way however, please let me know what you think.      Happy Holidays!     Best,  Dr. Higuera

## 2023-11-21 NOTE — PROCEDURE: DRESSING CHANGE (GLOBAL PERIOD)
Detail Level: Detailed
Add 19246 Cpt? (Important Note: In 2017 The Use Of 55359 Is Being Tracked By Cms To Determine Future Global Period Reimbursement For Global Periods): yes

## 2023-11-22 RX ORDER — LOSARTAN POTASSIUM 25 MG/1
12.5 TABLET ORAL DAILY
Qty: 50 TABLET | Refills: 3 | Status: SHIPPED | OUTPATIENT
Start: 2023-11-22 | End: 2024-03-21 | Stop reason: SDUPTHER

## 2023-11-22 NOTE — TELEPHONE ENCOUNTER
To: JM    Just wanted to follow up, patient would like to try Losartan 12.5 mg daily as recommended in previous message. Okay to order this medication? Please advise. Thank you!

## 2023-12-01 ENCOUNTER — HOSPITAL ENCOUNTER (OUTPATIENT)
Dept: LAB | Facility: MEDICAL CENTER | Age: 68
End: 2023-12-01
Attending: INTERNAL MEDICINE
Payer: MEDICARE

## 2023-12-01 DIAGNOSIS — I10 ESSENTIAL HYPERTENSION, BENIGN: ICD-10-CM

## 2023-12-01 PROCEDURE — 36415 COLL VENOUS BLD VENIPUNCTURE: CPT

## 2023-12-01 PROCEDURE — 80048 BASIC METABOLIC PNL TOTAL CA: CPT

## 2023-12-02 LAB
ANION GAP SERPL CALC-SCNC: 10 MMOL/L (ref 7–16)
BUN SERPL-MCNC: 12 MG/DL (ref 8–22)
CALCIUM SERPL-MCNC: 9.4 MG/DL (ref 8.5–10.5)
CHLORIDE SERPL-SCNC: 105 MMOL/L (ref 96–112)
CO2 SERPL-SCNC: 26 MMOL/L (ref 20–33)
CREAT SERPL-MCNC: 0.81 MG/DL (ref 0.5–1.4)
GFR SERPLBLD CREATININE-BSD FMLA CKD-EPI: 78 ML/MIN/1.73 M 2
GLUCOSE SERPL-MCNC: 98 MG/DL (ref 65–99)
POTASSIUM SERPL-SCNC: 4.2 MMOL/L (ref 3.6–5.5)
SODIUM SERPL-SCNC: 141 MMOL/L (ref 135–145)

## 2023-12-27 DIAGNOSIS — R00.2 PALPITATIONS: ICD-10-CM

## 2023-12-28 ENCOUNTER — PATIENT MESSAGE (OUTPATIENT)
Dept: CARDIOLOGY | Facility: MEDICAL CENTER | Age: 68
End: 2023-12-28
Payer: MEDICARE

## 2023-12-28 ENCOUNTER — TELEPHONE (OUTPATIENT)
Dept: CARDIOLOGY | Facility: MEDICAL CENTER | Age: 68
End: 2023-12-28
Payer: MEDICARE

## 2023-12-28 NOTE — TELEPHONE ENCOUNTER
Is the patient due for a refill? Yes    Was the patient seen the past year? Yes    Date of last office visit: 9/28/2023    Does the patient have an upcoming appointment?  No    Provider to refill:SIMRAN    Does the patients insurance require a 100 day supply?  No

## 2024-01-09 ENCOUNTER — PATIENT MESSAGE (OUTPATIENT)
Dept: CARDIOLOGY | Facility: MEDICAL CENTER | Age: 69
End: 2024-01-09
Payer: MEDICARE

## 2024-01-13 NOTE — PROGRESS NOTES
Sent [employer] message:  Dixon Markham,    Sorry for the delayed response, and sorry if my previous messages weren't clear. I recommended starting the losartan in addition to the potassium and hydrochlorothiazide you were on. I think that combination will control your blood pressure and get you back to the 110s-120s, as I thought you were taking both of those pills.     Alternatively, if you'd really not like to restart the hydrochlorothiazide, we can try going up to losartan 25mg once a day. I initially wanted you on both medications as the side effects at low doses are usually less.     Hope this helps clarify things, we'll get your blood pressure under control!    Best,  Dr. Higuera

## 2024-02-02 ENCOUNTER — HOSPITAL ENCOUNTER (OUTPATIENT)
Dept: RADIOLOGY | Facility: MEDICAL CENTER | Age: 69
End: 2024-02-02
Attending: STUDENT IN AN ORGANIZED HEALTH CARE EDUCATION/TRAINING PROGRAM
Payer: MEDICARE

## 2024-02-02 DIAGNOSIS — Z12.31 VISIT FOR SCREENING MAMMOGRAM: ICD-10-CM

## 2024-02-02 PROCEDURE — 77067 SCR MAMMO BI INCL CAD: CPT

## 2024-02-14 ENCOUNTER — APPOINTMENT (RX ONLY)
Dept: URBAN - METROPOLITAN AREA CLINIC 4 | Facility: CLINIC | Age: 69
Setting detail: DERMATOLOGY
End: 2024-02-14

## 2024-02-14 DIAGNOSIS — L57.0 ACTINIC KERATOSIS: ICD-10-CM

## 2024-02-14 DIAGNOSIS — Z85.828 PERSONAL HISTORY OF OTHER MALIGNANT NEOPLASM OF SKIN: ICD-10-CM

## 2024-02-14 PROCEDURE — ? COUNSELING

## 2024-02-14 PROCEDURE — 17000 DESTRUCT PREMALG LESION: CPT

## 2024-02-14 PROCEDURE — ? ADDITIONAL NOTES

## 2024-02-14 PROCEDURE — ? DIAGNOSIS COMMENT

## 2024-02-14 PROCEDURE — 99212 OFFICE O/P EST SF 10 MIN: CPT | Mod: 25

## 2024-02-14 PROCEDURE — 17003 DESTRUCT PREMALG LES 2-14: CPT

## 2024-02-14 PROCEDURE — ? LIQUID NITROGEN

## 2024-02-14 ASSESSMENT — LOCATION DETAILED DESCRIPTION DERM
LOCATION DETAILED: LEFT PROXIMAL PRETIBIAL REGION
LOCATION DETAILED: LEFT DISTAL PRETIBIAL REGION

## 2024-02-14 ASSESSMENT — LOCATION SIMPLE DESCRIPTION DERM: LOCATION SIMPLE: LEFT PRETIBIAL REGION

## 2024-02-14 ASSESSMENT — LOCATION ZONE DERM: LOCATION ZONE: LEG

## 2024-02-14 NOTE — PROCEDURE: DIAGNOSIS COMMENT
Detail Level: Detailed
Comment: Mohs scars, hx of SCC well differentiated.\\[id number] and [id number]\\nBoth treated by Dr. Mobley at same visit on 9/14/23
Render Risk Assessment In Note?: no

## 2024-02-14 NOTE — PROCEDURE: ADDITIONAL NOTES
Detail Level: Detailed
Additional Notes: Pt reassured healing as expected.  Still gets occasional shooting pains in wounds.  Recommend cold compress and Tylenol as needed.  No e/o infection or recurrence.
Render Risk Assessment In Note?: no

## 2024-03-21 ENCOUNTER — OFFICE VISIT (OUTPATIENT)
Dept: CARDIOLOGY | Facility: MEDICAL CENTER | Age: 69
End: 2024-03-21
Attending: INTERNAL MEDICINE
Payer: MEDICARE

## 2024-03-21 VITALS
BODY MASS INDEX: 23.56 KG/M2 | WEIGHT: 138 LBS | DIASTOLIC BLOOD PRESSURE: 74 MMHG | HEART RATE: 103 BPM | SYSTOLIC BLOOD PRESSURE: 130 MMHG | OXYGEN SATURATION: 97 % | RESPIRATION RATE: 12 BRPM | HEIGHT: 64 IN

## 2024-03-21 DIAGNOSIS — R00.2 PALPITATIONS: ICD-10-CM

## 2024-03-21 DIAGNOSIS — E78.00 PURE HYPERCHOLESTEROLEMIA: ICD-10-CM

## 2024-03-21 DIAGNOSIS — I10 ESSENTIAL HYPERTENSION, BENIGN: ICD-10-CM

## 2024-03-21 DIAGNOSIS — I35.1 MODERATE AORTIC REGURGITATION: ICD-10-CM

## 2024-03-21 PROCEDURE — 3075F SYST BP GE 130 - 139MM HG: CPT | Performed by: INTERNAL MEDICINE

## 2024-03-21 PROCEDURE — 99213 OFFICE O/P EST LOW 20 MIN: CPT | Performed by: INTERNAL MEDICINE

## 2024-03-21 PROCEDURE — 3078F DIAST BP <80 MM HG: CPT | Performed by: INTERNAL MEDICINE

## 2024-03-21 PROCEDURE — 99214 OFFICE O/P EST MOD 30 MIN: CPT | Performed by: INTERNAL MEDICINE

## 2024-03-21 RX ORDER — LOSARTAN POTASSIUM 25 MG/1
12.5 TABLET ORAL DAILY
Qty: 50 TABLET | Refills: 3 | Status: SHIPPED | OUTPATIENT
Start: 2024-03-21

## 2024-03-21 RX ORDER — HYDROCHLOROTHIAZIDE 12.5 MG/1
12.5 CAPSULE, GELATIN COATED ORAL DAILY
Qty: 90 CAPSULE | Refills: 3 | Status: SHIPPED | OUTPATIENT
Start: 2024-03-21

## 2024-03-21 RX ORDER — POTASSIUM CHLORIDE 750 MG/1
10 TABLET, FILM COATED, EXTENDED RELEASE ORAL DAILY
Qty: 90 TABLET | Refills: 3 | Status: SHIPPED | OUTPATIENT
Start: 2024-03-21

## 2024-03-21 ASSESSMENT — FIBROSIS 4 INDEX: FIB4 SCORE: 0.94

## 2024-03-22 NOTE — PATIENT INSTRUCTIONS
Work on at least 1.5 hours a week of moderate exercise (typical brisk walking or similar activity)    Please look into the following diets and incorporate them into your diet    LOW SALT DIET   KEEP YOUR SODIUM EQUAL TO CALORIES AND NO MORE THAN DOUBLE THE CALORIES FOR A LOW SALT DIET    Cardiosmart.org - great resource for American College of Cardiology on heart disease prevention and treatment    Please work on increasing these foods in your diet to increase your potassium levels    Highest potassium foods  Dried figs, molasses, seaweed    High potassium foods  Dried fruits (dates, prunes), nuts, avocados, bran cereal, wheat germ, lima beans    Potassium-rich food  Vegetables-spinach, tomatoes, broccoli, winter squash, beets, carrots, cauliflower, potatoes    Fruits-bananas, cantaloupe, kiwis, oranges, mangoes    Meats-ground beef, steak, pork, veal, lamb    *Adapted: Celestina SAMUELS. Hypokalemia. [place] Journal of Medicine 1998; 339:451.    NATURAL SUPPLEMENTS  A total of 884 randomized controlled intervention trials evaluating 27 types of micronutrients among [id number],627 participants (4,895,544 person-years) were identified. Supplementation with n-3 fatty acid, n-6 fatty acid, l-arginine, l-citrulline, folic acid, vitamin D, magnesium, zinc, ?-lipoic acid, coenzyme Q10, melatonin, catechin, curcumin, flavanol, genistein, and quercetin showed moderate- to high-quality evidence for reducing CVD risk factors. Specifically, n-3 fatty acid supplementation decreased CVD mortality (relative risk [RR]: 0.93; 95% CI: 0.88-0.97), myocardial infarction (RR: 0.85; 95% CI: 0.78-0.92), and coronary heart disease events (RR: 0.86; 95% CI: 0.80-0.93). Folic acid supplementation decreased stroke risk (RR: 0.84; 95% CI: 0.72-0.97), and coenzyme Q10 supplementation decreased all-cause mortality events (RR: 0.68; 95% CI: 0.49-0.94). Vitamin C, vitamin D, vitamin E, and selenium showed no effect on CVD or type 2 diabetes risk.  ?-carotene supplementation increased all-cause mortality (RR: 1.10; 95% CI: 1.05-1.15), CVD mortality events (RR: 1.12; 95% CI: 1.06-1.18), and stroke risk (RR: 1.09; 95% CI: 1.01-1.17).           [email or web address]

## 2024-03-22 NOTE — PROGRESS NOTES
Chief Complaint   Patient presents with    Follow-Up     FV Dx: Moderate aortic regurgitation       Subjective     Shahrzad Scott is a 68 y.o. female who presents today dyslipidemia, hypertension, and aortic regurgitation secondary to phen phen who is here for follow-up.     Worked at [email or web address] in [place]        Past Medical History:   Diagnosis Date    Anesthesia     n/v, may req scopalamine patch    Aortic regurgitation 1996    Arthritis     hands    Breath shortness     Cataract     bilat IOL    Disorder of thyroid     nodules    GERD (gastroesophageal reflux disease)     Hemorrhagic disorder (HCC)     nose bleeds    High cholesterol     History of aortic insufficiency 1996    seeing Dr. Kalen ZAMBRANO (hyperlipidemia)     Hypertension     Mitral regurgitation     MVP (mitral valve prolapse)     Palpitations     PONV (postoperative nausea and vomiting)     Urinary incontinence      Past Surgical History:   Procedure Laterality Date    THYROIDECTOMY Left 3/24/2022    Procedure: THYROIDECTOMY - REMOVAL REMAINING TISSUE, FOLLOWING PREVIOUS PARTIAL REMOVAL, PORTION THYROID using NIMS;  Surgeon: Dirk Hilliard M.D.;  Location: SURGERY SAME DAY [facility];  Service: Ent    THYROID LOBECTOMY Right 7/18/2019    Procedure: LOBECTOMY, THYROID WITH FROZEN SECTION;  Surgeon: Dirk Hilliard M.D.;  Location: SURGERY SAME DAY [facility];  Service: Ent    MYRINGOTOMY Bilateral 7/18/2019    Procedure: MYRINGOTOMY WITH TUBES;  Surgeon: Dirk Hilliard M.D.;  Location: SURGERY SAME DAY [facility] OR[facility];  Service: Ent    HAND SURGERY Bilateral 2005    thumb fusions     HYSTERECTOMY, VAGINAL  1992    BLEPHAROPLASTY, ELAINE (UPPER OR LOWER)  1990    APPENDECTOMY      BREAST RECONSTRUCTION Bilateral     CATARACT EXTRACTION WITH IOL Bilateral     Dr. Wilder    OTHER ORTHOPEDIC SURGERY  [id number]    multiplre knee surgeries.     RECONSTRUCTION, KNEE, ACL, ARTHROSCOPIC, USING TIBIALIS TENDON AUTOGRAFT       Family History   Problem  Relation Age of Onset    Stroke Mother     Hypertension Mother     Cancer Father         prostate cancer    Lung Disease Sister         asthma    No Known Problems Brother     No Known Problems Maternal Grandmother     No Known Problems Maternal Grandfather     Other Brother         cerabral palsy     Social History     Socioeconomic History    Marital status:      Spouse name: Not on file    Number of children: Not on file    Years of education: Not on file    Highest education level: Some college, no degree   Occupational History    Not on file   Tobacco Use    Smoking status: Never    Smokeless tobacco: Never   Vaping Use    Vaping Use: Never used   Substance and Sexual Activity    Alcohol use: Not Currently    Drug use: Not Currently    Sexual activity: Never   Other Topics Concern    Not on file   Social History Narrative    Not on file     Social Determinants of Health     Financial Resource Strain: Low Risk  (8/9/2022)    Overall Financial Resource Strain (CARDIA)     Difficulty of Paying Living Expenses: Not hard at all   Food Insecurity: No Food Insecurity (8/9/2022)    Hunger Vital Sign     Worried About Running Out of Food in the Last Year: Never true     Ran Out of Food in the Last Year: Never true   Transportation Needs: No Transportation Needs (8/9/2022)    PRAPARE - Transportation     Lack of Transportation (Medical): No     Lack of Transportation (Non-Medical): No   Physical Activity: Insufficiently Active (8/9/2022)    Exercise Vital Sign     Days of Exercise per Week: 3 days     Minutes of Exercise per Session: 30 min   Stress: No Stress Concern Present (8/9/2022)    [employer] [employer] - Occupational Stress Questionnaire     Feeling of Stress : Not at all   Social Connections: Socially Isolated (8/9/2022)    Social Connection and Isolation Panel [NHANES]     Frequency of Communication with Friends and Family: More than three times a week     Frequency of Social  Gatherings with Friends and Family: More than three times a week     Attends Gnosticist Services: Never     Active Member of Clubs or Organizations: No     Attends Club or Organization Meetings: Never     Marital Status:    Intimate Partner Violence: Not on file   Housing Stability: Low Risk  (8/9/2022)    Housing Stability Vital Sign     Unable to Pay for Housing in the Last Year: No     Number of Places Lived in the Last Year: 1     Unstable Housing in the Last Year: No     Allergies   Allergen Reactions    Septra [Bactrim] Hives    Sulfa Drugs Hives     Outpatient Encounter Medications as of 3/21/2024   Medication Sig Dispense Refill    metoprolol tartrate (LOPRESSOR) 25 MG Tab TAKE ONE-HALF (1/2) TABLET TWICE A DAY AS NEEDED FOR PALPITATIONS 45 Tablet 3    losartan (COZAAR) 25 MG Tab Take 0.5 Tablets by mouth every day. 50 Tablet 3    hydrochlorothiazide (MICROZIDE) 12.5 MG capsule TAKE 1 CAPSULE DAILY 90 Capsule 3    potassium chloride ER (KLOR-CON) 10 MEQ tablet TAKE 1 TABLET DAILY 90 Tablet 3    Riboflavin (VITAMIN B-2) 100 MG Tab 200 mg 2 times a day.      famotidine (PEPCID) 40 MG Tab Take 40 mg by mouth every day.      nortriptyline (PAMELOR) 10 MG Cap Take 10mg at bedtime for 7 days, then increase every 7 days by 10 mg to maximum dose of 40 mg.      simvastatin (ZOCOR) 10 MG Tab       levothyroxine (SYNTHROID) 100 MCG Tab Take 1 Tablet by mouth every morning on an empty stomach. 90 Tablet 0    fexofenadine (ALLEGRA) 180 MG tablet Take 1 Tablet by mouth every day.      CALCIUM PO Take 750 mg by mouth 2 times a day.      COLLAGEN PO Take 1 Dose by mouth every day.      MAGNESIUM PO Take 1 Tablet by mouth 2 times a day.      ascorbic acid (ASCORBIC ACID) 500 MG Tab Take 1 Tablet by mouth every day.      niacinamide 500 MG tablet Take 1 Tablet by mouth 2 times a day. 60 Tab 3    TURMERIC PO Take 1 Tablet by mouth 2 times a day.      COENZYME Q10 PO Take 300 mg by mouth every day.      MINIVELLE 0.025  "MG/24HR PATCH BIWEEKLY Place 1 Patch on the skin two times a week. Apply on Wednesday and Saturday  Indications: Moderate to Severe Vasomotor Symptoms      Multiple Vitamins-Minerals (MULTIVITAMIN ADULTS 50+ PO) Take 1 Tablet by mouth every day.      Probiotic Product (PROBIOTIC DAILY PO) Take 1 Tablet by mouth every day.      Alpha Lipoic Acid 200 MG Cap Take 200 mg by mouth every day.      vitamin D (CHOLECALCIFEROL) 1000 UNIT TABS Take 5,000 Units by mouth every day. 100 mcg of vitamin K      clindamycin (CLEOCIN) 300 MG Cap       omeprazole (PRILOSEC) 20 MG delayed-release capsule TAKE 1 CAPSULE BY MOUTH EVERY DAY 90 Capsule 3    mupirocin (BACTROBAN) 2 % Ointment       levothyroxine (SYNTHROID) 100 MCG Tab TAKE 1 TABLET BY MOUTH EVERY MORNING ON AN EMPTY STOMACH 90 Tablet 0     No facility-administered encounter medications on file as of 3/21/2024.     ROS           Objective     /74 (BP Location: Left arm, Patient Position: Sitting, BP Cuff Size: Adult)   Pulse (!) 103   Resp 12   Ht 1.626 m (5' 4\")   Wt 62.6 kg (138 lb)   LMP  (LMP Unknown)   SpO2 97%   BMI 23.69 kg/m²     Physical Exam  Constitutional:       General: She is not in acute distress.     Appearance: She is not diaphoretic.   Eyes:      General: No scleral icterus.  Neck:      Vascular: No JVD.   Cardiovascular:      Rate and Rhythm: Normal rate.      Heart sounds: Murmur heard.      No friction rub. No gallop.   Pulmonary:      Effort: No respiratory distress.      Breath sounds: No wheezing or rales.   Abdominal:      General: Bowel sounds are normal.      Palpations: Abdomen is soft.   Musculoskeletal:      Right lower leg: No edema.      Left lower leg: No edema.   Skin:     Findings: No rash.   Neurological:      Mental Status: She is alert. Mental status is at baseline.   Psychiatric:         Mood and Affect: Mood normal.            We reviewed in person the most recent labs  Recent Results (from the past 5040 hour(s)) "   CULTURE WOUND W/ GRAM STAIN    Collection Time: 23 12:30 AM    Specimen: Wound   Result Value Ref Range    Significant Indicator POS (POS)     Source WND     Site Left Distal Pretibial Region     Culture Result - (A)     Gram Stain Result Few Gram positive cocci in chains.     Culture Result (A)      Enterococcus faecalis  Moderate growth  The susceptibility profile for this organism indicates that  Streptomycin would not be an effective component of  combination therapy.      Culture Result Staphylococcus aureus  Light growth   (A)        Susceptibility    Enterococcus faecalis - MARTIN     Daptomycin 1 Sensitive mcg/mL     Gent Synergy <=500 Sensitive mcg/mL     Penicillin 2 Sensitive mcg/mL     Vancomycin 2 Sensitive mcg/mL     Ampicillin <=2 Sensitive mcg/mL    Staphylococcus aureus - MARTIN     Daptomycin <=0.5 Sensitive mcg/mL     Erythromycin <=0.25 Sensitive mcg/mL     Tetracycline <=4 Sensitive mcg/mL     Vancomycin 1 Sensitive mcg/mL     Ampicillin/sulbactam <=8/4 Sensitive mcg/mL     Azithromycin <=2 Sensitive mcg/mL     Trimeth/Sulfa <=0.5/9.5 Sensitive mcg/mL     Clindamycin <=0.25 Sensitive mcg/mL     Cefazolin <=8 Sensitive mcg/mL     Cefepime <=4 Sensitive mcg/mL     Oxacillin <=0.25 Sensitive mcg/mL     Pip/Tazobactam <=8 Sensitive mcg/mL   GRAM STAIN    Collection Time: 23 12:30 AM    Specimen: Wound   Result Value Ref Range    Significant Indicator .     Source WND     Site Left Distal Pretibial Region     Gram Stain Result Few Gram positive cocci in chains.    AMBIGUOUS DATE/TIME    Collection Time: 23  7:53 PM   Result Value Ref Range    Ambiguous Date/Time See Below:    EKG    Collection Time: 10/06/23 11:12 AM   Result Value Ref Range    Report       [facility]    Test Date:  2023-10-06  Pt Name:    [facility]ON [facility]                 Department: [facility]  MRN:        [id number]                      Room:  Gender:     Female                       Technician: YAMILE  :         1955                   Requested By:YOSEF SHERRI NUVIA  Order #:    [id number]                    Reading MD: Brian James MD    Measurements  Intervals                                Axis  Rate:       85                           P:          65  AK:         149                          QRS:        49  QRSD:       102                          T:          41  QT:         376  QTc:        447    Interpretive Statements  Sinus rhythm  Probable left atrial enlargement  Compared to ECG 03/18/2022 13:55:43  No significant changes  Electronically Signed On 10- 17:22:22 PDT by Brian James MD     Basic Metabolic Panel    Collection Time: 12/01/23 10:11 AM   Result Value Ref Range    Sodium 141 135 - 145 mmol/L    Potassium 4.2 3.6 - 5.5 mmol/L    Chloride 105 96 - 112 mmol/L    Co2 26 20 - 33 mmol/L    Glucose 98 65 - 99 mg/dL    Bun 12 8 - 22 mg/dL    Creatinine 0.81 0.50 - 1.40 mg/dL    Calcium 9.4 8.5 - 10.5 mg/dL    Anion Gap 10.0 7.0 - 16.0   ESTIMATED GFR    Collection Time: 12/01/23 10:11 AM   Result Value Ref Range    GFR (CKD-EPI) 78 >60 mL/min/1.73 m 2     CT scan and Echo reviewed    Assessment & Plan     1. Pure hypercholesterolemia        2. Moderate aortic regurgitation  EC-ECHOCARDIOGRAM COMPLETE W/O CONT          Medical Decision Making: Today's Assessment/Status/Plan:      It was my pleasure to meet with Ms. Scott.    We addressed the management of hypertension at today's visit. Blood pressure is well controlled.  We specifically assessed the labs on hypertension treatment    We addressed the management of dyslipidemia and atherosclerosis at today's visit. She is on appropriate statin.      Echo for moderate AR    I will see Ms. Scott back in 1 year time and encouraged her to follow up with us over the phone or electronically using my MyChart as issues arise.    It is my pleasure to participate in the care of Ms. Scott.  Please do not hesitate to contact me with questions or  concerns.    Manas Maldonado MD PhD [facility]  Cardiologist [facility]    Please note that this dictation was created using voice recognition software. There may be errors I did not discover before finalizing the note.

## 2024-03-29 ENCOUNTER — PATIENT MESSAGE (OUTPATIENT)
Dept: CARDIOLOGY | Facility: MEDICAL CENTER | Age: 69
End: 2024-03-29

## 2024-03-29 ENCOUNTER — HOSPITAL ENCOUNTER (OUTPATIENT)
Dept: CARDIOLOGY | Facility: MEDICAL CENTER | Age: 69
End: 2024-03-29
Attending: INTERNAL MEDICINE
Payer: MEDICARE

## 2024-03-29 DIAGNOSIS — I35.1 MODERATE AORTIC REGURGITATION: ICD-10-CM

## 2024-03-29 PROCEDURE — 93306 TTE W/DOPPLER COMPLETE: CPT

## 2024-04-04 ENCOUNTER — PATIENT MESSAGE (OUTPATIENT)
Dept: CARDIOLOGY | Facility: MEDICAL CENTER | Age: 69
End: 2024-04-04

## 2024-04-04 PROBLEM — I77.810 ASCENDING AORTA DILATION (HCC): Chronic | Status: ACTIVE | Noted: 2024-04-04

## 2024-04-04 LAB — LV EJECT FRACT  99904: 60

## 2024-04-08 ENCOUNTER — PATIENT MESSAGE (OUTPATIENT)
Dept: CARDIOLOGY | Facility: MEDICAL CENTER | Age: 69
End: 2024-04-08
Payer: MEDICARE

## 2024-04-24 ENCOUNTER — APPOINTMENT (RX ONLY)
Dept: URBAN - METROPOLITAN AREA CLINIC 4 | Facility: CLINIC | Age: 69
Setting detail: DERMATOLOGY
End: 2024-04-24

## 2024-04-24 DIAGNOSIS — Z87.2 PERSONAL HISTORY OF DISEASES OF THE SKIN AND SUBCUTANEOUS TISSUE: ICD-10-CM

## 2024-04-24 DIAGNOSIS — Z85.828 PERSONAL HISTORY OF OTHER MALIGNANT NEOPLASM OF SKIN: ICD-10-CM

## 2024-04-24 DIAGNOSIS — D22 MELANOCYTIC NEVI: ICD-10-CM | Status: STABLE

## 2024-04-24 DIAGNOSIS — L81.4 OTHER MELANIN HYPERPIGMENTATION: ICD-10-CM

## 2024-04-24 DIAGNOSIS — L82.1 OTHER SEBORRHEIC KERATOSIS: ICD-10-CM

## 2024-04-24 PROBLEM — D22.5 MELANOCYTIC NEVI OF TRUNK: Status: ACTIVE | Noted: 2024-04-24

## 2024-04-24 PROBLEM — D22.62 MELANOCYTIC NEVI OF LEFT UPPER LIMB, INCLUDING SHOULDER: Status: ACTIVE | Noted: 2024-04-24

## 2024-04-24 PROCEDURE — ? OBSERVATION

## 2024-04-24 PROCEDURE — ? ADDITIONAL NOTES

## 2024-04-24 PROCEDURE — 99213 OFFICE O/P EST LOW 20 MIN: CPT

## 2024-04-24 PROCEDURE — ? DIAGNOSIS COMMENT

## 2024-04-24 PROCEDURE — ? COUNSELING

## 2024-04-24 ASSESSMENT — LOCATION DETAILED DESCRIPTION DERM
LOCATION DETAILED: LEFT ANTERIOR PROXIMAL UPPER ARM
LOCATION DETAILED: RIGHT INFERIOR MEDIAL FOREHEAD
LOCATION DETAILED: RIGHT MEDIAL UPPER BACK
LOCATION DETAILED: LEFT BUTTOCK
LOCATION DETAILED: LEFT ANTERIOR DISTAL THIGH
LOCATION DETAILED: LEFT ANTERIOR PROXIMAL THIGH
LOCATION DETAILED: LEFT DISTAL PRETIBIAL REGION
LOCATION DETAILED: LEFT SUPERIOR LATERAL UPPER BACK
LOCATION DETAILED: LEFT PROXIMAL PRETIBIAL REGION
LOCATION DETAILED: RIGHT PROXIMAL PRETIBIAL REGION

## 2024-04-24 ASSESSMENT — LOCATION ZONE DERM
LOCATION ZONE: LEG
LOCATION ZONE: TRUNK
LOCATION ZONE: FACE
LOCATION ZONE: ARM

## 2024-04-24 ASSESSMENT — LOCATION SIMPLE DESCRIPTION DERM
LOCATION SIMPLE: LEFT BUTTOCK
LOCATION SIMPLE: LEFT UPPER BACK
LOCATION SIMPLE: LEFT PRETIBIAL REGION
LOCATION SIMPLE: RIGHT UPPER BACK
LOCATION SIMPLE: LEFT THIGH
LOCATION SIMPLE: RIGHT FOREHEAD
LOCATION SIMPLE: LEFT UPPER ARM
LOCATION SIMPLE: RIGHT PRETIBIAL REGION

## 2024-04-24 NOTE — PROCEDURE: DIAGNOSIS COMMENT
Detail Level: Detailed
Comment: Mohs scars, hx of SCC well differentiated.\\[id number] and [id number]\\nBoth treated by Dr. Mobley at same visit on 9/14/23
Render Risk Assessment In Note?: no
Comment: [id number], hx of Lentigious Junctional Proliferation, (evolving lentiginous MM IS) see [facility] Pathology report. Treated by Dr. Dsouza in 2009.
Comment: See body map
Comment: Slightly atypical, see photos

## 2024-04-24 NOTE — PROCEDURE: ADDITIONAL NOTES
Additional Notes: [id number], SCC, well differentiated
Render Risk Assessment In Note?: no
Detail Level: Simple
Additional Notes: See photos
Detail Level: Detailed

## 2024-05-09 ENCOUNTER — HOSPITAL ENCOUNTER (OUTPATIENT)
Dept: LAB | Facility: MEDICAL CENTER | Age: 69
End: 2024-05-09
Attending: STUDENT IN AN ORGANIZED HEALTH CARE EDUCATION/TRAINING PROGRAM
Payer: MEDICARE

## 2024-05-09 LAB
25(OH)D3 SERPL-MCNC: 49 NG/ML (ref 30–100)
TSH SERPL DL<=0.005 MIU/L-ACNC: 1.57 UIU/ML (ref 0.38–5.33)

## 2024-05-10 ENCOUNTER — PATIENT MESSAGE (OUTPATIENT)
Dept: CARDIOLOGY | Facility: MEDICAL CENTER | Age: 69
End: 2024-05-10
Payer: MEDICARE

## 2024-06-14 ENCOUNTER — RX ONLY (OUTPATIENT)
Age: 69
Setting detail: RX ONLY
End: 2024-06-14

## 2024-06-14 RX ORDER — H-QUINONE/TRETINOIN/HYDROCORT 8-0.05-1 %
1 EMULSION (GRAM) TOPICAL
Qty: 30 | Refills: 3 | Status: ERX

## 2024-07-29 ENCOUNTER — HOSPITAL ENCOUNTER (OUTPATIENT)
Dept: LAB | Facility: MEDICAL CENTER | Age: 69
End: 2024-07-29
Attending: OBSTETRICS & GYNECOLOGY
Payer: MEDICARE

## 2024-07-29 LAB — TSH SERPL DL<=0.005 MIU/L-ACNC: 1.53 UIU/ML (ref 0.38–5.33)

## 2024-07-29 PROCEDURE — 84443 ASSAY THYROID STIM HORMONE: CPT

## 2024-07-29 PROCEDURE — 36415 COLL VENOUS BLD VENIPUNCTURE: CPT

## 2024-08-06 ENCOUNTER — HOSPITAL ENCOUNTER (OUTPATIENT)
Dept: RADIOLOGY | Facility: MEDICAL CENTER | Age: 69
End: 2024-08-06
Attending: STUDENT IN AN ORGANIZED HEALTH CARE EDUCATION/TRAINING PROGRAM
Payer: MEDICARE

## 2024-08-06 DIAGNOSIS — R05.1 ACUTE COUGH: ICD-10-CM

## 2024-08-06 PROCEDURE — 71046 X-RAY EXAM CHEST 2 VIEWS: CPT

## 2024-08-13 ENCOUNTER — HOSPITAL ENCOUNTER (OUTPATIENT)
Dept: LAB | Facility: MEDICAL CENTER | Age: 69
End: 2024-08-13
Attending: STUDENT IN AN ORGANIZED HEALTH CARE EDUCATION/TRAINING PROGRAM
Payer: MEDICARE

## 2024-08-13 LAB
ALBUMIN SERPL BCP-MCNC: 4.3 G/DL (ref 3.2–4.9)
ALBUMIN/GLOB SERPL: 1.7 G/DL
ALP SERPL-CCNC: 86 U/L (ref 30–99)
ALT SERPL-CCNC: 19 U/L (ref 2–50)
ANION GAP SERPL CALC-SCNC: 12 MMOL/L (ref 7–16)
AST SERPL-CCNC: 24 U/L (ref 12–45)
BASOPHILS # BLD AUTO: 0.5 % (ref 0–1.8)
BASOPHILS # BLD: 0.03 K/UL (ref 0–0.12)
BILIRUB SERPL-MCNC: 0.3 MG/DL (ref 0.1–1.5)
BUN SERPL-MCNC: 16 MG/DL (ref 8–22)
CALCIUM ALBUM COR SERPL-MCNC: 9.3 MG/DL (ref 8.5–10.5)
CALCIUM SERPL-MCNC: 9.5 MG/DL (ref 8.5–10.5)
CHLORIDE SERPL-SCNC: 102 MMOL/L (ref 96–112)
CHOLEST SERPL-MCNC: 178 MG/DL (ref 100–199)
CO2 SERPL-SCNC: 26 MMOL/L (ref 20–33)
CREAT SERPL-MCNC: 0.77 MG/DL (ref 0.5–1.4)
EOSINOPHIL # BLD AUTO: 0.11 K/UL (ref 0–0.51)
EOSINOPHIL NFR BLD: 1.7 % (ref 0–6.9)
ERYTHROCYTE [DISTWIDTH] IN BLOOD BY AUTOMATED COUNT: 45.9 FL (ref 35.9–50)
EST. AVERAGE GLUCOSE BLD GHB EST-MCNC: 111 MG/DL
FASTING STATUS PATIENT QL REPORTED: NORMAL
GFR SERPLBLD CREATININE-BSD FMLA CKD-EPI: 83 ML/MIN/1.73 M 2
GLOBULIN SER CALC-MCNC: 2.6 G/DL (ref 1.9–3.5)
GLUCOSE SERPL-MCNC: 96 MG/DL (ref 65–99)
HBA1C MFR BLD: 5.5 % (ref 4–5.6)
HCT VFR BLD AUTO: 37.4 % (ref 37–47)
HDLC SERPL-MCNC: 51 MG/DL
HGB BLD-MCNC: 12.5 G/DL (ref 12–16)
IMM GRANULOCYTES # BLD AUTO: 0.02 K/UL (ref 0–0.11)
IMM GRANULOCYTES NFR BLD AUTO: 0.3 % (ref 0–0.9)
LDLC SERPL CALC-MCNC: 93 MG/DL
LYMPHOCYTES # BLD AUTO: 1.47 K/UL (ref 1–4.8)
LYMPHOCYTES NFR BLD: 22.7 % (ref 22–41)
MCH RBC QN AUTO: 33 PG (ref 27–33)
MCHC RBC AUTO-ENTMCNC: 33.4 G/DL (ref 32.2–35.5)
MCV RBC AUTO: 98.7 FL (ref 81.4–97.8)
MONOCYTES # BLD AUTO: 0.48 K/UL (ref 0–0.85)
MONOCYTES NFR BLD AUTO: 7.4 % (ref 0–13.4)
NEUTROPHILS # BLD AUTO: 4.36 K/UL (ref 1.82–7.42)
NEUTROPHILS NFR BLD: 67.4 % (ref 44–72)
NRBC # BLD AUTO: 0 K/UL
NRBC BLD-RTO: 0 /100 WBC (ref 0–0.2)
PLATELET # BLD AUTO: 347 K/UL (ref 164–446)
PMV BLD AUTO: 9.5 FL (ref 9–12.9)
POTASSIUM SERPL-SCNC: 4.2 MMOL/L (ref 3.6–5.5)
PROT SERPL-MCNC: 6.9 G/DL (ref 6–8.2)
RBC # BLD AUTO: 3.79 M/UL (ref 4.2–5.4)
SODIUM SERPL-SCNC: 140 MMOL/L (ref 135–145)
TRIGL SERPL-MCNC: 168 MG/DL (ref 0–149)
WBC # BLD AUTO: 6.5 K/UL (ref 4.8–10.8)

## 2024-08-13 PROCEDURE — 83036 HEMOGLOBIN GLYCOSYLATED A1C: CPT | Mod: GA

## 2024-08-13 PROCEDURE — 80053 COMPREHEN METABOLIC PANEL: CPT

## 2024-08-13 PROCEDURE — 80061 LIPID PANEL: CPT

## 2024-08-13 PROCEDURE — 36415 COLL VENOUS BLD VENIPUNCTURE: CPT

## 2024-08-13 PROCEDURE — 85025 COMPLETE CBC W/AUTO DIFF WBC: CPT

## 2024-10-09 ENCOUNTER — APPOINTMENT (RX ONLY)
Dept: URBAN - METROPOLITAN AREA CLINIC 4 | Facility: CLINIC | Age: 69
Setting detail: DERMATOLOGY
End: 2024-10-09

## 2024-10-09 DIAGNOSIS — L20.89 OTHER ATOPIC DERMATITIS: ICD-10-CM

## 2024-10-09 DIAGNOSIS — Z87.2 PERSONAL HISTORY OF DISEASES OF THE SKIN AND SUBCUTANEOUS TISSUE: ICD-10-CM | Status: STABLE

## 2024-10-09 DIAGNOSIS — L92.0 GRANULOMA ANNULARE: ICD-10-CM | Status: INADEQUATELY CONTROLLED

## 2024-10-09 DIAGNOSIS — L82.1 OTHER SEBORRHEIC KERATOSIS: ICD-10-CM

## 2024-10-09 DIAGNOSIS — Z85.828 PERSONAL HISTORY OF OTHER MALIGNANT NEOPLASM OF SKIN: ICD-10-CM | Status: STABLE

## 2024-10-09 DIAGNOSIS — D22 MELANOCYTIC NEVI: ICD-10-CM | Status: STABLE

## 2024-10-09 DIAGNOSIS — L81.4 OTHER MELANIN HYPERPIGMENTATION: ICD-10-CM | Status: STABLE

## 2024-10-09 PROBLEM — D22.5 MELANOCYTIC NEVI OF TRUNK: Status: ACTIVE | Noted: 2024-10-09

## 2024-10-09 PROBLEM — D22.62 MELANOCYTIC NEVI OF LEFT UPPER LIMB, INCLUDING SHOULDER: Status: ACTIVE | Noted: 2024-10-09

## 2024-10-09 PROCEDURE — ? ADDITIONAL NOTES

## 2024-10-09 PROCEDURE — ? INTRALESIONAL KENALOG

## 2024-10-09 PROCEDURE — 99213 OFFICE O/P EST LOW 20 MIN: CPT | Mod: 25

## 2024-10-09 PROCEDURE — 11900 INJECT SKIN LESIONS </W 7: CPT

## 2024-10-09 PROCEDURE — ? TREATMENT REGIMEN

## 2024-10-09 PROCEDURE — ? DIAGNOSIS COMMENT

## 2024-10-09 PROCEDURE — ? PRESCRIPTION

## 2024-10-09 PROCEDURE — ? COUNSELING

## 2024-10-09 PROCEDURE — ? OBSERVATION

## 2024-10-09 PROCEDURE — ? MEDICATION COUNSELING

## 2024-10-09 RX ORDER — TRIAMCINOLONE ACETONIDE 1 MG/G
CREAM TOPICAL BID
Qty: 80 | Refills: 1 | Status: ERX | COMMUNITY
Start: 2024-10-09

## 2024-10-09 RX ADMIN — TRIAMCINOLONE ACETONIDE: 1 CREAM TOPICAL at 00:00

## 2024-10-09 ASSESSMENT — LOCATION SIMPLE DESCRIPTION DERM
LOCATION SIMPLE: RIGHT PRETIBIAL REGION
LOCATION SIMPLE: LEFT UPPER BACK
LOCATION SIMPLE: LEFT HAND
LOCATION SIMPLE: RIGHT UPPER BACK
LOCATION SIMPLE: LEFT PRETIBIAL REGION
LOCATION SIMPLE: RIGHT FOREHEAD
LOCATION SIMPLE: LEFT THIGH
LOCATION SIMPLE: LEFT BUTTOCK
LOCATION SIMPLE: LEFT UPPER ARM

## 2024-10-09 ASSESSMENT — LOCATION ZONE DERM
LOCATION ZONE: LEG
LOCATION ZONE: TRUNK
LOCATION ZONE: FACE
LOCATION ZONE: ARM
LOCATION ZONE: HAND

## 2024-10-09 ASSESSMENT — LOCATION DETAILED DESCRIPTION DERM
LOCATION DETAILED: LEFT PROXIMAL PRETIBIAL REGION
LOCATION DETAILED: LEFT BUTTOCK
LOCATION DETAILED: LEFT SUPERIOR LATERAL UPPER BACK
LOCATION DETAILED: LEFT DISTAL PRETIBIAL REGION
LOCATION DETAILED: RIGHT INFERIOR MEDIAL FOREHEAD
LOCATION DETAILED: LEFT ANTERIOR DISTAL THIGH
LOCATION DETAILED: LEFT DORSAL INDEX FINGER METACARPOPHALANGEAL JOINT
LOCATION DETAILED: LEFT DORSAL MIDDLE FINGER METACARPOPHALANGEAL JOINT
LOCATION DETAILED: LEFT ANTERIOR PROXIMAL THIGH
LOCATION DETAILED: RIGHT PROXIMAL PRETIBIAL REGION
LOCATION DETAILED: LEFT ANTERIOR PROXIMAL UPPER ARM
LOCATION DETAILED: RIGHT MEDIAL UPPER BACK

## 2024-10-09 NOTE — PROCEDURE: DIAGNOSIS COMMENT
Detail Level: Detailed
Comment: Mohs scars, hx of SCC well differentiated.\\[id number] and [id number]\\nBoth treated by Dr. Mobley at same visit on 9/14/23
Render Risk Assessment In Note?: no
Comment: [id number], hx of Lentigious Junctional Proliferation, (evolving lentiginous MM IS) see [facility] Pathology report. Treated by Dr. Dsouza in 2009.
Comment: See body map
Comment: Slightly atypical, see photos

## 2024-10-09 NOTE — PROCEDURE: INTRALESIONAL KENALOG
Include Z78.9 (Other Specified Conditions Influencing Health Status) As An Associated Diagnosis?: No
How Many Mls Were Removed From The 40 Mg/Ml (5ml) Vial When Preparing The Injectable Solution?: 0
Consent: The risks of atrophy were reviewed with the patient.
Concentration Of Kenalog Solution Injected (Mg/Ml): 10.0
Medical Necessity Clause: This procedure was medically necessary because the lesions that were treated were:
How Many Mls Were Removed From The 10 Mg/Ml (5ml) Vial When Preparing The Injectable Solution?: 0.5
Total Volume (Ccs): .2
Validate Note Data When Using Inventory: Yes
Expiration Date For Kenalog (Optional): FEB 2026
Kenalog Preparation: Kenalog
Ndc# For Kenalog Only: 6773-3561-50
Kenalog Type Of Vial: Multiple Dose
Which Kenalog Vial Was Used?: Kenalog 10 mg/ml (5 ml vial)
Detail Level: Detailed
Administered By (Optional): Dr. Yeni Cha MD
Lot # For Kenalog (Optional): [id number]

## 2024-10-09 NOTE — PROCEDURE: TREATMENT REGIMEN
Initiate Treatment: - triamcinolone acetonide 0.1 % topical cream BID\\nSig: Apply to affected area twice daily for one week, then once daily as needed for itch/rash; do not use more than 2 weeks/month
Detail Level: Simple

## 2024-10-09 NOTE — PROCEDURE: ADDITIONAL NOTES
Additional Notes: [id number], SCC, well differentiated
Render Risk Assessment In Note?: no
Detail Level: Simple
Additional Notes: See photos
Detail Level: Detailed
Additional Notes: Pt reports new lesions.

## 2024-10-09 NOTE — PROCEDURE: MEDICATION COUNSELING
Cantharidin Counseling:  I discussed with the patient the risks of Cantharidin including but not limited to pain, redness, burning, itching, and blistering.
Stelara Counseling:  I discussed with the patient the risks of ustekinumab including but not limited to immunosuppression, malignancy, posterior leukoencephalopathy syndrome, and serious infections.  The patient understands that monitoring is required including a PPD at baseline and must alert us or the primary physician if symptoms of infection or other concerning signs are noted.
Rinvoq Pregnancy And Lactation Text: Based on animal studies, Rinvoq may cause embryo-fetal harm when administered to pregnant women.  The medication should not be used in pregnancy.  Breastfeeding is not recommended during treatment and for 6 days after the last dose.
Rituxan Pregnancy And Lactation Text: This medication is Pregnancy Category C and it isn't know if it is safe during pregnancy. It is unknown if this medication is excreted in breast milk but similar antibodies are known to be excreted.
Humira Counseling:  I discussed with the patient the risks of adalimumab including but not limited to myelosuppression, immunosuppression, autoimmune hepatitis, demyelinating diseases, lymphoma, and serious infections.  The patient understands that monitoring is required including a PPD at baseline and must alert us or the primary physician if symptoms of infection or other concerning signs are noted.
Mirvaso Pregnancy And Lactation Text: This medication has not been assigned a Pregnancy Risk Category. It is unknown if the medication is excreted in breast milk.
Acitretin Pregnancy And Lactation Text: This medication is Pregnancy Category X and should not be given to women who are pregnant or may become pregnant in the future. This medication is excreted in breast milk.
Tazorac Pregnancy And Lactation Text: This medication is not safe during pregnancy. It is unknown if this medication is excreted in breast milk.
Adbry Pregnancy And Lactation Text: It is unknown if this medication will adversely affect pregnancy or breast feeding.
Aklief Pregnancy And Lactation Text: It is unknown if this medication is safe to use during pregnancy.  It is unknown if this medication is excreted in breast milk.  Breastfeeding women should use the topical cream on the smallest area of the skin for the shortest time needed while breastfeeding.  Do not apply to nipple and areola.
Enbrel Counseling:  I discussed with the patient the risks of etanercept including but not limited to myelosuppression, immunosuppression, autoimmune hepatitis, demyelinating diseases, lymphoma, and infections.  The patient understands that monitoring is required including a PPD at baseline and must alert us or the primary physician if symptoms of infection or other concerning signs are noted.
Wartpeel Counseling:  I discussed with the patient the risks of Wartpeel including but not limited to erythema, scaling, itching, weeping, crusting, and pain.
Dapsone Counseling: I discussed with the patient the risks of dapsone including but not limited to hemolytic anemia, agranulocytosis, rashes, methemoglobinemia, kidney failure, peripheral neuropathy, headaches, GI upset, and liver toxicity.  Patients who start dapsone require monitoring including baseline LFTs and weekly CBCs for the first month, then every month thereafter.  The patient verbalized understanding of the proper use and possible adverse effects of dapsone.  All of the patient's questions and concerns were addressed.
Hydroquinone Counseling:  Patient advised that medication may result in skin irritation, lightening (hypopigmentation), dryness, and burning.  In the event of skin irritation, the patient was advised to reduce the amount of the drug applied or use it less frequently.  Rarely, spots that are treated with hydroquinone can become darker (pseudoochronosis).  Should this occur, patient instructed to stop medication and call the office. The patient verbalized understanding of the proper use and possible adverse effects of hydroquinone.  All of the patient's questions and concerns were addressed.
Use Enhanced Medication Counseling?: No
Itraconazole Pregnancy And Lactation Text: This medication is Pregnancy Category C and it isn't know if it is safe during pregnancy. It is also excreted in breast milk.
Erivedge Pregnancy And Lactation Text: This medication is Pregnancy Category X and is absolutely contraindicated during pregnancy. It is unknown if it is excreted in breast milk.
Otezla Counseling: The side effects of Otezla were discussed with the patient, including but not limited to worsening or new depression, weight loss, diarrhea, nausea, upper respiratory tract infection, and headache. Patient instructed to call the office should any adverse effect occur.  The patient verbalized understanding of the proper use and possible adverse effects of Otezla.  All the patient's questions and concerns were addressed.
Zyclara Pregnancy And Lactation Text: This medication is Pregnancy Category C. It is unknown if this medication is excreted in breast milk.
Sski Pregnancy And Lactation Text: This medication is Pregnancy Category D and isn't considered safe during pregnancy. It is excreted in breast milk.
Cantharidin Pregnancy And Lactation Text: This medication has not been proven safe during pregnancy. It is unknown if this medication is excreted in breast milk.
Oral Minoxidil Pregnancy And Lactation Text: This medication should only be used when clearly needed if you are pregnant, attempting to become pregnant or breast feeding.
Erivedge Counseling- I discussed with the patient the risks of Erivedge including but not limited to nausea, vomiting, diarrhea, constipation, weight loss, changes in the sense of taste, decreased appetite, muscle spasms, and hair loss.  The patient verbalized understanding of the proper use and possible adverse effects of Erivedge.  All of the patient's questions and concerns were addressed.
Azathioprine Pregnancy And Lactation Text: This medication is Pregnancy Category D and isn't considered safe during pregnancy. It is unknown if this medication is excreted in breast milk.
Prednisone Counseling:  I discussed with the patient the risks of prolonged use of prednisone including but not limited to weight gain, insomnia, osteoporosis, mood changes, diabetes, susceptibility to infection, glaucoma and high blood pressure.  In cases where prednisone use is prolonged, patients should be monitored with blood pressure checks, serum glucose levels and an eye exam.  Additionally, the patient may need to be placed on GI prophylaxis, PCP prophylaxis, and calcium and vitamin D supplementation and/or a bisphosphonate.  The patient verbalized understanding of the proper use and the possible adverse effects of prednisone.  All of the patient's questions and concerns were addressed.
Low Dose Naltrexone Counseling- I discussed with the patient the potential risks and side effects of low dose naltrexone including but not limited to: more vivid dreams, headaches, nausea, vomiting, abdominal pain, fatigue, dizziness, and anxiety.
Erythromycin Pregnancy And Lactation Text: This medication is Pregnancy Category B and is considered safe during pregnancy. It is also excreted in breast milk.
Bactrim Counseling:  I discussed with the patient the risks of sulfa antibiotics including but not limited to GI upset, allergic reaction, drug rash, diarrhea, dizziness, photosensitivity, and yeast infections.  Rarely, more serious reactions can occur including but not limited to aplastic anemia, agranulocytosis, methemoglobinemia, blood dyscrasias, liver or kidney failure, lung infiltrates or desquamative/blistering drug rashes.
Sarecycline Counseling: Patient advised regarding possible photosensitivity and discoloration of the teeth, skin, lips, tongue and gums.  Patient instructed to avoid sunlight, if possible.  When exposed to sunlight, patients should wear protective clothing, sunglasses, and sunscreen.  The patient was instructed to call the office immediately if the following severe adverse effects occur:  hearing changes, easy bruising/bleeding, severe headache, or vision changes.  The patient verbalized understanding of the proper use and possible adverse effects of sarecycline.  All of the patient's questions and concerns were addressed.
Doxepin Pregnancy And Lactation Text: This medication is Pregnancy Category C and it isn't known if it is safe during pregnancy. It is also excreted in breast milk and breast feeding isn't recommended.
Rhofade Counseling: Rhofade is a topical medication which can decrease superficial blood flow where applied. Side effects are uncommon and include stinging, redness and allergic reactions.
Finasteride Male Counseling: Finasteride Counseling:  I discussed with the patient the risks of use of finasteride including but not limited to decreased libido, decreased ejaculate volume, gynecomastia, and depression. Women should not handle medication.  All of the patient's questions and concerns were addressed.
5-Fu Counseling: 5-Fluorouracil Counseling:  I discussed with the patient the risks of 5-fluorouracil including but not limited to erythema, scaling, itching, weeping, crusting, and pain.
Topical Clindamycin Counseling: Patient counseled that this medication may cause skin irritation or allergic reactions.  In the event of skin irritation, the patient was advised to reduce the amount of the drug applied or use it less frequently.   The patient verbalized understanding of the proper use and possible adverse effects of clindamycin.  All of the patient's questions and concerns were addressed.
Finasteride Female Counseling: Finasteride Counseling:  I discussed with the patient the risks of use of finasteride including but not limited to decreased libido and sexual dysfunction. Explained the teratogenic nature of the medication and stressed the importance of not getting pregnant during treatment. All of the patient's questions and concerns were addressed.
Azelaic Acid Counseling: Patient counseled that medicine may cause skin irritation and to avoid applying near the eyes.  In the event of skin irritation, the patient was advised to reduce the amount of the drug applied or use it less frequently.   The patient verbalized understanding of the proper use and possible adverse effects of azelaic acid.  All of the patient's questions and concerns were addressed.
Enbrel Pregnancy And Lactation Text: This medication is Pregnancy Category B and is considered safe during pregnancy. It is unknown if this medication is excreted in breast milk.
Hydroquinone Pregnancy And Lactation Text: This medication has not been assigned a Pregnancy Risk Category but animal studies failed to show danger with the topical medication. It is unknown if the medication is excreted in breast milk.
Ketoconazole Counseling:   Patient counseled regarding improving absorption with orange juice.  Adverse effects include but are not limited to breast enlargement, headache, diarrhea, nausea, upset stomach, liver function test abnormalities, taste disturbance, and stomach pain.  There is a rare possibility of liver failure that can occur when taking ketoconazole. The patient understands that monitoring of LFTs may be required, especially at baseline. The patient verbalized understanding of the proper use and possible adverse effects of ketoconazole.  All of the patient's questions and concerns were addressed.
Dapsone Pregnancy And Lactation Text: This medication is Pregnancy Category C and is not considered safe during pregnancy or breast feeding.
Siliq Counseling:  I discussed with the patient the risks of Siliq including but not limited to new or worsening depression, suicidal thoughts and behavior, immunosuppression, malignancy, posterior leukoencephalopathy syndrome, and serious infections.  The patient understands that monitoring is required including a PPD at baseline and must alert us or the primary physician if symptoms of infection or other concerning signs are noted. There is also a special program designed to monitor depression which is required with Siliq.
Otezla Pregnancy And Lactation Text: This medication is Pregnancy Category C and it isn't known if it is safe during pregnancy. It is unknown if it is excreted in breast milk.
Opioid Counseling: I discussed with the patient the potential side effects of opioids including but not limited to addiction, altered mental status, and depression. I stressed avoiding alcohol, benzodiazepines, muscle relaxants and sleep aids unless specifically okayed by a physician. The patient verbalized understanding of the proper use and possible adverse effects of opioids. All of the patient's questions and concerns were addressed. They were instructed to flush the remaining pills down the toilet if they did not need them for pain.
Libtayo Counseling- I discussed with the patient the risks of Libtayo including but not limited to nausea, vomiting, diarrhea, and bone or muscle pain.  The patient verbalized understanding of the proper use and possible adverse effects of Libtayo.  All of the patient's questions and concerns were addressed.
Niacinamide Counseling: I recommended taking niacin or niacinamide, also know as vitamin B3, twice daily. Recent evidence suggests that taking vitamin B3 (500 mg twice daily) can reduce the risk of actinic keratoses and non-melanoma skin cancers. Side effects of vitamin B3 include flushing and headache.
Wartpeel Pregnancy And Lactation Text: This medication is Pregnancy Category X and contraindicated in pregnancy and in women who may become pregnant. It is unknown if this medication is excreted in breast milk.
Detail Level: Simple
Low Dose Naltrexone Pregnancy And Lactation Text: Naltrexone is pregnancy category C.  There have been no adequate and well-controlled studies in pregnant women.  It should be used in pregnancy only if the potential benefit justifies the potential risk to the fetus.   Limited data indicates that naltrexone is minimally excreted into breastmilk.
Metronidazole Counseling:  I discussed with the patient the risks of metronidazole including but not limited to seizures, nausea/vomiting, a metallic taste in the mouth, nausea/vomiting and severe allergy.
Winlevi Counseling:  I discussed with the patient the risks of topical clascoterone including but not limited to erythema, scaling, itching, and stinging. Patient voiced their understanding.
Bactrim Pregnancy And Lactation Text: This medication is Pregnancy Category D and is known to cause fetal risk.  It is also excreted in breast milk.
Sarecycline Pregnancy And Lactation Text: This medication is Pregnancy Category D and not consider safe during pregnancy. It is also excreted in breast milk.
Cellcept Counseling:  I discussed with the patient the risks of mycophenolate mofetil including but not limited to infection/immunosuppression, GI upset, hypokalemia, hypercholesterolemia, bone marrow suppression, lymphoproliferative disorders, malignancy, GI ulceration/bleed/perforation, colitis, interstitial lung disease, kidney failure, progressive multifocal leukoencephalopathy, and birth defects.  The patient understands that monitoring is required including a baseline creatinine and regular CBC testing. In addition, patient must alert us immediately if symptoms of infection or other concerning signs are noted.
Prednisone Pregnancy And Lactation Text: This medication is Pregnancy Category C and it isn't know if it is safe during pregnancy. This medication is excreted in breast milk.
Opzelura Counseling:  I discussed with the patient the risks of Opzelura including but not limited to nasopharngitis, bronchitis, ear infection, eosinophila, hives, diarrhea, folliculitis, tonsillitis, and rhinorrhea.  Taken orally, this medication has been linked to serious infections; higher rate of mortality; malignancy and lymphoproliferative disorders; major adverse cardiovascular events; thrombosis; thrombocytopenia, anemia, and neutropenia; and lipid elevations.
Bimzelx Counseling:  I discussed with the patient the risks of Bimzelx including but not limited to depression, immunosuppression, allergic reactions and infections.  The patient understands that monitoring is required including a PPD at baseline and must alert us or the primary physician if symptoms of infection or other concerning signs are noted.
Bexarotene Counseling:  I discussed with the patient the risks of bexarotene including but not limited to hair loss, dry lips/skin/eyes, liver abnormalities, hyperlipidemia, pancreatitis, depression/suicidal ideation, photosensitivity, drug rash/allergic reactions, hypothyroidism, anemia, leukopenia, infection, cataracts, and teratogenicity.  Patient understands that they will need regular blood tests to check lipid profile, liver function tests, white blood cell count, thyroid function tests and pregnancy test if applicable.
Hydroxyzine Counseling: Patient advised that the medication is sedating and not to drive a car after taking this medication.  Patient informed of potential adverse effects including but not limited to dry mouth, urinary retention, and blurry vision.  The patient verbalized understanding of the proper use and possible adverse effects of hydroxyzine.  All of the patient's questions and concerns were addressed.
Azelaic Acid Pregnancy And Lactation Text: This medication is considered safe during pregnancy and breast feeding.
Oxybutynin Counseling:  I discussed with the patient the risks of oxybutynin including but not limited to skin rash, drowsiness, dry mouth, difficulty urinating, and blurred vision.
Niacinamide Pregnancy And Lactation Text: These medications are considered safe during pregnancy.
Imiquimod Counseling:  I discussed with the patient the risks of imiquimod including but not limited to erythema, scaling, itching, weeping, crusting, and pain.  Patient understands that the inflammatory response to imiquimod is variable from person to person and was educated regarded proper titration schedule.  If flu-like symptoms develop, patient knows to discontinue the medication and contact us.
Ketoconazole Pregnancy And Lactation Text: This medication is Pregnancy Category C and it isn't know if it is safe during pregnancy. It is also excreted in breast milk and breast feeding isn't recommended.
Libtayo Pregnancy And Lactation Text: This medication is contraindicated in pregnancy and when breast feeding.
Taltz Counseling: I discussed with the patient the risks of ixekizumab including but not limited to immunosuppression, serious infections, worsening of inflammatory bowel disease and drug reactions.  The patient understands that monitoring is required including a PPD at baseline and must alert us or the primary physician if symptoms of infection or other concerning signs are noted.
Siliq Pregnancy And Lactation Text: The risk during pregnancy and breastfeeding is uncertain with this medication.
Cyclophosphamide Counseling:  I discussed with the patient the risks of cyclophosphamide including but not limited to hair loss, hormonal abnormalities, decreased fertility, abdominal pain, diarrhea, nausea and vomiting, bone marrow suppression and infection. The patient understands that monitoring is required while taking this medication.
Hyrimoz Counseling:  I discussed with the patient the risks of adalimumab including but not limited to myelosuppression, immunosuppression, autoimmune hepatitis, demyelinating diseases, lymphoma, and serious infections.  The patient understands that monitoring is required including a PPD at baseline and must alert us or the primary physician if symptoms of infection or other concerning signs are noted.
Opioid Pregnancy And Lactation Text: These medications can lead to premature delivery and should be avoided during pregnancy. These medications are also present in breast milk in small amounts.
Cephalexin Pregnancy And Lactation Text: This medication is Pregnancy Category B and considered safe during pregnancy.  It is also excreted in breast milk but can be used safely for shorter doses.
Minocycline Counseling: Patient advised regarding possible photosensitivity and discoloration of the teeth, skin, lips, tongue and gums.  Patient instructed to avoid sunlight, if possible.  When exposed to sunlight, patients should wear protective clothing, sunglasses, and sunscreen.  The patient was instructed to call the office immediately if the following severe adverse effects occur:  hearing changes, easy bruising/bleeding, severe headache, or vision changes.  The patient verbalized understanding of the proper use and possible adverse effects of minocycline.  All of the patient's questions and concerns were addressed.
Topical Ketoconazole Pregnancy And Lactation Text: This medication is Pregnancy Category B and is considered safe during pregnancy. It is unknown if it is excreted in breast milk.
Cephalexin Counseling: I counseled the patient regarding use of cephalexin as an antibiotic for prophylactic and/or therapeutic purposes. Cephalexin (commonly prescribed under brand name Keflex) is a cephalosporin antibiotic which is active against numerous classes of bacteria, including most skin bacteria. Side effects may include nausea, diarrhea, gastrointestinal upset, rash, hives, yeast infections, and in rare cases, hepatitis, kidney disease, seizures, fever, confusion, neurologic symptoms, and others. Patients with severe allergies to penicillin medications are cautioned that there is about a 10% incidence of cross-reactivity with cephalosporins. When possible, patients with penicillin allergies should use alternatives to cephalosporins for antibiotic therapy.
Winlevi Pregnancy And Lactation Text: This medication is considered safe during pregnancy and breastfeeding.
Tetracycline Counseling: Patient counseled regarding possible photosensitivity and increased risk for sunburn.  Patient instructed to avoid sunlight, if possible.  When exposed to sunlight, patients should wear protective clothing, sunglasses, and sunscreen.  The patient was instructed to call the office immediately if the following severe adverse effects occur:  hearing changes, easy bruising/bleeding, severe headache, or vision changes.  The patient verbalized understanding of the proper use and possible adverse effects of tetracycline.  All of the patient's questions and concerns were addressed. Patient understands to avoid pregnancy while on therapy due to potential birth defects.
Metronidazole Pregnancy And Lactation Text: This medication is Pregnancy Category B and considered safe during pregnancy.  It is also excreted in breast milk.
Thalidomide Counseling: I discussed with the patient the risks of thalidomide including but not limited to birth defects, anxiety, weakness, chest pain, dizziness, cough and severe allergy.
Solaraze Counseling:  I discussed with the patient the risks of Solaraze including but not limited to erythema, scaling, itching, weeping, crusting, and pain.
Bexarotene Pregnancy And Lactation Text: This medication is Pregnancy Category X and should not be given to women who are pregnant or may become pregnant. This medication should not be used if you are breast feeding.
Hydroxyzine Pregnancy And Lactation Text: This medication is not safe during pregnancy and should not be taken. It is also excreted in breast milk and breast feeding isn't recommended.
Finasteride Pregnancy And Lactation Text: This medication is absolutely contraindicated during pregnancy. It is unknown if it is excreted in breast milk.
Opzelura Pregnancy And Lactation Text: There is insufficient data to evaluate drug-associated risk for major birth defects, miscarriage, or other adverse maternal or fetal outcomes.  There is a pregnancy registry that monitors pregnancy outcomes in pregnant persons exposed to the medication during pregnancy.  It is unknown if this medication is excreted in breast milk.  Do not breastfeed during treatment and for about 4 weeks after the last dose.
Bimzelx Pregnancy And Lactation Text: This medication crosses the placenta and the safety is uncertain during pregnancy. It is unknown if this medication is present in breast milk.
Cibinqo Counseling: I discussed with the patient the risks of Cibinqo therapy including but not limited to common cold, nausea, headache, cold sores, increased blood CPK levels, dizziness, UTIs, fatigue, acne, and vomitting. Live vaccines should be avoided.  This medication has been linked to serious infections; higher rate of mortality; malignancy and lymphoproliferative disorders; major adverse cardiovascular events; thrombosis; thrombocytopenia and lymphopenia; lipid elevations; and retinal detachment.
Solaraze Pregnancy And Lactation Text: This medication is Pregnancy Category B and is considered safe. There is some data to suggest avoiding during the third trimester. It is unknown if this medication is excreted in breast milk.
Odomzo Counseling- I discussed with the patient the risks of Odomzo including but not limited to nausea, vomiting, diarrhea, constipation, weight loss, changes in the sense of taste, decreased appetite, muscle spasms, and hair loss.  The patient verbalized understanding of the proper use and possible adverse effects of Odomzo.  All of the patient's questions and concerns were addressed.
Albendazole Counseling:  I discussed with the patient the risks of albendazole including but not limited to cytopenia, kidney damage, nausea/vomiting and severe allergy.  The patient understands that this medication is being used in an off-label manner.
Sotyktu Counseling:  I discussed the most common side effects of Sotyktu including: common cold, sore throat, sinus infections, cold sores, canker sores, folliculitis, and acne.? I also discussed more serious side effects of Sotyktu including but not limited to: serious allergic reactions; increased risk for infections such as TB; cancers such as lymphomas; rhabdomyolysis and elevated CPK; and elevated triglycerides and liver enzymes.?
Nsaids Counseling: NSAID Counseling: I discussed with the patient that NSAIDs should be taken with food. Prolonged use of NSAIDs can result in the development of stomach ulcers.  Patient advised to stop taking NSAIDs if abdominal pain occurs.  The patient verbalized understanding of the proper use and possible adverse effects of NSAIDs.  All of the patient's questions and concerns were addressed.
Clindamycin Counseling: I counseled the patient regarding use of clindamycin as an antibiotic for prophylactic and/or therapeutic purposes. Clindamycin is active against numerous classes of bacteria, including skin bacteria. Side effects may include nausea, diarrhea, gastrointestinal upset, rash, hives, yeast infections, and in rare cases, colitis.
Cyclophosphamide Pregnancy And Lactation Text: This medication is Pregnancy Category D and it isn't considered safe during pregnancy. This medication is excreted in breast milk.
Gabapentin Pregnancy And Lactation Text: This medication is Pregnancy Category C and isn't considered safe during pregnancy. It is excreted in breast milk.
Topical Metronidazole Counseling: Metronidazole is a topical antibiotic medication. You may experience burning, stinging, redness, or allergic reactions.  Please call our office if you develop any problems from using this medication.
Simponi Counseling:  I discussed with the patient the risks of golimumab including but not limited to myelosuppression, immunosuppression, autoimmune hepatitis, demyelinating diseases, lymphoma, and serious infections.  The patient understands that monitoring is required including a PPD at baseline and must alert us or the primary physician if symptoms of infection or other concerning signs are noted.
Gabapentin Counseling: I discussed with the patient the risks of gabapentin including but not limited to dizziness, somnolence, fatigue and ataxia.
Topical Metronidazole Pregnancy And Lactation Text: This medication is Pregnancy Category B and considered safe during pregnancy.  It is also considered safe to use while breastfeeding.
Topical Ketoconazole Counseling: Patient counseled that this medication may cause skin irritation or allergic reactions.  In the event of skin irritation, the patient was advised to reduce the amount of the drug applied or use it less frequently.   The patient verbalized understanding of the proper use and possible adverse effects of ketoconazole.  All of the patient's questions and concerns were addressed.
Birth Control Pills Counseling: Birth Control Pill Counseling: I discussed with the patient the potential side effects of OCPs including but not limited to increased risk of stroke, heart attack, thrombophlebitis, deep venous thrombosis, hepatic adenomas, breast changes, GI upset, headaches, and depression.  The patient verbalized understanding of the proper use and possible adverse effects of OCPs. All of the patient's questions and concerns were addressed.
Drysol Counseling:  I discussed with the patient the risks of drysol/aluminum chloride including but not limited to skin rash, itching, irritation, burning.
Cimzia Counseling:  I discussed with the patient the risks of Cimzia including but not limited to immunosuppression, allergic reactions and infections.  The patient understands that monitoring is required including a PPD at baseline and must alert us or the primary physician if symptoms of infection or other concerning signs are noted.
Picato Counseling:  I discussed with the patient the risks of Picato including but not limited to erythema, scaling, itching, weeping, crusting, and pain.
VTAMA Counseling: I discussed with the patient that VTAMA is not for use in the eyes, mouth or mouth. They should call the office if they develop any signs of allergic reactions to VTAMA. The patient verbalized understanding of the proper use and possible adverse effects of VTAMA.  All of the patient's questions and concerns were addressed.
Cibinqo Pregnancy And Lactation Text: It is unknown if this medication will adversely affect pregnancy or breast feeding.  You should not take this medication if you are currently pregnant or planning a pregnancy or while breastfeeding.
Benzoyl Peroxide Counseling: Patient counseled that medicine may cause skin irritation and bleach clothing.  In the event of skin irritation, the patient was advised to reduce the amount of the drug applied or use it less frequently.   The patient verbalized understanding of the proper use and possible adverse effects of benzoyl peroxide.  All of the patient's questions and concerns were addressed.
Isotretinoin Counseling: Patient should get monthly blood tests, not donate blood, not drive at night if vision affected, not share medication, and not undergo elective surgery for 6 months after tx completed. Side effects reviewed, pt to contact office should one occur.
Arava Counseling:  Patient counseled regarding adverse effects of Arava including but not limited to nausea, vomiting, abnormalities in liver function tests. Patients may develop mouth sores, rash, diarrhea, and abnormalities in blood counts. The patient understands that monitoring is required including LFTs and blood counts.  There is a rare possibility of scarring of the liver and lung problems that can occur when taking methotrexate. Persistent nausea, loss of appetite, pale stools, dark urine, cough, and shortness of breath should be reported immediately. Patient advised to discontinue Arava treatment and consult with a physician prior to attempting conception. The patient will have to undergo a treatment to eliminate Arava from the body prior to conception.
Terbinafine Counseling: Patient counseling regarding adverse effects of terbinafine including but not limited to headache, diarrhea, rash, upset stomach, liver function test abnormalities, itching, taste/smell disturbance, nausea, abdominal pain, and flatulence.  There is a rare possibility of liver failure that can occur when taking terbinafine.  The patient understands that a baseline LFT and kidney function test may be required. The patient verbalized understanding of the proper use and possible adverse effects of terbinafine.  All of the patient's questions and concerns were addressed.
Sotyktu Pregnancy And Lactation Text: There is insufficient data to evaluate whether or not Sotyktu is safe to use during pregnancy.? ?It is not known if Sotyktu passes into breast milk and whether or not it is safe to use when breastfeeding.??
Nsaids Pregnancy And Lactation Text: These medications are considered safe up to 30 weeks gestation. It is excreted in breast milk.
Albendazole Pregnancy And Lactation Text: This medication is Pregnancy Category C and it isn't known if it is safe during pregnancy. It is also excreted in breast milk.
Cyclosporine Counseling:  I discussed with the patient the risks of cyclosporine including but not limited to hypertension, gingival hyperplasia,myelosuppression, immunosuppression, liver damage, kidney damage, neurotoxicity, lymphoma, and serious infections. The patient understands that monitoring is required including baseline blood pressure, CBC, CMP, lipid panel and uric acid, and then 1-2 times monthly CMP and blood pressure.
Glycopyrrolate Counseling:  I discussed with the patient the risks of glycopyrrolate including but not limited to skin rash, drowsiness, dry mouth, difficulty urinating, and blurred vision.
Clindamycin Pregnancy And Lactation Text: This medication can be used in pregnancy if certain situations. Clindamycin is also present in breast milk.
Tranexamic Acid Counseling:  Patient advised of the small risk of bleeding problems with tranexamic acid. They were also instructed to call if they developed any nausea, vomiting or diarrhea. All of the patient's questions and concerns were addressed.
Elidel Counseling: Patient may experience a mild burning sensation during topical application. Elidel is not approved in children less than 2 years of age. There have been case reports of hematologic and skin malignancies in patients using topical calcineurin inhibitors although causality is questionable.
Quinolones Counseling:  I discussed with the patient the risks of fluoroquinolones including but not limited to GI upset, allergic reaction, drug rash, diarrhea, dizziness, photosensitivity, yeast infections, liver function test abnormalities, tendonitis/tendon rupture.
Tremfya Counseling: I discussed with the patient the risks of guselkumab including but not limited to immunosuppression, serious infections, worsening of inflammatory bowel disease and drug reactions.  The patient understands that monitoring is required including a PPD at baseline and must alert us or the primary physician if symptoms of infection or other concerning signs are noted.
Cosentyx Counseling:  I discussed with the patient the risks of Cosentyx including but not limited to worsening of Crohn's disease, immunosuppression, allergic reactions and infections.  The patient understands that monitoring is required including a PPD at baseline and must alert us or the primary physician if symptoms of infection or other concerning signs are noted.
Protopic Counseling: Patient may experience a mild burning sensation during topical application. Protopic is not approved in children less than 2 years of age. There have been case reports of hematologic and skin malignancies in patients using topical calcineurin inhibitors although causality is questionable.
High Dose Vitamin A Counseling: Side effects reviewed, pt to contact office should one occur.
Ilumya Counseling: I discussed with the patient the risks of tildrakizumab including but not limited to immunosuppression, malignancy, posterior leukoencephalopathy syndrome, and serious infections.  The patient understands that monitoring is required including a PPD at baseline and must alert us or the primary physician if symptoms of infection or other concerning signs are noted.
Skyrizi Counseling: I discussed with the patient the risks of risankizumab-rzaa including but not limited to immunosuppression, and serious infections.  The patient understands that monitoring is required including a PPD at baseline and must alert us or the primary physician if symptoms of infection or other concerning signs are noted.
Isotretinoin Pregnancy And Lactation Text: This medication is Pregnancy Category X and is considered extremely dangerous during pregnancy. It is unknown if it is excreted in breast milk.
Birth Control Pills Pregnancy And Lactation Text: This medication should be avoided if pregnant and for the first 30 days post-partum.
Cimzia Pregnancy And Lactation Text: This medication crosses the placenta but can be considered safe in certain situations. Cimzia may be excreted in breast milk.
Benzoyl Peroxide Pregnancy And Lactation Text: This medication is Pregnancy Category C. It is unknown if benzoyl peroxide is excreted in breast milk.
Litfulo Counseling: I discussed with the patient the risks of Litfulo therapy including but not limited to upper respiratory tract infections, shingles, cold sores, and nausea. Live vaccines should be avoided.  This medication has been linked to serious infections; higher rate of mortality; malignancy and lymphoproliferative disorders; major adverse cardiovascular events; thrombosis; gastrointestinal perforations; neutropenia; lymphopenia; anemia; liver enzyme elevations; and lipid elevations.
Klisyri Counseling:  I discussed with the patient the risks of Klisyri including but not limited to erythema, scaling, itching, weeping, crusting, and pain.
Terbinafine Pregnancy And Lactation Text: This medication is Pregnancy Category B and is considered safe during pregnancy. It is also excreted in breast milk and breast feeding isn't recommended.
Topical Steroids Counseling: I discussed with the patient that prolonged use of topical steroids can result in the increased appearance of superficial blood vessels (telangiectasias), lightening (hypopigmentation) and thinning of the skin (atrophy).  Patient understands to avoid using high potency steroids in skin folds, the groin or the face.  The patient verbalized understanding of the proper use and possible adverse effects of topical steroids.  All of the patient's questions and concerns were addressed.
Vtama Pregnancy And Lactation Text: It is unknown if this medication can cause problems during pregnancy and breastfeeding.
Soolantra Counseling: I discussed with the patients the risks of topial Soolantra. This is a medicine which decreases the number of mites and inflammation in the skin. You experience burning, stinging, eye irritation or allergic reactions.  Please call our office if you develop any problems from using this medication.
Fluconazole Counseling:  Patient counseled regarding adverse effects of fluconazole including but not limited to headache, diarrhea, nausea, upset stomach, liver function test abnormalities, taste disturbance, and stomach pain.  There is a rare possibility of liver failure that can occur when taking fluconazole.  The patient understands that monitoring of LFTs and kidney function test may be required, especially at baseline. The patient verbalized understanding of the proper use and possible adverse effects of fluconazole.  All of the patient's questions and concerns were addressed.
Glycopyrrolate Pregnancy And Lactation Text: This medication is Pregnancy Category B and is considered safe during pregnancy. It is unknown if it is excreted breast milk.
Doxycycline Counseling:  Patient counseled regarding possible photosensitivity and increased risk for sunburn.  Patient instructed to avoid sunlight, if possible.  When exposed to sunlight, patients should wear protective clothing, sunglasses, and sunscreen.  The patient was instructed to call the office immediately if the following severe adverse effects occur:  hearing changes, easy bruising/bleeding, severe headache, or vision changes.  The patient verbalized understanding of the proper use and possible adverse effects of doxycycline.  All of the patient's questions and concerns were addressed.
Zoryve Counseling:  I discussed with the patient that Zoryve is not for use in the eyes, mouth or vagina. The most commonly reported side effects include diarrhea, headache, insomnia, application site pain, upper respiratory tract infections, and urinary tract infections.  All of the patient's questions and concerns were addressed.
Cimetidine Counseling:  I discussed with the patient the risks of Cimetidine including but not limited to gynecomastia, headache, diarrhea, nausea, drowsiness, arrhythmias, pancreatitis, skin rashes, psychosis, bone marrow suppression and kidney toxicity.
Dutasteride Male Counseling: Dustasteride Counseling:  I discussed with the patient the risks of use of dutasteride including but not limited to decreased libido, decreased ejaculate volume, and gynecomastia. Women who can become pregnant should not handle medication.  All of the patient's questions and concerns were addressed.
Spironolactone Pregnancy And Lactation Text: This medication can cause feminization of the male fetus and should be avoided during pregnancy. The active metabolite is also found in breast milk.
Protopic Pregnancy And Lactation Text: This medication is Pregnancy Category C. It is unknown if this medication is excreted in breast milk when applied topically.
Olumiant Counseling: I discussed with the patient the risks of Olumiant therapy including but not limited to upper respiratory tract infections, shingles, cold sores, and nausea. Live vaccines should be avoided.  This medication has been linked to serious infections; higher rate of mortality; malignancy and lymphoproliferative disorders; major adverse cardiovascular events; thrombosis; gastrointestinal perforations; neutropenia; lymphopenia; anemia; liver enzyme elevations; and lipid elevations.
Minoxidil Counseling: Minoxidil is a topical medication which can increase blood flow where it is applied. It is uncertain how this medication increases hair growth. Side effects are uncommon and include stinging and allergic reactions.
Spironolactone Counseling: Patient advised regarding risks of diarrhea, abdominal pain, hyperkalemia, birth defects (for female patients), liver toxicity and renal toxicity. The patient may need blood work to monitor liver and kidney function and potassium levels while on therapy. The patient verbalized understanding of the proper use and possible adverse effects of spironolactone.  All of the patient's questions and concerns were addressed.
Carac Counseling:  I discussed with the patient the risks of Carac including but not limited to erythema, scaling, itching, weeping, crusting, and pain.
Litfulo Pregnancy And Lactation Text: Based on animal studies, Lifulo may cause embryo-fetal harm when administered to pregnant women.  The medication should not be used in pregnancy.  Breastfeeding is not recommended during treatment.
Soolantra Pregnancy And Lactation Text: This medication is Pregnancy Category C. This medication is considered safe during breast feeding.
Klisyri Pregnancy And Lactation Text: It is unknown if this medication can harm a developing fetus or if it is excreted in breast milk.
Propranolol Counseling:  I discussed with the patient the risks of propranolol including but not limited to low heart rate, low blood pressure, low blood sugar, restlessness and increased cold sensitivity. They should call the office if they experience any of these side effects.
Xolair Counseling:  Patient informed of potential adverse effects including but not limited to fever, muscle aches, rash and allergic reactions.  The patient verbalized understanding of the proper use and possible adverse effects of Xolair.  All of the patient's questions and concerns were addressed.
Clofazimine Counseling:  I discussed with the patient the risks of clofazimine including but not limited to skin and eye pigmentation, liver damage, nausea/vomiting, gastrointestinal bleeding and allergy.
Ivermectin Counseling:  Patient instructed to take medication on an empty stomach with a full glass of water.  Patient informed of potential adverse effects including but not limited to nausea, diarrhea, dizziness, itching, and swelling of the extremities or lymph nodes.  The patient verbalized understanding of the proper use and possible adverse effects of ivermectin.  All of the patient's questions and concerns were addressed.
Infliximab Counseling:  I discussed with the patient the risks of infliximab including but not limited to myelosuppression, immunosuppression, autoimmune hepatitis, demyelinating diseases, lymphoma, and serious infections.  The patient understands that monitoring is required including a PPD at baseline and must alert us or the primary physician if symptoms of infection or other concerning signs are noted.
Xeljanz Counseling: I discussed with the patient the risks of Xeljanz therapy including increased risk of infection, liver issues, headache, diarrhea, or cold symptoms. Live vaccines should be avoided. They were instructed to call if they have any problems.
Olanzapine Counseling- I discussed with the patient the common side effects of olanzapine including but are not limited to: lack of energy, dry mouth, increased appetite, sleepiness, tremor, constipation, dizziness, changes in behavior, or restlessness.  Explained that teenagers are more likely to experience headaches, abdominal pain, pain in the arms or legs, tiredness, and sleepiness.  Serious side effects include but are not limited: increased risk of death in elderly patients who are confused, have memory loss, or dementia-related psychosis; hyperglycemia; increased cholesterol and triglycerides; and weight gain.
Topical Steroids Applications Pregnancy And Lactation Text: Most topical steroids are considered safe to use during pregnancy and lactation.  Any topical steroid applied to the breast or nipple should be washed off before breastfeeding.
Methotrexate Counseling:  Patient counseled regarding adverse effects of methotrexate including but not limited to nausea, vomiting, abnormalities in liver function tests. Patients may develop mouth sores, rash, diarrhea, and abnormalities in blood counts. The patient understands that monitoring is required including LFT's and blood counts.  There is a rare possibility of scarring of the liver and lung problems that can occur when taking methotrexate. Persistent nausea, loss of appetite, pale stools, dark urine, cough, and shortness of breath should be reported immediately. Patient advised to discontinue methotrexate treatment at least three months before attempting to become pregnant.  I discussed the need for folate supplements while taking methotrexate.  These supplements can decrease side effects during methotrexate treatment. The patient verbalized understanding of the proper use and possible adverse effects of methotrexate.  All of the patient's questions and concerns were addressed.
Topical Sulfur Applications Counseling: Topical Sulfur Counseling: Patient counseled that this medication may cause skin irritation or allergic reactions.  In the event of skin irritation, the patient was advised to reduce the amount of the drug applied or use it less frequently.   The patient verbalized understanding of the proper use and possible adverse effects of topical sulfur application.  All of the patient's questions and concerns were addressed.
Dutasteride Female Counseling: Dutasteride Counseling:  I discussed with the patient the risks of use of dutasteride including but not limited to decreased libido and sexual dysfunction. Explained the teratogenic nature of the medication and stressed the importance of not getting pregnant during treatment. All of the patient's questions and concerns were addressed.
Qbrexza Counseling:  I discussed with the patient the risks of Qbrexza including but not limited to headache, mydriasis, blurred vision, dry eyes, nasal dryness, dry mouth, dry throat, dry skin, urinary hesitation, and constipation.  Local skin reactions including erythema, burning, stinging, and itching can also occur.
Dupixent Counseling: I discussed with the patient the risks of dupilumab including but not limited to eye infection and irritation, cold sores, injection site reactions, worsening of asthma, allergic reactions and increased risk of parasitic infection.  Live vaccines should be avoided while taking dupilumab. Dupilumab will also interact with certain medications such as warfarin and cyclosporine. The patient understands that monitoring is required and they must alert us or the primary physician if symptoms of infection or other concerning signs are noted.
Xolair Pregnancy And Lactation Text: This medication is Pregnancy Category B and is considered safe during pregnancy. This medication is excreted in breast milk.
Griseofulvin Pregnancy And Lactation Text: This medication is Pregnancy Category X and is known to cause serious birth defects. It is unknown if this medication is excreted in breast milk but breast feeding should be avoided.
Calcipotriene Counseling:  I discussed with the patient the risks of calcipotriene including but not limited to erythema, scaling, itching, and irritation.
Valtrex Counseling: I discussed with the patient the risks of valacyclovir including but not limited to kidney damage, nausea, vomiting and severe allergy.  The patient understands that if the infection seems to be worsening or is not improving, they are to call.
Olumiant Pregnancy And Lactation Text: Based on animal studies, Olumiant may cause embryo-fetal harm when administered to pregnant women.  The medication should not be used in pregnancy.  Breastfeeding is not recommended during treatment.
High Dose Vitamin A Pregnancy And Lactation Text: High dose vitamin A therapy is contraindicated during pregnancy and breast feeding.
Tranexamic Acid Pregnancy And Lactation Text: It is unknown if this medication is safe during pregnancy or breast feeding.
Griseofulvin Counseling:  I discussed with the patient the risks of griseofulvin including but not limited to photosensitivity, cytopenia, liver damage, nausea/vomiting and severe allergy.  The patient understands that this medication is best absorbed when taken with a fatty meal (e.g., ice cream or french fries).
Topical Retinoid counseling:  Patient advised to apply a pea-sized amount only at bedtime and wait 30 minutes after washing their face before applying.  If too drying, patient may add a non-comedogenic moisturizer. The patient verbalized understanding of the proper use and possible adverse effects of retinoids.  All of the patient's questions and concerns were addressed.
Olanzapine Pregnancy And Lactation Text: This medication is pregnancy category C.   There are no adequate and well controlled trials with olanzapine in pregnant females.  Olanzapine should be used during pregnancy only if the potential benefit justifies the potential risk to the fetus.   In a study in lactating healthy women, olanzapine was excreted in breast milk.  It is recommended that women taking olanzapine should not breast feed.
Spevigo Counseling: I discussed with the patient the risks of Spevigo including but not limited to fatigue, nasuea, vomiting, headache, pruritus, urinary tract infection, an infusion related reactions.  The patient understands that monitoring is required including screening for tuberculosis at baseline and yearly screening thereafter while continuing Spevigo therapy. They should contact us if symptoms of infection or other concerning signs are noted.
Hydroxychloroquine Counseling:  I discussed with the patient that a baseline ophthalmologic exam is needed at the start of therapy and every year thereafter while on therapy. A CBC may also be warranted for monitoring.  The side effects of this medication were discussed with the patient, including but not limited to agranulocytosis, aplastic anemia, seizures, rashes, retinopathy, and liver toxicity. Patient instructed to call the office should any adverse effect occur.  The patient verbalized understanding of the proper use and possible adverse effects of Plaquenil.  All the patient's questions and concerns were addressed.
Xelflynnz Pregnancy And Lactation Text: This medication is Pregnancy Category D and is not considered safe during pregnancy.  The risk during breast feeding is also uncertain.
Propranolol Pregnancy And Lactation Text: This medication is Pregnancy Category C and it isn't known if it is safe during pregnancy. It is excreted in breast milk.
Azithromycin Counseling:  I discussed with the patient the risks of azithromycin including but not limited to GI upset, allergic reaction, drug rash, diarrhea, and yeast infections.
Rifampin Counseling: I discussed with the patient the risks of rifampin including but not limited to liver damage, kidney damage, red-orange body fluids, nausea/vomiting and severe allergy.
Doxycycline Pregnancy And Lactation Text: This medication is Pregnancy Category D and not consider safe during pregnancy. It is also excreted in breast milk but is considered safe for shorter treatment courses.
Rifampin Pregnancy And Lactation Text: This medication is Pregnancy Category C and it isn't know if it is safe during pregnancy. It is also excreted in breast milk and should not be used if you are breast feeding.
Adbry Counseling: I discussed with the patient the risks of tralokinumab including but not limited to eye infection and irritation, cold sores, injection site reactions, worsening of asthma, allergic reactions and increased risk of parasitic infection.  Live vaccines should be avoided while taking tralokinumab. The patient understands that monitoring is required and they must alert us or the primary physician if symptoms of infection or other concerning signs are noted.
Topical Sulfur Applications Pregnancy And Lactation Text: This medication is Pregnancy Category C and has an unknown safety profile during pregnancy. It is unknown if this topical medication is excreted in breast milk.
Doxepin Counseling:  Patient advised that the medication is sedating and not to drive a car after taking this medication. Patient informed of potential adverse effects including but not limited to dry mouth, urinary retention, and blurry vision.  The patient verbalized understanding of the proper use and possible adverse effects of doxepin.  All of the patient's questions and concerns were addressed.
Aklief counseling:  Patient advised to apply a pea-sized amount only at bedtime and wait 30 minutes after washing their face before applying.  If too drying, patient may add a non-comedogenic moisturizer.  The most commonly reported side effects including irritation, redness, scaling, dryness, stinging, burning, itching, and increased risk of sunburn.  The patient verbalized understanding of the proper use and possible adverse effects of retinoids.  All of the patient's questions and concerns were addressed.
Tazorac Counseling:  Patient advised that medication is irritating and drying.  Patient may need to apply sparingly and wash off after an hour before eventually leaving it on overnight.  The patient verbalized understanding of the proper use and possible adverse effects of tazorac.  All of the patient's questions and concerns were addressed.
Acitretin Counseling:  I discussed with the patient the risks of acitretin including but not limited to hair loss, dry lips/skin/eyes, liver damage, hyperlipidemia, depression/suicidal ideation, photosensitivity.  Serious rare side effects can include but are not limited to pancreatitis, pseudotumor cerebri, bony changes, clot formation/stroke/heart attack.  Patient understands that alcohol is contraindicated since it can result in liver toxicity and significantly prolong the elimination of the drug by many years.
SSKI Counseling:  I discussed with the patient the risks of SSKI including but not limited to thyroid abnormalities, metallic taste, GI upset, fever, headache, acne, arthralgias, paraesthesias, lymphadenopathy, easy bleeding, arrhythmias, and allergic reaction.
Dutasteride Pregnancy And Lactation Text: This medication is absolutely contraindicated in women, especially during pregnancy and breast feeding. Feminization of male fetuses is possible if taking while pregnant.
Qbrexza Pregnancy And Lactation Text: There is no available data on Qbrexza use in pregnant women.  There is no available data on Qbrexza use in lactation.
Calcipotriene Pregnancy And Lactation Text: The use of this medication during pregnancy or lactation is not recommended as there is insufficient data.
Dupixent Pregnancy And Lactation Text: This medication likely crosses the placenta but the risk for the fetus is uncertain. This medication is excreted in breast milk.
Valtrex Pregnancy And Lactation Text: this medication is Pregnancy Category B and is considered safe during pregnancy. This medication is not directly found in breast milk but it's metabolite acyclovir is present.
Zyclara Counseling:  I discussed with the patient the risks of imiquimod including but not limited to erythema, scaling, itching, weeping, crusting, and pain.  Patient understands that the inflammatory response to imiquimod is variable from person to person and was educated regarded proper titration schedule.  If flu-like symptoms develop, patient knows to discontinue the medication and contact us.
Mirvaso Counseling: Mirvaso is a topical medication which can decrease superficial blood flow where applied. Side effects are uncommon and include stinging, redness and allergic reactions.
Itraconazole Counseling:  I discussed with the patient the risks of itraconazole including but not limited to liver damage, nausea/vomiting, neuropathy, and severe allergy.  The patient understands that this medication is best absorbed when taken with acidic beverages such as non-diet cola or ginger ale.  The patient understands that monitoring is required including baseline LFTs and repeat LFTs at intervals.  The patient understands that they are to contact us or the primary physician if concerning signs are noted.
Rinvoq Counseling: I discussed with the patient the risks of Rinvoq therapy including but not limited to upper respiratory tract infections, shingles, cold sores, bronchitis, nausea, cough, fever, acne, and headache. Live vaccines should be avoided.  This medication has been linked to serious infections; higher rate of mortality; malignancy and lymphoproliferative disorders; major adverse cardiovascular events; thrombosis; thrombocytopenia, anemia, and neutropenia; lipid elevations; liver enzyme elevations; and gastrointestinal perforations.
Oral Minoxidil Counseling- I discussed with the patient the risks of oral minoxidil including but not limited to shortness of breath, swelling of the feet or ankles, dizziness, lightheadedness, unwanted hair growth and allergic reaction.  The patient verbalized understanding of the proper use and possible adverse effects of oral minoxidil.  All of the patient's questions and concerns were addressed.
Colchicine Counseling:  Patient counseled regarding adverse effects including but not limited to stomach upset (nausea, vomiting, stomach pain, or diarrhea).  Patient instructed to limit alcohol consumption while taking this medication.  Colchicine may reduce blood counts especially with prolonged use.  The patient understands that monitoring of kidney function and blood counts may be required, especially at baseline. The patient verbalized understanding of the proper use and possible adverse effects of colchicine.  All of the patient's questions and concerns were addressed.
Eucrisa Counseling: Patient may experience a mild burning sensation during topical application. Eucrisa is not approved in children less than 2 years of age.
Azathioprine Counseling:  I discussed with the patient the risks of azathioprine including but not limited to myelosuppression, immunosuppression, hepatotoxicity, lymphoma, and infections.  The patient understands that monitoring is required including baseline LFTs, Creatinine, possible TPMP genotyping and weekly CBCs for the first month and then every 2 weeks thereafter.  The patient verbalized understanding of the proper use and possible adverse effects of azathioprine.  All of the patient's questions and concerns were addressed.
Methotrexate Pregnancy And Lactation Text: This medication is Pregnancy Category X and is known to cause fetal harm. This medication is excreted in breast milk.
Hydroxychloroquine Pregnancy And Lactation Text: This medication has been shown to cause fetal harm but it isn't assigned a Pregnancy Risk Category. There are small amounts excreted in breast milk.
Azithromycin Pregnancy And Lactation Text: This medication is considered safe during pregnancy and is also secreted in breast milk.
Spevigo Pregnancy And Lactation Text: The risk during pregnancy and breastfeeding is uncertain with this medication. This medication does cross the placenta. It is unknown if this medication is found in breast milk.
Rituxan Counseling:  I discussed with the patient the risks of Rituxan infusions. Side effects can include infusion reactions, severe drug rashes including mucocutaneous reactions, reactivation of latent hepatitis and other infections and rarely progressive multifocal leukoencephalopathy.  All of the patient's questions and concerns were addressed.
Erythromycin Counseling:  I discussed with the patient the risks of erythromycin including but not limited to GI upset, allergic reaction, drug rash, diarrhea, increase in liver enzymes, and yeast infections.

## 2025-01-06 ENCOUNTER — PATIENT MESSAGE (OUTPATIENT)
Dept: CARDIOLOGY | Facility: MEDICAL CENTER | Age: 70
End: 2025-01-06
Payer: MEDICARE

## 2025-01-06 DIAGNOSIS — I35.1 MODERATE AORTIC REGURGITATION: ICD-10-CM

## 2025-02-03 ENCOUNTER — APPOINTMENT (OUTPATIENT)
Dept: RADIOLOGY | Facility: MEDICAL CENTER | Age: 70
End: 2025-02-03
Attending: OBSTETRICS & GYNECOLOGY
Payer: MEDICARE

## 2025-02-19 ENCOUNTER — HOSPITAL ENCOUNTER (OUTPATIENT)
Dept: RADIOLOGY | Facility: MEDICAL CENTER | Age: 70
End: 2025-02-19
Attending: OBSTETRICS & GYNECOLOGY
Payer: MEDICARE

## 2025-02-19 DIAGNOSIS — Z12.31 ENCOUNTER FOR MAMMOGRAM TO ESTABLISH BASELINE MAMMOGRAM: ICD-10-CM

## 2025-02-19 PROCEDURE — 77067 SCR MAMMO BI INCL CAD: CPT

## 2025-02-25 ENCOUNTER — HOSPITAL ENCOUNTER (OUTPATIENT)
Dept: CARDIOLOGY | Facility: MEDICAL CENTER | Age: 70
End: 2025-02-25
Attending: INTERNAL MEDICINE
Payer: MEDICARE

## 2025-02-25 DIAGNOSIS — I35.1 MODERATE AORTIC REGURGITATION: ICD-10-CM

## 2025-02-25 PROCEDURE — 93306 TTE W/DOPPLER COMPLETE: CPT

## 2025-02-26 ENCOUNTER — RESULTS FOLLOW-UP (OUTPATIENT)
Dept: CARDIOLOGY | Facility: MEDICAL CENTER | Age: 70
End: 2025-02-26

## 2025-02-26 LAB
LV EJECT FRACT  99904: 68
LV EJECT FRACT MOD 2C 99903: 66.16
LV EJECT FRACT MOD 4C 99902: 68.22
LV EJECT FRACT MOD BP 99901: 67.79

## 2025-03-14 ENCOUNTER — OFFICE VISIT (OUTPATIENT)
Dept: CARDIOLOGY | Facility: MEDICAL CENTER | Age: 70
End: 2025-03-14
Attending: INTERNAL MEDICINE
Payer: MEDICARE

## 2025-03-14 VITALS
WEIGHT: 148 LBS | SYSTOLIC BLOOD PRESSURE: 110 MMHG | RESPIRATION RATE: 16 BRPM | DIASTOLIC BLOOD PRESSURE: 70 MMHG | BODY MASS INDEX: 25.27 KG/M2 | OXYGEN SATURATION: 96 % | HEIGHT: 64 IN | HEART RATE: 99 BPM

## 2025-03-14 DIAGNOSIS — I35.1 MODERATE AORTIC REGURGITATION: ICD-10-CM

## 2025-03-14 DIAGNOSIS — R00.2 PALPITATIONS: ICD-10-CM

## 2025-03-14 DIAGNOSIS — I10 ESSENTIAL HYPERTENSION, BENIGN: ICD-10-CM

## 2025-03-14 DIAGNOSIS — I77.810 ASCENDING AORTA DILATION (HCC): Chronic | ICD-10-CM

## 2025-03-14 PROCEDURE — 3078F DIAST BP <80 MM HG: CPT | Performed by: INTERNAL MEDICINE

## 2025-03-14 PROCEDURE — 3074F SYST BP LT 130 MM HG: CPT | Performed by: INTERNAL MEDICINE

## 2025-03-14 PROCEDURE — 99214 OFFICE O/P EST MOD 30 MIN: CPT | Performed by: INTERNAL MEDICINE

## 2025-03-14 PROCEDURE — 99213 OFFICE O/P EST LOW 20 MIN: CPT | Performed by: INTERNAL MEDICINE

## 2025-03-14 RX ORDER — LOSARTAN POTASSIUM 25 MG/1
12.5 TABLET ORAL DAILY
Qty: 50 TABLET | Refills: 3 | Status: SHIPPED | OUTPATIENT
Start: 2025-03-14

## 2025-03-14 RX ORDER — POTASSIUM CHLORIDE 750 MG/1
10 TABLET, EXTENDED RELEASE ORAL DAILY
Qty: 90 TABLET | Refills: 3 | Status: SHIPPED | OUTPATIENT
Start: 2025-03-14

## 2025-03-14 RX ORDER — METOPROLOL TARTRATE 25 MG/1
12.5 TABLET, FILM COATED ORAL 2 TIMES DAILY PRN
Qty: 45 TABLET | Refills: 3 | Status: SHIPPED | OUTPATIENT
Start: 2025-03-14

## 2025-03-14 RX ORDER — HYDROCHLOROTHIAZIDE 12.5 MG/1
12.5 CAPSULE ORAL DAILY
Qty: 90 CAPSULE | Refills: 3 | Status: SHIPPED | OUTPATIENT
Start: 2025-03-14

## 2025-03-14 ASSESSMENT — FIBROSIS 4 INDEX: FIB4 SCORE: 1.11

## 2025-03-14 NOTE — PATIENT INSTRUCTIONS
Work on at least 2.5 - 5 hours a week of moderate exercise (typical brisk walking or similar activity)    If you have had a heart attack, stent, bypass or reduced heart strength (EF <35%): cardiac rehab may reduce your risk of dying by 13-24% and need to go to the hospital by 30% within the first year (1)    Please look into the following diets and incorporate them into your diet    CONSIDER CHECKING A CALCIUM SCORE TO UNDERSTAND YOUR RISK MORE    https://internal.mae-nhlbi.org/about/procedures/[email or web address]-csmxn-hmcs-[email or web address]    LOW SALT DIET   KEEP YOUR SODIUM EQUAL TO CALORIES AND NO MORE THAN DOUBLE THE CALORIES FOR A LOW SALT DIET    Cardiosmart.org - great resource for American College of Cardiology on heart disease prevention and treatment    FOR TREATMENT OR PREVENTION OF CORONARY ARTERY DISEASE  These three programs are approved by Medicare/Insurers for those with heart disease  [facility]  Dr. [facility] for Reversing Heart Disease - [facility]'s Cardiologist vegetarian-based  [facility] - [place]-based mind-body Program    Mediterranean Diet has been shown to be a hearty healthy diet.    This is a commonly referenced Program  Dr Ortega - [facility] [place] (book and documentary) - vegetarian-based    FOR TREATMENT OF BLOOD PRESSURE  DASH DIET - American Heart Association for treatment of HYPERTENSION    FOR TREATMENT OF BAD CHOLESTEROL/FATS  REDUCE PROCESSED SUGAR AS MUCH AS POSSIBLE  INCREASE WHOLE GRAINS/VEGETABLES  INCREASE FIBER    Lowering total cholesterol and LDL (bad) cholesterol:  - Eat leaner cuts of meat, or eliminate altogether if possible red meat, and frequently substitute fish or chicken.  - Limit saturated fat to no more than 7-10% of total calories no more than 10 g per day is recommended. Some sources of saturated fat include butter, animal fats, hydrogenated vegetable fats and oils, many desserts, whole  milk dairy products.  - Replaced saturated fats with polyunsaturated fats and monounsaturated fats. Foods high in monounsaturated fat include nuts, canola oil, avocados, and olives.  - Limit trans fat (processed foods) and replaced with fresh fruits and vegetables  - Recommend nonfat dairy products  - Increase substantially the amount of soluble fiber intake (legumes such as beans, fruit, whole grains).  - Consider nutritional supplements: plant sterile spreads such as Benecol, fish oil,  flaxseed oil, omega-3 acids capsules 1000 mg twice a day, or viscous fiber such as Metamucil  - Attain ideal weight and regular exercise (at least 30 minutes per day of moderate exercise)  ASK ABOUT STATIN OR NON STATIN MEDICATION TO REDUCE YOUR LDL AND HEART RISK    Lowering triglycerides:  - Reduce intake of simple sugar: Desserts, candy, pastries, honey, sodas, sugared cereals, yogurt, Gatorade, sports bars, canned fruit, smoothies, fruit juice, coffee drinks  - Reduced intake of refined starches: Refined Pasta, most bread  - Reduce or abstain from alcohol  - Increase omega-3 fatty acids: Dixon, Trout, Mackerel, Herring, Albacore tuna and supplements  - Attain ideal weight and regular exercise (at least 30 minutes per day of moderate exercise)  ASK ABOUT PURIFIED OMEGA 3 EPA or FISH OIL TO REDUCE YOUR TG AND HEART RISK    Elevating HDL (good) cholesterol:  - Increase physical activity  - Increase omega-3 fatty acids and supplements as listed above  - Incorporating appropriate amounts of monounsaturated fats such as nuts, olive oil, canola oil, avocados, olives  - Stop smoking  - Attain ideal weight and regular exercise (at least 30 minutes per day of moderate exercise)     Consider [employer] (https://www.[employer]Fast FiBRamobile/) $ DO NOT SUBSCRIBE WE WILL ALWAYS REVIEW YOUR TRACINGS ON MYCHART or Smartwatch such as Apple Watch $$$$ for monitoring of the heart palpitations.  This is a small device that syncs to your phone with  Bluetooth that can tell you your heart rhythm.  You can send us a screencapture of the tracings with Shots.        Typical Patch Monitor looks like this [employer] or [employer]      Alternatively consider a pulse oximeter and let us know if Oxygen is <90 or pulse is <50 or > 110 while resting

## 2025-03-14 NOTE — PROGRESS NOTES
Chief Complaint   Patient presents with    Hyperlipidemia    Aortic Stenosis       Subjective     Shahrzad Scott is a 69 y.o. female who presents today who presents today dyslipidemia, hypertension, and aortic regurgitation secondary to phen phen who is here for follow-up.     Has some palps    Past Medical History:   Diagnosis Date    Anesthesia     n/v, may req scopalamine patch    Aortic regurgitation 1996    Arthritis     hands    Ascending aorta dilation (HCC) 4.1 cm echo 4/2024 04/04/2024    Breath shortness     Cataract     bilat IOL    Disorder of thyroid     nodules    GERD (gastroesophageal reflux disease)     Hemorrhagic disorder (HCC)     nose bleeds    High cholesterol     History of aortic insufficiency 1996    seeing Dr. Higuera    HLD (hyperlipidemia)     Hypertension     Mitral regurgitation     MVP (mitral valve prolapse)     Palpitations     PONV (postoperative nausea and vomiting)     Urinary incontinence      Past Surgical History:   Procedure Laterality Date    THYROIDECTOMY Left 3/24/2022    Procedure: THYROIDECTOMY - REMOVAL REMAINING TISSUE, FOLLOWING PREVIOUS PARTIAL REMOVAL, PORTION THYROID using NIMS;  Surgeon: Dirk Hilliard M.D.;  Location: SURGERY SAME DAY [facility];  Service: Ent    THYROID LOBECTOMY Right 7/18/2019    Procedure: LOBECTOMY, THYROID WITH FROZEN SECTION;  Surgeon: Dirk Hilliard M.D.;  Location: SURGERY SAME DAY [facility];  Service: Ent    MYRINGOTOMY Bilateral 7/18/2019    Procedure: MYRINGOTOMY WITH TUBES;  Surgeon: Dirk Hilliard M.D.;  Location: SURGERY SAME DAY [facility];  Service: Ent    HAND SURGERY Bilateral 2005    thumb fusions     HYSTERECTOMY, VAGINAL  1992    BLEPHAROPLASTY, ELAINE (UPPER OR LOWER)  1990    APPENDECTOMY      BREAST RECONSTRUCTION Bilateral     CATARACT EXTRACTION WITH IOL Bilateral     Dr. Wilder    OTHER ORTHOPEDIC SURGERY  [id number]    multiplre knee surgeries.     RECONSTRUCTION, KNEE, ACL, ARTHROSCOPIC, USING TIBIALIS TENDON  AUTOGRAFT       Family History   Problem Relation Age of Onset    Stroke Mother     Hypertension Mother     Cancer Father         prostate cancer    Lung Disease Sister         asthma    No Known Problems Brother     No Known Problems Maternal Grandmother     No Known Problems Maternal Grandfather     Other Brother         cerabral palsy     Social History     Socioeconomic History    Marital status:      Spouse name: Not on file    Number of children: Not on file    Years of education: Not on file    Highest education level: Some college, no degree   Occupational History    Not on file   Tobacco Use    Smoking status: Never    Smokeless tobacco: Never   Vaping Use    Vaping status: Never Used   Substance and Sexual Activity    Alcohol use: Never    Drug use: Not Currently    Sexual activity: Not Currently   Other Topics Concern    Not on file   Social History Narrative    Not on file     Social Drivers of Health     Financial Resource Strain: Low Risk  (8/9/2022)    Overall Financial Resource Strain (CARDIA)     Difficulty of Paying Living Expenses: Not hard at all   Food Insecurity: No Food Insecurity (8/9/2022)    Hunger Vital Sign     Worried About Running Out of Food in the Last Year: Never true     Ran Out of Food in the Last Year: Never true   Transportation Needs: No Transportation Needs (8/9/2022)    PRAPARE - Transportation     Lack of Transportation (Medical): No     Lack of Transportation (Non-Medical): No   Physical Activity: Insufficiently Active (8/9/2022)    Exercise Vital Sign     Days of Exercise per Week: 3 days     Minutes of Exercise per Session: 30 min   Stress: No Stress Concern Present (8/9/2022)    [employer] - Occupational Stress Questionnaire     Feeling of Stress : Not at all   Social Connections: Socially Isolated (8/9/2022)    Social Connection and Isolation Panel [NHANES]     Frequency of Communication with Friends and Family: More than three times  a week     Frequency of Social Gatherings with Friends and Family: More than three times a week     Attends Yazidism Services: Never     Active Member of Clubs or Organizations: No     Attends Club or Organization Meetings: Never     Marital Status:    Intimate Partner Violence: Not on file   Housing Stability: Low Risk  (8/9/2022)    Housing Stability Vital Sign     Unable to Pay for Housing in the Last Year: No     Number of Places Lived in the Last Year: 1     Unstable Housing in the Last Year: No     Allergies   Allergen Reactions    Septra [Bactrim] Hives    Sulfa Drugs Hives     Outpatient Encounter Medications as of 3/14/2025   Medication Sig Dispense Refill    potassium chloride ER (KLOR-CON) 10 MEQ tablet Take 1 Tablet by mouth every day. 90 Tablet 3    hydrochlorothiazide (MICROZIDE) 12.5 MG capsule Take 1 Capsule by mouth every day. 90 Capsule 3    losartan (COZAAR) 25 MG Tab Take 0.5 Tablets by mouth every day. 50 Tablet 3    Riboflavin (VITAMIN B-2) 100 MG Tab 200 mg 2 times a day.      famotidine (PEPCID) 40 MG Tab Take 40 mg by mouth every day.      nortriptyline (PAMELOR) 10 MG Cap 50 mg.      simvastatin (ZOCOR) 10 MG Tab       levothyroxine (SYNTHROID) 100 MCG Tab Take 1 Tablet by mouth every morning on an empty stomach. 90 Tablet 0    levothyroxine (SYNTHROID) 100 MCG Tab TAKE 1 TABLET BY MOUTH EVERY MORNING ON AN EMPTY STOMACH 90 Tablet 0    fexofenadine (ALLEGRA) 180 MG tablet Take 1 Tablet by mouth every day.      CALCIUM PO Take 750 mg by mouth 2 times a day.      COLLAGEN PO Take 1 Dose by mouth every day.      MAGNESIUM PO Take 1 Tablet by mouth 2 times a day.      ascorbic acid (ASCORBIC ACID) 500 MG Tab Take 1 Tablet by mouth every day.      niacinamide 500 MG tablet Take 1 Tablet by mouth 2 times a day. 60 Tab 3    TURMERIC PO Take 1 Tablet by mouth 2 times a day.      COENZYME Q10 PO Take 300 mg by mouth every day.      MINIVELLE 0.025 MG/24HR PATCH BIWEEKLY Place 1 Patch on the  "skin two times a week. Apply on Wednesday and Saturday  Indications: Moderate to Severe Vasomotor Symptoms      Multiple Vitamins-Minerals (MULTIVITAMIN ADULTS 50+ PO) Take 1 Tablet by mouth every day.      Probiotic Product (PROBIOTIC DAILY PO) Take 1 Tablet by mouth every day.      Alpha Lipoic Acid 200 MG Cap Take 200 mg by mouth every day.      vitamin D (CHOLECALCIFEROL) 1000 UNIT TABS Take 5,000 Units by mouth every day. 100 mcg of vitamin K      metoprolol tartrate (LOPRESSOR) 25 MG Tab Take 0.5 Tablets by mouth 2 times a day as needed (palpitations). 45 Tablet 3     No facility-administered encounter medications on file as of 3/14/2025.     ROS           Objective     /70 (BP Location: Left arm, Patient Position: Sitting, BP Cuff Size: Adult)   Pulse 99   Resp 16   Ht 1.626 m (5' 4\")   Wt 67.1 kg (148 lb)   LMP  (LMP Unknown)   SpO2 96%   BMI 25.40 kg/m²     Physical Exam  Constitutional:       General: She is not in acute distress.     Appearance: She is not diaphoretic.   Eyes:      General: No scleral icterus.  Neck:      Vascular: No JVD.   Cardiovascular:      Rate and Rhythm: Normal rate.      Heart sounds: Normal heart sounds. No murmur heard.     No friction rub. No gallop.   Pulmonary:      Effort: No respiratory distress.      Breath sounds: No wheezing or rales.   Abdominal:      General: Bowel sounds are normal.      Palpations: Abdomen is soft.   Musculoskeletal:      Right lower leg: No edema.      Left lower leg: No edema.   Skin:     Findings: No rash.   Neurological:      Mental Status: She is alert. Mental status is at baseline.   Psychiatric:         Mood and Affect: Mood normal.            We reviewed in person the most recent labs  Recent Results (from the past 30 weeks)   EC-ECHOCARDIOGRAM COMPLETE W/O CONT    Collection Time: 02/25/25  4:53 PM   Result Value Ref Range    Eject.Frac. MOD BP 67.79     Eject.Frac. MOD 4C 68.22     Eject.Frac. MOD 2C 66.16     Left Ventrical " Ejection Fraction 68          Assessment & Plan     1. Moderate aortic regurgitation  EC-ECHOCARDIOGRAM COMPLETE W/O CONT      2. Ascending aorta dilation (HCC) 4.1 cm echo 4/2024  EC-ECHOCARDIOGRAM COMPLETE W/O CONT      3. Essential hypertension, benign  hydrochlorothiazide (MICROZIDE) 12.5 MG capsule    losartan (COZAAR) 25 MG Tab    potassium chloride ER (KLOR-CON) 10 MEQ tablet      4. Palpitations  metoprolol tartrate (LOPRESSOR) 25 MG Tab          Medical Decision Making: Today's Assessment/Status/Plan:        It was my pleasure to meet with Ms. Scott.    We addressed the management of hypertension at today's visit. Blood pressure is well controlled.  We specifically assessed the labs on hypertension treatment    We addressed the management of dyslipidemia at today's visit. She is on appropriate lipid lowering medication.    Echo for surveillance    I will see Ms. Scott back in 1 year time and encouraged her to follow up with us over the phone or electronically using my MyChart as issues arise.    It is my pleasure to participate in the care of Ms. Scott.  Please do not hesitate to contact me with questions or concerns.    Manas Maldonado MD PhD FACC  Cardiologist [facility]    Please note that this dictation was created using voice recognition software. There may be errors I did not discover before finalizing the note.

## 2025-03-31 ENCOUNTER — PATIENT MESSAGE (OUTPATIENT)
Dept: CARDIOLOGY | Facility: MEDICAL CENTER | Age: 70
End: 2025-03-31
Payer: MEDICARE

## 2025-04-30 ENCOUNTER — APPOINTMENT (OUTPATIENT)
Dept: URBAN - METROPOLITAN AREA CLINIC 4 | Facility: CLINIC | Age: 70
Setting detail: DERMATOLOGY
End: 2025-04-30

## 2025-04-30 DIAGNOSIS — L92.0 GRANULOMA ANNULARE: ICD-10-CM | Status: STABLE

## 2025-04-30 DIAGNOSIS — D22 MELANOCYTIC NEVI: ICD-10-CM | Status: STABLE

## 2025-04-30 DIAGNOSIS — L81.4 OTHER MELANIN HYPERPIGMENTATION: ICD-10-CM | Status: STABLE

## 2025-04-30 DIAGNOSIS — Z87.2 PERSONAL HISTORY OF DISEASES OF THE SKIN AND SUBCUTANEOUS TISSUE: ICD-10-CM

## 2025-04-30 DIAGNOSIS — Z85.828 PERSONAL HISTORY OF OTHER MALIGNANT NEOPLASM OF SKIN: ICD-10-CM

## 2025-04-30 DIAGNOSIS — L82.1 OTHER SEBORRHEIC KERATOSIS: ICD-10-CM

## 2025-04-30 PROBLEM — D22.5 MELANOCYTIC NEVI OF TRUNK: Status: ACTIVE | Noted: 2025-04-30

## 2025-04-30 PROBLEM — D22.62 MELANOCYTIC NEVI OF LEFT UPPER LIMB, INCLUDING SHOULDER: Status: ACTIVE | Noted: 2025-04-30

## 2025-04-30 PROCEDURE — 99213 OFFICE O/P EST LOW 20 MIN: CPT

## 2025-04-30 PROCEDURE — ? DIAGNOSIS COMMENT

## 2025-04-30 PROCEDURE — ? ADDITIONAL NOTES

## 2025-04-30 PROCEDURE — ? OBSERVATION

## 2025-04-30 PROCEDURE — ? COUNSELING

## 2025-04-30 ASSESSMENT — LOCATION ZONE DERM
LOCATION ZONE: FACE
LOCATION ZONE: TRUNK
LOCATION ZONE: HAND
LOCATION ZONE: ARM
LOCATION ZONE: LEG

## 2025-04-30 ASSESSMENT — LOCATION DETAILED DESCRIPTION DERM
LOCATION DETAILED: LEFT CENTRAL ZYGOMA
LOCATION DETAILED: LEFT BUTTOCK
LOCATION DETAILED: LEFT INFERIOR MEDIAL FOREHEAD
LOCATION DETAILED: LEFT ANTERIOR PROXIMAL THIGH
LOCATION DETAILED: INFERIOR THORACIC SPINE
LOCATION DETAILED: LEFT DISTAL PRETIBIAL REGION
LOCATION DETAILED: RIGHT PROXIMAL PRETIBIAL REGION
LOCATION DETAILED: LEFT ANTERIOR PROXIMAL UPPER ARM
LOCATION DETAILED: LEFT ANTERIOR DISTAL THIGH
LOCATION DETAILED: RIGHT MEDIAL UPPER BACK
LOCATION DETAILED: LEFT RADIAL DORSAL HAND
LOCATION DETAILED: RIGHT INFERIOR MEDIAL FOREHEAD
LOCATION DETAILED: LEFT PROXIMAL PRETIBIAL REGION

## 2025-04-30 ASSESSMENT — LOCATION SIMPLE DESCRIPTION DERM
LOCATION SIMPLE: LEFT PRETIBIAL REGION
LOCATION SIMPLE: UPPER BACK
LOCATION SIMPLE: LEFT HAND
LOCATION SIMPLE: RIGHT PRETIBIAL REGION
LOCATION SIMPLE: LEFT UPPER ARM
LOCATION SIMPLE: LEFT ZYGOMA
LOCATION SIMPLE: LEFT FOREHEAD
LOCATION SIMPLE: RIGHT UPPER BACK
LOCATION SIMPLE: RIGHT FOREHEAD
LOCATION SIMPLE: LEFT BUTTOCK
LOCATION SIMPLE: LEFT THIGH

## 2025-05-05 ENCOUNTER — OFFICE VISIT (OUTPATIENT)
Dept: MEDICAL GROUP | Facility: PHYSICIAN GROUP | Age: 70
End: 2025-05-05
Payer: MEDICARE

## 2025-05-05 VITALS
OXYGEN SATURATION: 97 % | HEART RATE: 66 BPM | WEIGHT: 146 LBS | BODY MASS INDEX: 24.92 KG/M2 | HEIGHT: 64 IN | DIASTOLIC BLOOD PRESSURE: 70 MMHG | TEMPERATURE: 97.8 F | SYSTOLIC BLOOD PRESSURE: 118 MMHG

## 2025-05-05 DIAGNOSIS — M85.80 OSTEOPENIA, UNSPECIFIED LOCATION: ICD-10-CM

## 2025-05-05 DIAGNOSIS — R92.333 HETEROGENEOUSLY DENSE TISSUE OF BOTH BREASTS ON MAMMOGRAPHY: ICD-10-CM

## 2025-05-05 DIAGNOSIS — Z11.51 SCREENING FOR HPV (HUMAN PAPILLOMAVIRUS): ICD-10-CM

## 2025-05-05 DIAGNOSIS — K21.9 GASTROESOPHAGEAL REFLUX DISEASE WITHOUT ESOPHAGITIS: ICD-10-CM

## 2025-05-05 DIAGNOSIS — R73.01 IMPAIRED FASTING GLUCOSE: ICD-10-CM

## 2025-05-05 DIAGNOSIS — G43.809 VESTIBULAR MIGRAINE: ICD-10-CM

## 2025-05-05 DIAGNOSIS — E89.0 H/O PARTIAL THYROIDECTOMY: ICD-10-CM

## 2025-05-05 DIAGNOSIS — E78.00 PURE HYPERCHOLESTEROLEMIA: ICD-10-CM

## 2025-05-05 DIAGNOSIS — N95.1 HOT FLASHES DUE TO MENOPAUSE: ICD-10-CM

## 2025-05-05 PROCEDURE — 3078F DIAST BP <80 MM HG: CPT | Performed by: FAMILY MEDICINE

## 2025-05-05 PROCEDURE — 99214 OFFICE O/P EST MOD 30 MIN: CPT | Performed by: FAMILY MEDICINE

## 2025-05-05 PROCEDURE — 3074F SYST BP LT 130 MM HG: CPT | Performed by: FAMILY MEDICINE

## 2025-05-05 RX ORDER — ESTRADIOL 0.04 MG/D
FILM, EXTENDED RELEASE TRANSDERMAL
COMMUNITY
Start: 2025-03-25

## 2025-05-05 RX ORDER — NORTRIPTYLINE HYDROCHLORIDE 50 MG/1
50 CAPSULE ORAL
COMMUNITY
Start: 2024-10-11 | End: 2025-10-11

## 2025-05-05 SDOH — HEALTH STABILITY: PHYSICAL HEALTH: ON AVERAGE, HOW MANY DAYS PER WEEK DO YOU ENGAGE IN MODERATE TO STRENUOUS EXERCISE (LIKE A BRISK WALK)?: 6 DAYS

## 2025-05-05 SDOH — ECONOMIC STABILITY: FOOD INSECURITY: WITHIN THE PAST 12 MONTHS, YOU WORRIED THAT YOUR FOOD WOULD RUN OUT BEFORE YOU GOT MONEY TO BUY MORE.: NEVER TRUE

## 2025-05-05 SDOH — ECONOMIC STABILITY: FOOD INSECURITY: WITHIN THE PAST 12 MONTHS, THE FOOD YOU BOUGHT JUST DIDN'T LAST AND YOU DIDN'T HAVE MONEY TO GET MORE.: NEVER TRUE

## 2025-05-05 SDOH — ECONOMIC STABILITY: INCOME INSECURITY: HOW HARD IS IT FOR YOU TO PAY FOR THE VERY BASICS LIKE FOOD, HOUSING, MEDICAL CARE, AND HEATING?: NOT VERY HARD

## 2025-05-05 SDOH — ECONOMIC STABILITY: INCOME INSECURITY: IN THE LAST 12 MONTHS, WAS THERE A TIME WHEN YOU WERE NOT ABLE TO PAY THE MORTGAGE OR RENT ON TIME?: NO

## 2025-05-05 SDOH — HEALTH STABILITY: PHYSICAL HEALTH: ON AVERAGE, HOW MANY MINUTES DO YOU ENGAGE IN EXERCISE AT THIS LEVEL?: 30 MIN

## 2025-05-05 ASSESSMENT — LIFESTYLE VARIABLES
AUDIT-C TOTAL SCORE: 0
HOW OFTEN DO YOU HAVE SIX OR MORE DRINKS ON ONE OCCASION: NEVER
SKIP TO QUESTIONS 9-10: 1
HOW MANY STANDARD DRINKS CONTAINING ALCOHOL DO YOU HAVE ON A TYPICAL DAY: PATIENT DOES NOT DRINK
HOW OFTEN DO YOU HAVE A DRINK CONTAINING ALCOHOL: NEVER

## 2025-05-05 ASSESSMENT — SOCIAL DETERMINANTS OF HEALTH (SDOH)
HOW OFTEN DO YOU GET TOGETHER WITH FRIENDS OR RELATIVES?: MORE THAN THREE TIMES A WEEK
HOW MANY DRINKS CONTAINING ALCOHOL DO YOU HAVE ON A TYPICAL DAY WHEN YOU ARE DRINKING: PATIENT DOES NOT DRINK
HOW OFTEN DO YOU ATTEND CHURCH OR RELIGIOUS SERVICES?: NEVER
HOW OFTEN DO YOU HAVE A DRINK CONTAINING ALCOHOL: NEVER
HOW OFTEN DO YOU GET TOGETHER WITH FRIENDS OR RELATIVES?: MORE THAN THREE TIMES A WEEK
DO YOU BELONG TO ANY CLUBS OR ORGANIZATIONS SUCH AS CHURCH GROUPS UNIONS, FRATERNAL OR ATHLETIC GROUPS, OR SCHOOL GROUPS?: NO
HOW OFTEN DO YOU ATTENT MEETINGS OF THE CLUB OR ORGANIZATION YOU BELONG TO?: PATIENT DECLINED
HOW OFTEN DO YOU ATTEND CHURCH OR RELIGIOUS SERVICES?: NEVER
DO YOU BELONG TO ANY CLUBS OR ORGANIZATIONS SUCH AS CHURCH GROUPS UNIONS, FRATERNAL OR ATHLETIC GROUPS, OR SCHOOL GROUPS?: NO
WITHIN THE PAST 12 MONTHS, YOU WORRIED THAT YOUR FOOD WOULD RUN OUT BEFORE YOU GOT THE MONEY TO BUY MORE: NEVER TRUE
HOW OFTEN DO YOU HAVE SIX OR MORE DRINKS ON ONE OCCASION: NEVER
IN A TYPICAL WEEK, HOW MANY TIMES DO YOU TALK ON THE PHONE WITH FAMILY, FRIENDS, OR NEIGHBORS?: MORE THAN THREE TIMES A WEEK
HOW HARD IS IT FOR YOU TO PAY FOR THE VERY BASICS LIKE FOOD, HOUSING, MEDICAL CARE, AND HEATING?: NOT VERY HARD
IN A TYPICAL WEEK, HOW MANY TIMES DO YOU TALK ON THE PHONE WITH FAMILY, FRIENDS, OR NEIGHBORS?: MORE THAN THREE TIMES A WEEK
HOW OFTEN DO YOU ATTENT MEETINGS OF THE CLUB OR ORGANIZATION YOU BELONG TO?: PATIENT DECLINED
IN THE PAST 12 MONTHS, HAS THE ELECTRIC, GAS, OIL, OR WATER COMPANY THREATENED TO SHUT OFF SERVICE IN YOUR HOME?: NO

## 2025-05-05 ASSESSMENT — PATIENT HEALTH QUESTIONNAIRE - PHQ9: CLINICAL INTERPRETATION OF PHQ2 SCORE: 0

## 2025-05-05 ASSESSMENT — FIBROSIS 4 INDEX: FIB4 SCORE: 1.11

## 2025-05-05 NOTE — LETTER
[facility]  Elva Quiroz P.A.-C.  [address] [place] [address]  Fax: [phone]   Authorization for Release/Disclosure of   Protected Health Information   Name: SHAHRZAD GARAY : 1955 SSN: [id number]   Address: [address] [place] [address] [place] [address] Phone:    There are no phone numbers on file.   I authorize the entity listed below to release/disclose the PHI below to:   [facility]/Elva Quiroz P.A.-C. and Claudia Tan M.D.   Provider or Entity Name:     Address   City, State, Zip   Phone:      Fax:     Reason for request: continuity of care   Information to be released:    [  ] LAST COLONOSCOPY,  including any PATH REPORT and follow-up  [  ] LAST FIT/COLOGUARD RESULT [  ] LAST DEXA  [  ] LAST MAMMOGRAM  [  ] LAST PAP  [  ] LAST LABS [  ] RETINA EXAM REPORT  [  ] IMMUNIZATION RECORDS  [  ] Release all info      [  ] Check here and initial the line next to each item to release ALL health information INCLUDING  _____ Care and treatment for drug and / or alcohol abuse  _____ HIV testing, infection status, or AIDS  _____ Genetic Testing    DATES OF SERVICE OR TIME PERIOD TO BE DISCLOSED: _____________  I understand and acknowledge that:  * This Authorization may be revoked at any time by you in writing, except if your health information has already been used or disclosed.  * Your health information that will be used or disclosed as a result of you signing this authorization could be re-disclosed by the recipient. If this occurs, your re-disclosed health information may no longer be protected by State or Federal laws.  * You may refuse to sign this Authorization. Your refusal will not affect your ability to obtain treatment.  * This Authorization becomes effective upon signing and will  on (date) __________.      If no date is indicated, this Authorization will  one (1) year from the signature date.    Name: Shahrzad Garay  Signature: Date:   2025     PLEASE FAX  REQUESTED RECORDS BACK TO: [phone]

## 2025-05-12 DIAGNOSIS — G43.809 VESTIBULAR MIGRAINE: ICD-10-CM

## 2025-05-12 RX ORDER — ONDANSETRON 4 MG/1
4 TABLET, FILM COATED ORAL EVERY 4 HOURS PRN
Qty: 20 TABLET | Refills: 0 | Status: SHIPPED | OUTPATIENT
Start: 2025-05-12 | End: 2025-08-10

## 2025-06-14 ENCOUNTER — PATIENT MESSAGE (OUTPATIENT)
Dept: CARDIOLOGY | Facility: MEDICAL CENTER | Age: 70
End: 2025-06-14
Payer: MEDICARE

## 2025-06-14 DIAGNOSIS — Z51.81 ENCOUNTER FOR MONITORING DIURETIC THERAPY: Primary | ICD-10-CM

## 2025-06-14 DIAGNOSIS — Z79.899 ENCOUNTER FOR MONITORING DIURETIC THERAPY: Primary | ICD-10-CM

## 2025-06-14 DIAGNOSIS — I35.1 MODERATE AORTIC REGURGITATION: ICD-10-CM

## 2025-06-16 RX ORDER — POTASSIUM CHLORIDE 750 MG/1
10 CAPSULE, EXTENDED RELEASE ORAL DAILY
Qty: 90 CAPSULE | Refills: 2 | Status: SHIPPED | OUTPATIENT
Start: 2025-06-16

## 2025-06-16 NOTE — PATIENT COMMUNICATION
AB- pt of . The potassium tablets are ER and the capsules are not. Please advise on possible med change as tablets out of stock with [facility].

## 2025-06-19 DIAGNOSIS — Z78.0 POSTMENOPAUSAL: Primary | ICD-10-CM

## 2025-07-15 ENCOUNTER — OFFICE VISIT (OUTPATIENT)
Dept: MEDICAL GROUP | Facility: PHYSICIAN GROUP | Age: 70
End: 2025-07-15
Payer: MEDICARE

## 2025-07-15 VITALS
HEIGHT: 64 IN | HEART RATE: 82 BPM | RESPIRATION RATE: 14 BRPM | SYSTOLIC BLOOD PRESSURE: 118 MMHG | TEMPERATURE: 97.8 F | DIASTOLIC BLOOD PRESSURE: 58 MMHG | OXYGEN SATURATION: 98 % | BODY MASS INDEX: 24.55 KG/M2 | WEIGHT: 143.8 LBS

## 2025-07-15 DIAGNOSIS — K58.0 IRRITABLE BOWEL SYNDROME WITH DIARRHEA: ICD-10-CM

## 2025-07-15 DIAGNOSIS — R10.9 ABDOMINAL CRAMPING: ICD-10-CM

## 2025-07-15 DIAGNOSIS — R19.5 LOOSE STOOLS: ICD-10-CM

## 2025-07-15 DIAGNOSIS — K21.9 GASTROESOPHAGEAL REFLUX DISEASE WITHOUT ESOPHAGITIS: ICD-10-CM

## 2025-07-15 DIAGNOSIS — R14.0 ABDOMINAL BLOATING: Primary | ICD-10-CM

## 2025-07-15 DIAGNOSIS — R11.0 NAUSEA: ICD-10-CM

## 2025-07-15 PROCEDURE — 3074F SYST BP LT 130 MM HG: CPT | Performed by: FAMILY MEDICINE

## 2025-07-15 PROCEDURE — 99214 OFFICE O/P EST MOD 30 MIN: CPT | Performed by: FAMILY MEDICINE

## 2025-07-15 PROCEDURE — 3078F DIAST BP <80 MM HG: CPT | Performed by: FAMILY MEDICINE

## 2025-07-15 RX ORDER — OMEPRAZOLE 20 MG/1
20 CAPSULE, DELAYED RELEASE ORAL DAILY
Qty: 30 CAPSULE | Refills: 0 | Status: SHIPPED | OUTPATIENT
Start: 2025-07-15

## 2025-07-15 RX ORDER — DICYCLOMINE HYDROCHLORIDE 10 MG/1
10 CAPSULE ORAL
Qty: 120 CAPSULE | Refills: 0 | Status: SHIPPED | OUTPATIENT
Start: 2025-07-15

## 2025-07-15 ASSESSMENT — FIBROSIS 4 INDEX: FIB4 SCORE: 1.11

## 2025-07-15 NOTE — PROGRESS NOTES
Verbal consent was acquired by the patient to use [employer] ambient listening note generation during this visit.    Subjective:     HPI:   History of Present Illness  The patient presents for evaluation of abdominal distension.    She reports experiencing abdominal distension, initially attributed to her longstanding diagnosis of irritable bowel syndrome (IBS). However, the severity of the bloating has recently increased, prompting her to suspect a dietary cause. Over the past two months, she has consumed significant quantities of pre-cut fruit bowls, which she believes may have exacerbated the bloating. Despite ceasing fruit consumption one week ago, the bloating persists. She experiences severe abdominal cramps postprandially, which have led to emesis on one occasion. She has transitioned to a low-fiber diet, consisting of skinless chicken and rice, which she tolerates well. She avoids raw vegetables and reports feeling well during the day but experiences nocturnal cramping. Typically, she skips lunch and consumes dinner. She experienced cramping after consuming canned green beans for dinner, which was alleviated by bismuth subsalicylate (Pepto-Bismol). She also administered ondansetron (Zofran) on one occasion due to severe malaise. She is contemplating reintroducing regular food into her diet. She experiences diarrhea following cramping episodes but does not report constipation. She uses docusate sodium (Colace) prophylactically to prevent constipation. She reports lower abdominal pain during cramping episodes and has not tried dicyclomine (Bentyl).    She experiences nocturnal gastroesophageal reflux disease (GERD), which she attributes to her supplement regimen. Her current medications include nortriptyline, famotidine 20 mg twice daily, simvastatin, vitamin B2, turmeric, and niacinamide. She has reduced her supplement intake due to GERD symptoms. She takes vitamin B2 twice daily and is considering  "discontinuing niacinamide. She has granuloma annulare and was advised to take niacinamide by her dermatologist. Her GERD symptoms worsen with bloating. She retains her gallbladder. She found omeprazole effective but discontinued it due to concerns about potential long-term adverse effects. She reports loud borborygmi during sleep. She is scheduled for a colonoscopy in July 2027.    Social History:  Diet: Consumed large amounts of pre-cut fruit bowls for the past two months, currently on a low-fiber diet including skinless chicken and rice.  Coffee/Tea/Caffeine-containing Drinks: Consumes coffee in the morning.  Sleep: Reports loud borborygmi during sleep.    Health Maintenance: Completed    Objective:     Exam:  /58 (BP Location: Right arm, Patient Position: Sitting, BP Cuff Size: Adult)   Pulse 82   Temp 36.6 °C (97.8 °F) (Temporal)   Resp 14   Ht 1.626 m (5' 4\")   Wt 65.2 kg (143 lb 12.8 oz)   LMP  (LMP Unknown)   SpO2 98%   BMI 24.68 kg/m²  Body mass index is 24.68 kg/m².    Physical Exam  Vitals reviewed.   Constitutional:       Appearance: Normal appearance.   HENT:      Head: Normocephalic and atraumatic.      Right Ear: External ear normal.      Left Ear: External ear normal.      Nose: Nose normal. No congestion or rhinorrhea.      Mouth/Throat:      Mouth: Mucous membranes are moist.   Eyes:      Extraocular Movements: Extraocular movements intact.      Pupils: Pupils are equal, round, and reactive to light.   Cardiovascular:      Rate and Rhythm: Normal rate and regular rhythm.      Pulses: Normal pulses.      Heart sounds: Murmur heard.   Pulmonary:      Effort: Pulmonary effort is normal. No respiratory distress.      Breath sounds: Normal breath sounds. No wheezing.   Abdominal:      General: Abdomen is flat. There is no distension.      Palpations: Abdomen is soft.      Tenderness: There is no abdominal tenderness.   Musculoskeletal:      Cervical back: Neck supple.   Skin:     General: " Skin is warm and dry.      Capillary Refill: Capillary refill takes less than 2 seconds.   Neurological:      General: No focal deficit present.      Mental Status: She is alert and oriented to person, place, and time.   Psychiatric:         Mood and Affect: Mood normal.         Behavior: Behavior normal.       Hyperactive bs   Ttp in epigastrium and mild ttp in all four quadrants      Results      Assessment & Plan:     1. Abdominal bloating  dicyclomine (BENTYL) 10 MG Cap      2. Nausea  omeprazole (PRILOSEC) 20 MG delayed-release capsule      3. Abdominal cramping  dicyclomine (BENTYL) 10 MG Cap      4. Loose stools  dicyclomine (BENTYL) 10 MG Cap      5. Irritable bowel syndrome with diarrhea  dicyclomine (BENTYL) 10 MG Cap      6. Gastroesophageal reflux disease without esophagitis  omeprazole (PRILOSEC) 20 MG delayed-release capsule          Assessment & Plan  1. Abdominal bloating.  - Symptoms include severe cramping, vomiting due to pain, and bloating, which worsens after eating certain foods.  - Physical exam reveals tenderness and puffiness in the lower abdomen, with hyperactive bowel sounds noted.  - Discussion includes the possibility of IBS flare-up, exacerbated by acid reflux. Diverticulitis is considered unlikely due to the absence of left lower quadrant pain and no evidence of diverticulosis on the last colonoscopy in 2017.  - Bentyl prescribed up to four times daily for cramping. Omeprazole 20 mg in the morning and Pepcid 20 mg at night recommended. If no improvement within 14 days, imaging studies and referral to gastroenterologist will be considered.    2. Acid reflux.  - Symptoms include worsening acid reflux at night, potentially linked to bloating and dietary habits.  - Physical exam findings include discomfort radiating to the upper abdomen during cramping episodes.  - Discussion includes the impact of dietary supplements and the need to manage acid reflux symptoms effectively.  - Omeprazole  20 mg in the morning and Pepcid 20 mg at night prescribed to manage acid reflux. Patient advised to maintain a bland diet and eat early in the evening.    Follow-up  A follow-up appointment is scheduled for 2 weeks from now.    Return in about 2 weeks (around 7/29/2025).    Please note that this dictation was created using voice recognition software. I have made every reasonable attempt to correct obvious errors, but I expect that there are errors of grammar and possibly content that I did not discover before finalizing the note.    Claudia Tan MD  Family Medicine and Non - Operative Sports Medicine   [facility] [place]

## 2025-07-29 ENCOUNTER — OFFICE VISIT (OUTPATIENT)
Dept: MEDICAL GROUP | Facility: PHYSICIAN GROUP | Age: 70
End: 2025-07-29
Payer: MEDICARE

## 2025-07-29 VITALS
OXYGEN SATURATION: 97 % | BODY MASS INDEX: 24.52 KG/M2 | HEART RATE: 96 BPM | HEIGHT: 64 IN | SYSTOLIC BLOOD PRESSURE: 116 MMHG | DIASTOLIC BLOOD PRESSURE: 58 MMHG | WEIGHT: 143.6 LBS | TEMPERATURE: 97.8 F | RESPIRATION RATE: 14 BRPM

## 2025-07-29 DIAGNOSIS — R10.9 ABDOMINAL CRAMPING: ICD-10-CM

## 2025-07-29 DIAGNOSIS — R19.5 LOOSE STOOLS: ICD-10-CM

## 2025-07-29 DIAGNOSIS — R14.0 ABDOMINAL BLOATING: Primary | ICD-10-CM

## 2025-07-29 DIAGNOSIS — K58.0 IRRITABLE BOWEL SYNDROME WITH DIARRHEA: ICD-10-CM

## 2025-07-29 DIAGNOSIS — K21.9 GASTROESOPHAGEAL REFLUX DISEASE WITHOUT ESOPHAGITIS: ICD-10-CM

## 2025-07-29 DIAGNOSIS — R11.0 NAUSEA: ICD-10-CM

## 2025-07-29 ASSESSMENT — FIBROSIS 4 INDEX: FIB4 SCORE: 1.13

## 2025-07-29 NOTE — PROGRESS NOTES
Verbal consent was acquired by the patient to use [employer] ambient listening note generation during this visit.    Subjective:     HPI:   History of Present Illness  The patient presents with complaints of abdominal pain.    She reports experiencing severe abdominal cramping postprandially until July 26, 2025, after which she noted an improvement in symptom intensity, with less severe cramping and the presence of borborygmi. Approximately 3 to 4 days ago, she reintroduced fruit into her diet and has been consuming saltines, which appear to mitigate her symptoms. She plans to undergo laboratory testing next week. Two weeks ago, she experienced significant discomfort, tolerating only coffee, toast, rice, and saltines without immediate cramping and borborygmi. She also reports visible abdominal distension following meals.    The patient describes experiencing menstrual-like cramps in the morning, which transition into abdominal cramps as the day progresses. These cramps are bilateral and reminiscent of the severe dysmenorrhea she experienced during high school. She underwent a hysterectomy at age 37, retaining her ovaries, and entered menopause within six months postoperatively. She denies any urinary symptoms but does report occasional stress urinary incontinence when sneezing. She has found symptomatic relief with dicyclomine and Bentyl, and has recently initiated omeprazole therapy, which has been beneficial. She has a history of irritable bowel syndrome and previously used paregoric for management.    The patient has been experiencing profound fatigue over the past few days, necessitating frequent rest after minimal exertion and often falling asleep while seated.    Social History:  Diet: She reintroduced fruit into her diet 3 to 4 days ago and has been consuming saltines.  Coffee/Tea/Caffeine-containing Drinks: She consumes coffee.    PAST SURGICAL HISTORY:  Hysterectomy at age 37.    FAMILY HISTORY  Her mother  "had hypertension and experienced a stroke.    Health Maintenance: Completed    Objective:     Exam:  /58 (BP Location: Right arm, Patient Position: Sitting, BP Cuff Size: Adult)   Pulse 96   Temp 36.6 °C (97.8 °F) (Temporal)   Resp 14   Ht 1.626 m (5' 4\")   Wt 65.1 kg (143 lb 9.6 oz)   LMP  (LMP Unknown)   SpO2 97%   BMI 24.65 kg/m²  Body mass index is 24.65 kg/m².    Physical Exam  Vitals reviewed.   Constitutional:       Appearance: Normal appearance.   HENT:      Head: Normocephalic and atraumatic.      Right Ear: External ear normal.      Left Ear: External ear normal.      Nose: Nose normal. No congestion or rhinorrhea.      Mouth/Throat:      Mouth: Mucous membranes are moist.   Eyes:      Extraocular Movements: Extraocular movements intact.      Pupils: Pupils are equal, round, and reactive to light.   Cardiovascular:      Rate and Rhythm: Normal rate and regular rhythm.      Pulses: Normal pulses.      Heart sounds: Murmur heard.   Pulmonary:      Effort: Pulmonary effort is normal. No respiratory distress.      Breath sounds: Normal breath sounds. No wheezing.   Abdominal:      General: Bowel sounds are normal. There is distension.      Palpations: Abdomen is soft.      Tenderness: There is abdominal tenderness.   Musculoskeletal:      Cervical back: Neck supple.   Skin:     General: Skin is warm and dry.      Capillary Refill: Capillary refill takes less than 2 seconds.   Neurological:      General: No focal deficit present.      Mental Status: She is alert and oriented to person, place, and time.   Psychiatric:         Mood and Affect: Mood normal.         Behavior: Behavior normal.             Results      Assessment & Plan:     1. Abdominal bloating  US-ABDOMEN COMPLETE SURVEY    US-PELVIC COMPLETE (TRANSABDOMINAL/TRANSVAGINAL) (COMBO)      2. Abdominal cramping  US-ABDOMEN COMPLETE SURVEY    US-PELVIC COMPLETE (TRANSABDOMINAL/TRANSVAGINAL) (COMBO)      3. Nausea  US-ABDOMEN COMPLETE " SURVEY    US-PELVIC COMPLETE (TRANSABDOMINAL/TRANSVAGINAL) (COMBO)      4. Loose stools  US-ABDOMEN COMPLETE SURVEY    US-PELVIC COMPLETE (TRANSABDOMINAL/TRANSVAGINAL) (COMBO)      5. Irritable bowel syndrome with diarrhea  US-ABDOMEN COMPLETE SURVEY    US-PELVIC COMPLETE (TRANSABDOMINAL/TRANSVAGINAL) (COMBO)      6. Gastroesophageal reflux disease without esophagitis            Assessment & Plan  1. Abdominal pain.  - Reports improvement in symptoms with dicyclomine (Bentyl) and omeprazole.  - Physical examination reveals tenderness in the lower abdomen, particularly over the ovaries.  - An ultrasound of the abdomen and pelvis will be ordered to investigate the cause of the pain.  - Advised to continue with Bentyl as needed and omeprazole for acid reflux. Referral to a gastroenterologist will be considered if imaging results are negative and symptoms persist.    2. Fatigue.  - Reports significant fatigue over the last couple of weeks.  - Thyroid function tests have been ordered and are pending.  - Results will be reviewed to determine if thyroid dysfunction is contributing to her symptoms.  - Further management will be based on the thyroid function test results.      Return if symptoms worsen or fail to improve.    Please note that this dictation was created using voice recognition software. I have made every reasonable attempt to correct obvious errors, but I expect that there are errors of grammar and possibly content that I did not discover before finalizing the note.    Claudia Tan MD  Family Medicine and Non - Operative Sports Medicine   [facility] [place]

## 2025-08-05 DIAGNOSIS — R14.0 ABDOMINAL BLOATING: ICD-10-CM

## 2025-08-05 DIAGNOSIS — R19.5 LOOSE STOOLS: ICD-10-CM

## 2025-08-05 DIAGNOSIS — R11.0 NAUSEA: ICD-10-CM

## 2025-08-05 DIAGNOSIS — R10.9 ABDOMINAL CRAMPING: Primary | ICD-10-CM

## 2025-08-06 ENCOUNTER — HOSPITAL ENCOUNTER (OUTPATIENT)
Dept: LAB | Facility: MEDICAL CENTER | Age: 70
End: 2025-08-06
Attending: FAMILY MEDICINE
Payer: MEDICARE

## 2025-08-06 DIAGNOSIS — K21.9 GASTROESOPHAGEAL REFLUX DISEASE WITHOUT ESOPHAGITIS: ICD-10-CM

## 2025-08-06 DIAGNOSIS — G43.809 VESTIBULAR MIGRAINE: ICD-10-CM

## 2025-08-06 DIAGNOSIS — R73.01 IMPAIRED FASTING GLUCOSE: ICD-10-CM

## 2025-08-06 DIAGNOSIS — N95.1 HOT FLASHES DUE TO MENOPAUSE: ICD-10-CM

## 2025-08-06 DIAGNOSIS — E89.0 H/O PARTIAL THYROIDECTOMY: ICD-10-CM

## 2025-08-06 DIAGNOSIS — E78.00 PURE HYPERCHOLESTEROLEMIA: ICD-10-CM

## 2025-08-06 LAB
ALBUMIN SERPL BCP-MCNC: 4.2 G/DL (ref 3.2–4.9)
ALBUMIN/GLOB SERPL: 1.5 G/DL
ALP SERPL-CCNC: 63 U/L (ref 30–99)
ALT SERPL-CCNC: 14 U/L (ref 2–50)
ANION GAP SERPL CALC-SCNC: 11 MMOL/L (ref 7–16)
AST SERPL-CCNC: 21 U/L (ref 12–45)
BILIRUB SERPL-MCNC: 0.2 MG/DL (ref 0.1–1.5)
BUN SERPL-MCNC: 16 MG/DL (ref 8–22)
CALCIUM ALBUM COR SERPL-MCNC: 8.9 MG/DL (ref 8.5–10.5)
CALCIUM SERPL-MCNC: 9.1 MG/DL (ref 8.5–10.5)
CHLORIDE SERPL-SCNC: 101 MMOL/L (ref 96–112)
CHOLEST SERPL-MCNC: 177 MG/DL (ref 100–199)
CO2 SERPL-SCNC: 24 MMOL/L (ref 20–33)
CREAT SERPL-MCNC: 1 MG/DL (ref 0.5–1.4)
ERYTHROCYTE [DISTWIDTH] IN BLOOD BY AUTOMATED COUNT: 44.3 FL (ref 35.9–50)
EST. AVERAGE GLUCOSE BLD GHB EST-MCNC: 114 MG/DL
FASTING STATUS PATIENT QL REPORTED: NORMAL
GFR SERPLBLD CREATININE-BSD FMLA CKD-EPI: 60 ML/MIN/1.73 M 2
GLOBULIN SER CALC-MCNC: 2.8 G/DL (ref 1.9–3.5)
GLUCOSE SERPL-MCNC: 98 MG/DL (ref 65–99)
HBA1C MFR BLD: 5.6 % (ref 4–5.6)
HCT VFR BLD AUTO: 26.8 % (ref 37–47)
HDLC SERPL-MCNC: 49 MG/DL
HGB BLD-MCNC: 8 G/DL (ref 12–16)
LDLC SERPL CALC-MCNC: 102 MG/DL
MCH RBC QN AUTO: 25.5 PG (ref 27–33)
MCHC RBC AUTO-ENTMCNC: 29.9 G/DL (ref 32.2–35.5)
MCV RBC AUTO: 85.4 FL (ref 81.4–97.8)
PLATELET # BLD AUTO: 405 K/UL (ref 164–446)
PMV BLD AUTO: 10 FL (ref 9–12.9)
POTASSIUM SERPL-SCNC: 4.3 MMOL/L (ref 3.6–5.5)
PROT SERPL-MCNC: 7 G/DL (ref 6–8.2)
RBC # BLD AUTO: 3.14 M/UL (ref 4.2–5.4)
SODIUM SERPL-SCNC: 136 MMOL/L (ref 135–145)
T4 FREE SERPL-MCNC: 1.34 NG/DL (ref 0.93–1.7)
TRIGL SERPL-MCNC: 132 MG/DL (ref 0–149)
TSH SERPL DL<=0.005 MIU/L-ACNC: 6.5 UIU/ML (ref 0.38–5.33)
WBC # BLD AUTO: 4.2 K/UL (ref 4.8–10.8)

## 2025-08-06 PROCEDURE — 83036 HEMOGLOBIN GLYCOSYLATED A1C: CPT | Mod: GA

## 2025-08-06 PROCEDURE — 85027 COMPLETE CBC AUTOMATED: CPT

## 2025-08-06 PROCEDURE — 80061 LIPID PANEL: CPT

## 2025-08-06 PROCEDURE — 84439 ASSAY OF FREE THYROXINE: CPT

## 2025-08-06 PROCEDURE — 84443 ASSAY THYROID STIM HORMONE: CPT

## 2025-08-06 PROCEDURE — 36415 COLL VENOUS BLD VENIPUNCTURE: CPT

## 2025-08-06 PROCEDURE — 80053 COMPREHEN METABOLIC PANEL: CPT

## 2025-08-08 ENCOUNTER — HOSPITAL ENCOUNTER (OUTPATIENT)
Dept: RADIOLOGY | Facility: MEDICAL CENTER | Age: 70
End: 2025-08-08
Attending: FAMILY MEDICINE
Payer: MEDICARE

## 2025-08-08 DIAGNOSIS — R92.333 HETEROGENEOUSLY DENSE TISSUE OF BOTH BREASTS ON MAMMOGRAPHY: ICD-10-CM

## 2025-08-08 DIAGNOSIS — Z78.0 POSTMENOPAUSAL: ICD-10-CM

## 2025-08-08 PROCEDURE — 76641 ULTRASOUND BREAST COMPLETE: CPT

## 2025-08-08 PROCEDURE — 77080 DXA BONE DENSITY AXIAL: CPT

## 2025-08-13 DIAGNOSIS — R10.9 ABDOMINAL CRAMPING: ICD-10-CM

## 2025-08-13 DIAGNOSIS — K58.0 IRRITABLE BOWEL SYNDROME WITH DIARRHEA: ICD-10-CM

## 2025-08-13 DIAGNOSIS — R14.0 ABDOMINAL BLOATING: ICD-10-CM

## 2025-08-13 DIAGNOSIS — R19.5 LOOSE STOOLS: Primary | ICD-10-CM

## 2025-08-14 ENCOUNTER — HOSPITAL ENCOUNTER (OUTPATIENT)
Dept: RADIOLOGY | Facility: MEDICAL CENTER | Age: 70
End: 2025-08-14
Attending: FAMILY MEDICINE
Payer: MEDICARE

## 2025-08-14 DIAGNOSIS — R10.9 ABDOMINAL CRAMPING: ICD-10-CM

## 2025-08-14 DIAGNOSIS — R19.5 LOOSE STOOLS: ICD-10-CM

## 2025-08-14 DIAGNOSIS — R14.0 ABDOMINAL BLOATING: ICD-10-CM

## 2025-08-14 DIAGNOSIS — R11.0 NAUSEA: ICD-10-CM

## 2025-08-14 PROCEDURE — 74176 CT ABD & PELVIS W/O CONTRAST: CPT

## 2025-08-19 ENCOUNTER — TELEPHONE (OUTPATIENT)
Dept: CARDIOLOGY | Facility: MEDICAL CENTER | Age: 70
End: 2025-08-19
Payer: MEDICARE

## 2025-08-19 ENCOUNTER — HOSPITAL ENCOUNTER (OUTPATIENT)
Dept: LAB | Facility: MEDICAL CENTER | Age: 70
End: 2025-08-19
Payer: MEDICARE

## 2025-08-19 LAB
BASOPHILS # BLD AUTO: 0.8 % (ref 0–1.8)
BASOPHILS # BLD: 0.04 K/UL (ref 0–0.12)
EOSINOPHIL # BLD AUTO: 0.12 K/UL (ref 0–0.51)
EOSINOPHIL NFR BLD: 2.4 % (ref 0–6.9)
ERYTHROCYTE [DISTWIDTH] IN BLOOD BY AUTOMATED COUNT: 43.1 FL (ref 35.9–50)
FERRITIN SERPL-MCNC: 10.6 NG/ML (ref 10–291)
HCT VFR BLD AUTO: 24.4 % (ref 37–47)
HGB BLD-MCNC: 7.5 G/DL (ref 12–16)
IMM GRANULOCYTES # BLD AUTO: 0.02 K/UL (ref 0–0.11)
IMM GRANULOCYTES NFR BLD AUTO: 0.4 % (ref 0–0.9)
IRON SATN MFR SERPL: 6 % (ref 15–55)
IRON SERPL-MCNC: 22 UG/DL (ref 40–170)
LYMPHOCYTES # BLD AUTO: 1.33 K/UL (ref 1–4.8)
LYMPHOCYTES NFR BLD: 26.7 % (ref 22–41)
MCH RBC QN AUTO: 25 PG (ref 27–33)
MCHC RBC AUTO-ENTMCNC: 30.7 G/DL (ref 32.2–35.5)
MCV RBC AUTO: 81.3 FL (ref 81.4–97.8)
MONOCYTES # BLD AUTO: 0.55 K/UL (ref 0–0.85)
MONOCYTES NFR BLD AUTO: 11 % (ref 0–13.4)
NEUTROPHILS # BLD AUTO: 2.93 K/UL (ref 1.82–7.42)
NEUTROPHILS NFR BLD: 58.7 % (ref 44–72)
NRBC # BLD AUTO: 0 K/UL
NRBC BLD-RTO: 0 /100 WBC (ref 0–0.2)
PLATELET # BLD AUTO: 433 K/UL (ref 164–446)
PMV BLD AUTO: 9.5 FL (ref 9–12.9)
RBC # BLD AUTO: 3 M/UL (ref 4.2–5.4)
TIBC SERPL-MCNC: 369 UG/DL (ref 250–450)
UIBC SERPL-MCNC: 347 UG/DL (ref 110–370)
WBC # BLD AUTO: 5 K/UL (ref 4.8–10.8)

## 2025-08-19 PROCEDURE — 83540 ASSAY OF IRON: CPT

## 2025-08-19 PROCEDURE — 83550 IRON BINDING TEST: CPT

## 2025-08-19 PROCEDURE — 36415 COLL VENOUS BLD VENIPUNCTURE: CPT

## 2025-08-19 PROCEDURE — 82728 ASSAY OF FERRITIN: CPT

## 2025-08-19 PROCEDURE — 85025 COMPLETE CBC W/AUTO DIFF WBC: CPT

## 2025-08-20 ENCOUNTER — OFFICE VISIT (OUTPATIENT)
Dept: MEDICAL GROUP | Facility: PHYSICIAN GROUP | Age: 70
End: 2025-08-20
Payer: MEDICARE

## 2025-08-20 ENCOUNTER — TELEPHONE (OUTPATIENT)
Dept: MEDICAL GROUP | Facility: PHYSICIAN GROUP | Age: 70
End: 2025-08-20

## 2025-08-20 VITALS
DIASTOLIC BLOOD PRESSURE: 64 MMHG | WEIGHT: 140 LBS | HEIGHT: 64 IN | BODY MASS INDEX: 23.9 KG/M2 | OXYGEN SATURATION: 100 % | HEART RATE: 93 BPM | TEMPERATURE: 97.9 F | SYSTOLIC BLOOD PRESSURE: 98 MMHG

## 2025-08-20 DIAGNOSIS — K58.0 IRRITABLE BOWEL SYNDROME WITH DIARRHEA: ICD-10-CM

## 2025-08-20 DIAGNOSIS — D50.0 IRON DEFICIENCY ANEMIA DUE TO CHRONIC BLOOD LOSS: Primary | ICD-10-CM

## 2025-08-20 DIAGNOSIS — R19.5 LOOSE STOOLS: ICD-10-CM

## 2025-08-20 DIAGNOSIS — E03.9 ACQUIRED HYPOTHYROIDISM: ICD-10-CM

## 2025-08-20 PROBLEM — R42 VERTIGO: Status: RESOLVED | Noted: 2022-08-12 | Resolved: 2025-08-20

## 2025-08-20 PROCEDURE — 3078F DIAST BP <80 MM HG: CPT | Performed by: FAMILY MEDICINE

## 2025-08-20 PROCEDURE — 99214 OFFICE O/P EST MOD 30 MIN: CPT | Performed by: FAMILY MEDICINE

## 2025-08-20 PROCEDURE — 3074F SYST BP LT 130 MM HG: CPT | Performed by: FAMILY MEDICINE

## 2025-08-20 RX ORDER — LEVOTHYROXINE SODIUM 100 UG/1
TABLET ORAL
Qty: 90 TABLET | Refills: 3 | Status: SHIPPED | OUTPATIENT
Start: 2025-08-20

## 2025-08-20 RX ORDER — FERROUS SULFATE 325(65) MG
325 TABLET ORAL DAILY
Qty: 90 TABLET | Refills: 0 | Status: ON HOLD | OUTPATIENT
Start: 2025-08-20 | End: 2025-08-25

## 2025-08-20 ASSESSMENT — FIBROSIS 4 INDEX: FIB4 SCORE: 0.92

## 2025-08-21 ENCOUNTER — HOSPITAL ENCOUNTER (OUTPATIENT)
Dept: LAB | Facility: MEDICAL CENTER | Age: 70
End: 2025-08-21
Attending: FAMILY MEDICINE
Payer: MEDICARE

## 2025-08-21 DIAGNOSIS — D50.0 IRON DEFICIENCY ANEMIA DUE TO CHRONIC BLOOD LOSS: ICD-10-CM

## 2025-08-21 LAB
ANISOCYTOSIS BLD QL SMEAR: ABNORMAL
BASOPHILS # BLD AUTO: 0.6 % (ref 0–1.8)
BASOPHILS # BLD: 0.03 K/UL (ref 0–0.12)
COMMENT 1642: NORMAL
EOSINOPHIL # BLD AUTO: 0.12 K/UL (ref 0–0.51)
EOSINOPHIL NFR BLD: 2.3 % (ref 0–6.9)
ERYTHROCYTE [DISTWIDTH] IN BLOOD BY AUTOMATED COUNT: 45.1 FL (ref 35.9–50)
HCT VFR BLD AUTO: 25.5 % (ref 37–47)
HGB BLD-MCNC: 7.6 G/DL (ref 12–16)
HYPOCHROMIA BLD QL SMEAR: ABNORMAL
IMM GRANULOCYTES # BLD AUTO: 0.01 K/UL (ref 0–0.11)
IMM GRANULOCYTES NFR BLD AUTO: 0.2 % (ref 0–0.9)
LYMPHOCYTES # BLD AUTO: 1.47 K/UL (ref 1–4.8)
LYMPHOCYTES NFR BLD: 28.5 % (ref 22–41)
MCH RBC QN AUTO: 25 PG (ref 27–33)
MCHC RBC AUTO-ENTMCNC: 29.8 G/DL (ref 32.2–35.5)
MCV RBC AUTO: 83.9 FL (ref 81.4–97.8)
MICROCYTES BLD QL SMEAR: ABNORMAL
MONOCYTES # BLD AUTO: 0.61 K/UL (ref 0–0.85)
MONOCYTES NFR BLD AUTO: 11.8 % (ref 0–13.4)
MORPHOLOGY BLD-IMP: NORMAL
NEUTROPHILS # BLD AUTO: 2.92 K/UL (ref 1.82–7.42)
NEUTROPHILS NFR BLD: 56.6 % (ref 44–72)
NRBC # BLD AUTO: 0 K/UL
NRBC BLD-RTO: 0 /100 WBC (ref 0–0.2)
PLATELET # BLD AUTO: 437 K/UL (ref 164–446)
PLATELET BLD QL SMEAR: NORMAL
PMV BLD AUTO: 9.7 FL (ref 9–12.9)
RBC # BLD AUTO: 3.04 M/UL (ref 4.2–5.4)
RBC BLD AUTO: PRESENT
WBC # BLD AUTO: 5.2 K/UL (ref 4.8–10.8)

## 2025-08-21 PROCEDURE — 85025 COMPLETE CBC W/AUTO DIFF WBC: CPT

## 2025-08-21 PROCEDURE — 36415 COLL VENOUS BLD VENIPUNCTURE: CPT

## 2025-08-24 ENCOUNTER — APPOINTMENT (OUTPATIENT)
Dept: RADIOLOGY | Facility: MEDICAL CENTER | Age: 70
End: 2025-08-24
Attending: EMERGENCY MEDICINE
Payer: MEDICARE

## 2025-08-24 ENCOUNTER — HOSPITAL ENCOUNTER (OUTPATIENT)
Facility: MEDICAL CENTER | Age: 70
End: 2025-08-25
Attending: EMERGENCY MEDICINE | Admitting: HOSPITALIST
Payer: MEDICARE

## 2025-08-24 DIAGNOSIS — R06.09 DYSPNEA ON EXERTION: ICD-10-CM

## 2025-08-24 DIAGNOSIS — R19.5 HEME POSITIVE STOOL: ICD-10-CM

## 2025-08-24 DIAGNOSIS — D64.9 ANEMIA, UNSPECIFIED TYPE: Primary | ICD-10-CM

## 2025-08-24 DIAGNOSIS — R55 NEAR SYNCOPE: ICD-10-CM

## 2025-08-24 LAB
ABO + RH BLD: NORMAL
ABO GROUP BLD: NORMAL
ALBUMIN SERPL BCP-MCNC: 4.3 G/DL (ref 3.2–4.9)
ALBUMIN/GLOB SERPL: 1.4 G/DL
ALP SERPL-CCNC: 75 U/L (ref 30–99)
ALT SERPL-CCNC: 13 U/L (ref 2–50)
ANION GAP SERPL CALC-SCNC: 14 MMOL/L (ref 7–16)
ANISOCYTOSIS BLD QL SMEAR: ABNORMAL
APTT PPP: <20 SEC (ref 24.7–36)
AST SERPL-CCNC: 20 U/L (ref 12–45)
BARCODED ABORH UBTYP: 6200
BARCODED PRD CODE UBPRD: NORMAL
BARCODED UNIT NUM UBUNT: NORMAL
BASOPHILS # BLD AUTO: 0.9 % (ref 0–1.8)
BASOPHILS # BLD: 0.06 K/UL (ref 0–0.12)
BILIRUB SERPL-MCNC: 0.3 MG/DL (ref 0.1–1.5)
BLD GP AB SCN SERPL QL: NORMAL
BUN SERPL-MCNC: 15 MG/DL (ref 8–22)
CALCIUM ALBUM COR SERPL-MCNC: 9.3 MG/DL (ref 8.5–10.5)
CALCIUM SERPL-MCNC: 9.5 MG/DL (ref 8.5–10.5)
CHLORIDE SERPL-SCNC: 101 MMOL/L (ref 96–112)
CO2 SERPL-SCNC: 22 MMOL/L (ref 20–33)
COMPONENT R 8504R: NORMAL
CREAT SERPL-MCNC: 0.88 MG/DL (ref 0.5–1.4)
EKG IMPRESSION: NORMAL
EOSINOPHIL # BLD AUTO: 0.06 K/UL (ref 0–0.51)
EOSINOPHIL NFR BLD: 0.9 % (ref 0–6.9)
ERYTHROCYTE [DISTWIDTH] IN BLOOD BY AUTOMATED COUNT: 43.7 FL (ref 35.9–50)
FERRITIN SERPL-MCNC: 9.7 NG/ML (ref 10–291)
GFR SERPLBLD CREATININE-BSD FMLA CKD-EPI: 70 ML/MIN/1.73 M 2
GLOBULIN SER CALC-MCNC: 3 G/DL (ref 1.9–3.5)
GLUCOSE SERPL-MCNC: 99 MG/DL (ref 65–99)
HCT VFR BLD AUTO: 23.4 % (ref 37–47)
HGB BLD-MCNC: 7 G/DL (ref 12–16)
HGB RETIC QN AUTO: 20.7 PG/CELL (ref 29–35)
IMM RETICS NFR: 17.2 % (ref 2.6–16.1)
INR PPP: 1.06 (ref 0.87–1.13)
IRON SATN MFR SERPL: 3 % (ref 15–55)
IRON SERPL-MCNC: 12 UG/DL (ref 40–170)
LYMPHOCYTES # BLD AUTO: 1.15 K/UL (ref 1–4.8)
LYMPHOCYTES NFR BLD: 17.4 % (ref 22–41)
MANUAL DIFF BLD: NORMAL
MCH RBC QN AUTO: 24.7 PG (ref 27–33)
MCHC RBC AUTO-ENTMCNC: 29.9 G/DL (ref 32.2–35.5)
MCV RBC AUTO: 82.7 FL (ref 81.4–97.8)
MONOCYTES # BLD AUTO: 0.17 K/UL (ref 0–0.85)
MONOCYTES NFR BLD AUTO: 2.6 % (ref 0–13.4)
NEUTROPHILS # BLD AUTO: 5.16 K/UL (ref 1.82–7.42)
NEUTROPHILS NFR BLD: 78.2 % (ref 44–72)
NRBC # BLD AUTO: 0 K/UL
NRBC BLD-RTO: 0 /100 WBC (ref 0–0.2)
NT-PROBNP SERPL IA-MCNC: 168 PG/ML (ref 0–125)
PLATELET # BLD AUTO: 408 K/UL (ref 164–446)
PLATELET BLD QL SMEAR: NORMAL
PMV BLD AUTO: 9.2 FL (ref 9–12.9)
POIKILOCYTOSIS BLD QL SMEAR: NORMAL
POTASSIUM SERPL-SCNC: 4 MMOL/L (ref 3.6–5.5)
PRODUCT TYPE UPROD: NORMAL
PROT SERPL-MCNC: 7.3 G/DL (ref 6–8.2)
PROTHROMBIN TIME: 14.1 SEC (ref 12–14.6)
RBC # BLD AUTO: 2.83 M/UL (ref 4.2–5.4)
RBC BLD AUTO: PRESENT
RETICS # AUTO: 0.05 M/UL (ref 0.04–0.12)
RETICS/RBC NFR: 1.9 % (ref 0.8–2.6)
RH BLD: NORMAL
SODIUM SERPL-SCNC: 137 MMOL/L (ref 135–145)
TIBC SERPL-MCNC: 399 UG/DL (ref 250–450)
TROPONIN T SERPL-MCNC: 11 NG/L (ref 6–19)
UIBC SERPL-MCNC: 387 UG/DL (ref 110–370)
UNIT STATUS USTAT: NORMAL
VIT B12 SERPL-MCNC: 1459 PG/ML (ref 211–911)
WBC # BLD AUTO: 6.6 K/UL (ref 4.8–10.8)

## 2025-08-24 PROCEDURE — 36415 COLL VENOUS BLD VENIPUNCTURE: CPT

## 2025-08-24 PROCEDURE — 700101 HCHG RX REV CODE 250: Performed by: INTERNAL MEDICINE

## 2025-08-24 PROCEDURE — 84484 ASSAY OF TROPONIN QUANT: CPT

## 2025-08-24 PROCEDURE — G0378 HOSPITAL OBSERVATION PER HR: HCPCS

## 2025-08-24 PROCEDURE — 83540 ASSAY OF IRON: CPT

## 2025-08-24 PROCEDURE — 85027 COMPLETE CBC AUTOMATED: CPT

## 2025-08-24 PROCEDURE — 82728 ASSAY OF FERRITIN: CPT

## 2025-08-24 PROCEDURE — 71045 X-RAY EXAM CHEST 1 VIEW: CPT

## 2025-08-24 PROCEDURE — 86923 COMPATIBILITY TEST ELECTRIC: CPT

## 2025-08-24 PROCEDURE — 80053 COMPREHEN METABOLIC PANEL: CPT

## 2025-08-24 PROCEDURE — 36430 TRANSFUSION BLD/BLD COMPNT: CPT

## 2025-08-24 PROCEDURE — A9270 NON-COVERED ITEM OR SERVICE: HCPCS | Performed by: HOSPITALIST

## 2025-08-24 PROCEDURE — 94760 N-INVAS EAR/PLS OXIMETRY 1: CPT

## 2025-08-24 PROCEDURE — 700102 HCHG RX REV CODE 250 W/ 637 OVERRIDE(OP): Performed by: HOSPITALIST

## 2025-08-24 PROCEDURE — 82607 VITAMIN B-12: CPT

## 2025-08-24 PROCEDURE — 700111 HCHG RX REV CODE 636 W/ 250 OVERRIDE (IP): Mod: JZ | Performed by: HOSPITALIST

## 2025-08-24 PROCEDURE — 85730 THROMBOPLASTIN TIME PARTIAL: CPT

## 2025-08-24 PROCEDURE — 83880 ASSAY OF NATRIURETIC PEPTIDE: CPT

## 2025-08-24 PROCEDURE — 85046 RETICYTE/HGB CONCENTRATE: CPT

## 2025-08-24 PROCEDURE — 99223 1ST HOSP IP/OBS HIGH 75: CPT | Performed by: HOSPITALIST

## 2025-08-24 PROCEDURE — 85007 BL SMEAR W/DIFF WBC COUNT: CPT

## 2025-08-24 PROCEDURE — 99285 EMERGENCY DEPT VISIT HI MDM: CPT

## 2025-08-24 PROCEDURE — 85610 PROTHROMBIN TIME: CPT

## 2025-08-24 PROCEDURE — 86900 BLOOD TYPING SEROLOGIC ABO: CPT | Mod: 91

## 2025-08-24 PROCEDURE — 96374 THER/PROPH/DIAG INJ IV PUSH: CPT

## 2025-08-24 PROCEDURE — 93005 ELECTROCARDIOGRAM TRACING: CPT | Mod: TC

## 2025-08-24 PROCEDURE — 86850 RBC ANTIBODY SCREEN: CPT

## 2025-08-24 PROCEDURE — 83550 IRON BINDING TEST: CPT

## 2025-08-24 PROCEDURE — P9016 RBC LEUKOCYTES REDUCED: HCPCS

## 2025-08-24 PROCEDURE — 86901 BLOOD TYPING SEROLOGIC RH(D): CPT

## 2025-08-24 PROCEDURE — 93005 ELECTROCARDIOGRAM TRACING: CPT | Mod: TC | Performed by: EMERGENCY MEDICINE

## 2025-08-24 RX ORDER — PREDNISONE 5 MG/1
180 TABLET ORAL DAILY
Status: DISCONTINUED | OUTPATIENT
Start: 2025-08-25 | End: 2025-08-25 | Stop reason: HOSPADM

## 2025-08-24 RX ORDER — NORTRIPTYLINE HYDROCHLORIDE 25 MG/1
50 CAPSULE ORAL NIGHTLY
Status: DISCONTINUED | OUTPATIENT
Start: 2025-08-24 | End: 2025-08-25 | Stop reason: HOSPADM

## 2025-08-24 RX ORDER — OXYCODONE HYDROCHLORIDE 5 MG/1
5 TABLET ORAL
Refills: 0 | Status: DISCONTINUED | OUTPATIENT
Start: 2025-08-24 | End: 2025-08-25 | Stop reason: HOSPADM

## 2025-08-24 RX ORDER — POTASSIUM CHLORIDE 1500 MG/1
10 TABLET, EXTENDED RELEASE ORAL DAILY
Status: DISCONTINUED | OUTPATIENT
Start: 2025-08-25 | End: 2025-08-25 | Stop reason: HOSPADM

## 2025-08-24 RX ORDER — OXYCODONE HYDROCHLORIDE 5 MG/1
2.5 TABLET ORAL
Refills: 0 | Status: DISCONTINUED | OUTPATIENT
Start: 2025-08-24 | End: 2025-08-25 | Stop reason: HOSPADM

## 2025-08-24 RX ORDER — LEVOTHYROXINE SODIUM 100 UG/1
100 TABLET ORAL
Status: DISCONTINUED | OUTPATIENT
Start: 2025-08-25 | End: 2025-08-25 | Stop reason: HOSPADM

## 2025-08-24 RX ORDER — NIACINAMIDE 500 MG
500 TABLET ORAL 2 TIMES DAILY
Status: DISCONTINUED | OUTPATIENT
Start: 2025-08-24 | End: 2025-08-24

## 2025-08-24 RX ORDER — OMEPRAZOLE 20 MG/1
20 CAPSULE, DELAYED RELEASE ORAL 2 TIMES DAILY
Status: DISCONTINUED | OUTPATIENT
Start: 2025-08-24 | End: 2025-08-25 | Stop reason: HOSPADM

## 2025-08-24 RX ORDER — ONDANSETRON 4 MG/1
4 TABLET, ORALLY DISINTEGRATING ORAL EVERY 4 HOURS PRN
Status: DISCONTINUED | OUTPATIENT
Start: 2025-08-24 | End: 2025-08-25 | Stop reason: HOSPADM

## 2025-08-24 RX ORDER — ACETAMINOPHEN 325 MG/1
650 TABLET ORAL EVERY 6 HOURS PRN
Status: DISCONTINUED | OUTPATIENT
Start: 2025-08-24 | End: 2025-08-25 | Stop reason: HOSPADM

## 2025-08-24 RX ORDER — HYDRALAZINE HYDROCHLORIDE 20 MG/ML
10 INJECTION INTRAMUSCULAR; INTRAVENOUS EVERY 4 HOURS PRN
Status: DISCONTINUED | OUTPATIENT
Start: 2025-08-24 | End: 2025-08-25 | Stop reason: HOSPADM

## 2025-08-24 RX ORDER — ONDANSETRON 2 MG/ML
4 INJECTION INTRAMUSCULAR; INTRAVENOUS EVERY 4 HOURS PRN
Status: DISCONTINUED | OUTPATIENT
Start: 2025-08-24 | End: 2025-08-25 | Stop reason: HOSPADM

## 2025-08-24 RX ORDER — IBUPROFEN 200 MG
400 TABLET ORAL EVERY 6 HOURS PRN
Status: ON HOLD | COMMUNITY
End: 2025-08-25

## 2025-08-24 RX ORDER — MORPHINE SULFATE 4 MG/ML
2 INJECTION INTRAVENOUS
Status: DISCONTINUED | OUTPATIENT
Start: 2025-08-24 | End: 2025-08-25 | Stop reason: HOSPADM

## 2025-08-24 RX ORDER — SODIUM CHLORIDE 9 MG/ML
INJECTION, SOLUTION INTRAVENOUS CONTINUOUS
Status: ACTIVE | OUTPATIENT
Start: 2025-08-24 | End: 2025-08-25

## 2025-08-24 RX ORDER — HYDROCHLOROTHIAZIDE 12.5 MG/1
12.5 TABLET ORAL DAILY
Status: DISCONTINUED | OUTPATIENT
Start: 2025-08-25 | End: 2025-08-25 | Stop reason: HOSPADM

## 2025-08-24 RX ORDER — SIMVASTATIN 10 MG
10 TABLET ORAL NIGHTLY
Status: DISCONTINUED | OUTPATIENT
Start: 2025-08-24 | End: 2025-08-25 | Stop reason: HOSPADM

## 2025-08-24 RX ADMIN — SIMVASTATIN 10 MG: 10 TABLET, FILM COATED ORAL at 21:34

## 2025-08-24 RX ADMIN — NORTRIPTYLINE HYDROCHLORIDE 50 MG: 25 CAPSULE ORAL at 21:34

## 2025-08-24 RX ADMIN — POLYETHYLENE GLYCOL-3350 AND ELECTROLYTES 4 L: 236; 6.74; 5.86; 2.97; 22.74 POWDER, FOR SOLUTION ORAL at 21:36

## 2025-08-24 RX ADMIN — OMEPRAZOLE 20 MG: 20 CAPSULE, DELAYED RELEASE ORAL at 21:37

## 2025-08-24 RX ADMIN — ONDANSETRON 4 MG: 2 INJECTION INTRAMUSCULAR; INTRAVENOUS at 21:19

## 2025-08-24 ASSESSMENT — ENCOUNTER SYMPTOMS
SINUS PAIN: 0
FEVER: 0
DIAPHORESIS: 0
SENSORY CHANGE: 0
SPUTUM PRODUCTION: 0
PND: 0
INSOMNIA: 0
PHOTOPHOBIA: 0
PALPITATIONS: 1
NAUSEA: 1
POLYDIPSIA: 0
COUGH: 0
PSYCHIATRIC NEGATIVE: 1
FLANK PAIN: 0
SEIZURES: 0
MYALGIAS: 0
MUSCULOSKELETAL NEGATIVE: 1
HEARTBURN: 0
DOUBLE VISION: 0
BRUISES/BLEEDS EASILY: 0
SHORTNESS OF BREATH: 1
TINGLING: 0
BLOOD IN STOOL: 0
FALLS: 0
DEPRESSION: 0
ABDOMINAL PAIN: 1
FOCAL WEAKNESS: 0
STRIDOR: 0
CHILLS: 0
ORTHOPNEA: 0
EYES NEGATIVE: 1
EYE REDNESS: 0
DIZZINESS: 1
WHEEZING: 0
BACK PAIN: 0
EYE DISCHARGE: 0

## 2025-08-24 ASSESSMENT — LIFESTYLE VARIABLES
HAVE YOU EVER FELT YOU SHOULD CUT DOWN ON YOUR DRINKING: NO
CONSUMPTION TOTAL: NEGATIVE
HOW MANY STANDARD DRINKS CONTAINING ALCOHOL DO YOU HAVE ON A TYPICAL DAY: PATIENT DOES NOT DRINK
TOTAL SCORE: 0
SUBSTANCE_ABUSE: 0
EVER FELT BAD OR GUILTY ABOUT YOUR DRINKING: NO
HAVE PEOPLE ANNOYED YOU BY CRITICIZING YOUR DRINKING: NO
ALCOHOL_USE: NO
HOW MANY TIMES IN THE PAST YEAR HAVE YOU HAD 5 OR MORE DRINKS IN A DAY: 0
EVER HAD A DRINK FIRST THING IN THE MORNING TO STEADY YOUR NERVES TO GET RID OF A HANGOVER: NO
TOTAL SCORE: 0
AVERAGE NUMBER OF DAYS PER WEEK YOU HAVE A DRINK CONTAINING ALCOHOL: 0
TOTAL SCORE: 0
HOW OFTEN DO YOU HAVE A DRINK CONTAINING ALCOHOL: NEVER
ON A TYPICAL DAY WHEN YOU DRINK ALCOHOL HOW MANY DRINKS DO YOU HAVE: 0

## 2025-08-24 ASSESSMENT — FIBROSIS 4 INDEX
FIB4 SCORE: 0.91
FIB4 SCORE: 0.97
FIB4 SCORE: 0.97

## 2025-08-24 ASSESSMENT — COGNITIVE AND FUNCTIONAL STATUS - GENERAL
MOVING FROM LYING ON BACK TO SITTING ON SIDE OF FLAT BED: A LITTLE
MOBILITY SCORE: 19
SUGGESTED CMS G CODE MODIFIER DAILY ACTIVITY: CH
WALKING IN HOSPITAL ROOM: A LITTLE
SUGGESTED CMS G CODE MODIFIER MOBILITY: CK
DAILY ACTIVITIY SCORE: 24
CLIMB 3 TO 5 STEPS WITH RAILING: A LOT
MOVING TO AND FROM BED TO CHAIR: A LITTLE

## 2025-08-24 ASSESSMENT — PAIN DESCRIPTION - PAIN TYPE: TYPE: ACUTE PAIN

## 2025-08-24 ASSESSMENT — VISUAL ACUITY: OU: 1

## 2025-08-25 ENCOUNTER — ANESTHESIA (OUTPATIENT)
Dept: SURGERY | Facility: MEDICAL CENTER | Age: 70
End: 2025-08-25
Payer: MEDICARE

## 2025-08-25 ENCOUNTER — ANESTHESIA EVENT (OUTPATIENT)
Dept: SURGERY | Facility: MEDICAL CENTER | Age: 70
End: 2025-08-25
Payer: MEDICARE

## 2025-08-25 ENCOUNTER — APPOINTMENT (OUTPATIENT)
Dept: RADIOLOGY | Facility: MEDICAL CENTER | Age: 70
End: 2025-08-25
Attending: INTERNAL MEDICINE
Payer: MEDICARE

## 2025-08-25 VITALS
WEIGHT: 141.98 LBS | HEART RATE: 76 BPM | DIASTOLIC BLOOD PRESSURE: 67 MMHG | TEMPERATURE: 97.1 F | HEIGHT: 64 IN | BODY MASS INDEX: 24.24 KG/M2 | OXYGEN SATURATION: 98 % | RESPIRATION RATE: 18 BRPM | SYSTOLIC BLOOD PRESSURE: 143 MMHG

## 2025-08-25 LAB
ADV 40+41 DNA STL QL NAA+NON-PROBE: NOT DETECTED
ANION GAP SERPL CALC-SCNC: 14 MMOL/L (ref 7–16)
ASTRO TYP 1-8 RNA STL QL NAA+NON-PROBE: NOT DETECTED
BUN SERPL-MCNC: 11 MG/DL (ref 8–22)
C CAYETANENSIS DNA STL QL NAA+NON-PROBE: NOT DETECTED
C COLI+JEJ+UPSA DNA STL QL NAA+NON-PROBE: NOT DETECTED
CALCIUM SERPL-MCNC: 9.1 MG/DL (ref 8.5–10.5)
CHLORIDE SERPL-SCNC: 103 MMOL/L (ref 96–112)
CO2 SERPL-SCNC: 22 MMOL/L (ref 20–33)
CREAT SERPL-MCNC: 0.82 MG/DL (ref 0.5–1.4)
CRYPTOSP DNA STL QL NAA+NON-PROBE: NOT DETECTED
E HISTOLYT DNA STL QL NAA+NON-PROBE: NOT DETECTED
EAEC PAA PLAS AGGR+AATA ST NAA+NON-PRB: NOT DETECTED
EC STX1+STX2 GENES STL QL NAA+NON-PROBE: NOT DETECTED
EPEC EAE GENE STL QL NAA+NON-PROBE: NOT DETECTED
ERYTHROCYTE [DISTWIDTH] IN BLOOD BY AUTOMATED COUNT: 44.3 FL (ref 35.9–50)
ETEC LTA+ST1A+ST1B TOX ST NAA+NON-PROBE: NOT DETECTED
G LAMBLIA DNA STL QL NAA+NON-PROBE: NOT DETECTED
GFR SERPLBLD CREATININE-BSD FMLA CKD-EPI: 76 ML/MIN/1.73 M 2
GLUCOSE SERPL-MCNC: 98 MG/DL (ref 65–99)
HCT VFR BLD AUTO: 31.1 % (ref 37–47)
HGB BLD-MCNC: 9.6 G/DL (ref 12–16)
MCH RBC QN AUTO: 26.1 PG (ref 27–33)
MCHC RBC AUTO-ENTMCNC: 30.9 G/DL (ref 32.2–35.5)
MCV RBC AUTO: 84.5 FL (ref 81.4–97.8)
NOROVIRUS GI+II RNA STL QL NAA+NON-PROBE: NOT DETECTED
P SHIGELLOIDES DNA STL QL NAA+NON-PROBE: NOT DETECTED
PATHOLOGY CONSULT NOTE: NORMAL
PLATELET # BLD AUTO: 417 K/UL (ref 164–446)
PMV BLD AUTO: 9.3 FL (ref 9–12.9)
POTASSIUM SERPL-SCNC: 3.6 MMOL/L (ref 3.6–5.5)
RBC # BLD AUTO: 3.68 M/UL (ref 4.2–5.4)
RVA RNA STL QL NAA+NON-PROBE: NOT DETECTED
S ENT+BONG DNA STL QL NAA+NON-PROBE: NOT DETECTED
SAPO I+II+IV+V RNA STL QL NAA+NON-PROBE: NOT DETECTED
SHIGELLA SP+EIEC IPAH ST NAA+NON-PROBE: NOT DETECTED
SODIUM SERPL-SCNC: 139 MMOL/L (ref 135–145)
V CHOL+PARA+VUL DNA STL QL NAA+NON-PROBE: NOT DETECTED
V CHOLERAE DNA STL QL NAA+NON-PROBE: NOT DETECTED
WBC # BLD AUTO: 6.8 K/UL (ref 4.8–10.8)
Y ENTEROCOL DNA STL QL NAA+NON-PROBE: NOT DETECTED

## 2025-08-25 PROCEDURE — 160002 HCHG RECOVERY MINUTES (STAT): Performed by: INTERNAL MEDICINE

## 2025-08-25 PROCEDURE — 160048 HCHG OR STATISTICAL LEVEL 1-5: Performed by: INTERNAL MEDICINE

## 2025-08-25 PROCEDURE — 700111 HCHG RX REV CODE 636 W/ 250 OVERRIDE (IP): Performed by: STUDENT IN AN ORGANIZED HEALTH CARE EDUCATION/TRAINING PROGRAM

## 2025-08-25 PROCEDURE — 88305 TISSUE EXAM BY PATHOLOGIST: CPT | Mod: 26 | Performed by: PATHOLOGY

## 2025-08-25 PROCEDURE — 160208 HCHG ENDO MINUTES - EA ADDL 1 MIN LEVEL 4: Performed by: INTERNAL MEDICINE

## 2025-08-25 PROCEDURE — A9270 NON-COVERED ITEM OR SERVICE: HCPCS | Performed by: HOSPITALIST

## 2025-08-25 PROCEDURE — 82378 CARCINOEMBRYONIC ANTIGEN: CPT

## 2025-08-25 PROCEDURE — 88312 SPECIAL STAINS GROUP 1: CPT | Performed by: PATHOLOGY

## 2025-08-25 PROCEDURE — 160193 HCHG PACU STANDARD - 1ST 60 MINS: Performed by: INTERNAL MEDICINE

## 2025-08-25 PROCEDURE — 87507 IADNA-DNA/RNA PROBE TQ 12-25: CPT

## 2025-08-25 PROCEDURE — 80048 BASIC METABOLIC PNL TOTAL CA: CPT

## 2025-08-25 PROCEDURE — 160203 HCHG ENDO MINUTES - 1ST 30 MINS LEVEL 4: Performed by: INTERNAL MEDICINE

## 2025-08-25 PROCEDURE — 160015 HCHG STAT PREOP MINUTES: Performed by: INTERNAL MEDICINE

## 2025-08-25 PROCEDURE — 700117 HCHG RX CONTRAST REV CODE 255: Performed by: INTERNAL MEDICINE

## 2025-08-25 PROCEDURE — 700105 HCHG RX REV CODE 258: Performed by: STUDENT IN AN ORGANIZED HEALTH CARE EDUCATION/TRAINING PROGRAM

## 2025-08-25 PROCEDURE — G0378 HOSPITAL OBSERVATION PER HR: HCPCS

## 2025-08-25 PROCEDURE — 88312 SPECIAL STAINS GROUP 1: CPT | Mod: 26 | Performed by: PATHOLOGY

## 2025-08-25 PROCEDURE — 700101 HCHG RX REV CODE 250: Performed by: STUDENT IN AN ORGANIZED HEALTH CARE EDUCATION/TRAINING PROGRAM

## 2025-08-25 PROCEDURE — 700102 HCHG RX REV CODE 250 W/ 637 OVERRIDE(OP): Performed by: HOSPITALIST

## 2025-08-25 PROCEDURE — 71260 CT THORAX DX C+: CPT

## 2025-08-25 PROCEDURE — 88305 TISSUE EXAM BY PATHOLOGIST: CPT | Performed by: PATHOLOGY

## 2025-08-25 PROCEDURE — C1889 IMPLANT/INSERT DEVICE, NOC: HCPCS | Performed by: INTERNAL MEDICINE

## 2025-08-25 PROCEDURE — 85027 COMPLETE CBC AUTOMATED: CPT

## 2025-08-25 PROCEDURE — 160197 HCHG MAC ANESTHESIA: Performed by: INTERNAL MEDICINE

## 2025-08-25 PROCEDURE — 36415 COLL VENOUS BLD VENIPUNCTURE: CPT

## 2025-08-25 PROCEDURE — 94760 N-INVAS EAR/PLS OXIMETRY 1: CPT

## 2025-08-25 RX ORDER — SODIUM CHLORIDE, SODIUM LACTATE, POTASSIUM CHLORIDE, CALCIUM CHLORIDE 600; 310; 30; 20 MG/100ML; MG/100ML; MG/100ML; MG/100ML
INJECTION, SOLUTION INTRAVENOUS
Status: DISCONTINUED | OUTPATIENT
Start: 2025-08-25 | End: 2025-08-25 | Stop reason: SURG

## 2025-08-25 RX ORDER — ALBUTEROL SULFATE 5 MG/ML
2.5 SOLUTION RESPIRATORY (INHALATION)
Status: DISCONTINUED | OUTPATIENT
Start: 2025-08-25 | End: 2025-08-25 | Stop reason: HOSPADM

## 2025-08-25 RX ORDER — HALOPERIDOL 5 MG/ML
1 INJECTION INTRAMUSCULAR
Status: DISCONTINUED | OUTPATIENT
Start: 2025-08-25 | End: 2025-08-25 | Stop reason: HOSPADM

## 2025-08-25 RX ORDER — LIDOCAINE HYDROCHLORIDE 20 MG/ML
INJECTION, SOLUTION EPIDURAL; INFILTRATION; INTRACAUDAL; PERINEURAL PRN
Status: DISCONTINUED | OUTPATIENT
Start: 2025-08-25 | End: 2025-08-25 | Stop reason: SURG

## 2025-08-25 RX ORDER — DIPHENHYDRAMINE HYDROCHLORIDE 50 MG/ML
12.5 INJECTION, SOLUTION INTRAMUSCULAR; INTRAVENOUS
Status: DISCONTINUED | OUTPATIENT
Start: 2025-08-25 | End: 2025-08-25 | Stop reason: HOSPADM

## 2025-08-25 RX ORDER — METOPROLOL TARTRATE 1 MG/ML
1 INJECTION, SOLUTION INTRAVENOUS
Status: DISCONTINUED | OUTPATIENT
Start: 2025-08-25 | End: 2025-08-25 | Stop reason: HOSPADM

## 2025-08-25 RX ORDER — HYDRALAZINE HYDROCHLORIDE 20 MG/ML
5 INJECTION INTRAMUSCULAR; INTRAVENOUS
Status: DISCONTINUED | OUTPATIENT
Start: 2025-08-25 | End: 2025-08-25 | Stop reason: HOSPADM

## 2025-08-25 RX ORDER — ONDANSETRON 2 MG/ML
4 INJECTION INTRAMUSCULAR; INTRAVENOUS
Status: DISCONTINUED | OUTPATIENT
Start: 2025-08-25 | End: 2025-08-25 | Stop reason: HOSPADM

## 2025-08-25 RX ORDER — LABETALOL HYDROCHLORIDE 5 MG/ML
5 INJECTION, SOLUTION INTRAVENOUS
Status: DISCONTINUED | OUTPATIENT
Start: 2025-08-25 | End: 2025-08-25 | Stop reason: HOSPADM

## 2025-08-25 RX ORDER — EPHEDRINE SULFATE 50 MG/ML
5 INJECTION, SOLUTION INTRAVENOUS
Status: DISCONTINUED | OUTPATIENT
Start: 2025-08-25 | End: 2025-08-25 | Stop reason: HOSPADM

## 2025-08-25 RX ADMIN — HYDROCHLOROTHIAZIDE 12.5 MG: 12.5 TABLET ORAL at 05:10

## 2025-08-25 RX ADMIN — PROPOFOL 50 MG: 10 INJECTION, EMULSION INTRAVENOUS at 11:56

## 2025-08-25 RX ADMIN — LEVOTHYROXINE SODIUM 100 MCG: 0.1 TABLET ORAL at 05:09

## 2025-08-25 RX ADMIN — LIDOCAINE HYDROCHLORIDE 40 MG: 20 INJECTION, SOLUTION EPIDURAL; INFILTRATION; INTRACAUDAL; PERINEURAL at 11:55

## 2025-08-25 RX ADMIN — PROPOFOL 150 MCG/KG/MIN: 10 INJECTION, EMULSION INTRAVENOUS at 11:55

## 2025-08-25 RX ADMIN — SODIUM CHLORIDE, POTASSIUM CHLORIDE, SODIUM LACTATE AND CALCIUM CHLORIDE: 600; 310; 30; 20 INJECTION, SOLUTION INTRAVENOUS at 11:49

## 2025-08-25 RX ADMIN — IOHEXOL 100 ML: 350 INJECTION, SOLUTION INTRAVENOUS at 15:50

## 2025-08-25 RX ADMIN — FEXOFENADINE HCL 180 MG: 60 TABLET, FILM COATED ORAL at 05:09

## 2025-08-25 RX ADMIN — OMEPRAZOLE 20 MG: 20 CAPSULE, DELAYED RELEASE ORAL at 05:10

## 2025-08-25 ASSESSMENT — PAIN DESCRIPTION - PAIN TYPE
TYPE: ACUTE PAIN
TYPE: SURGICAL PAIN
TYPE: ACUTE PAIN

## 2025-08-25 ASSESSMENT — ENCOUNTER SYMPTOMS
DIARRHEA: 0
VOMITING: 0
CHILLS: 0
FEVER: 0
DEPRESSION: 0
MYALGIAS: 0
PHOTOPHOBIA: 0
HEARTBURN: 0
CLAUDICATION: 0
ABDOMINAL PAIN: 0
INSOMNIA: 0
DIZZINESS: 0
NERVOUS/ANXIOUS: 0
SENSORY CHANGE: 0
CONSTIPATION: 0
BLURRED VISION: 0
COUGH: 0
SPEECH CHANGE: 0
SHORTNESS OF BREATH: 0
HEADACHES: 0
WEAKNESS: 0

## 2025-08-25 ASSESSMENT — PAIN SCALES - GENERAL: PAIN_LEVEL: 0

## 2025-08-26 ENCOUNTER — TELEPHONE (OUTPATIENT)
Dept: CARDIOLOGY | Facility: MEDICAL CENTER | Age: 70
End: 2025-08-26
Payer: MEDICARE

## 2025-08-26 ENCOUNTER — PATIENT OUTREACH (OUTPATIENT)
Dept: MEDICAL GROUP | Facility: PHYSICIAN GROUP | Age: 70
End: 2025-08-26
Payer: MEDICARE

## 2025-08-26 LAB — CEA SERPL-MCNC: 35.8 NG/ML (ref 0–3)

## 2025-08-27 ENCOUNTER — OFFICE VISIT (OUTPATIENT)
Facility: MEDICAL CENTER | Age: 70
End: 2025-08-27
Payer: MEDICARE

## 2025-08-27 ENCOUNTER — APPOINTMENT (OUTPATIENT)
Dept: RADIOLOGY | Facility: MEDICAL CENTER | Age: 70
End: 2025-08-27
Attending: FAMILY MEDICINE
Payer: MEDICARE

## 2025-08-27 VITALS
SYSTOLIC BLOOD PRESSURE: 126 MMHG | HEART RATE: 93 BPM | DIASTOLIC BLOOD PRESSURE: 74 MMHG | WEIGHT: 138.89 LBS | BODY MASS INDEX: 23.71 KG/M2 | OXYGEN SATURATION: 98 % | HEIGHT: 64 IN | TEMPERATURE: 97.9 F

## 2025-08-27 DIAGNOSIS — D50.0 IRON DEFICIENCY ANEMIA DUE TO CHRONIC BLOOD LOSS: ICD-10-CM

## 2025-08-27 DIAGNOSIS — K63.89 COLONIC MASS: Primary | ICD-10-CM

## 2025-08-27 PROBLEM — D48.9 NEOPLASM OF UNCERTAIN BEHAVIOR: Status: RESOLVED | Noted: 2019-12-04 | Resolved: 2025-08-27

## 2025-08-27 RX ORDER — METRONIDAZOLE 500 MG/1
TABLET ORAL
Qty: 3 TABLET | Refills: 0 | Status: SHIPPED | OUTPATIENT
Start: 2025-08-27

## 2025-08-27 RX ORDER — BISACODYL 5 MG/1
TABLET, DELAYED RELEASE ORAL
Qty: 2 TABLET | Refills: 0 | Status: SHIPPED | OUTPATIENT
Start: 2025-08-27

## 2025-08-27 RX ORDER — NEOMYCIN SULFATE 500 MG/1
TABLET ORAL
Qty: 6 TABLET | Refills: 0 | Status: SHIPPED | OUTPATIENT
Start: 2025-08-27

## 2025-08-27 RX ORDER — POLYETHYLENE GLYCOL 3350 17 G/17G
POWDER, FOR SOLUTION ORAL
Qty: 510 G | Refills: 0 | Status: SHIPPED | OUTPATIENT
Start: 2025-08-27

## 2025-08-27 ASSESSMENT — FIBROSIS 4 INDEX: FIB4 SCORE: 0.94

## (undated) DEVICE — ELECTRODE DUAL RETURN W/ CORD - (50/PK)

## (undated) DEVICE — TUBE CONNECT SUCTION CLEAR 120 X 1/4" (50EA/CA)"

## (undated) DEVICE — GOWN SURGEONS X-LARGE - DISP. (30/CA)

## (undated) DEVICE — HEAD HOLDER JUNIOR/ADULT

## (undated) DEVICE — GLOVE BIOGEL INDICATOR SZ 7.5 SURGICAL PF LTX - (50PR/BX 4BX/CA)

## (undated) DEVICE — CONTAINER SPECIMEN BAG OR - STERILE 4 OZ W/LID (100EA/CA)

## (undated) DEVICE — PROTECTOR ULNA NERVE - (36PR/CA)

## (undated) DEVICE — KIT  I.V. START (100EA/CA)

## (undated) DEVICE — NEPTUNE 4 PORT MANIFOLD - (20/PK)

## (undated) DEVICE — CLOSURE SKIN STRIP 1/2 X 4 IN - (STERI STRIP) (50/BX 4BX/CA)

## (undated) DEVICE — TOWEL STOP TIMEOUT SAFETY FLAG (40EA/CA)

## (undated) DEVICE — CORDS BIPOLAR COAGULATION - 12FT STERILE DISP. (10EA/BX)

## (undated) DEVICE — LACTATED RINGERS INJ 1000 ML - (14EA/CA 60CA/PF)

## (undated) DEVICE — TUBE CONNECTING SUCTION - CLEAR PLASTIC STERILE 72 IN (50EA/CA)

## (undated) DEVICE — STAPLER SKIN DISP - (6/BX 10BX/CA) VISISTAT

## (undated) DEVICE — GOWN SURGEONS LARGE - (32/CA)

## (undated) DEVICE — CATHERTER CLEAR SINGLE USE INJECTION THERAPY NEEDLE 25GA X 4MM 2.3MM X 240CM (5EA/BX)

## (undated) DEVICE — PENCIL ELECTSURG 10FT BTN SWH - (50/CA)

## (undated) DEVICE — KIT ANESTHESIA W/CIRCUIT & 3/LT BAG W/FILTER (20EA/CA)

## (undated) DEVICE — SENSOR SPO2 NEO LNCS ADHESIVE (20/BX) SEE USER NOTES

## (undated) DEVICE — SET LEADWIRE 5 LEAD BEDSIDE DISPOSABLE ECG (1SET OF 5/EA)

## (undated) DEVICE — COVER LIGHT HANDLE FLEXIBLE - SOFT (2EA/PK 80PK/CA)

## (undated) DEVICE — SUTURE 5-0 MONOCRYL PLUS PC-3 - (12/BX)

## (undated) DEVICE — PACK ENT OR - (2EA/CA)

## (undated) DEVICE — JELLY SURGILUBE STERILE FOIL 5 GM (150EA/BX)

## (undated) DEVICE — GLOVE BIOGEL SZ 7.5 SURGICAL PF LTX - (50PR/BX 4BX/CA)

## (undated) DEVICE — CATHETER IV 20 GA X 1-1/4 ---SURG.& SDS ONLY--- (50EA/BX)

## (undated) DEVICE — PROBE PRASS STAND STIMULATING (5EA/PK)

## (undated) DEVICE — BLADE SURGICAL #10 - (50/BX)

## (undated) DEVICE — MASK ANESTHESIA ADULT  - (100/CA)

## (undated) DEVICE — GLOVE SZ 7.5 BIOGEL PI MICRO - PF LF (50PR/BX)

## (undated) DEVICE — SPONGE GAUZESTER 4 X 4 4PLY - (128PK/CA)

## (undated) DEVICE — SUTURE 3-0 SILK 12 X 18 IN - (36/BX)

## (undated) DEVICE — SODIUM CHL IRRIGATION 0.9% 1000ML (12EA/CA)

## (undated) DEVICE — COLLAR BUTTON 1.27LUMEN - FLUROROPLASTIC (6/BX)

## (undated) DEVICE — SUTURE 4-0 VICRYL PLUS FS-2 - 27 INCH (36/BX)

## (undated) DEVICE — ADHESIVE MASTISOL - (48/BX)

## (undated) DEVICE — BLADE 45 DEGREE CAEAR TIP NARROW SHAFT S/SU (6/CA)

## (undated) DEVICE — TOWELS CLOTH SURGICAL - (4/PK 20PK/CA)

## (undated) DEVICE — SUCTION INSTRUMENT YANKAUER BULBOUS TIP W/O VENT (50EA/CA)

## (undated) DEVICE — PACK MINOR BASIN - (4EA/CA)

## (undated) DEVICE — DRAPE MAGNETIC (INSTRA-MAG) - (30/CA)

## (undated) DEVICE — PROBE INCREMENTING PRASS TIP (3EA/PK)

## (undated) DEVICE — GOWN WARMING STANDARD FLEX - (30/CA)

## (undated) DEVICE — CANISTER SUCTION RIGID RED 1500CC (40EA/CA)

## (undated) DEVICE — SLEEVE, VASO, THIGH, MED

## (undated) DEVICE — TRAY SRGPRP PVP IOD WT PRP - (20/CA)

## (undated) DEVICE — SHEAR HS FOCUS 9CM CVD - (6/BX)

## (undated) DEVICE — BALL COTTON STERILE 5/PK - (5/PK 25PK/CA)

## (undated) DEVICE — SUTURE GENERAL

## (undated) DEVICE — SUTURE 2-0 SILK 12 X 18" (36PK/BX)"

## (undated) DEVICE — TUBING CLEARLINK DUO-VENT - C-FLO (48EA/CA)

## (undated) DEVICE — TEETHGUARD ENT (6EA/BX) ****2 BOX MIN ORDER****

## (undated) DEVICE — DRAPE SURGICAL U 77X120 - (10/CA)

## (undated) DEVICE — GLOVE BIOGEL PI INDICATOR SZ 7.5 SURGICAL PF LF -(50/BX 4BX/CA)

## (undated) DEVICE — CONTAINER, SPECIMEN, STERILE

## (undated) DEVICE — CANISTER SUCTION 3000ML MECHANICAL FILTER AUTO SHUTOFF MEDI-VAC NONSTERILE LF DISP  (40EA/CA)

## (undated) DEVICE — SYRINGE SAFETY TB 1 ML 27 GA X 1/2 IN (100/BX 4BX/CA)

## (undated) DEVICE — DRAPE LARGE 3 QUARTER - (20/CA)

## (undated) DEVICE — GLOVE BIOGEL SZ 8 SURGICAL PF LTX - (50PR/BX 4BX/CA)

## (undated) DEVICE — SUTURE 3-0 VICRYL PLUS SH - 27 INCH (36/BX)

## (undated) DEVICE — SYRINGE 10 ML CONTROL LL (25EA/BX 4BX/CA)

## (undated) DEVICE — TUBE EMG NIM TRIVANTAGE 7MM (3EA/PK)

## (undated) DEVICE — SPONGE PEANUT - (5/PK 50PK/CA)

## (undated) DEVICE — FIBRILLAR SURGICEL 4X4 - 10/CA

## (undated) DEVICE — MEDICINE CUP STERILE 2 OZ - (100/CA)

## (undated) DEVICE — ELECTRODE 850 FOAM ADHESIVE - HYDROGEL RADIOTRNSPRNT (50/PK)

## (undated) DEVICE — GLOVE BIOGEL SZ 7 SURGICAL PF LTX - (50PR/BX 4BX/CA)

## (undated) DEVICE — WATER IRRIGATION STERILE 1000ML (12EA/CA)